# Patient Record
Sex: MALE | Race: WHITE | Employment: FULL TIME | URBAN - METROPOLITAN AREA
[De-identification: names, ages, dates, MRNs, and addresses within clinical notes are randomized per-mention and may not be internally consistent; named-entity substitution may affect disease eponyms.]

---

## 2017-06-23 ENCOUNTER — LAB ENCOUNTER (OUTPATIENT)
Dept: LAB | Age: 52
End: 2017-06-23
Attending: FAMILY MEDICINE
Payer: COMMERCIAL

## 2017-06-23 ENCOUNTER — OFFICE VISIT (OUTPATIENT)
Dept: FAMILY MEDICINE CLINIC | Facility: CLINIC | Age: 52
End: 2017-06-23

## 2017-06-23 VITALS
HEIGHT: 69 IN | SYSTOLIC BLOOD PRESSURE: 136 MMHG | TEMPERATURE: 98 F | DIASTOLIC BLOOD PRESSURE: 88 MMHG | WEIGHT: 233 LBS | HEART RATE: 94 BPM | BODY MASS INDEX: 34.51 KG/M2 | RESPIRATION RATE: 18 BRPM | OXYGEN SATURATION: 97 %

## 2017-06-23 DIAGNOSIS — Z13.29 SCREENING FOR THYROID DISORDER: ICD-10-CM

## 2017-06-23 DIAGNOSIS — Z13.220 SCREENING FOR LIPID DISORDERS: ICD-10-CM

## 2017-06-23 DIAGNOSIS — Z12.5 ENCOUNTER FOR SCREENING FOR MALIGNANT NEOPLASM OF PROSTATE: ICD-10-CM

## 2017-06-23 DIAGNOSIS — Z13.21 ENCOUNTER FOR VITAMIN DEFICIENCY SCREENING: ICD-10-CM

## 2017-06-23 DIAGNOSIS — Z13.1 ENCOUNTER FOR SCREENING FOR DIABETES MELLITUS: ICD-10-CM

## 2017-06-23 DIAGNOSIS — Z00.00 ROUTINE GENERAL MEDICAL EXAMINATION AT HEALTH CARE FACILITY: Primary | ICD-10-CM

## 2017-06-23 DIAGNOSIS — M1A.00X0 IDIOPATHIC CHRONIC GOUT WITHOUT TOPHUS, UNSPECIFIED SITE: ICD-10-CM

## 2017-06-23 DIAGNOSIS — Z13.0 SCREENING, ANEMIA, DEFICIENCY, IRON: ICD-10-CM

## 2017-06-23 DIAGNOSIS — Z00.00 LABORATORY EXAM ORDERED AS PART OF ROUTINE GENERAL MEDICAL EXAMINATION: ICD-10-CM

## 2017-06-23 DIAGNOSIS — E66.09 NON MORBID OBESITY DUE TO EXCESS CALORIES: ICD-10-CM

## 2017-06-23 DIAGNOSIS — I10 ESSENTIAL HYPERTENSION: ICD-10-CM

## 2017-06-23 DIAGNOSIS — Z80.0 FAMILY HISTORY OF COLON CANCER: ICD-10-CM

## 2017-06-23 PROCEDURE — 85025 COMPLETE CBC W/AUTO DIFF WBC: CPT

## 2017-06-23 PROCEDURE — 80053 COMPREHEN METABOLIC PANEL: CPT

## 2017-06-23 PROCEDURE — 82306 VITAMIN D 25 HYDROXY: CPT

## 2017-06-23 PROCEDURE — 36415 COLL VENOUS BLD VENIPUNCTURE: CPT

## 2017-06-23 PROCEDURE — 99396 PREV VISIT EST AGE 40-64: CPT | Performed by: FAMILY MEDICINE

## 2017-06-23 PROCEDURE — 80061 LIPID PANEL: CPT

## 2017-06-23 PROCEDURE — 84443 ASSAY THYROID STIM HORMONE: CPT

## 2017-06-23 RX ORDER — LOSARTAN POTASSIUM AND HYDROCHLOROTHIAZIDE 12.5; 1 MG/1; MG/1
1 TABLET ORAL DAILY
Qty: 90 TABLET | Refills: 3 | Status: SHIPPED | OUTPATIENT
Start: 2017-06-23 | End: 2020-04-01

## 2017-06-23 RX ORDER — ALLOPURINOL 100 MG/1
TABLET ORAL
Qty: 90 TABLET | Refills: 3 | Status: SHIPPED | OUTPATIENT
Start: 2017-06-23 | End: 2020-04-01

## 2017-06-23 NOTE — PROGRESS NOTES
Here for a physical.  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 Does not exercise. Positive family history of colon cancer. His never had a colonoscopy. Has a positive family history of premature cardiovascular disease.   Has gout but the last attack is bee

## 2019-07-28 ENCOUNTER — OFFICE VISIT (OUTPATIENT)
Dept: FAMILY MEDICINE CLINIC | Facility: CLINIC | Age: 54
End: 2019-07-28
Payer: COMMERCIAL

## 2019-07-28 VITALS
WEIGHT: 215.38 LBS | OXYGEN SATURATION: 97 % | HEIGHT: 67 IN | SYSTOLIC BLOOD PRESSURE: 116 MMHG | BODY MASS INDEX: 33.8 KG/M2 | TEMPERATURE: 100 F | RESPIRATION RATE: 20 BRPM | HEART RATE: 91 BPM | DIASTOLIC BLOOD PRESSURE: 74 MMHG

## 2019-07-28 DIAGNOSIS — J02.0 STREP PHARYNGITIS: Primary | ICD-10-CM

## 2019-07-28 DIAGNOSIS — J02.9 SORE THROAT: ICD-10-CM

## 2019-07-28 LAB
CONTROL LINE PRESENT WITH A CLEAR BACKGROUND (YES/NO): YES YES/NO
STREP GRP A CUL-SCR: NEGATIVE

## 2019-07-28 PROCEDURE — 99213 OFFICE O/P EST LOW 20 MIN: CPT | Performed by: NURSE PRACTITIONER

## 2019-07-28 PROCEDURE — 87880 STREP A ASSAY W/OPTIC: CPT | Performed by: NURSE PRACTITIONER

## 2019-07-28 RX ORDER — AMOXICILLIN 875 MG/1
875 TABLET, COATED ORAL 2 TIMES DAILY
Qty: 20 TABLET | Refills: 0 | Status: SHIPPED | OUTPATIENT
Start: 2019-07-28 | End: 2019-08-07

## 2019-07-28 NOTE — PROGRESS NOTES
CHIEF COMPLAINT:   Patient presents with:  Sore Throat: fever x1day      HPI:   Robert Montana is a 49year old male presents to clinic with symptoms of sore throat. Patient has had for 2 days. Symptoms have been worsenign since onset.   Patient reports fo NOSE: nostrils patent, no exudates, nasal mucosa pink and noninflamed  THROAT: oral mucosa pink, moist. Posterior pharynx erythematous and injected. Uvula is very red and swollen.   NECK: supple  LUNGS: clear to auscultation bilaterally, no wheezes or rhonc The patient is asked to follow up with their PCP prn. Patient Instructions   Use the antibiotic for 10 days  Ibuprofen 800mg three times a day      Pharyngitis: Strep (Presumed)    You have pharyngitis (sore throat).  The healthcare staff think your sore · Fever as directed by your healthcare provider  · New or worse ear pain, sinus pain, or headache  · Painful lumps in the back of neck  · Stiff neck  · Lymph nodes that get larger  · Can’t swallow liquids, a lot of drooling, or can’t open mouth wide due to

## 2019-07-28 NOTE — PATIENT INSTRUCTIONS
Use the antibiotic for 10 days  Ibuprofen 800mg three times a day      Pharyngitis: Strep (Presumed)    You have pharyngitis (sore throat).  The healthcare staff think your sore throat is caused by streptococcus (strep) bacteria. This is often called strep headache  · Painful lumps in the back of neck  · Stiff neck  · Lymph nodes that get larger  · Can’t swallow liquids, a lot of drooling, or can’t open mouth wide due to throat pain  · Signs of dehydration, such as very dark urine or no urine, sunken eyes, d

## 2019-10-17 ENCOUNTER — TELEPHONE (OUTPATIENT)
Dept: FAMILY MEDICINE CLINIC | Facility: CLINIC | Age: 54
End: 2019-10-17

## 2019-10-17 NOTE — TELEPHONE ENCOUNTER
Patient is having a gout flare up and the script he has is old and . Requesting if a new script can be sent to [facility]. Please advise.

## 2020-03-31 ENCOUNTER — VIRTUAL PHONE E/M (OUTPATIENT)
Dept: FAMILY MEDICINE CLINIC | Facility: CLINIC | Age: 55
End: 2020-03-31
Payer: COMMERCIAL

## 2020-03-31 DIAGNOSIS — I10 ESSENTIAL HYPERTENSION: ICD-10-CM

## 2020-03-31 DIAGNOSIS — M1A.0790 IDIOPATHIC CHRONIC GOUT OF FOOT WITHOUT TOPHUS, UNSPECIFIED LATERALITY: Primary | ICD-10-CM

## 2020-03-31 PROCEDURE — 99212 OFFICE O/P EST SF 10 MIN: CPT | Performed by: FAMILY MEDICINE

## 2020-03-31 NOTE — PROGRESS NOTES
Virtual/Telephone Check-In    [facility] verbally consents to a Virtual/Telephone Check-In service on 03/31/20. Patient understands and accepts financial responsibility for any deductible, co-insurance and/or co-pays associated with this service.

## 2020-03-31 NOTE — PROGRESS NOTES
Virtual/Telephone Check-In    [facility] verbally consents to a Virtual/Telephone Check-In service on 03/31/20. Patient understands and accepts financial responsibility for any deductible, co-insurance and/or co-pays associated with this service.

## 2020-04-01 RX ORDER — ALLOPURINOL 100 MG/1
TABLET ORAL
Qty: 90 TABLET | Refills: 3 | Status: SHIPPED | OUTPATIENT
Start: 2020-04-01 | End: 2021-11-05

## 2020-04-01 RX ORDER — LOSARTAN POTASSIUM 50 MG/1
50 TABLET ORAL DAILY
Qty: 90 TABLET | Refills: 2 | Status: SHIPPED | OUTPATIENT
Start: 2020-04-01 | End: 2021-03-12

## 2021-03-12 DIAGNOSIS — I10 ESSENTIAL HYPERTENSION: ICD-10-CM

## 2021-03-12 DIAGNOSIS — M1A.0790 IDIOPATHIC CHRONIC GOUT OF FOOT WITHOUT TOPHUS, UNSPECIFIED LATERALITY: ICD-10-CM

## 2021-03-12 RX ORDER — LOSARTAN POTASSIUM 50 MG/1
50 TABLET ORAL DAILY
Qty: 30 TABLET | Refills: 0 | Status: SHIPPED | OUTPATIENT
Start: 2021-03-12 | End: 2021-11-05

## 2021-11-05 ENCOUNTER — LAB ENCOUNTER (OUTPATIENT)
Dept: LAB | Age: 56
End: 2021-11-05
Attending: FAMILY MEDICINE
Payer: COMMERCIAL

## 2021-11-05 ENCOUNTER — OFFICE VISIT (OUTPATIENT)
Dept: FAMILY MEDICINE CLINIC | Facility: CLINIC | Age: 56
End: 2021-11-05

## 2021-11-05 VITALS
SYSTOLIC BLOOD PRESSURE: 128 MMHG | BODY MASS INDEX: 36.57 KG/M2 | DIASTOLIC BLOOD PRESSURE: 84 MMHG | OXYGEN SATURATION: 98 % | WEIGHT: 233 LBS | HEIGHT: 67 IN | HEART RATE: 86 BPM | RESPIRATION RATE: 16 BRPM

## 2021-11-05 DIAGNOSIS — M1A.0790 IDIOPATHIC CHRONIC GOUT OF FOOT WITHOUT TOPHUS, UNSPECIFIED LATERALITY: ICD-10-CM

## 2021-11-05 DIAGNOSIS — Z13.0 SCREENING FOR DEFICIENCY ANEMIA: ICD-10-CM

## 2021-11-05 DIAGNOSIS — I10 ESSENTIAL HYPERTENSION: ICD-10-CM

## 2021-11-05 DIAGNOSIS — Z12.5 PROSTATE CANCER SCREENING: ICD-10-CM

## 2021-11-05 DIAGNOSIS — Z13.220 LIPID SCREENING: ICD-10-CM

## 2021-11-05 DIAGNOSIS — Z00.00 WELLNESS EXAMINATION: Primary | ICD-10-CM

## 2021-11-05 DIAGNOSIS — Z00.00 WELLNESS EXAMINATION: ICD-10-CM

## 2021-11-05 DIAGNOSIS — Z12.11 SCREENING FOR COLON CANCER: ICD-10-CM

## 2021-11-05 PROCEDURE — 3008F BODY MASS INDEX DOCD: CPT | Performed by: FAMILY MEDICINE

## 2021-11-05 PROCEDURE — 80053 COMPREHEN METABOLIC PANEL: CPT

## 2021-11-05 PROCEDURE — 3079F DIAST BP 80-89 MM HG: CPT | Performed by: FAMILY MEDICINE

## 2021-11-05 PROCEDURE — 80061 LIPID PANEL: CPT

## 2021-11-05 PROCEDURE — 3074F SYST BP LT 130 MM HG: CPT | Performed by: FAMILY MEDICINE

## 2021-11-05 PROCEDURE — 99396 PREV VISIT EST AGE 40-64: CPT | Performed by: FAMILY MEDICINE

## 2021-11-05 PROCEDURE — 85025 COMPLETE CBC W/AUTO DIFF WBC: CPT

## 2021-11-05 PROCEDURE — 36415 COLL VENOUS BLD VENIPUNCTURE: CPT

## 2021-11-05 PROCEDURE — 84550 ASSAY OF BLOOD/URIC ACID: CPT

## 2021-11-05 RX ORDER — LOSARTAN POTASSIUM 50 MG/1
50 TABLET ORAL DAILY
Qty: 90 TABLET | Refills: 3 | Status: SHIPPED | OUTPATIENT
Start: 2021-11-05

## 2021-11-05 RX ORDER — ALLOPURINOL 100 MG/1
TABLET ORAL
Qty: 90 TABLET | Refills: 3 | Status: SHIPPED | OUTPATIENT
Start: 2021-11-05

## 2021-11-05 NOTE — PROGRESS NOTES
HPI:   Jai Thurman is a 55year old male who presents for an Annual Health Visit. No particular concerns or worries. Social:  , with a 5year old daughter.  Have family nearby, his side is all in [place], wife's here, [address] [place], [place] lb 6.4 oz (97.7 kg)  06/23/17 : 233 lb (105.7 kg)  06/20/16 : 232 lb (105.2 kg)  03/20/15 : 240 lb (108.9 kg)  09/26/14 : 238 lb (108 kg)    Body mass index is 36.49 kg/m².     General: alert, appears stated age and cooperative  Head: Normocephalic, without this visit. The patient indicates understanding of these issues and agrees to the plan.     Problem List:  Patient Active Problem List:     Gouty arthropathy, unspecified     Family history of colon cancer     Routine general medical examination at a [facility]

## 2023-08-02 ENCOUNTER — OFFICE VISIT (OUTPATIENT)
Dept: FAMILY MEDICINE CLINIC | Facility: CLINIC | Age: 58
End: 2023-08-02
Payer: COMMERCIAL

## 2023-08-02 ENCOUNTER — LAB ENCOUNTER (OUTPATIENT)
Dept: LAB | Age: 58
End: 2023-08-02
Attending: FAMILY MEDICINE
Payer: COMMERCIAL

## 2023-08-02 VITALS
WEIGHT: 215 LBS | RESPIRATION RATE: 18 BRPM | HEIGHT: 67 IN | BODY MASS INDEX: 33.74 KG/M2 | OXYGEN SATURATION: 98 % | DIASTOLIC BLOOD PRESSURE: 76 MMHG | TEMPERATURE: 98 F | SYSTOLIC BLOOD PRESSURE: 122 MMHG | HEART RATE: 68 BPM

## 2023-08-02 DIAGNOSIS — Z13.0 SCREENING FOR DEFICIENCY ANEMIA: ICD-10-CM

## 2023-08-02 DIAGNOSIS — Z13.220 LIPID SCREENING: ICD-10-CM

## 2023-08-02 DIAGNOSIS — Z12.5 PROSTATE CANCER SCREENING: ICD-10-CM

## 2023-08-02 DIAGNOSIS — Z12.11 SCREENING FOR COLON CANCER: ICD-10-CM

## 2023-08-02 DIAGNOSIS — I10 ESSENTIAL HYPERTENSION: ICD-10-CM

## 2023-08-02 DIAGNOSIS — Z00.00 WELLNESS EXAMINATION: ICD-10-CM

## 2023-08-02 DIAGNOSIS — Z00.00 WELLNESS EXAMINATION: Primary | ICD-10-CM

## 2023-08-02 DIAGNOSIS — M1A.0790 IDIOPATHIC CHRONIC GOUT OF FOOT WITHOUT TOPHUS, UNSPECIFIED LATERALITY: ICD-10-CM

## 2023-08-02 LAB
ALBUMIN SERPL-MCNC: 2.5 G/DL (ref 3.4–5)
ALBUMIN/GLOB SERPL: 0.5 {RATIO} (ref 1–2)
ALP LIVER SERPL-CCNC: 103 U/L
ALT SERPL-CCNC: 43 U/L
ANION GAP SERPL CALC-SCNC: 9 MMOL/L (ref 0–18)
AST SERPL-CCNC: 120 U/L (ref 15–37)
BASOPHILS # BLD AUTO: 0.06 X10(3) UL (ref 0–0.2)
BASOPHILS NFR BLD AUTO: 0.8 %
BILIRUB SERPL-MCNC: 5.3 MG/DL (ref 0.1–2)
BUN BLD-MCNC: 7 MG/DL (ref 7–18)
CALCIUM BLD-MCNC: 9 MG/DL (ref 8.5–10.1)
CHLORIDE SERPL-SCNC: 104 MMOL/L (ref 98–112)
CHOLEST SERPL-MCNC: 132 MG/DL (ref ?–200)
CO2 SERPL-SCNC: 20 MMOL/L (ref 21–32)
COMPLEXED PSA SERPL-MCNC: 0.25 NG/ML (ref ?–4)
CREAT BLD-MCNC: 1.11 MG/DL
EGFRCR SERPLBLD CKD-EPI 2021: 77 ML/MIN/1.73M2 (ref 60–?)
EOSINOPHIL # BLD AUTO: 0.13 X10(3) UL (ref 0–0.7)
EOSINOPHIL NFR BLD AUTO: 1.7 %
ERYTHROCYTE [DISTWIDTH] IN BLOOD BY AUTOMATED COUNT: 15 %
FASTING PATIENT LIPID ANSWER: YES
FASTING STATUS PATIENT QL REPORTED: YES
GLOBULIN PLAS-MCNC: 5.4 G/DL (ref 2.8–4.4)
GLUCOSE BLD-MCNC: 120 MG/DL (ref 70–99)
HCT VFR BLD AUTO: 29.6 %
HDLC SERPL-MCNC: 44 MG/DL (ref 40–59)
HGB BLD-MCNC: 10.3 G/DL
IMM GRANULOCYTES # BLD AUTO: 0.02 X10(3) UL (ref 0–1)
IMM GRANULOCYTES NFR BLD: 0.3 %
LDLC SERPL CALC-MCNC: 67 MG/DL (ref ?–100)
LYMPHOCYTES # BLD AUTO: 1.65 X10(3) UL (ref 1–4)
LYMPHOCYTES NFR BLD AUTO: 21.6 %
MCH RBC QN AUTO: 35 PG (ref 26–34)
MCHC RBC AUTO-ENTMCNC: 34.8 G/DL (ref 31–37)
MCV RBC AUTO: 100.7 FL
MONOCYTES # BLD AUTO: 0.68 X10(3) UL (ref 0.1–1)
MONOCYTES NFR BLD AUTO: 8.9 %
NEUTROPHILS # BLD AUTO: 5.09 X10 (3) UL (ref 1.5–7.7)
NEUTROPHILS # BLD AUTO: 5.09 X10(3) UL (ref 1.5–7.7)
NEUTROPHILS NFR BLD AUTO: 66.7 %
NONHDLC SERPL-MCNC: 88 MG/DL (ref ?–130)
OSMOLALITY SERPL CALC.SUM OF ELEC: 275 MOSM/KG (ref 275–295)
PLATELET # BLD AUTO: 101 10(3)UL (ref 150–450)
POTASSIUM SERPL-SCNC: 3.8 MMOL/L (ref 3.5–5.1)
PROT SERPL-MCNC: 7.9 G/DL (ref 6.4–8.2)
RBC # BLD AUTO: 2.94 X10(6)UL
SODIUM SERPL-SCNC: 133 MMOL/L (ref 136–145)
TRIGL SERPL-MCNC: 117 MG/DL (ref 30–149)
URATE SERPL-MCNC: 2.6 MG/DL
VLDLC SERPL CALC-MCNC: 18 MG/DL (ref 0–30)
WBC # BLD AUTO: 7.6 X10(3) UL (ref 4–11)

## 2023-08-02 PROCEDURE — 3008F BODY MASS INDEX DOCD: CPT | Performed by: FAMILY MEDICINE

## 2023-08-02 PROCEDURE — 84550 ASSAY OF BLOOD/URIC ACID: CPT

## 2023-08-02 PROCEDURE — 3078F DIAST BP <80 MM HG: CPT | Performed by: FAMILY MEDICINE

## 2023-08-02 PROCEDURE — 3074F SYST BP LT 130 MM HG: CPT | Performed by: FAMILY MEDICINE

## 2023-08-02 PROCEDURE — 80061 LIPID PANEL: CPT

## 2023-08-02 PROCEDURE — 36415 COLL VENOUS BLD VENIPUNCTURE: CPT

## 2023-08-02 PROCEDURE — 85025 COMPLETE CBC W/AUTO DIFF WBC: CPT

## 2023-08-02 PROCEDURE — 99396 PREV VISIT EST AGE 40-64: CPT | Performed by: FAMILY MEDICINE

## 2023-08-02 PROCEDURE — 80053 COMPREHEN METABOLIC PANEL: CPT

## 2023-08-02 RX ORDER — LOSARTAN POTASSIUM 50 MG/1
50 TABLET ORAL DAILY
Qty: 90 TABLET | Refills: 3 | Status: SHIPPED | OUTPATIENT
Start: 2023-08-02

## 2023-08-02 RX ORDER — ALLOPURINOL 100 MG/1
TABLET ORAL
Qty: 90 TABLET | Refills: 3 | Status: SHIPPED | OUTPATIENT
Start: 2023-08-02

## 2023-08-03 DIAGNOSIS — R74.8 ELEVATED LIVER ENZYMES: Primary | ICD-10-CM

## 2023-08-03 DIAGNOSIS — R17 PAINLESS JAUNDICE: ICD-10-CM

## 2023-08-04 ENCOUNTER — HOSPITAL ENCOUNTER (OUTPATIENT)
Dept: ULTRASOUND IMAGING | Facility: HOSPITAL | Age: 58
Discharge: HOME | End: 2023-08-04
Attending: FAMILY MEDICINE
Payer: COMMERCIAL

## 2023-08-04 DIAGNOSIS — R74.8 ELEVATED LIVER ENZYMES: ICD-10-CM

## 2023-08-04 DIAGNOSIS — R17 PAINLESS JAUNDICE: ICD-10-CM

## 2023-08-04 PROCEDURE — 76700 US EXAM ABDOM COMPLETE: CPT | Performed by: FAMILY MEDICINE

## 2023-08-07 DIAGNOSIS — R17 JAUNDICE: Primary | ICD-10-CM

## 2023-08-11 ENCOUNTER — LAB ENCOUNTER (OUTPATIENT)
Dept: LAB | Age: 58
End: 2023-08-11
Attending: FAMILY MEDICINE
Payer: COMMERCIAL

## 2023-08-11 DIAGNOSIS — R17 PAINLESS JAUNDICE: ICD-10-CM

## 2023-08-11 DIAGNOSIS — R17 JAUNDICE: ICD-10-CM

## 2023-08-11 DIAGNOSIS — R74.8 ELEVATED LIVER ENZYMES: ICD-10-CM

## 2023-08-11 LAB
HAV IGM SER QL: NONREACTIVE
HBV CORE IGM SER QL: NONREACTIVE
HBV SURFACE AG SERPL QL IA: NONREACTIVE
HCV AB SERPL QL IA: NONREACTIVE
INR BLD: 1.97 (ref 0.85–1.16)
PROTHROMBIN TIME: 22.3 SECONDS (ref 11.6–14.8)

## 2023-08-11 PROCEDURE — 83516 IMMUNOASSAY NONANTIBODY: CPT

## 2023-08-11 PROCEDURE — 80074 ACUTE HEPATITIS PANEL: CPT

## 2023-08-11 PROCEDURE — 36415 COLL VENOUS BLD VENIPUNCTURE: CPT

## 2023-08-11 PROCEDURE — 85610 PROTHROMBIN TIME: CPT

## 2023-08-12 LAB — M2 MITOCHONDRIAL AB: <20 UNITS

## 2023-08-16 ENCOUNTER — OFFICE VISIT (OUTPATIENT)
Dept: FAMILY MEDICINE CLINIC | Facility: CLINIC | Age: 58
End: 2023-08-16
Payer: COMMERCIAL

## 2023-08-16 VITALS
SYSTOLIC BLOOD PRESSURE: 112 MMHG | TEMPERATURE: 98 F | DIASTOLIC BLOOD PRESSURE: 50 MMHG | WEIGHT: 215 LBS | RESPIRATION RATE: 16 BRPM | OXYGEN SATURATION: 98 % | HEART RATE: 97 BPM | BODY MASS INDEX: 33.74 KG/M2 | HEIGHT: 67 IN

## 2023-08-16 DIAGNOSIS — K74.60 CIRRHOSIS OF LIVER WITH ASCITES, UNSPECIFIED HEPATIC CIRRHOSIS TYPE: Primary | ICD-10-CM

## 2023-08-16 DIAGNOSIS — R18.8 CIRRHOSIS OF LIVER WITH ASCITES, UNSPECIFIED HEPATIC CIRRHOSIS TYPE: Primary | ICD-10-CM

## 2023-08-16 PROCEDURE — 3074F SYST BP LT 130 MM HG: CPT | Performed by: FAMILY MEDICINE

## 2023-08-16 PROCEDURE — 99213 OFFICE O/P EST LOW 20 MIN: CPT | Performed by: FAMILY MEDICINE

## 2023-08-16 PROCEDURE — 3078F DIAST BP <80 MM HG: CPT | Performed by: FAMILY MEDICINE

## 2023-08-16 PROCEDURE — 3008F BODY MASS INDEX DOCD: CPT | Performed by: FAMILY MEDICINE

## 2023-08-22 ENCOUNTER — APPOINTMENT (OUTPATIENT)
Dept: ULTRASOUND IMAGING | Facility: HOSPITAL | Age: 58
End: 2023-08-22
Attending: NURSE PRACTITIONER
Payer: COMMERCIAL

## 2023-08-22 ENCOUNTER — APPOINTMENT (OUTPATIENT)
Dept: GENERAL RADIOLOGY | Facility: HOSPITAL | Age: 58
End: 2023-08-22
Attending: NURSE PRACTITIONER
Payer: COMMERCIAL

## 2023-08-22 ENCOUNTER — APPOINTMENT (OUTPATIENT)
Dept: CT IMAGING | Facility: HOSPITAL | Age: 58
End: 2023-08-22
Attending: EMERGENCY MEDICINE
Payer: COMMERCIAL

## 2023-08-22 ENCOUNTER — APPOINTMENT (OUTPATIENT)
Dept: ULTRASOUND IMAGING | Facility: HOSPITAL | Age: 58
End: 2023-08-22
Attending: EMERGENCY MEDICINE
Payer: COMMERCIAL

## 2023-08-22 ENCOUNTER — HOSPITAL ENCOUNTER (INPATIENT)
Facility: HOSPITAL | Age: 58
LOS: 3 days | Discharge: HOME | End: 2023-08-25
Attending: EMERGENCY MEDICINE | Admitting: HOSPITALIST
Payer: COMMERCIAL

## 2023-08-22 DIAGNOSIS — R17 ELEVATED BILIRUBIN: ICD-10-CM

## 2023-08-22 DIAGNOSIS — R73.9 HYPERGLYCEMIA: ICD-10-CM

## 2023-08-22 DIAGNOSIS — E87.6 HYPOKALEMIA: ICD-10-CM

## 2023-08-22 DIAGNOSIS — R17 SCLERAL ICTERUS: ICD-10-CM

## 2023-08-22 DIAGNOSIS — R79.89 ELEVATED LACTIC ACID LEVEL: ICD-10-CM

## 2023-08-22 DIAGNOSIS — E87.1 HYPONATREMIA: ICD-10-CM

## 2023-08-22 DIAGNOSIS — R18.8 OTHER ASCITES: ICD-10-CM

## 2023-08-22 DIAGNOSIS — R74.8 ELEVATED LIPASE: ICD-10-CM

## 2023-08-22 DIAGNOSIS — K65.2 SBP (SPONTANEOUS BACTERIAL PERITONITIS) (HCC): Primary | ICD-10-CM

## 2023-08-22 DIAGNOSIS — D69.6 THROMBOCYTOPENIA (HCC): ICD-10-CM

## 2023-08-22 DIAGNOSIS — A41.9 SEPSIS DUE TO UNDETERMINED ORGANISM (HCC): ICD-10-CM

## 2023-08-22 PROBLEM — K74.60 CIRRHOSIS OF LIVER (HCC): Status: ACTIVE | Noted: 2023-08-22

## 2023-08-22 LAB
A1AT SERPL-MCNC: 186 MG/DL (ref 90–200)
AFP-TM SERPL-MCNC: 9.3 NG/ML (ref ?–8)
ALBUMIN FLD-MCNC: 0.7 G/DL
ALBUMIN SERPL-MCNC: 2.6 G/DL (ref 3.4–5)
ALBUMIN/GLOB SERPL: 0.5 {RATIO} (ref 1–2)
ALP LIVER SERPL-CCNC: 128 U/L
ALT SERPL-CCNC: 51 U/L
ANION GAP SERPL CALC-SCNC: 6 MMOL/L (ref 0–18)
AST SERPL-CCNC: 92 U/L (ref 15–37)
BASOPHILS # BLD AUTO: 0.07 X10(3) UL (ref 0–0.2)
BASOPHILS NFR BLD AUTO: 0.8 %
BASOPHILS NFR PRT: 0 %
BILIRUB SERPL-MCNC: 6.5 MG/DL (ref 0.1–2)
BILIRUB UR QL CFM: POSITIVE
BUN BLD-MCNC: 6 MG/DL (ref 7–18)
CALCIUM BLD-MCNC: 9 MG/DL (ref 8.5–10.1)
CHLORIDE SERPL-SCNC: 105 MMOL/L (ref 98–112)
CLARITY UR REFRACT.AUTO: CLEAR
CO2 SERPL-SCNC: 23 MMOL/L (ref 21–32)
COLOR FLD: YELLOW
COLOR UR AUTO: YELLOW
CREAT BLD-MCNC: 1.06 MG/DL
DEPRECATED HBV CORE AB SER IA-ACNC: 1435.4 NG/ML
EGFRCR SERPLBLD CKD-EPI 2021: 82 ML/MIN/1.73M2 (ref 60–?)
EOSINOPHIL # BLD AUTO: 0.19 X10(3) UL (ref 0–0.7)
EOSINOPHIL NFR BLD AUTO: 2.1 %
EOSINOPHIL NFR PRT: 0 %
ERYTHROCYTE [DISTWIDTH] IN BLOOD BY AUTOMATED COUNT: 14.4 %
FOLATE SERPL-MCNC: 4.2 NG/ML (ref 8.7–?)
GLOBULIN PLAS-MCNC: 5.7 G/DL (ref 2.8–4.4)
GLUCOSE BLD-MCNC: 126 MG/DL (ref 70–99)
GLUCOSE UR STRIP.AUTO-MCNC: NORMAL MG/DL
HBV CORE AB SERPL QL IA: NONREACTIVE
HCT VFR BLD AUTO: 32.8 %
HGB BLD-MCNC: 11 G/DL
IMM GRANULOCYTES # BLD AUTO: 0.05 X10(3) UL (ref 0–1)
IMM GRANULOCYTES NFR BLD: 0.6 %
IMMUNOGLOBULIN PNL SER-MCNC: 2520 MG/DL (ref 791–1643)
INR BLD: 1.89 (ref 0.85–1.16)
IRON SATN MFR SERPL: 86 %
IRON SERPL-MCNC: 123 UG/DL
KETONES UR STRIP.AUTO-MCNC: NEGATIVE MG/DL
LACTATE SERPL-SCNC: 1.1 MMOL/L (ref 0.4–2)
LACTATE SERPL-SCNC: 2.1 MMOL/L (ref 0.4–2)
LEUKOCYTE ESTERASE UR QL STRIP.AUTO: NEGATIVE
LIPASE SERPL-CCNC: 107 U/L (ref 13–75)
LYMPHOCYTES # BLD AUTO: 1.82 X10(3) UL (ref 1–4)
LYMPHOCYTES NFR BLD AUTO: 20.4 %
LYMPHOCYTES NFR PRT: 42 %
MCH RBC QN AUTO: 34.3 PG (ref 26–34)
MCHC RBC AUTO-ENTMCNC: 33.5 G/DL (ref 31–37)
MCV RBC AUTO: 102.2 FL
MESOTHL CELL NFR PRT: 5 %
MONOCYTES # BLD AUTO: 0.58 X10(3) UL (ref 0.1–1)
MONOCYTES NFR BLD AUTO: 6.5 %
MONOS+MACROS NFR PRT: 32 %
NEUTROPHILS # BLD AUTO: 6.22 X10 (3) UL (ref 1.5–7.7)
NEUTROPHILS # BLD AUTO: 6.22 X10(3) UL (ref 1.5–7.7)
NEUTROPHILS NFR BLD AUTO: 69.6 %
NEUTROPHILS PERITONEAL FLUID: 21 %
NITRITE UR QL STRIP.AUTO: NEGATIVE
OSMOLALITY SERPL CALC.SUM OF ELEC: 277 MOSM/KG (ref 275–295)
PH UR STRIP.AUTO: 6.5 [PH] (ref 5–8)
PLATELET # BLD AUTO: 107 10(3)UL (ref 150–450)
POTASSIUM SERPL-SCNC: 3.1 MMOL/L (ref 3.5–5.1)
POTASSIUM SERPL-SCNC: 3.7 MMOL/L (ref 3.5–5.1)
PROT FLD-MCNC: 1.7 G/DL
PROT SERPL-MCNC: 8.3 G/DL (ref 6.4–8.2)
PROTHROMBIN TIME: 21.5 SECONDS (ref 11.6–14.8)
RBC # BLD AUTO: 3.21 X10(6)UL
RBC PERITONEAL FLUID: <3000 /MM3
RBC UR QL AUTO: NEGATIVE
SODIUM SERPL-SCNC: 134 MMOL/L (ref 136–145)
SP GR UR STRIP.AUTO: >1.03 (ref 1–1.03)
TIBC SERPL-MCNC: 143 UG/DL (ref 240–450)
TOTAL CELLS COUNTED FLD: 100
TRANSFERRIN SERPL-MCNC: 96 MG/DL (ref 200–360)
TRANSFERRIN SERPL-MCNC: 96 MG/DL (ref 200–360)
UROBILINOGEN UR STRIP.AUTO-MCNC: 3 MG/DL
VIT B12 SERPL-MCNC: 1308 PG/ML (ref 193–986)
WBC # BLD AUTO: 8.9 X10(3) UL (ref 4–11)
WBC # PRT: 173 /MM3

## 2023-08-22 PROCEDURE — 74177 CT ABD & PELVIS W/CONTRAST: CPT | Performed by: EMERGENCY MEDICINE

## 2023-08-22 PROCEDURE — 76705 ECHO EXAM OF ABDOMEN: CPT | Performed by: EMERGENCY MEDICINE

## 2023-08-22 PROCEDURE — 0W9G3ZZ DRAINAGE OF PERITONEAL CAVITY, PERCUTANEOUS APPROACH: ICD-10-PCS | Performed by: RADIOLOGY

## 2023-08-22 PROCEDURE — 49083 ABD PARACENTESIS W/IMAGING: CPT | Performed by: NURSE PRACTITIONER

## 2023-08-22 PROCEDURE — 99223 1ST HOSP IP/OBS HIGH 75: CPT | Performed by: HOSPITALIST

## 2023-08-22 PROCEDURE — 93975 VASCULAR STUDY: CPT | Performed by: NURSE PRACTITIONER

## 2023-08-22 PROCEDURE — 99223 1ST HOSP IP/OBS HIGH 75: CPT | Performed by: SURGERY

## 2023-08-22 PROCEDURE — 71046 X-RAY EXAM CHEST 2 VIEWS: CPT | Performed by: NURSE PRACTITIONER

## 2023-08-22 RX ORDER — BISACODYL 10 MG
10 SUPPOSITORY, RECTAL RECTAL
Status: DISCONTINUED | OUTPATIENT
Start: 2023-08-22 | End: 2023-08-25

## 2023-08-22 RX ORDER — MORPHINE SULFATE 2 MG/ML
1 INJECTION, SOLUTION INTRAMUSCULAR; INTRAVENOUS EVERY 2 HOUR PRN
Status: DISCONTINUED | OUTPATIENT
Start: 2023-08-22 | End: 2023-08-25

## 2023-08-22 RX ORDER — HYDROMORPHONE HYDROCHLORIDE 1 MG/ML
1 INJECTION, SOLUTION INTRAMUSCULAR; INTRAVENOUS; SUBCUTANEOUS ONCE
Status: COMPLETED | OUTPATIENT
Start: 2023-08-22 | End: 2023-08-22

## 2023-08-22 RX ORDER — ENEMA 19; 7 G/133ML; G/133ML
1 ENEMA RECTAL ONCE AS NEEDED
Status: DISCONTINUED | OUTPATIENT
Start: 2023-08-22 | End: 2023-08-25

## 2023-08-22 RX ORDER — LOSARTAN POTASSIUM 50 MG/1
50 TABLET ORAL DAILY
Status: DISCONTINUED | OUTPATIENT
Start: 2023-08-22 | End: 2023-08-25

## 2023-08-22 RX ORDER — ONDANSETRON 2 MG/ML
4 INJECTION INTRAMUSCULAR; INTRAVENOUS EVERY 6 HOURS PRN
Status: DISCONTINUED | OUTPATIENT
Start: 2023-08-22 | End: 2023-08-25

## 2023-08-22 RX ORDER — MORPHINE SULFATE 4 MG/ML
4 INJECTION, SOLUTION INTRAMUSCULAR; INTRAVENOUS EVERY 2 HOUR PRN
Status: DISCONTINUED | OUTPATIENT
Start: 2023-08-22 | End: 2023-08-25

## 2023-08-22 RX ORDER — POLYETHYLENE GLYCOL 3350 17 G/17G
17 POWDER, FOR SOLUTION ORAL DAILY PRN
Status: DISCONTINUED | OUTPATIENT
Start: 2023-08-22 | End: 2023-08-25

## 2023-08-22 RX ORDER — PIPERACILLIN SODIUM, TAZOBACTAM SODIUM 4; .5 G/20ML; G/20ML
INJECTION, POWDER, LYOPHILIZED, FOR SOLUTION INTRAVENOUS
Status: DISPENSED
Start: 2023-08-22 | End: 2023-08-22

## 2023-08-22 RX ORDER — SENNOSIDES 8.6 MG
17.2 TABLET ORAL NIGHTLY PRN
Status: DISCONTINUED | OUTPATIENT
Start: 2023-08-22 | End: 2023-08-25

## 2023-08-22 RX ORDER — MORPHINE SULFATE 2 MG/ML
2 INJECTION, SOLUTION INTRAMUSCULAR; INTRAVENOUS EVERY 2 HOUR PRN
Status: DISCONTINUED | OUTPATIENT
Start: 2023-08-22 | End: 2023-08-25

## 2023-08-22 RX ORDER — POTASSIUM CHLORIDE 14.9 MG/ML
20 INJECTION INTRAVENOUS ONCE
Status: COMPLETED | OUTPATIENT
Start: 2023-08-22 | End: 2023-08-22

## 2023-08-22 RX ORDER — MELATONIN
3 NIGHTLY PRN
Status: DISCONTINUED | OUTPATIENT
Start: 2023-08-22 | End: 2023-08-25

## 2023-08-22 RX ORDER — PROCHLORPERAZINE EDISYLATE 5 MG/ML
5 INJECTION INTRAMUSCULAR; INTRAVENOUS EVERY 8 HOURS PRN
Status: DISCONTINUED | OUTPATIENT
Start: 2023-08-22 | End: 2023-08-25

## 2023-08-22 RX ORDER — ONDANSETRON 2 MG/ML
4 INJECTION INTRAMUSCULAR; INTRAVENOUS ONCE
Status: COMPLETED | OUTPATIENT
Start: 2023-08-22 | End: 2023-08-22

## 2023-08-22 NOTE — PROGRESS NOTES
Patient back to room post Paracentesis. Report received from [facility], removed 2.6 L of fluid from paracentesis. Paracentesis site at RLQ, dressing intact.

## 2023-08-22 NOTE — ED QUICK NOTES
Orders for admission, patient is aware of plan and ready to go upstairs. Any questions, please call ED RN Mary at [address] [place] [address].      Patient Covid vaccination status: Fully vaccinated     COVID Test Ordered in ED: None    COVID Suspicion at Admission: N/A    Running Infusions:  0.9 @200mL/hr and KCL @50ml/hr    Mental Status/LOC at time of transport: AOx4    Other pertinent information:   CIWA score: N/A   NIH score:  N/A

## 2023-08-22 NOTE — PLAN OF CARE
Patient A&Ox4, VSS on room air. 2L O2 PRN. Patient reporting moderate-severe pain to R abdomen, IV pain medication given with adequate relief. Patient ambulating with steady gait, voiding freely, LBM 8/21. NPO. Plan for US ABD, Chest Xray and Paracentesis. POC discussed with patient.

## 2023-08-22 NOTE — PROGRESS NOTES
NURSING ADMISSION NOTE      Patient admitted via Cart from ED  Oriented to room. Safety precautions initiated. Bed in low position. Call light in reach.     Page sent to Gen surg MD  Page sent to DILIP MAYORGA

## 2023-08-23 PROBLEM — K80.50 CALCULUS OF BILE DUCT WITHOUT CHOLECYSTITIS AND WITHOUT OBSTRUCTION: Status: ACTIVE | Noted: 2023-08-23

## 2023-08-23 LAB
ACTIN SMOOTH MUSCLE AB: 20 UNITS
ALBUMIN SERPL-MCNC: 2.3 G/DL (ref 3.4–5)
ALBUMIN/GLOB SERPL: 0.5 {RATIO} (ref 1–2)
ALP LIVER SERPL-CCNC: 82 U/L
ALT SERPL-CCNC: 42 U/L
ANION GAP SERPL CALC-SCNC: 5 MMOL/L (ref 0–18)
AST SERPL-CCNC: 68 U/L (ref 15–37)
BASOPHILS # BLD AUTO: 0.06 X10(3) UL (ref 0–0.2)
BASOPHILS NFR BLD AUTO: 0.8 %
BILIRUB SERPL-MCNC: 5.8 MG/DL (ref 0.1–2)
BUN BLD-MCNC: 6 MG/DL (ref 7–18)
CALCIUM BLD-MCNC: 8.6 MG/DL (ref 8.5–10.1)
CHLORIDE SERPL-SCNC: 109 MMOL/L (ref 98–112)
CO2 SERPL-SCNC: 24 MMOL/L (ref 21–32)
CREAT BLD-MCNC: 0.96 MG/DL
EGFRCR SERPLBLD CKD-EPI 2021: 92 ML/MIN/1.73M2 (ref 60–?)
EOSINOPHIL # BLD AUTO: 0.21 X10(3) UL (ref 0–0.7)
EOSINOPHIL NFR BLD AUTO: 2.9 %
ERYTHROCYTE [DISTWIDTH] IN BLOOD BY AUTOMATED COUNT: 14.6 %
GLOBULIN PLAS-MCNC: 4.4 G/DL (ref 2.8–4.4)
GLUCOSE BLD-MCNC: 106 MG/DL (ref 70–99)
HCT VFR BLD AUTO: 29.6 %
HGB BLD-MCNC: 10.1 G/DL
IMM GRANULOCYTES # BLD AUTO: 0.05 X10(3) UL (ref 0–1)
IMM GRANULOCYTES NFR BLD: 0.7 %
INR BLD: 2.15 (ref 0.85–1.16)
LYMPHOCYTES # BLD AUTO: 1.7 X10(3) UL (ref 1–4)
LYMPHOCYTES NFR BLD AUTO: 23.1 %
M2 MITOCHONDRIAL AB: <20 UNITS
MCH RBC QN AUTO: 35.1 PG (ref 26–34)
MCHC RBC AUTO-ENTMCNC: 34.1 G/DL (ref 31–37)
MCV RBC AUTO: 102.8 FL
MONOCYTES # BLD AUTO: 0.64 X10(3) UL (ref 0.1–1)
MONOCYTES NFR BLD AUTO: 8.7 %
NEUTROPHILS # BLD AUTO: 4.69 X10 (3) UL (ref 1.5–7.7)
NEUTROPHILS # BLD AUTO: 4.69 X10(3) UL (ref 1.5–7.7)
NEUTROPHILS NFR BLD AUTO: 63.8 %
OSMOLALITY SERPL CALC.SUM OF ELEC: 284 MOSM/KG (ref 275–295)
PLATELET # BLD AUTO: 88 10(3)UL (ref 150–450)
PLATELET MORPHOLOGY: NORMAL
POTASSIUM SERPL-SCNC: 3.5 MMOL/L (ref 3.5–5.1)
PROT SERPL-MCNC: 6.7 G/DL (ref 6.4–8.2)
PROTHROMBIN TIME: 23.8 SECONDS (ref 11.6–14.8)
RBC # BLD AUTO: 2.88 X10(6)UL
SODIUM SERPL-SCNC: 138 MMOL/L (ref 136–145)
WBC # BLD AUTO: 7.4 X10(3) UL (ref 4–11)

## 2023-08-23 PROCEDURE — 99232 SBSQ HOSP IP/OBS MODERATE 35: CPT | Performed by: SURGERY

## 2023-08-23 PROCEDURE — 99233 SBSQ HOSP IP/OBS HIGH 50: CPT | Performed by: INTERNAL MEDICINE

## 2023-08-23 RX ORDER — ALLOPURINOL 100 MG/1
100 TABLET ORAL DAILY
Status: DISCONTINUED | OUTPATIENT
Start: 2023-08-23 | End: 2023-08-25

## 2023-08-23 RX ORDER — FUROSEMIDE 40 MG/1
40 TABLET ORAL DAILY
Status: DISCONTINUED | OUTPATIENT
Start: 2023-08-23 | End: 2023-08-25

## 2023-08-23 RX ORDER — HYDROCODONE BITARTRATE AND ACETAMINOPHEN 5; 325 MG/1; MG/1
1 TABLET ORAL EVERY 6 HOURS PRN
Status: DISCONTINUED | OUTPATIENT
Start: 2023-08-23 | End: 2023-08-25

## 2023-08-23 RX ORDER — CIPROFLOXACIN 500 MG/1
500 TABLET, FILM COATED ORAL
Status: DISCONTINUED | OUTPATIENT
Start: 2023-08-23 | End: 2023-08-25

## 2023-08-23 RX ORDER — SPIRONOLACTONE 25 MG/1
100 TABLET ORAL DAILY
Status: DISCONTINUED | OUTPATIENT
Start: 2023-08-23 | End: 2023-08-25

## 2023-08-23 NOTE — PLAN OF CARE
Patient A&Ox4, VSS on room air. O2 PRN via NC. Diet advanced, tolerating well, denies nausea. IV ABX continued per orders. Diuretics started this morning with good output. Ambulating independently with steady gait. MRI to be completed, check list done. Plan for NPO at midnight, EGD 8/24, consent signed and on chart. POC discussed with patient.

## 2023-08-24 ENCOUNTER — APPOINTMENT (OUTPATIENT)
Dept: MRI IMAGING | Facility: HOSPITAL | Age: 58
End: 2023-08-24
Attending: NURSE PRACTITIONER
Payer: COMMERCIAL

## 2023-08-24 ENCOUNTER — ANESTHESIA (OUTPATIENT)
Dept: ENDOSCOPY | Facility: HOSPITAL | Age: 58
End: 2023-08-24
Payer: COMMERCIAL

## 2023-08-24 ENCOUNTER — ANESTHESIA EVENT (OUTPATIENT)
Dept: ENDOSCOPY | Facility: HOSPITAL | Age: 58
End: 2023-08-24
Payer: COMMERCIAL

## 2023-08-24 PROBLEM — K20.90 ESOPHAGITIS: Status: ACTIVE | Noted: 2023-08-24

## 2023-08-24 LAB
ALBUMIN SERPL-MCNC: 2.1 G/DL (ref 3.4–5)
ALBUMIN/GLOB SERPL: 0.5 {RATIO} (ref 1–2)
ALP LIVER SERPL-CCNC: 92 U/L
ALT SERPL-CCNC: 37 U/L
ANION GAP SERPL CALC-SCNC: 6 MMOL/L (ref 0–18)
AST SERPL-CCNC: 62 U/L (ref 15–37)
BASOPHILS # BLD AUTO: 0.04 X10(3) UL (ref 0–0.2)
BASOPHILS NFR BLD AUTO: 0.6 %
BILIRUB SERPL-MCNC: 4.7 MG/DL (ref 0.1–2)
BUN BLD-MCNC: 5 MG/DL (ref 7–18)
CALCIUM BLD-MCNC: 7.9 MG/DL (ref 8.5–10.1)
CHLORIDE SERPL-SCNC: 105 MMOL/L (ref 98–112)
CO2 SERPL-SCNC: 25 MMOL/L (ref 21–32)
CREAT BLD-MCNC: 0.81 MG/DL
EGFRCR SERPLBLD CKD-EPI 2021: 103 ML/MIN/1.73M2 (ref 60–?)
EOSINOPHIL # BLD AUTO: 0.16 X10(3) UL (ref 0–0.7)
EOSINOPHIL NFR BLD AUTO: 2.2 %
ERYTHROCYTE [DISTWIDTH] IN BLOOD BY AUTOMATED COUNT: 14.3 %
GLOBULIN PLAS-MCNC: 4.4 G/DL (ref 2.8–4.4)
GLUCOSE BLD-MCNC: 95 MG/DL (ref 70–99)
HCT VFR BLD AUTO: 26.8 %
HGB BLD-MCNC: 9.4 G/DL
IMM GRANULOCYTES # BLD AUTO: 0.03 X10(3) UL (ref 0–1)
IMM GRANULOCYTES NFR BLD: 0.4 %
INR BLD: 2.31 (ref 0.85–1.16)
LYMPHOCYTES # BLD AUTO: 1.9 X10(3) UL (ref 1–4)
LYMPHOCYTES NFR BLD AUTO: 26.6 %
MAGNESIUM SERPL-MCNC: 1.5 MG/DL (ref 1.6–2.6)
MCH RBC QN AUTO: 34.9 PG (ref 26–34)
MCHC RBC AUTO-ENTMCNC: 35.1 G/DL (ref 31–37)
MCV RBC AUTO: 99.6 FL
MONOCYTES # BLD AUTO: 0.48 X10(3) UL (ref 0.1–1)
MONOCYTES NFR BLD AUTO: 6.7 %
NEUTROPHILS # BLD AUTO: 4.53 X10 (3) UL (ref 1.5–7.7)
NEUTROPHILS # BLD AUTO: 4.53 X10(3) UL (ref 1.5–7.7)
NEUTROPHILS NFR BLD AUTO: 63.5 %
OSMOLALITY SERPL CALC.SUM OF ELEC: 279 MOSM/KG (ref 275–295)
PLATELET # BLD AUTO: 83 10(3)UL (ref 150–450)
POTASSIUM SERPL-SCNC: 3.1 MMOL/L (ref 3.5–5.1)
PROT SERPL-MCNC: 6.5 G/DL (ref 6.4–8.2)
PROTHROMBIN TIME: 25.1 SECONDS (ref 11.6–14.8)
RBC # BLD AUTO: 2.69 X10(6)UL
SODIUM SERPL-SCNC: 136 MMOL/L (ref 136–145)
WBC # BLD AUTO: 7.1 X10(3) UL (ref 4–11)

## 2023-08-24 PROCEDURE — 0DB58ZX EXCISION OF ESOPHAGUS, VIA NATURAL OR ARTIFICIAL OPENING ENDOSCOPIC, DIAGNOSTIC: ICD-10-PCS | Performed by: INTERNAL MEDICINE

## 2023-08-24 PROCEDURE — 99232 SBSQ HOSP IP/OBS MODERATE 35: CPT | Performed by: INTERNAL MEDICINE

## 2023-08-24 PROCEDURE — 99231 SBSQ HOSP IP/OBS SF/LOW 25: CPT | Performed by: SURGERY

## 2023-08-24 PROCEDURE — 74183 MRI ABD W/O CNTR FLWD CNTR: CPT | Performed by: NURSE PRACTITIONER

## 2023-08-24 RX ORDER — SODIUM CHLORIDE, SODIUM LACTATE, POTASSIUM CHLORIDE, CALCIUM CHLORIDE 600; 310; 30; 20 MG/100ML; MG/100ML; MG/100ML; MG/100ML
INJECTION, SOLUTION INTRAVENOUS CONTINUOUS PRN
Status: DISCONTINUED | OUTPATIENT
Start: 2023-08-24 | End: 2023-08-24 | Stop reason: SURG

## 2023-08-24 RX ORDER — LIDOCAINE HYDROCHLORIDE 10 MG/ML
INJECTION, SOLUTION EPIDURAL; INFILTRATION; INTRACAUDAL; PERINEURAL AS NEEDED
Status: DISCONTINUED | OUTPATIENT
Start: 2023-08-24 | End: 2023-08-24 | Stop reason: SURG

## 2023-08-24 RX ORDER — MAGNESIUM OXIDE 400 MG/1
800 TABLET ORAL ONCE
Status: COMPLETED | OUTPATIENT
Start: 2023-08-24 | End: 2023-08-24

## 2023-08-24 RX ORDER — PANTOPRAZOLE SODIUM 40 MG/1
40 TABLET, DELAYED RELEASE ORAL
Status: DISCONTINUED | OUTPATIENT
Start: 2023-08-24 | End: 2023-08-25

## 2023-08-24 RX ORDER — POTASSIUM CHLORIDE 20 MEQ/1
40 TABLET, EXTENDED RELEASE ORAL ONCE
Status: COMPLETED | OUTPATIENT
Start: 2023-08-24 | End: 2023-08-24

## 2023-08-24 RX ORDER — SODIUM CHLORIDE, SODIUM LACTATE, POTASSIUM CHLORIDE, CALCIUM CHLORIDE 600; 310; 30; 20 MG/100ML; MG/100ML; MG/100ML; MG/100ML
INJECTION, SOLUTION INTRAVENOUS CONTINUOUS
Status: DISCONTINUED | OUTPATIENT
Start: 2023-08-24 | End: 2023-08-24

## 2023-08-24 RX ORDER — NALOXONE HYDROCHLORIDE 0.4 MG/ML
80 INJECTION, SOLUTION INTRAMUSCULAR; INTRAVENOUS; SUBCUTANEOUS AS NEEDED
Status: ACTIVE | OUTPATIENT
Start: 2023-08-24 | End: 2023-08-24

## 2023-08-24 RX ORDER — ONDANSETRON 2 MG/ML
4 INJECTION INTRAMUSCULAR; INTRAVENOUS AS NEEDED
Status: ACTIVE | OUTPATIENT
Start: 2023-08-24 | End: 2023-08-24

## 2023-08-24 RX ADMIN — SODIUM CHLORIDE, SODIUM LACTATE, POTASSIUM CHLORIDE, CALCIUM CHLORIDE: 600; 310; 30; 20 INJECTION, SOLUTION INTRAVENOUS at 10:24:00

## 2023-08-24 RX ADMIN — LIDOCAINE HYDROCHLORIDE 30 MG: 10 INJECTION, SOLUTION EPIDURAL; INFILTRATION; INTRACAUDAL; PERINEURAL at 10:27:00

## 2023-08-24 NOTE — OPERATIVE REPORT
EGD Operative Report  Patient Name: Jessy Quezada  YOB: 1965  MRN: [id number]  Procedure: Esophagogastroduodenoscopy (EGD)  with biopsies  Pre-operative Diagnosis & Indication: cirrhosis, EV screening  Post-operative Diagnosis: esophagitis, possible Crow's esophagus, portal hypertensive gastropathy  Attending Endoscopist: Yasmeen Miller MD  Informed Consent: The planned procedure(s), the explanation of the procedure, its expected benefits, the potential complications and risks and possible alternatives and their benefits and risks were discussed with the patient or the patient's surrogate. The discussion of risks, not limited to but including bleeding, infection, perforation, adverse effects from anesthesia, need for emergency surgery/prolonged hospitalization,  cardiac arrhythmias,  and aspiration were discussed with patient. Pt and/or surrogate understood the proposed procedure(s), its risks, benefits and alternatives and wish to proceed with procedure(s). All questions answered in full. After all questions were answered to their satisfaction, a signed, informed, and witnessed consent was obtained. Physical Exam: Heart: regular rate and rhythm. No rubs, murmurs, or gallops. Lungs: Clear to auscultation bilaterally. Abdomen: Soft, non-tender, non distended. No rebound tenderness, no guarding. A TIME OUT WAS COMPLETED prior to the procedure to confirm the patient, procedure(s) and complete endoscopy safety procedure. Sedation: Monitored Anesthesia Care; ASA class per anesthesiology team   Monitoring: Pulsoximetry, pulse, respirations, and blood pressure , vitals were monitored throughout the entire procedure under monitored anesthesia care. Procedure: The patient was then brought to the endoscopy suite where his/her pulse, pulse oximetry and blood pressure were monitored. The patient was placed in the left lateral decubitus position and deep sedation was administered.  Once adequate sedation was achieved, a bite block was placed and a lubricated tip of an Olympus video upper endoscope was inserted through the oropharynx and gently manipulated through the esophagus into the stomach and the second portion of the duodenum. Upon withdrawal of the endoscope, careful visualization of the mucosa was performed. The endoscope was then withdrawn into the gastric antrum and placed in a retroflexed position. The endoscope was then righted, and air was suctioned from the stomach. The endoscope was then withdrawn from the patient, with careful visual inspection of the mucosa. The patient left the procedure room in stable condition for recovery. Findings and endotherapy as listed below  Toleration: Patient tolerated procedure well   Complications: No immediate complications   Technical Difficulty:  The procedure was not technically difficult   Estimated Blood Loss: Minimal, less than 5mL of estimated blood loss. Findings and Therapeutics:  Esophagus: There were no strictures or stenosis, or ulcers or masses. GEJ junction traversed with endoscope without resistance. The squamocolumnar junction was irregular, appreciated at 33 cm from the incisors. The top of the gastric folds were appreciated at 39 cm from the incisors. The diaphragmatic impression was appreciated at 40 cm from the incisors. There was a few centimeters of esophagitis LA grade C esophagitis above the squamocolumnar junction. The appearance of the distal esophagus was concerning for possible Crow's esophagus (C6M6). Biopsies were taken with a cold forceps of the esophagitis, as well as at 36 cm from the incisors (biopsies were limited due to coagulopathy from liver disease). No varices were seen. Stomach: The gastric body was notable for moderate portal hypertensive gastropathy, and the antrum, fundus, cardia, and angularis were normal. No ulcers, erosions or masses visualized. Endoscope was placed in a retroflexion view in the stomach. There was  no evidence of a hiatal hernia or gastric varices.    Duodenum:   The entire examined duodenum was normal.  Recommendations:  Post EGD precautions, watch for bleeding, infection, perforation, adverse drug reactions   Follow-up biopsies  Protonix 40mg bid  Avoid non-aspirin NSAID  Repeat EGD in 12 weeks to assess healing of esophagitis and for formal 4 quadrant biopsies of possible Crow's    Donnie Plaza MD  8/24/2023  10:39 AM

## 2023-08-24 NOTE — H&P
History & Physical Examination    Patient Name: Armani Pena  MRN: [id number]  [facility]: [id number]  YOB: 1965    Diagnosis: cirrhosis    Present Illness: 64 y/o male history of cirrhosis presents for EV screening. losartan 50 MG Oral Tab, Take 1 tablet (50 mg total) by mouth daily. , Disp: 90 tablet, Rfl: 3, 8/21/2023  allopurinol 100 MG Oral Tab, TAKE 1 TABLET(100 MG) BY MOUTH DAILY, Disp: 90 tablet, Rfl: 3, 8/21/2023      furosemide (Lasix) tab 40 mg, 40 mg, Oral, Daily  spironolactone (Aldactone) tab 100 mg, 100 mg, Oral, Daily  allopurinol (Zyloprim) tab 100 mg, 100 mg, Oral, Daily  ciprofloxacin (Cipro) tab 500 mg, 500 mg, Oral, Daily @ 0700  HYDROcodone-acetaminophen (Norco) 5-325 MG per tab 1 tablet, 1 tablet, Oral, Q6H PRN  losartan (Cozaar) tab 50 mg, 50 mg, Oral, Daily  melatonin tab 3 mg, 3 mg, Oral, Nightly PRN  ondansetron (Zofran) 4 MG/2ML injection 4 mg, 4 mg, Intravenous, Q6H PRN  prochlorperazine (Compazine) 10 MG/2ML injection 5 mg, 5 mg, Intravenous, Q8H PRN  polyethylene glycol (PEG 3350) (Miralax) 17 g oral packet 17 g, 17 g, Oral, Daily PRN  sennosides (Senokot) tab 17.2 mg, 17.2 mg, Oral, Nightly PRN  bisacodyl (Dulcolax) 10 MG rectal suppository 10 mg, 10 mg, Rectal, Daily PRN  fleet enema (Fleet) 7-19 GM/118ML rectal enema 133 mL, 1 enema, Rectal, Once PRN  morphINE PF 2 MG/ML injection 1 mg, 1 mg, Intravenous, Q2H PRN   Or  morphINE PF 2 MG/ML injection 2 mg, 2 mg, Intravenous, Q2H PRN   Or  morphINE PF 4 MG/ML injection 4 mg, 4 mg, Intravenous, Q2H PRN        Allergies: No Known Allergies    Past Medical History:   Diagnosis Date    Essential hypertension     Personal history of alcoholism ([facility] [address]      Past Surgical History:   Procedure Laterality Date    TONSILLECTOMY       Family History   Problem Relation Age of Onset    Other (alcoholism) Father     Diabetes Mother     Other (CAD) Mother      Social History    Tobacco Use      Smoking status: Some Days        Types: Cigarettes, Cigars        Last attempt to quit: 2014        Years since quittin.5      Smokeless tobacco: Current    Alcohol use: Not Currently      SYSTEM Check if Review is Normal Check if Physical Exam is Normal If not normal, please explain:   HEENT [ x] +jaundice    NECK & BACK [ x] [ x]    HEART [ x] [x ]    LUNGS [ x] [ x]    ABDOMEN [ x] +distension    UROGENITAL [ n/a] [ n/a]    EXTREMITIES [ x] +swelling    OTHER        [ x ] I have discussed the risks and benefits and alternatives with the patient/family. They understand and agree to proceed with plan of care. [ x ] I have reviewed the History and Physical done within the last 30 days. Any changes noted above.     Sury Dasilva MD  2023  10:22 AM

## 2023-08-24 NOTE — ANESTHESIA POSTPROCEDURE EVALUATION
[facility] Patient Status:  Inpatient   Age/Gender 63year old male MRN [id number]   Location [address] [address] Attending Ambika Conti MD   [address] PCP Ashly Levin MD       Anesthesia Post-op Note    ESOPHAGOGASTRODUODENOSCOPY (EGD) W/ BIOPSIES    Procedure Summary       Date: 08/24/23 Room / Location: [facility] ENDOSCOPY 02 / [facility] ENDOSCOPY    Anesthesia Start: 1024 Anesthesia Stop: 6812    Procedure: ESOPHAGOGASTRODUODENOSCOPY (EGD) W/ BIOPSIES Diagnosis: (ESOPHAGITIS)    Surgeons: Derrell Hashimoto, MD Anesthesiologist: Soha Owens MD    Anesthesia Type: MAC ASA Status: 3            Anesthesia Type: MAC    Vitals Value Taken Time   /55 08/24/23 1040   Temp  08/24/23 1040   Pulse 93 08/24/23 1038   Resp 16 08/24/23 1040   SpO2 96 % 08/24/23 1039   Vitals shown include unvalidated device data.     Patient Location: Endoscopy    Anesthesia Type: MAC    Airway Patency: patent    Postop Pain Control: adequate    Mental Status: mildly sedated but able to meaningfully participate in the post-anesthesia evaluation    Nausea/Vomiting: none    Cardiopulmonary/Hydration status: stable euvolemic    Complications: no apparent anesthesia related complications    Postop vital signs: stable    Dental Exam: Unchanged from Postop

## 2023-08-24 NOTE — PLAN OF CARE
Patient A&Ox4 . Vital signs stable , tele with sinus rhythm . On room air during the day ,using o2 as needed when sleeping . Tolerating diet , denies nausea . BM 8/23 . Voiding well . Abdominal pain controlled with norco . NPO post midnight for EGD in am . MRI abdomen /liver pending . Dressing to paracentesis site with some old drainage . All safety precautions in place . Reminded to call for needs . Will continue to monitor .

## 2023-08-24 NOTE — PLAN OF CARE
MRI done this am.  NPO prior to EGD. INR 2.3, GI aware, Vit K given as per order. Pt up ad fei with steady gait. Denies pain this am.  Verbalized understanding of POC.

## 2023-08-25 VITALS
BODY MASS INDEX: 39.24 KG/M2 | RESPIRATION RATE: 20 BRPM | DIASTOLIC BLOOD PRESSURE: 57 MMHG | HEIGHT: 67 IN | OXYGEN SATURATION: 93 % | SYSTOLIC BLOOD PRESSURE: 126 MMHG | TEMPERATURE: 98 F | HEART RATE: 82 BPM | WEIGHT: 250 LBS

## 2023-08-25 LAB
MAGNESIUM SERPL-MCNC: 1.7 MG/DL (ref 1.6–2.6)
NON GYNE INTERPRETATION: NEGATIVE
POTASSIUM SERPL-SCNC: 3.6 MMOL/L (ref 3.5–5.1)

## 2023-08-25 PROCEDURE — 99239 HOSP IP/OBS DSCHRG MGMT >30: CPT | Performed by: INTERNAL MEDICINE

## 2023-08-25 RX ORDER — SPIRONOLACTONE 100 MG/1
100 TABLET, FILM COATED ORAL DAILY
Qty: 30 TABLET | Refills: 2 | Status: SHIPPED | OUTPATIENT
Start: 2023-08-25

## 2023-08-25 RX ORDER — PANTOPRAZOLE SODIUM 40 MG/1
40 TABLET, DELAYED RELEASE ORAL
Qty: 180 TABLET | Refills: 3 | Status: SHIPPED | OUTPATIENT
Start: 2023-08-25

## 2023-08-25 RX ORDER — FUROSEMIDE 40 MG/1
40 TABLET ORAL DAILY
Qty: 30 TABLET | Refills: 2 | Status: SHIPPED | OUTPATIENT
Start: 2023-08-25 | End: 2023-11-23

## 2023-08-25 NOTE — PLAN OF CARE
NURSING DISCHARGE NOTE    Discharged Home via Wheelchair. Accompanied by Support staff  Belongings Taken by patient/family. Verbal and written discharge instructions reviewed. Verbalized understanding.

## 2023-08-25 NOTE — PLAN OF CARE
Abdomen rounded. Denies nausea, bowel sounds x4 quad present. Denies discomfort, denies pain, states tolerating diet. Instructed on plan of care, call for all asst needed, discomfort felt. Verbalized understanding.

## 2023-08-25 NOTE — PLAN OF CARE
Patient A&Ox4 . Denies any abdominal pain or nausea . Tele with sinus rhythm . Patient had 11 beats of V-tach ,blood pressure stable asymptomatic . Notified Dr. Tim Powell . Repeat labs in am . Up ad fei in room . Voiding in bathroom . Had BM today .  Plan for possible home in am .

## 2023-08-25 NOTE — DISCHARGE INSTRUCTIONS
Follow up with physicians as instructed. Start taking lasix 40 milligrams and spironolactone 100 milligrams every day at home. Also start taking protonix twice a day 30 minutes before breakfast and dinner. Get labs in 1 week. F/u with Dr. Ivana Turner at [facility] in 2-4 weeks. Don't drink any more alcohol. You need a MRI of your liver and a repeat upper endoscopy in 3 months.

## 2023-08-28 ENCOUNTER — PATIENT OUTREACH (OUTPATIENT)
Dept: CASE MANAGEMENT | Age: 58
End: 2023-08-28

## 2023-08-28 DIAGNOSIS — Z02.9 ENCOUNTERS FOR UNSPECIFIED ADMINISTRATIVE PURPOSE: Primary | ICD-10-CM

## 2023-09-01 ENCOUNTER — HOSPITAL ENCOUNTER (INPATIENT)
Facility: HOSPITAL | Age: 58
LOS: 2 days | Discharge: HOME | End: 2023-09-03
Attending: EMERGENCY MEDICINE | Admitting: HOSPITALIST
Payer: COMMERCIAL

## 2023-09-01 ENCOUNTER — APPOINTMENT (OUTPATIENT)
Dept: CT IMAGING | Facility: HOSPITAL | Age: 58
End: 2023-09-01
Attending: EMERGENCY MEDICINE
Payer: COMMERCIAL

## 2023-09-01 ENCOUNTER — APPOINTMENT (OUTPATIENT)
Dept: GENERAL RADIOLOGY | Facility: HOSPITAL | Age: 58
End: 2023-09-01
Attending: EMERGENCY MEDICINE
Payer: COMMERCIAL

## 2023-09-01 DIAGNOSIS — E80.6 HYPERBILIRUBINEMIA: ICD-10-CM

## 2023-09-01 DIAGNOSIS — R06.02 SHORTNESS OF BREATH: ICD-10-CM

## 2023-09-01 DIAGNOSIS — D68.9 COAGULOPATHY (HCC): ICD-10-CM

## 2023-09-01 DIAGNOSIS — J90 PLEURAL EFFUSION: ICD-10-CM

## 2023-09-01 DIAGNOSIS — N17.9 AKI (ACUTE KIDNEY INJURY) (HCC): ICD-10-CM

## 2023-09-01 DIAGNOSIS — K74.60 CIRRHOSIS OF LIVER WITHOUT ASCITES, UNSPECIFIED HEPATIC CIRRHOSIS TYPE (HCC): ICD-10-CM

## 2023-09-01 DIAGNOSIS — K80.20 CALCULUS OF GALLBLADDER WITHOUT CHOLECYSTITIS WITHOUT OBSTRUCTION: ICD-10-CM

## 2023-09-01 DIAGNOSIS — U07.1 COVID-19: Primary | ICD-10-CM

## 2023-09-01 LAB
ALBUMIN SERPL-MCNC: 2.7 G/DL (ref 3.4–5)
ALBUMIN/GLOB SERPL: 0.5 {RATIO} (ref 1–2)
ALP LIVER SERPL-CCNC: 120 U/L
ALT SERPL-CCNC: 56 U/L
AMMONIA PLAS-MCNC: 36 UMOL/L (ref 11–32)
ANION GAP SERPL CALC-SCNC: 9 MMOL/L (ref 0–18)
APTT PPP: 41.7 SECONDS (ref 23.3–35.6)
AST SERPL-CCNC: 98 U/L (ref 15–37)
BASOPHILS # BLD AUTO: 0.05 X10(3) UL (ref 0–0.2)
BASOPHILS NFR BLD AUTO: 0.7 %
BILIRUB SERPL-MCNC: 7.4 MG/DL (ref 0.1–2)
BUN BLD-MCNC: 9 MG/DL (ref 7–18)
CALCIUM BLD-MCNC: 9 MG/DL (ref 8.5–10.1)
CHLORIDE SERPL-SCNC: 101 MMOL/L (ref 98–112)
CO2 SERPL-SCNC: 22 MMOL/L (ref 21–32)
CREAT BLD-MCNC: 1.65 MG/DL
D DIMER PPP FEU-MCNC: 7.31 UG/ML FEU (ref ?–0.57)
EGFRCR SERPLBLD CKD-EPI 2021: 48 ML/MIN/1.73M2 (ref 60–?)
EOSINOPHIL # BLD AUTO: 0.11 X10(3) UL (ref 0–0.7)
EOSINOPHIL NFR BLD AUTO: 1.6 %
ERYTHROCYTE [DISTWIDTH] IN BLOOD BY AUTOMATED COUNT: 14.5 %
ETHANOL SERPL-MCNC: <3 MG/DL (ref ?–3)
GLOBULIN PLAS-MCNC: 5.5 G/DL (ref 2.8–4.4)
GLUCOSE BLD-MCNC: 186 MG/DL (ref 70–99)
HCT VFR BLD AUTO: 31.4 %
HGB BLD-MCNC: 10.9 G/DL
IMM GRANULOCYTES # BLD AUTO: 0.03 X10(3) UL (ref 0–1)
IMM GRANULOCYTES NFR BLD: 0.4 %
INR BLD: 2.05 (ref 0.85–1.16)
LIPASE SERPL-CCNC: 112 U/L (ref 13–75)
LYMPHOCYTES # BLD AUTO: 0.7 X10(3) UL (ref 1–4)
LYMPHOCYTES NFR BLD AUTO: 10 %
MAGNESIUM SERPL-MCNC: 1.7 MG/DL (ref 1.6–2.6)
MCH RBC QN AUTO: 34.6 PG (ref 26–34)
MCHC RBC AUTO-ENTMCNC: 34.7 G/DL (ref 31–37)
MCV RBC AUTO: 99.7 FL
MONOCYTES # BLD AUTO: 0.55 X10(3) UL (ref 0.1–1)
MONOCYTES NFR BLD AUTO: 7.8 %
NEUTROPHILS # BLD AUTO: 5.57 X10 (3) UL (ref 1.5–7.7)
NEUTROPHILS # BLD AUTO: 5.57 X10(3) UL (ref 1.5–7.7)
NEUTROPHILS NFR BLD AUTO: 79.5 %
NT-PROBNP SERPL-MCNC: 49 PG/ML (ref ?–125)
OSMOLALITY SERPL CALC.SUM OF ELEC: 278 MOSM/KG (ref 275–295)
PLATELET # BLD AUTO: 105 10(3)UL (ref 150–450)
POTASSIUM SERPL-SCNC: 3.4 MMOL/L (ref 3.5–5.1)
PROT SERPL-MCNC: 8.2 G/DL (ref 6.4–8.2)
PROTHROMBIN TIME: 22.9 SECONDS (ref 11.6–14.8)
RBC # BLD AUTO: 3.15 X10(6)UL
SARS-COV-2 RNA RESP QL NAA+PROBE: DETECTED
SODIUM SERPL-SCNC: 132 MMOL/L (ref 136–145)
TROPONIN I HIGH SENSITIVITY: 7 NG/L
WBC # BLD AUTO: 7 X10(3) UL (ref 4–11)

## 2023-09-01 PROCEDURE — 71045 X-RAY EXAM CHEST 1 VIEW: CPT | Performed by: EMERGENCY MEDICINE

## 2023-09-01 PROCEDURE — XW033E5 INTRODUCTION OF REMDESIVIR ANTI-INFECTIVE INTO PERIPHERAL VEIN, PERCUTANEOUS APPROACH, NEW TECHNOLOGY GROUP 5: ICD-10-PCS | Performed by: EMERGENCY MEDICINE

## 2023-09-01 PROCEDURE — 71275 CT ANGIOGRAPHY CHEST: CPT | Performed by: EMERGENCY MEDICINE

## 2023-09-01 PROCEDURE — 99223 1ST HOSP IP/OBS HIGH 75: CPT | Performed by: HOSPITALIST

## 2023-09-01 RX ORDER — ONDANSETRON 2 MG/ML
8 INJECTION INTRAMUSCULAR; INTRAVENOUS EVERY 6 HOURS PRN
Status: DISCONTINUED | OUTPATIENT
Start: 2023-09-01 | End: 2023-09-03

## 2023-09-01 RX ORDER — KETOROLAC TROMETHAMINE 15 MG/ML
15 INJECTION, SOLUTION INTRAMUSCULAR; INTRAVENOUS EVERY 6 HOURS PRN
Status: DISCONTINUED | OUTPATIENT
Start: 2023-09-01 | End: 2023-09-03

## 2023-09-01 RX ORDER — LACTULOSE 10 G/15ML
10 SOLUTION ORAL 3 TIMES DAILY
Status: DISCONTINUED | OUTPATIENT
Start: 2023-09-01 | End: 2023-09-03

## 2023-09-01 RX ORDER — PANTOPRAZOLE SODIUM 40 MG/1
40 TABLET, DELAYED RELEASE ORAL
Status: DISCONTINUED | OUTPATIENT
Start: 2023-09-02 | End: 2023-09-03

## 2023-09-01 RX ORDER — POLYETHYLENE GLYCOL 3350 17 G/17G
17 POWDER, FOR SOLUTION ORAL DAILY PRN
Status: DISCONTINUED | OUTPATIENT
Start: 2023-09-01 | End: 2023-09-03

## 2023-09-01 RX ORDER — ACETAMINOPHEN 500 MG
500 TABLET ORAL EVERY 4 HOURS PRN
Status: DISCONTINUED | OUTPATIENT
Start: 2023-09-01 | End: 2023-09-03

## 2023-09-01 RX ORDER — BENZONATATE 200 MG/1
200 CAPSULE ORAL 3 TIMES DAILY PRN
Status: DISCONTINUED | OUTPATIENT
Start: 2023-09-01 | End: 2023-09-03

## 2023-09-01 RX ORDER — MAGNESIUM OXIDE 400 MG/1
400 TABLET ORAL ONCE
Status: COMPLETED | OUTPATIENT
Start: 2023-09-01 | End: 2023-09-01

## 2023-09-01 RX ORDER — ECHINACEA PURPUREA EXTRACT 125 MG
1 TABLET ORAL
Status: DISCONTINUED | OUTPATIENT
Start: 2023-09-01 | End: 2023-09-03

## 2023-09-01 RX ORDER — POTASSIUM CHLORIDE 20 MEQ/1
40 TABLET, EXTENDED RELEASE ORAL EVERY 4 HOURS
Status: COMPLETED | OUTPATIENT
Start: 2023-09-01 | End: 2023-09-02

## 2023-09-01 RX ORDER — MELATONIN
3 NIGHTLY PRN
Status: DISCONTINUED | OUTPATIENT
Start: 2023-09-01 | End: 2023-09-03

## 2023-09-01 RX ORDER — BISACODYL 10 MG
10 SUPPOSITORY, RECTAL RECTAL
Status: DISCONTINUED | OUTPATIENT
Start: 2023-09-01 | End: 2023-09-03

## 2023-09-01 RX ORDER — SODIUM CHLORIDE 9 MG/ML
INJECTION, SOLUTION INTRAVENOUS CONTINUOUS
Status: DISCONTINUED | OUTPATIENT
Start: 2023-09-01 | End: 2023-09-02

## 2023-09-01 RX ORDER — ALLOPURINOL 100 MG/1
100 TABLET ORAL DAILY
Status: DISCONTINUED | OUTPATIENT
Start: 2023-09-02 | End: 2023-09-03

## 2023-09-01 RX ORDER — SENNOSIDES 8.6 MG
17.2 TABLET ORAL NIGHTLY PRN
Status: DISCONTINUED | OUTPATIENT
Start: 2023-09-01 | End: 2023-09-03

## 2023-09-01 NOTE — ED INITIAL ASSESSMENT (HPI)
Pt to er with c/o sob just released for fluid in abd and needed fluid drained and pt has wt loss and jaundice

## 2023-09-02 PROBLEM — F10.10 ETOH ABUSE: Status: ACTIVE | Noted: 2023-09-02

## 2023-09-02 LAB
ALBUMIN SERPL-MCNC: 2.5 G/DL (ref 3.4–5)
ALBUMIN/GLOB SERPL: 0.5 {RATIO} (ref 1–2)
ALP LIVER SERPL-CCNC: 112 U/L
ALT SERPL-CCNC: 52 U/L
ANION GAP SERPL CALC-SCNC: 8 MMOL/L (ref 0–18)
AST SERPL-CCNC: 98 U/L (ref 15–37)
BILIRUB SERPL-MCNC: 6 MG/DL (ref 0.1–2)
BUN BLD-MCNC: 8 MG/DL (ref 7–18)
CALCIUM BLD-MCNC: 8.6 MG/DL (ref 8.5–10.1)
CHLORIDE SERPL-SCNC: 102 MMOL/L (ref 98–112)
CO2 SERPL-SCNC: 20 MMOL/L (ref 21–32)
CREAT BLD-MCNC: 1.14 MG/DL
CREAT UR-SCNC: 78 MG/DL
EGFRCR SERPLBLD CKD-EPI 2021: 75 ML/MIN/1.73M2 (ref 60–?)
ERYTHROCYTE [DISTWIDTH] IN BLOOD BY AUTOMATED COUNT: 14.6 %
GLOBULIN PLAS-MCNC: 5.3 G/DL (ref 2.8–4.4)
GLUCOSE BLD-MCNC: 88 MG/DL (ref 70–99)
HCT VFR BLD AUTO: 29.1 %
HGB BLD-MCNC: 10.1 G/DL
MAGNESIUM SERPL-MCNC: 1.7 MG/DL (ref 1.6–2.6)
MCH RBC QN AUTO: 34.5 PG (ref 26–34)
MCHC RBC AUTO-ENTMCNC: 34.7 G/DL (ref 31–37)
MCV RBC AUTO: 99.3 FL
OSMOLALITY SERPL CALC.SUM OF ELEC: 268 MOSM/KG (ref 275–295)
OSMOLALITY UR: 382 MOSM/KG (ref 300–1300)
PLATELET # BLD AUTO: 92 10(3)UL (ref 150–450)
POTASSIUM SERPL-SCNC: 3.8 MMOL/L (ref 3.5–5.1)
PROT SERPL-MCNC: 7.8 G/DL (ref 6.4–8.2)
RBC # BLD AUTO: 2.93 X10(6)UL
SODIUM SERPL-SCNC: 130 MMOL/L (ref 136–145)
SODIUM SERPL-SCNC: 22 MMOL/L
SODIUM SERPL-SCNC: 93 MMOL/L
URATE SERPL-MCNC: 4.7 MG/DL
WBC # BLD AUTO: 6.7 X10(3) UL (ref 4–11)

## 2023-09-02 PROCEDURE — 99223 1ST HOSP IP/OBS HIGH 75: CPT | Performed by: HOSPITALIST

## 2023-09-02 PROCEDURE — 99223 1ST HOSP IP/OBS HIGH 75: CPT | Performed by: INTERNAL MEDICINE

## 2023-09-02 RX ORDER — SODIUM CHLORIDE 9 MG/ML
INJECTION, SOLUTION INTRAVENOUS CONTINUOUS
Status: DISCONTINUED | OUTPATIENT
Start: 2023-09-02 | End: 2023-09-03

## 2023-09-02 RX ORDER — MAGNESIUM OXIDE 400 MG/1
400 TABLET ORAL ONCE
Status: COMPLETED | OUTPATIENT
Start: 2023-09-02 | End: 2023-09-02

## 2023-09-02 RX ORDER — POTASSIUM CHLORIDE 20 MEQ/1
40 TABLET, EXTENDED RELEASE ORAL ONCE
Status: COMPLETED | OUTPATIENT
Start: 2023-09-02 | End: 2023-09-02

## 2023-09-02 RX ORDER — SPIRONOLACTONE 25 MG/1
50 TABLET ORAL DAILY
Status: DISCONTINUED | OUTPATIENT
Start: 2023-09-02 | End: 2023-09-03

## 2023-09-02 RX ORDER — FUROSEMIDE 20 MG/1
20 TABLET ORAL DAILY
Status: DISCONTINUED | OUTPATIENT
Start: 2023-09-02 | End: 2023-09-03

## 2023-09-02 NOTE — PROGRESS NOTES
NURSING ADMISSION NOTE      Patient admitted via Cart  Oriented to room. Safety precautions initiated. Bed in low position. Call light in reach. Patient alert and oriented x 4, afebrile, no c/o pain. VSS, up at fei. COVID +, Contact and roplet precautions maintained, Remdesevir 200 mg IV administered, Potassium and Magnesium replaced per protocol. Updated patient on current POC, patient agreeable.

## 2023-09-02 NOTE — ED QUICK NOTES
Orders for admission, patient is aware of plan and ready to go upstairs. Any questions, please call ED RN Tyrese Corbett at extension [phone].      Patient Covid vaccination status: Fully vaccinated     COVID Test Ordered in ED: Rapid SARS-CoV-2 by PCR    COVID Suspicion at Admission: positive    Running Infusions:  None    Mental Status/LOC at time of transport: a/ox4    Other pertinent information:   CIWA score: N/A   NIH score:  N/A

## 2023-09-03 ENCOUNTER — APPOINTMENT (OUTPATIENT)
Dept: ULTRASOUND IMAGING | Facility: HOSPITAL | Age: 58
End: 2023-09-03
Attending: HOSPITALIST
Payer: COMMERCIAL

## 2023-09-03 VITALS
SYSTOLIC BLOOD PRESSURE: 129 MMHG | OXYGEN SATURATION: 100 % | RESPIRATION RATE: 18 BRPM | HEART RATE: 85 BPM | WEIGHT: 186 LBS | DIASTOLIC BLOOD PRESSURE: 52 MMHG | BODY MASS INDEX: 29 KG/M2 | TEMPERATURE: 98 F

## 2023-09-03 LAB
ALBUMIN SERPL-MCNC: 2.4 G/DL (ref 3.4–5)
ALBUMIN/GLOB SERPL: 0.5 {RATIO} (ref 1–2)
ALP LIVER SERPL-CCNC: 119 U/L
ALT SERPL-CCNC: 48 U/L
ANION GAP SERPL CALC-SCNC: 6 MMOL/L (ref 0–18)
AST SERPL-CCNC: 93 U/L (ref 15–37)
ATRIAL RATE: 101 BPM
BILIRUB SERPL-MCNC: 4.6 MG/DL (ref 0.1–2)
BUN BLD-MCNC: 7 MG/DL (ref 7–18)
CALCIUM BLD-MCNC: 8.4 MG/DL (ref 8.5–10.1)
CHLORIDE SERPL-SCNC: 103 MMOL/L (ref 98–112)
CO2 SERPL-SCNC: 23 MMOL/L (ref 21–32)
CREAT BLD-MCNC: 1.04 MG/DL
EGFRCR SERPLBLD CKD-EPI 2021: 84 ML/MIN/1.73M2 (ref 60–?)
GLOBULIN PLAS-MCNC: 5.1 G/DL (ref 2.8–4.4)
GLUCOSE BLD-MCNC: 105 MG/DL (ref 70–99)
MAGNESIUM SERPL-MCNC: 1.8 MG/DL (ref 1.6–2.6)
OSMOLALITY SERPL CALC.SUM OF ELEC: 272 MOSM/KG (ref 275–295)
P AXIS: 58 DEGREES
P-R INTERVAL: 142 MS
POTASSIUM SERPL-SCNC: 4.2 MMOL/L (ref 3.5–5.1)
POTASSIUM SERPL-SCNC: 4.2 MMOL/L (ref 3.5–5.1)
PROT SERPL-MCNC: 7.5 G/DL (ref 6.4–8.2)
Q-T INTERVAL: 436 MS
QRS DURATION: 76 MS
QTC CALCULATION (BEZET): 565 MS
R AXIS: 29 DEGREES
SODIUM SERPL-SCNC: 132 MMOL/L (ref 136–145)
T AXIS: 43 DEGREES
VENTRICULAR RATE: 101 BPM

## 2023-09-03 PROCEDURE — 93970 EXTREMITY STUDY: CPT | Performed by: HOSPITALIST

## 2023-09-03 PROCEDURE — 99239 HOSP IP/OBS DSCHRG MGMT >30: CPT | Performed by: HOSPITALIST

## 2023-09-03 PROCEDURE — 99232 SBSQ HOSP IP/OBS MODERATE 35: CPT | Performed by: INTERNAL MEDICINE

## 2023-09-03 RX ORDER — SPIRONOLACTONE 50 MG/1
50 TABLET, FILM COATED ORAL DAILY
Qty: 30 TABLET | Refills: 0 | Status: SHIPPED | OUTPATIENT
Start: 2023-09-04

## 2023-09-03 RX ORDER — FUROSEMIDE 20 MG/1
20 TABLET ORAL DAILY
Qty: 30 TABLET | Refills: 0 | Status: SHIPPED | OUTPATIENT
Start: 2023-09-04

## 2023-09-03 RX ORDER — LACTULOSE 10 G/15ML
10 SOLUTION ORAL DAILY
Qty: 30 EACH | Refills: 0 | Status: SHIPPED | OUTPATIENT
Start: 2023-09-04

## 2023-09-03 RX ORDER — LACTULOSE 10 G/15ML
10 SOLUTION ORAL DAILY
Status: DISCONTINUED | OUTPATIENT
Start: 2023-09-04 | End: 2023-09-03

## 2023-09-03 RX ORDER — PANTOPRAZOLE SODIUM 40 MG/1
40 TABLET, DELAYED RELEASE ORAL
Qty: 30 TABLET | Refills: 1 | Status: SHIPPED | OUTPATIENT
Start: 2023-09-03

## 2023-09-03 NOTE — PLAN OF CARE
Problem: Covid, cirrhosis   Data: Ax04. Telemetry- sinus rhythm. , on room air. Regular, unlabored breathing. Jaundice. Afebrile. Denied pain. Mild abdominal cramping at times. Continent. IVF 0.9 nacl @ 75 ml/hr. Regular diet. Ambulatory.    Intervention: Lactulose, IV Remdesivir, spirolactone   Education: Medications, call light   Response: Cooperative with care    Problem: Patient/Family Goals  Goal: Patient/Family Long Term Goal  Description: Patient's Long Term Goal: discharge home with adequate resources    Interventions:  - IVF, Remdesevir  - See additional Care Plan goals for specific interventions  Outcome: Progressing  Goal: Patient/Family Short Term Goal  Description: Patient's Short Term Goal: \"have more energy\"  9/2 noc: reduce cramping    Interventions:   - IVF, Remdesevir  - See additional Care Plan goals for specific interventions  Outcome: Progressing     Problem: RESPIRATORY - ADULT  Goal: Achieves optimal ventilation and oxygenation  Description: INTERVENTIONS:  - Assess for changes in respiratory status  - Assess for changes in mentation and behavior  - Position to facilitate oxygenation and minimize respiratory effort  - Oxygen supplementation based on oxygen saturation or ABGs  - Provide Smoking Cessation handout, if applicable  - Encourage broncho-pulmonary hygiene including cough, deep breathe, Incentive Spirometry  - Assess the need for suctioning and perform as needed  - Assess and instruct to report SOB or any respiratory difficulty  - Respiratory Therapy support as indicated  - Manage/alleviate anxiety  - Monitor for signs/symptoms of CO2 retention  Outcome: Progressing

## 2023-09-03 NOTE — PLAN OF CARE
NURSING DISCHARGE NOTE    Discharged Home via Wheelchair. Accompanied by Spouse and Support staff  Belongings Taken by patient/family. Patient is stable to discharge home. Medically cleared by all consults and hospitalist. Reviewed discharge instructions about medications and post-discharge follow up visits with patient. Patient verbalized understanding. Questions and concerns addressed. IV line dc'ed, pressure and dressing applied. Clean/dry/intact. Discharge navigator completed. Discharge AVS printed out and given to patient. Tele box removed, cleaned, and returned to drawer. All patient's belongings sent with patient.       Problem: Patient/Family Goals  Goal: Patient/Family Long Term Goal  Description: Patient's Long Term Goal: discharge home with adequate resources    Interventions:  - IVF, Remdesevir  - See additional Care Plan goals for specific interventions  Outcome: Completed  Goal: Patient/Family Short Term Goal  Description: Patient's Short Term Goal: \"have more energy\"  9/2 noc: reduce cramping    Interventions:   - IVF, Remdesevir  - See additional Care Plan goals for specific interventions  Outcome: Completed     Problem: RESPIRATORY - ADULT  Goal: Achieves optimal ventilation and oxygenation  Description: INTERVENTIONS:  - Assess for changes in respiratory status  - Assess for changes in mentation and behavior  - Position to facilitate oxygenation and minimize respiratory effort  - Oxygen supplementation based on oxygen saturation or ABGs  - Provide Smoking Cessation handout, if applicable  - Encourage broncho-pulmonary hygiene including cough, deep breathe, Incentive Spirometry  - Assess the need for suctioning and perform as needed  - Assess and instruct to report SOB or any respiratory difficulty  - Respiratory Therapy support as indicated  - Manage/alleviate anxiety  - Monitor for signs/symptoms of CO2 retention  Outcome: Completed

## 2023-09-05 ENCOUNTER — PATIENT OUTREACH (OUTPATIENT)
Dept: CASE MANAGEMENT | Age: 58
End: 2023-09-05

## 2023-09-05 ENCOUNTER — TELEPHONE (OUTPATIENT)
Dept: FAMILY MEDICINE CLINIC | Facility: CLINIC | Age: 58
End: 2023-09-05

## 2023-09-05 DIAGNOSIS — Z02.9 ENCOUNTERS FOR UNSPECIFIED ADMINISTRATIVE PURPOSE: Primary | ICD-10-CM

## 2023-09-05 DIAGNOSIS — U07.1 COVID-19: ICD-10-CM

## 2023-09-06 NOTE — PAYOR COMM NOTE
--------------  DISCHARGE REVIEW    Payor: Jung Pham  #:  [id number]  Authorization Number: [id number]    Admit date: 9/1/23  Admit time:   9:47 PM  Discharge Date: 9/3/2023  5:30 PM     Admitting Physician: Shell Carrington MD  Attending Physician:  No att. providers found  Primary Care Physician: Karolina Yoder MD

## 2023-09-11 NOTE — DISCHARGE SUMMARY
[facility] HOSPITALIST  DISCHARGE SUMMARY     Diogenes Raymond Patient Status:  Inpatient    1965 MRN [id number]   [facility]-A Attending No att. providers found   Hosp Day # 2 PCP Renee Nayak MD     Date of Admission: 2023  Date of Discharge:  9/3/2023     Discharge Disposition: Home or Self Care    Discharge Diagnosis:  1. COVID-19 infection  2. Acute kidney injury  3. Hyponatremia  4. Hypokalemia  5. Bilateral pleural effusions  6. Esophagitis  7. Cholelithiasis  8. Liver cirrhosis with ascites  9. Hypertension  10. Thrombocytopenia    History of Present Illness: Diogenes Raymond is a 63year old male with recent admission for abd pain and possible SBP, now comes back because of 20 lbs weight loss and dyspnea and fatigue. No cough or fevers. Says he has been urinating well with diuretics and LE edema much better. No longer on abx he says. Brief Synopsis: Patient was admitted to the pulmonary care unit. Patient was started on remdesivir due to his shortness of breath and high risk of being COVID-positive. Infectious disease on consult. Patient's electrolytes were replaced per protocol acute injury improved with gentle IV fluids. Patient's platelets remained stable. Patient's symptoms improving patient got 3 doses of remdesivir and was discharged home    Lace+ Score: 76  59-90 High Risk  29-58 Medium Risk  0-28   Low Risk       TCM Follow-Up Recommendation:  LACE > 58:  High Risk of readmission after discharge from the hospital.      Procedures during hospitalization:   None    Incidental or significant findings and recommendations (brief descriptions):  None    Lab/Test results pending at Discharge:   None    Consultants:  Infectious disease-Dr. Brandan Ross  Gastroenterology-Dr. Yuliya Stone  Nephrology-Dr. Keely Ferreira    Discharge Medication List:     Discharge Medications        Start taking these medications        Instructions Prescription details   rifAXIMin 550 MG Tabs  Commonly known as: Xifaxan   Take 1 tablet (550 mg total) by mouth 2 (two) times daily. Quantity: 60 tablet  Refills: 0            Change how you take these medications        Instructions Prescription details   furosemide 20 MG Tabs  Commonly known as: Lasix  What changed:   medication strength  how much to take   Take 1 tablet (20 mg total) by mouth daily. Quantity: 30 tablet  Refills: 0     lactulose 10 GM/15ML Soln  Commonly known as: CHRONULAC  What changed:   when to take this  additional instructions   Take 15 mL (10 g total) by mouth daily. Quantity: 30 each  Refills: 0     pantoprazole 40 MG Tbec  Commonly known as: Protonix  What changed: when to take this   Take 1 tablet (40 mg total) by mouth every morning before breakfast.   Quantity: 30 tablet  Refills: 1     spironolactone 50 MG Tabs  Commonly known as: Aldactone  What changed:   medication strength  how much to take   Take 1 tablet (50 mg total) by mouth daily. Quantity: 30 tablet  Refills: 0            Continue taking these medications        Instructions Prescription details   allopurinol 100 MG Tabs  Commonly known as: Zyloprim   TAKE 1 TABLET(100 MG) BY MOUTH DAILY   Quantity: 90 tablet  Refills: 3     losartan 50 MG Tabs  Commonly known as: Cozaar   Take 1 tablet (50 mg total) by mouth daily.    Quantity: 90 tablet  Refills: 3               Where to Get Your Medications        These medications were sent to [address] [place] [address], [address] [place] [address] [phone], [address], [facility]      Phone: [phone]   furosemide 20 MG Tabs  lactulose 10 GM/15ML Soln  pantoprazole 40 MG Tbec  rifAXIMin 550 MG Tabs  spironolactone 50 MG Tabs         [place]    Follow-up appointment:   Evelio Kaminski MD  [address] [place] [address] [address] [place] [address] [phone]    Follow up in 2 week(s)      Amarjit Church MD  [address] [address]  [phone]    Follow up in 2 week(s)  Post-hospitalization follow-up    Letha Varela MD   [facility]    Follow up in 1 week(s)  Post-hospitalization follow-up    Appointments for Next 30 Days 2023 - 10/11/2023        Date Arrival Time Visit Type Length Department Provider     2023  3:00 PM  [address] [facility] 30 min [address], Meryl Tam MD    Patient Instructions:         Location Instructions:     Masks are optional for all patients and visitors, unless otherwise indicated. 2023 11:00 AM  FOLLOW-UP OV [62200] 20 min [facility],  Winston Ramirez MD    Patient Instructions:     Please arrive 15 minutes prior to your scheduled appointment time. Vital signs:       Physical Exam:    General: No acute distress   Lungs: clear to auscultation  Cardiovascular: S1, S2  Abdomen: Soft      -----------------------------------------------------------------------------------------------  PATIENT DISCHARGE INSTRUCTIONS: See electronic chart    Sharri Sinclair DO    Total time spent on discharge plannin minutes     The [facility] makes medical notes like these available to patients in the interest of transparency. Please be advised this is a medical document. Medical documents are intended to carry relevant information, facts as evident, and the clinical opinion of the practitioner. The medical note is intended as peer to peer communication and may appear blunt or direct. It is written in medical language and may contain abbreviations or verbiage that are unfamiliar.

## 2023-09-13 ENCOUNTER — OFFICE VISIT (OUTPATIENT)
Dept: FAMILY MEDICINE CLINIC | Facility: CLINIC | Age: 58
End: 2023-09-13
Payer: COMMERCIAL

## 2023-09-13 VITALS
OXYGEN SATURATION: 94 % | HEIGHT: 67 IN | WEIGHT: 192 LBS | HEART RATE: 110 BPM | SYSTOLIC BLOOD PRESSURE: 118 MMHG | DIASTOLIC BLOOD PRESSURE: 76 MMHG | RESPIRATION RATE: 18 BRPM | BODY MASS INDEX: 30.13 KG/M2 | TEMPERATURE: 98 F

## 2023-09-13 DIAGNOSIS — U07.1 COVID-19: ICD-10-CM

## 2023-09-13 DIAGNOSIS — Z09 HOSPITAL DISCHARGE FOLLOW-UP: Primary | ICD-10-CM

## 2023-09-13 DIAGNOSIS — G47.00 INSOMNIA, UNSPECIFIED TYPE: ICD-10-CM

## 2023-09-13 DIAGNOSIS — N17.9 AKI (ACUTE KIDNEY INJURY) (HCC): ICD-10-CM

## 2023-09-13 DIAGNOSIS — K74.60 CIRRHOSIS OF LIVER WITH ASCITES, UNSPECIFIED HEPATIC CIRRHOSIS TYPE: ICD-10-CM

## 2023-09-13 DIAGNOSIS — R18.8 CIRRHOSIS OF LIVER WITH ASCITES, UNSPECIFIED HEPATIC CIRRHOSIS TYPE: ICD-10-CM

## 2023-09-13 DIAGNOSIS — N52.9 ERECTILE DYSFUNCTION, UNSPECIFIED ERECTILE DYSFUNCTION TYPE: ICD-10-CM

## 2023-09-13 PROCEDURE — 3008F BODY MASS INDEX DOCD: CPT | Performed by: FAMILY MEDICINE

## 2023-09-13 PROCEDURE — 99495 TRANSJ CARE MGMT MOD F2F 14D: CPT | Performed by: FAMILY MEDICINE

## 2023-09-13 PROCEDURE — 3078F DIAST BP <80 MM HG: CPT | Performed by: FAMILY MEDICINE

## 2023-09-13 PROCEDURE — 3074F SYST BP LT 130 MM HG: CPT | Performed by: FAMILY MEDICINE

## 2023-09-13 RX ORDER — SILDENAFIL 100 MG/1
TABLET, FILM COATED ORAL
Qty: 15 TABLET | Refills: 0 | Status: SHIPPED | OUTPATIENT
Start: 2023-09-13

## 2023-09-13 RX ORDER — TRAZODONE HYDROCHLORIDE 50 MG/1
TABLET ORAL NIGHTLY PRN
Qty: 30 TABLET | Refills: 0 | Status: SHIPPED | OUTPATIENT
Start: 2023-09-13

## 2023-09-20 ENCOUNTER — HOSPITAL ENCOUNTER (INPATIENT)
Facility: HOSPITAL | Age: 58
LOS: 2 days | Discharge: HOME | DRG: 682 | End: 2023-09-22
Attending: EMERGENCY MEDICINE | Admitting: INTERNAL MEDICINE
Payer: COMMERCIAL

## 2023-09-20 ENCOUNTER — HOSPITAL ENCOUNTER (INPATIENT)
Facility: HOSPITAL | Age: 58
LOS: 2 days | Discharge: HOME | End: 2023-09-22
Attending: EMERGENCY MEDICINE | Admitting: INTERNAL MEDICINE
Payer: COMMERCIAL

## 2023-09-20 DIAGNOSIS — N17.9 ACUTE KIDNEY INJURY (HCC): ICD-10-CM

## 2023-09-20 DIAGNOSIS — I95.9 HYPOTENSION, UNSPECIFIED HYPOTENSION TYPE: ICD-10-CM

## 2023-09-20 DIAGNOSIS — E87.6 HYPOKALEMIA: ICD-10-CM

## 2023-09-20 DIAGNOSIS — N17.9 AKI (ACUTE KIDNEY INJURY) (HCC): ICD-10-CM

## 2023-09-20 DIAGNOSIS — K74.60 CIRRHOSIS OF LIVER WITHOUT ASCITES, UNSPECIFIED HEPATIC CIRRHOSIS TYPE (HCC): ICD-10-CM

## 2023-09-20 DIAGNOSIS — K76.7 HEPATORENAL FAILURE (HCC): Primary | ICD-10-CM

## 2023-09-20 PROBLEM — D64.9 ANEMIA: Status: ACTIVE | Noted: 2023-09-20

## 2023-09-20 LAB
ALBUMIN SERPL-MCNC: 2.5 G/DL (ref 3.4–5)
ALBUMIN/GLOB SERPL: 0.5 {RATIO} (ref 1–2)
ALP LIVER SERPL-CCNC: 174 U/L
ALT SERPL-CCNC: 51 U/L
ANION GAP SERPL CALC-SCNC: 7 MMOL/L (ref 0–18)
AST SERPL-CCNC: 71 U/L (ref 15–37)
BASOPHILS # BLD AUTO: 0.05 X10(3) UL (ref 0–0.2)
BASOPHILS NFR BLD AUTO: 0.6 %
BILIRUB SERPL-MCNC: 3.6 MG/DL (ref 0.1–2)
BUN BLD-MCNC: 35 MG/DL (ref 7–18)
CALCIUM BLD-MCNC: 9.7 MG/DL (ref 8.5–10.1)
CHLORIDE SERPL-SCNC: 103 MMOL/L (ref 98–112)
CO2 SERPL-SCNC: 20 MMOL/L (ref 21–32)
CREAT BLD-MCNC: 2.28 MG/DL
EGFRCR SERPLBLD CKD-EPI 2021: 33 ML/MIN/1.73M2 (ref 60–?)
EOSINOPHIL # BLD AUTO: 0.13 X10(3) UL (ref 0–0.7)
EOSINOPHIL NFR BLD AUTO: 1.6 %
ERYTHROCYTE [DISTWIDTH] IN BLOOD BY AUTOMATED COUNT: 13.7 %
GLOBULIN PLAS-MCNC: 5.3 G/DL (ref 2.8–4.4)
GLUCOSE BLD-MCNC: 141 MG/DL (ref 70–99)
HCT VFR BLD AUTO: 27.4 %
HGB BLD-MCNC: 9.6 G/DL
IMM GRANULOCYTES # BLD AUTO: 0.04 X10(3) UL (ref 0–1)
IMM GRANULOCYTES NFR BLD: 0.5 %
LYMPHOCYTES # BLD AUTO: 1.53 X10(3) UL (ref 1–4)
LYMPHOCYTES NFR BLD AUTO: 19.1 %
MCH RBC QN AUTO: 33.8 PG (ref 26–34)
MCHC RBC AUTO-ENTMCNC: 35 G/DL (ref 31–37)
MCV RBC AUTO: 96.5 FL
MONOCYTES # BLD AUTO: 0.74 X10(3) UL (ref 0.1–1)
MONOCYTES NFR BLD AUTO: 9.2 %
NEUTROPHILS # BLD AUTO: 5.54 X10 (3) UL (ref 1.5–7.7)
NEUTROPHILS # BLD AUTO: 5.54 X10(3) UL (ref 1.5–7.7)
NEUTROPHILS NFR BLD AUTO: 69 %
OSMOLALITY SERPL CALC.SUM OF ELEC: 280 MOSM/KG (ref 275–295)
PLATELET # BLD AUTO: 72 10(3)UL (ref 150–450)
POTASSIUM SERPL-SCNC: 4.4 MMOL/L (ref 3.5–5.1)
PROT SERPL-MCNC: 7.8 G/DL (ref 6.4–8.2)
RBC # BLD AUTO: 2.84 X10(6)UL
SODIUM SERPL-SCNC: 130 MMOL/L (ref 136–145)
TROPONIN I HIGH SENSITIVITY: 7 NG/L
WBC # BLD AUTO: 8 X10(3) UL (ref 4–11)

## 2023-09-20 PROCEDURE — 99223 1ST HOSP IP/OBS HIGH 75: CPT | Performed by: INTERNAL MEDICINE

## 2023-09-20 RX ORDER — ALLOPURINOL 100 MG/1
100 TABLET ORAL DAILY
Status: DISCONTINUED | OUTPATIENT
Start: 2023-09-21 | End: 2023-09-22

## 2023-09-20 RX ORDER — ONDANSETRON 2 MG/ML
4 INJECTION INTRAMUSCULAR; INTRAVENOUS EVERY 4 HOURS PRN
Status: ACTIVE | OUTPATIENT
Start: 2023-09-20 | End: 2023-09-20

## 2023-09-20 RX ORDER — PROCHLORPERAZINE EDISYLATE 5 MG/ML
5 INJECTION INTRAMUSCULAR; INTRAVENOUS EVERY 8 HOURS PRN
Status: DISCONTINUED | OUTPATIENT
Start: 2023-09-20 | End: 2023-09-22

## 2023-09-20 RX ORDER — BENZONATATE 200 MG/1
200 CAPSULE ORAL 3 TIMES DAILY PRN
Status: DISCONTINUED | OUTPATIENT
Start: 2023-09-20 | End: 2023-09-22

## 2023-09-20 RX ORDER — POLYETHYLENE GLYCOL 3350 17 G/17G
17 POWDER, FOR SOLUTION ORAL DAILY PRN
Status: DISCONTINUED | OUTPATIENT
Start: 2023-09-20 | End: 2023-09-22

## 2023-09-20 RX ORDER — ACETAMINOPHEN 500 MG
500 TABLET ORAL EVERY 4 HOURS PRN
Status: DISCONTINUED | OUTPATIENT
Start: 2023-09-20 | End: 2023-09-22

## 2023-09-20 RX ORDER — ECHINACEA PURPUREA EXTRACT 125 MG
1 TABLET ORAL
Status: DISCONTINUED | OUTPATIENT
Start: 2023-09-20 | End: 2023-09-22

## 2023-09-20 RX ORDER — PANTOPRAZOLE SODIUM 40 MG/1
40 TABLET, DELAYED RELEASE ORAL
Status: DISCONTINUED | OUTPATIENT
Start: 2023-09-21 | End: 2023-09-22

## 2023-09-20 RX ORDER — BISACODYL 10 MG
10 SUPPOSITORY, RECTAL RECTAL
Status: DISCONTINUED | OUTPATIENT
Start: 2023-09-20 | End: 2023-09-22

## 2023-09-20 RX ORDER — MELATONIN
3 NIGHTLY PRN
Status: DISCONTINUED | OUTPATIENT
Start: 2023-09-20 | End: 2023-09-22

## 2023-09-20 RX ORDER — SENNOSIDES 8.6 MG
17.2 TABLET ORAL NIGHTLY PRN
Status: DISCONTINUED | OUTPATIENT
Start: 2023-09-20 | End: 2023-09-22

## 2023-09-20 RX ORDER — HEPARIN SODIUM 5000 [USP'U]/ML
5000 INJECTION, SOLUTION INTRAVENOUS; SUBCUTANEOUS EVERY 8 HOURS SCHEDULED
Status: DISCONTINUED | OUTPATIENT
Start: 2023-09-20 | End: 2023-09-22

## 2023-09-20 RX ORDER — LACTULOSE 10 G/15ML
10 SOLUTION ORAL DAILY
Status: DISCONTINUED | OUTPATIENT
Start: 2023-09-21 | End: 2023-09-22

## 2023-09-20 RX ORDER — ENEMA 19; 7 G/133ML; G/133ML
1 ENEMA RECTAL ONCE AS NEEDED
Status: DISCONTINUED | OUTPATIENT
Start: 2023-09-20 | End: 2023-09-22

## 2023-09-20 RX ORDER — TRAZODONE HYDROCHLORIDE 50 MG/1
TABLET ORAL NIGHTLY PRN
Status: DISCONTINUED | OUTPATIENT
Start: 2023-09-20 | End: 2023-09-20

## 2023-09-20 RX ORDER — ONDANSETRON 2 MG/ML
4 INJECTION INTRAMUSCULAR; INTRAVENOUS EVERY 6 HOURS PRN
Status: DISCONTINUED | OUTPATIENT
Start: 2023-09-20 | End: 2023-09-22

## 2023-09-20 RX ORDER — TRAZODONE HYDROCHLORIDE 50 MG/1
50 TABLET ORAL NIGHTLY PRN
Status: DISCONTINUED | OUTPATIENT
Start: 2023-09-20 | End: 2023-09-22

## 2023-09-20 RX ORDER — SODIUM CHLORIDE 9 MG/ML
INJECTION, SOLUTION INTRAVENOUS CONTINUOUS
Status: ACTIVE | OUTPATIENT
Start: 2023-09-20 | End: 2023-09-21

## 2023-09-20 NOTE — ED INITIAL ASSESSMENT (HPI)
Was seen at MD office and BP was low 79/48. Denies any sx. Recently hospitalized sept 3 for kidney issues.

## 2023-09-20 NOTE — ED QUICK NOTES
Orders for admission, patient is aware of plan and ready to go upstairs. Any questions, please call ED RN Erickson Hancock at extension [phone].      Patient Covid vaccination status: Fully vaccinated     COVID Test Ordered in ED: None    COVID Suspicion at Admission: N/A    Running Infusions:  None    Mental Status/LOC at time of transport: x4    Other pertinent information:   CIWA score: N/A   NIH score:  N/A

## 2023-09-21 ENCOUNTER — APPOINTMENT (OUTPATIENT)
Dept: GENERAL RADIOLOGY | Facility: HOSPITAL | Age: 58
DRG: 682 | End: 2023-09-21
Attending: NURSE PRACTITIONER
Payer: COMMERCIAL

## 2023-09-21 ENCOUNTER — APPOINTMENT (OUTPATIENT)
Dept: GENERAL RADIOLOGY | Facility: HOSPITAL | Age: 58
End: 2023-09-21
Attending: NURSE PRACTITIONER
Payer: COMMERCIAL

## 2023-09-21 ENCOUNTER — APPOINTMENT (OUTPATIENT)
Dept: ULTRASOUND IMAGING | Facility: HOSPITAL | Age: 58
DRG: 682 | End: 2023-09-21
Attending: NURSE PRACTITIONER
Payer: COMMERCIAL

## 2023-09-21 ENCOUNTER — APPOINTMENT (OUTPATIENT)
Dept: ULTRASOUND IMAGING | Facility: HOSPITAL | Age: 58
End: 2023-09-21
Attending: NURSE PRACTITIONER
Payer: COMMERCIAL

## 2023-09-21 LAB
ALBUMIN SERPL-MCNC: 2.5 G/DL (ref 3.4–5)
ALBUMIN/GLOB SERPL: 0.5 {RATIO} (ref 1–2)
ALP LIVER SERPL-CCNC: 124 U/L
ALT SERPL-CCNC: 47 U/L
ANION GAP SERPL CALC-SCNC: 6 MMOL/L (ref 0–18)
AST SERPL-CCNC: 64 U/L (ref 15–37)
BASOPHILS # BLD AUTO: 0.05 X10(3) UL (ref 0–0.2)
BASOPHILS NFR BLD AUTO: 0.8 %
BILIRUB SERPL-MCNC: 4.1 MG/DL (ref 0.1–2)
BILIRUB UR QL STRIP.AUTO: NEGATIVE
BUN BLD-MCNC: 21 MG/DL (ref 7–18)
CALCIUM BLD-MCNC: 9.5 MG/DL (ref 8.5–10.1)
CHLORIDE SERPL-SCNC: 107 MMOL/L (ref 98–112)
CLARITY UR REFRACT.AUTO: CLEAR
CO2 SERPL-SCNC: 21 MMOL/L (ref 21–32)
COLOR UR AUTO: YELLOW
CREAT BLD-MCNC: 1.45 MG/DL
EGFRCR SERPLBLD CKD-EPI 2021: 56 ML/MIN/1.73M2 (ref 60–?)
EOSINOPHIL # BLD AUTO: 0.19 X10(3) UL (ref 0–0.7)
EOSINOPHIL NFR BLD AUTO: 3.1 %
ERYTHROCYTE [DISTWIDTH] IN BLOOD BY AUTOMATED COUNT: 13.9 %
GLOBULIN PLAS-MCNC: 5.1 G/DL (ref 2.8–4.4)
GLUCOSE BLD-MCNC: 95 MG/DL (ref 70–99)
GLUCOSE UR STRIP.AUTO-MCNC: NORMAL MG/DL
HCT VFR BLD AUTO: 26.3 %
HGB BLD-MCNC: 8.9 G/DL
IMM GRANULOCYTES # BLD AUTO: 0.03 X10(3) UL (ref 0–1)
IMM GRANULOCYTES NFR BLD: 0.5 %
INR BLD: 1.87 (ref 0.85–1.16)
KETONES UR STRIP.AUTO-MCNC: NEGATIVE MG/DL
LEUKOCYTE ESTERASE UR QL STRIP.AUTO: NEGATIVE
LYMPHOCYTES # BLD AUTO: 1.57 X10(3) UL (ref 1–4)
LYMPHOCYTES NFR BLD AUTO: 25.7 %
MCH RBC QN AUTO: 33.6 PG (ref 26–34)
MCHC RBC AUTO-ENTMCNC: 33.8 G/DL (ref 31–37)
MCV RBC AUTO: 99.2 FL
MONOCYTES # BLD AUTO: 0.59 X10(3) UL (ref 0.1–1)
MONOCYTES NFR BLD AUTO: 9.6 %
NEUTROPHILS # BLD AUTO: 3.69 X10 (3) UL (ref 1.5–7.7)
NEUTROPHILS # BLD AUTO: 3.69 X10(3) UL (ref 1.5–7.7)
NEUTROPHILS NFR BLD AUTO: 60.3 %
NITRITE UR QL STRIP.AUTO: NEGATIVE
OSMOLALITY SERPL CALC.SUM OF ELEC: 281 MOSM/KG (ref 275–295)
PH UR STRIP.AUTO: 6 [PH] (ref 5–8)
PLATELET # BLD AUTO: 64 10(3)UL (ref 150–450)
POTASSIUM SERPL-SCNC: 4.3 MMOL/L (ref 3.5–5.1)
PROT SERPL-MCNC: 7.6 G/DL (ref 6.4–8.2)
PROT UR STRIP.AUTO-MCNC: NEGATIVE MG/DL
PROTHROMBIN TIME: 21.4 SECONDS (ref 11.6–14.8)
RBC # BLD AUTO: 2.65 X10(6)UL
RBC UR QL AUTO: NEGATIVE
SODIUM SERPL-SCNC: 134 MMOL/L (ref 136–145)
SP GR UR STRIP.AUTO: 1.02 (ref 1–1.03)
UROBILINOGEN UR STRIP.AUTO-MCNC: 4 MG/DL
WBC # BLD AUTO: 6.1 X10(3) UL (ref 4–11)

## 2023-09-21 PROCEDURE — 71046 X-RAY EXAM CHEST 2 VIEWS: CPT | Performed by: NURSE PRACTITIONER

## 2023-09-21 PROCEDURE — 76705 ECHO EXAM OF ABDOMEN: CPT | Performed by: NURSE PRACTITIONER

## 2023-09-21 PROCEDURE — 99232 SBSQ HOSP IP/OBS MODERATE 35: CPT | Performed by: HOSPITALIST

## 2023-09-21 NOTE — PROGRESS NOTES
NURSING ADMISSION NOTE      Patient admitted via Cart  Oriented to room. Safety precautions initiated. Bed in low position. Call light in reach. AO x4. RA. No tele. Heparin subcutaneous. Continent. Up standby to bathroom. No reports of pain. IVF. Regular diet. Pt updated on POC. No further needs at this moment. Med rec and navigator completed with pt and wife.

## 2023-09-22 VITALS
SYSTOLIC BLOOD PRESSURE: 128 MMHG | DIASTOLIC BLOOD PRESSURE: 49 MMHG | TEMPERATURE: 98 F | OXYGEN SATURATION: 99 % | BODY MASS INDEX: 30.13 KG/M2 | HEIGHT: 67 IN | HEART RATE: 93 BPM | RESPIRATION RATE: 19 BRPM | WEIGHT: 192 LBS

## 2023-09-22 LAB
ALBUMIN SERPL-MCNC: 2.3 G/DL (ref 3.4–5)
ALBUMIN/GLOB SERPL: 0.4 {RATIO} (ref 1–2)
ALP LIVER SERPL-CCNC: 136 U/L
ALT SERPL-CCNC: 50 U/L
ANION GAP SERPL CALC-SCNC: 1 MMOL/L (ref 0–18)
AST SERPL-CCNC: 72 U/L (ref 15–37)
BASOPHILS # BLD AUTO: 0.05 X10(3) UL (ref 0–0.2)
BASOPHILS NFR BLD AUTO: 0.9 %
BILIRUB SERPL-MCNC: 4.2 MG/DL (ref 0.1–2)
BUN BLD-MCNC: 14 MG/DL (ref 7–18)
CALCIUM BLD-MCNC: 8.9 MG/DL (ref 8.5–10.1)
CHLORIDE SERPL-SCNC: 103 MMOL/L (ref 98–112)
CO2 SERPL-SCNC: 22 MMOL/L (ref 21–32)
CREAT BLD-MCNC: 1.25 MG/DL
EGFRCR SERPLBLD CKD-EPI 2021: 67 ML/MIN/1.73M2 (ref 60–?)
EOSINOPHIL # BLD AUTO: 0.18 X10(3) UL (ref 0–0.7)
EOSINOPHIL NFR BLD AUTO: 3.4 %
ERYTHROCYTE [DISTWIDTH] IN BLOOD BY AUTOMATED COUNT: 14 %
GLOBULIN PLAS-MCNC: 5.2 G/DL (ref 2.8–4.4)
GLUCOSE BLD-MCNC: 100 MG/DL (ref 70–99)
HCT VFR BLD AUTO: 24 %
HGB BLD-MCNC: 8.3 G/DL
IMM GRANULOCYTES # BLD AUTO: 0.02 X10(3) UL (ref 0–1)
IMM GRANULOCYTES NFR BLD: 0.4 %
INR BLD: 1.84 (ref 0.85–1.16)
LYMPHOCYTES # BLD AUTO: 1.66 X10(3) UL (ref 1–4)
LYMPHOCYTES NFR BLD AUTO: 31.1 %
MCH RBC QN AUTO: 33.3 PG (ref 26–34)
MCHC RBC AUTO-ENTMCNC: 34.6 G/DL (ref 31–37)
MCV RBC AUTO: 96.4 FL
MONOCYTES # BLD AUTO: 0.42 X10(3) UL (ref 0.1–1)
MONOCYTES NFR BLD AUTO: 7.9 %
NEUTROPHILS # BLD AUTO: 3.01 X10 (3) UL (ref 1.5–7.7)
NEUTROPHILS # BLD AUTO: 3.01 X10(3) UL (ref 1.5–7.7)
NEUTROPHILS NFR BLD AUTO: 56.3 %
OSMOLALITY SERPL CALC.SUM OF ELEC: 263 MOSM/KG (ref 275–295)
PLATELET # BLD AUTO: 72 10(3)UL (ref 150–450)
POTASSIUM SERPL-SCNC: 4.4 MMOL/L (ref 3.5–5.1)
PROT SERPL-MCNC: 7.5 G/DL (ref 6.4–8.2)
PROTHROMBIN TIME: 21.1 SECONDS (ref 11.6–14.8)
RBC # BLD AUTO: 2.49 X10(6)UL
SODIUM SERPL-SCNC: 126 MMOL/L (ref 136–145)
WBC # BLD AUTO: 5.3 X10(3) UL (ref 4–11)

## 2023-09-22 NOTE — PLAN OF CARE
Patient is a/o x4. On room air. Ambulating in schafer. Denies SOB, ,n/v/d, or pain. No tele. Low sodium diet. Subcutaneous heparin. Continent. Voids. Up self. No further needs at this time. Safety precautions in place.    Problem: Patient/Family Goals  Goal: Patient/Family Long Term Goal  Description: Patient's Long Term Goal: Discharge     Interventions:  - Lactulose, Xifaxan  - See additional Care Plan goals for specific interventions  Outcome: Progressing

## 2023-09-22 NOTE — PROGRESS NOTES
AO x4. RA. No tele. Heparin subcutaneous. Continent. Up self to bathroom. No reports of pain. Low sodium diet. Trazodone PRN given for sleep as requested. Pt updated on POC. No further needs at this moment.

## 2023-09-22 NOTE — PROGRESS NOTES
NURSING DISCHARGE NOTE    Discharged Home via Ambulatory. Accompanied by Family member  Belongings Taken by patient/family. AVS explained. Answered all questions. PIV removed. VSS. No further needs at this time.

## 2023-09-23 DIAGNOSIS — G47.00 INSOMNIA, UNSPECIFIED TYPE: ICD-10-CM

## 2023-09-25 ENCOUNTER — TELEPHONE (OUTPATIENT)
Dept: FAMILY MEDICINE CLINIC | Facility: CLINIC | Age: 58
End: 2023-09-25

## 2023-09-25 ENCOUNTER — PATIENT OUTREACH (OUTPATIENT)
Dept: CASE MANAGEMENT | Age: 58
End: 2023-09-25

## 2023-09-25 DIAGNOSIS — Z02.9 ENCOUNTERS FOR ADMINISTRATIVE PURPOSE: Primary | ICD-10-CM

## 2023-09-25 RX ORDER — TRAZODONE HYDROCHLORIDE 50 MG/1
TABLET ORAL NIGHTLY PRN
Qty: 30 TABLET | Refills: 0 | Status: SHIPPED | OUTPATIENT
Start: 2023-09-25

## 2023-09-25 NOTE — PROGRESS NOTES
Goal Outcome Evaluation:           Progress: no change     Labetalol x 1 dose given; bp monitored; loose bm x2  episodes; urine output adequate; no complaint of pain.    Problem: Fall Injury Risk  Goal: Absence of Fall and Fall-Related Injury  Outcome: Ongoing, Progressing  Intervention: Promote Injury-Free Environment  Recent Flowsheet Documentation  Taken 3/20/2023 0400 by Glo Ni RN  Safety Promotion/Fall Prevention: safety round/check completed  Taken 3/20/2023 0200 by Glo Ni RN  Safety Promotion/Fall Prevention: safety round/check completed  Taken 3/20/2023 0000 by Glo Ni RN  Safety Promotion/Fall Prevention: safety round/check completed  Taken 3/19/2023 2200 by Glo Ni RN  Safety Promotion/Fall Prevention: safety round/check completed  Taken 3/19/2023 2000 by Glo Ni RN  Safety Promotion/Fall Prevention: safety round/check completed     Problem: Skin Injury Risk Increased  Goal: Skin Health and Integrity  Outcome: Ongoing, Progressing  Intervention: Optimize Skin Protection  Recent Flowsheet Documentation  Taken 3/20/2023 0400 by Glo Ni RN  Head of Bed (HOB) Positioning: HOB at 20-30 degrees  Taken 3/20/2023 0200 by Glo Ni RN  Head of Bed (HOB) Positioning: HOB at 20-30 degrees  Taken 3/20/2023 0000 by Glo Ni RN  Head of Bed (HOB) Positioning: HOB at 20-30 degrees  Taken 3/19/2023 2200 by Glo Ni RN  Head of Bed (HOB) Positioning: HOB elevated  Taken 3/19/2023 2026 by Glo Ni RN  Pressure Reduction Devices: alternating pressure pump (ADD)  Taken 3/19/2023 2000 by Glo Ni RN  Head of Bed (HOB) Positioning: HOB elevated     Problem: Adult Inpatient Plan of Care  Goal: Plan of Care Review  Outcome: Ongoing, Progressing  Flowsheets (Taken 3/20/2023 0603)  Progress: no change  Goal: Patient-Specific Goal (Individualized)  Outcome: Ongoing, Progressing  Goal: Absence of Hospital-Acquired Illness or Injury  Outcome: Ongoing,  MASHA s/w patient for HFU. He states that he is doing well and is having no issues. He confirms he will be getting his labs today and dropping off some paperwork at PCP office. He states that he will be following up with the specialists as this time and does not need an appt with PCP. MASHA sent TE to PCP office. Med review completed. He states that this was not a good time as he is currently at a meeting with his  and did not require a return call as everything was fine. MASHA closing encounter. Progressing  Intervention: Identify and Manage Fall Risk  Recent Flowsheet Documentation  Taken 3/20/2023 0400 by Glo Ni RN  Safety Promotion/Fall Prevention: safety round/check completed  Taken 3/20/2023 0200 by Glo Ni RN  Safety Promotion/Fall Prevention: safety round/check completed  Taken 3/20/2023 0000 by Glo Ni RN  Safety Promotion/Fall Prevention: safety round/check completed  Taken 3/19/2023 2200 by Glo Ni RN  Safety Promotion/Fall Prevention: safety round/check completed  Taken 3/19/2023 2000 by Glo Ni RN  Safety Promotion/Fall Prevention: safety round/check completed  Intervention: Prevent Skin Injury  Recent Flowsheet Documentation  Taken 3/20/2023 0400 by Glo Ni RN  Body Position:   turned   left  Taken 3/20/2023 0200 by Glo Ni RN  Body Position:   turned   right  Taken 3/20/2023 0000 by Glo Ni RN  Body Position: sitting up in bed  Taken 3/19/2023 2200 by Glo Ni RN  Body Position:   turned   left  Taken 3/19/2023 2000 by Glo Ni RN  Body Position:   turned   right  Intervention: Prevent and Manage VTE (Venous Thromboembolism) Risk  Recent Flowsheet Documentation  Taken 3/20/2023 0400 by Glo Ni RN  Activity Management: activity adjusted per tolerance  Taken 3/20/2023 0200 by Glo Ni RN  Activity Management: activity adjusted per tolerance  Taken 3/20/2023 0000 by Glo Ni RN  Activity Management: activity adjusted per tolerance  Taken 3/19/2023 2200 by Glo Ni RN  Activity Management: activity adjusted per tolerance  Taken 3/19/2023 2026 by Glo Ni RN  VTE Prevention/Management:   sequential compression devices on   left  Taken 3/19/2023 2000 by Glo Ni RN  Activity Management: activity adjusted per tolerance  Intervention: Prevent Infection  Recent Flowsheet Documentation  Taken 3/19/2023 2000 by Glo Ni RN  Infection Prevention:   single patient room provided   rest/sleep  promoted  Goal: Optimal Comfort and Wellbeing  Outcome: Ongoing, Progressing  Goal: Readiness for Transition of Care  Outcome: Ongoing, Progressing     Problem: Adjustment to Illness (Stroke, Ischemic/Transient Ischemic Attack)  Goal: Optimal Coping  Outcome: Ongoing, Progressing  Intervention: Support Psychosocial Response to Stroke  Recent Flowsheet Documentation  Taken 3/19/2023 2026 by Glo Ni RN  Supportive Measures: active listening utilized  Family/Support System Care: support provided     Problem: Bowel Elimination Impaired (Stroke, Ischemic/Transient Ischemic Attack)  Goal: Effective Bowel Elimination  Outcome: Ongoing, Progressing     Problem: Cerebral Tissue Perfusion (Stroke, Ischemic/Transient Ischemic Attack)  Goal: Optimal Cerebral Tissue Perfusion  Outcome: Ongoing, Progressing  Intervention: Protect and Optimize Cerebral Perfusion  Recent Flowsheet Documentation  Taken 3/19/2023 2026 by Glo Ni RN  Cerebral Perfusion Promotion: blood pressure monitored     Problem: Cognitive Impairment (Stroke, Ischemic/Transient Ischemic Attack)  Goal: Optimal Cognitive Function  Outcome: Ongoing, Progressing     Problem: Communication Impairment (Stroke, Ischemic/Transient Ischemic Attack)  Goal: Improved Communication Skills  Outcome: Ongoing, Progressing  Intervention: Optimize Communication Skills  Recent Flowsheet Documentation  Taken 3/19/2023 2026 by Glo Ni RN  Communication Enhancement Strategies:  utilized     Problem: Functional Ability Impaired (Stroke, Ischemic/Transient Ischemic Attack)  Goal: Optimal Functional Ability  Outcome: Ongoing, Progressing  Intervention: Optimize Functional Ability  Recent Flowsheet Documentation  Taken 3/20/2023 0400 by Glo Ni RN  Activity Management: activity adjusted per tolerance  Taken 3/20/2023 0200 by Glo Ni RN  Activity Management: activity adjusted per tolerance  Taken 3/20/2023 0000 by Glo Ni RN  Activity  Management: activity adjusted per tolerance  Taken 3/19/2023 2200 by Glo Ni RN  Activity Management: activity adjusted per tolerance  Taken 3/19/2023 2000 by Glo Ni RN  Activity Management: activity adjusted per tolerance     Problem: Respiratory Compromise (Stroke, Ischemic/Transient Ischemic Attack)  Goal: Effective Oxygenation and Ventilation  Outcome: Ongoing, Progressing  Intervention: Optimize Oxygenation and Ventilation  Recent Flowsheet Documentation  Taken 3/20/2023 0400 by Glo Ni RN  Head of Bed (HOB) Positioning: HOB at 20-30 degrees  Taken 3/20/2023 0200 by Glo Ni RN  Head of Bed (HOB) Positioning: HOB at 20-30 degrees  Taken 3/20/2023 0000 by Glo Ni RN  Head of Bed (HOB) Positioning: HOB at 20-30 degrees  Taken 3/19/2023 2200 by Glo Ni RN  Head of Bed (HOB) Positioning: HOB elevated  Taken 3/19/2023 2000 by Glo Ni RN  Head of Bed (HOB) Positioning: HOB elevated     Problem: Sensorimotor Impairment (Stroke, Ischemic/Transient Ischemic Attack)  Goal: Improved Sensorimotor Function  Outcome: Ongoing, Progressing  Intervention: Optimize Range of Motion, Motor Control and Function  Recent Flowsheet Documentation  Taken 3/20/2023 0400 by Glo Ni RN  Positioning/Transfer Devices:   pillows   in use  Taken 3/20/2023 0200 by Glo Ni RN  Positioning/Transfer Devices:   pillows   in use  Taken 3/20/2023 0000 by Glo Ni RN  Positioning/Transfer Devices:   pillows   in use  Taken 3/19/2023 2200 by Glo Ni RN  Positioning/Transfer Devices:   pillows   in use  Taken 3/19/2023 2000 by Glo Ni RN  Positioning/Transfer Devices:   pillows   in use  Intervention: Optimize Sensory and Perceptual Ability  Recent Flowsheet Documentation  Taken 3/19/2023 2026 by Glo Ni RN  Pressure Reduction Devices: alternating pressure pump (ADD)     Problem: Swallowing Impairment (Stroke, Ischemic/Transient Ischemic Attack)  Goal: Optimal  Eating and Swallowing without Aspiration  Outcome: Ongoing, Progressing     Problem: Urinary Elimination Impaired (Stroke, Ischemic/Transient Ischemic Attack)  Goal: Effective Urinary Elimination  Outcome: Ongoing, Progressing

## 2023-09-25 NOTE — TELEPHONE ENCOUNTER
.A refill request was received for:  Requested Prescriptions     Pending Prescriptions Disp Refills    TRAZODONE 50 MG Oral Tab [Pharmacy Med Name: TRAZODONE 50MG TABLETS] 30 tablet 0     Sig: TAKE 1/2 TO 1 TABLET(25 TO 50 MG) BY MOUTH EVERY NIGHT AS NEEDED FOR SLEEP       Last refill date:   9/13/2023    Last office visit: 9/13/2023    Follow up due:  Future Appointments   Date Time Provider Holli Stanley   11/20/2023  9:45 AM JAVI, PROCEDURE SGIEDW None

## 2023-09-25 NOTE — PROGRESS NOTES
Hospital follow up, first attempt. (Dc 9/22)    TCC request.    Patient declined follow up, prefers to follow up with specialist.  Transferred patient to central scheduling for further assistance. Confirmed with patient. Closing encounter.

## 2023-09-25 NOTE — TELEPHONE ENCOUNTER
JOSH, Spoke to pt for TCM today. He states that he is currently following up with the specialists and does not need a HFU at this time with PCP. HFU appt recommended by 9/29/23 and no later than 10/6/23  as pt is a high risk for readmission. He does state that he will be stopping by the office to drop by paperwork and will be getting labs done.

## 2023-09-25 NOTE — PAYOR COMM NOTE
--------------  DISCHARGE REVIEW    Payor: Jung Pham  #:  [id number]  Authorization Number: [id number]    Admit date: 9/20/23  Admit time:   7:43 PM  Discharge Date: 9/22/2023  3:44 PM     Admitting Physician: Eugenia Torres DO  Attending Physician:  No att. providers found  Primary Care Physician: Sabine Anna MD

## 2023-09-25 NOTE — PROGRESS NOTES
Returned call to patient and explained that [facility] does not schedule any outpatient testing. I did let him know I can however, provide him with the phone number and can transfer him directly to the Central Scheduling dept and they can assist with scheduling his MRI. Patient agreed to be transferred.     Closing encounter

## 2023-09-26 ENCOUNTER — LAB ENCOUNTER (OUTPATIENT)
Dept: LAB | Age: 58
End: 2023-09-26
Attending: INTERNAL MEDICINE
Payer: COMMERCIAL

## 2023-09-26 DIAGNOSIS — K70.31 ALCOHOLIC CIRRHOSIS OF LIVER WITH ASCITES (HCC): ICD-10-CM

## 2023-09-26 LAB
ALBUMIN SERPL-MCNC: 2.7 G/DL (ref 3.4–5)
ALBUMIN/GLOB SERPL: 0.5 {RATIO} (ref 1–2)
ALP LIVER SERPL-CCNC: 128 U/L
ALT SERPL-CCNC: 52 U/L
ANION GAP SERPL CALC-SCNC: 8 MMOL/L (ref 0–18)
AST SERPL-CCNC: 72 U/L (ref 15–37)
BASOPHILS # BLD AUTO: 0.04 X10(3) UL (ref 0–0.2)
BASOPHILS NFR BLD AUTO: 0.5 %
BILIRUB SERPL-MCNC: 4.7 MG/DL (ref 0.1–2)
BUN BLD-MCNC: 9 MG/DL (ref 7–18)
CALCIUM BLD-MCNC: 9.3 MG/DL (ref 8.5–10.1)
CHLORIDE SERPL-SCNC: 105 MMOL/L (ref 98–112)
CO2 SERPL-SCNC: 20 MMOL/L (ref 21–32)
CREAT BLD-MCNC: 1.15 MG/DL
EGFRCR SERPLBLD CKD-EPI 2021: 74 ML/MIN/1.73M2 (ref 60–?)
EOSINOPHIL # BLD AUTO: 0.19 X10(3) UL (ref 0–0.7)
EOSINOPHIL NFR BLD AUTO: 2.5 %
ERYTHROCYTE [DISTWIDTH] IN BLOOD BY AUTOMATED COUNT: 13.9 %
FASTING STATUS PATIENT QL REPORTED: YES
GLOBULIN PLAS-MCNC: 5.1 G/DL (ref 2.8–4.4)
GLUCOSE BLD-MCNC: 108 MG/DL (ref 70–99)
HCT VFR BLD AUTO: 24.9 %
HGB BLD-MCNC: 8.4 G/DL
IMM GRANULOCYTES # BLD AUTO: 0.04 X10(3) UL (ref 0–1)
IMM GRANULOCYTES NFR BLD: 0.5 %
INR BLD: 1.84 (ref 0.85–1.16)
LYMPHOCYTES # BLD AUTO: 1.5 X10(3) UL (ref 1–4)
LYMPHOCYTES NFR BLD AUTO: 19.9 %
MCH RBC QN AUTO: 33.1 PG (ref 26–34)
MCHC RBC AUTO-ENTMCNC: 33.7 G/DL (ref 31–37)
MCV RBC AUTO: 98 FL
MONOCYTES # BLD AUTO: 0.52 X10(3) UL (ref 0.1–1)
MONOCYTES NFR BLD AUTO: 6.9 %
NEUTROPHILS # BLD AUTO: 5.23 X10 (3) UL (ref 1.5–7.7)
NEUTROPHILS # BLD AUTO: 5.23 X10(3) UL (ref 1.5–7.7)
NEUTROPHILS NFR BLD AUTO: 69.7 %
OSMOLALITY SERPL CALC.SUM OF ELEC: 275 MOSM/KG (ref 275–295)
PLATELET # BLD AUTO: 92 10(3)UL (ref 150–450)
POTASSIUM SERPL-SCNC: 3.7 MMOL/L (ref 3.5–5.1)
PROT SERPL-MCNC: 7.8 G/DL (ref 6.4–8.2)
PROTHROMBIN TIME: 21.5 SECONDS (ref 11.6–14.8)
RBC # BLD AUTO: 2.54 X10(6)UL
SODIUM SERPL-SCNC: 133 MMOL/L (ref 136–145)
WBC # BLD AUTO: 7.5 X10(3) UL (ref 4–11)

## 2023-09-26 PROCEDURE — 85610 PROTHROMBIN TIME: CPT

## 2023-09-26 PROCEDURE — 36415 COLL VENOUS BLD VENIPUNCTURE: CPT

## 2023-09-26 PROCEDURE — 80053 COMPREHEN METABOLIC PANEL: CPT

## 2023-09-26 PROCEDURE — 85025 COMPLETE CBC W/AUTO DIFF WBC: CPT

## 2023-10-02 ENCOUNTER — APPOINTMENT (OUTPATIENT)
Dept: GENERAL RADIOLOGY | Facility: HOSPITAL | Age: 58
End: 2023-10-02
Attending: EMERGENCY MEDICINE
Payer: COMMERCIAL

## 2023-10-02 ENCOUNTER — HOSPITAL ENCOUNTER (INPATIENT)
Facility: HOSPITAL | Age: 58
LOS: 5 days | Discharge: HOME | End: 2023-10-07
Attending: EMERGENCY MEDICINE
Payer: COMMERCIAL

## 2023-10-02 ENCOUNTER — HOSPITAL ENCOUNTER (INPATIENT)
Facility: HOSPITAL | Age: 58
LOS: 5 days | Discharge: HOME | End: 2023-10-07
Attending: EMERGENCY MEDICINE | Admitting: INTERNAL MEDICINE
Payer: COMMERCIAL

## 2023-10-02 DIAGNOSIS — E87.20 LACTIC ACIDOSIS: ICD-10-CM

## 2023-10-02 DIAGNOSIS — J18.9 COMMUNITY ACQUIRED PNEUMONIA OF LEFT LOWER LOBE OF LUNG: Primary | ICD-10-CM

## 2023-10-02 PROBLEM — K72.90 LIVER FAILURE WITHOUT HEPATIC COMA (HCC): Status: ACTIVE | Noted: 2023-10-02

## 2023-10-02 LAB
ALBUMIN SERPL-MCNC: 2.3 G/DL (ref 3.4–5)
ALBUMIN/GLOB SERPL: 0.5 {RATIO} (ref 1–2)
ALP LIVER SERPL-CCNC: 117 U/L
ALT SERPL-CCNC: 43 U/L
AMMONIA PLAS-MCNC: <10 UMOL/L (ref 11–32)
ANION GAP SERPL CALC-SCNC: 4 MMOL/L (ref 0–18)
AST SERPL-CCNC: 53 U/L (ref 15–37)
ATRIAL RATE: 86 BPM
BASOPHILS # BLD AUTO: 0.03 X10(3) UL (ref 0–0.2)
BASOPHILS NFR BLD AUTO: 0.4 %
BILIRUB SERPL-MCNC: 5.8 MG/DL (ref 0.1–2)
BUN BLD-MCNC: 6 MG/DL (ref 7–18)
CALCIUM BLD-MCNC: 8.7 MG/DL (ref 8.5–10.1)
CHLORIDE SERPL-SCNC: 107 MMOL/L (ref 98–112)
CO2 SERPL-SCNC: 23 MMOL/L (ref 21–32)
CREAT BLD-MCNC: 0.99 MG/DL
EGFRCR SERPLBLD CKD-EPI 2021: 89 ML/MIN/1.73M2 (ref 60–?)
EOSINOPHIL # BLD AUTO: 0.1 X10(3) UL (ref 0–0.7)
EOSINOPHIL NFR BLD AUTO: 1.3 %
ERYTHROCYTE [DISTWIDTH] IN BLOOD BY AUTOMATED COUNT: 16.6 %
FLUAV + FLUBV RNA SPEC NAA+PROBE: NEGATIVE
FLUAV + FLUBV RNA SPEC NAA+PROBE: NEGATIVE
GLOBULIN PLAS-MCNC: 4.6 G/DL (ref 2.8–4.4)
GLUCOSE BLD-MCNC: 112 MG/DL (ref 70–99)
HCT VFR BLD AUTO: 24.5 %
HGB BLD-MCNC: 8.2 G/DL
IMM GRANULOCYTES # BLD AUTO: 0.07 X10(3) UL (ref 0–1)
IMM GRANULOCYTES NFR BLD: 0.9 %
L PNEUMO AG UR QL: NEGATIVE
LACTATE SERPL-SCNC: 2.6 MMOL/L (ref 0.4–2)
LACTATE SERPL-SCNC: 2.6 MMOL/L (ref 0.4–2)
LACTATE SERPL-SCNC: 3.7 MMOL/L (ref 0.4–2)
LYMPHOCYTES # BLD AUTO: 1.4 X10(3) UL (ref 1–4)
LYMPHOCYTES NFR BLD AUTO: 17.9 %
MAGNESIUM SERPL-MCNC: 2 MG/DL (ref 1.6–2.6)
MCH RBC QN AUTO: 33.2 PG (ref 26–34)
MCHC RBC AUTO-ENTMCNC: 33.5 G/DL (ref 31–37)
MCV RBC AUTO: 99.2 FL
MONOCYTES # BLD AUTO: 0.65 X10(3) UL (ref 0.1–1)
MONOCYTES NFR BLD AUTO: 8.3 %
NEUTROPHILS # BLD AUTO: 5.58 X10 (3) UL (ref 1.5–7.7)
NEUTROPHILS # BLD AUTO: 5.58 X10(3) UL (ref 1.5–7.7)
NEUTROPHILS NFR BLD AUTO: 71.2 %
OSMOLALITY SERPL CALC.SUM OF ELEC: 276 MOSM/KG (ref 275–295)
P AXIS: 39 DEGREES
P-R INTERVAL: 162 MS
PLATELET # BLD AUTO: 80 10(3)UL (ref 150–450)
POTASSIUM SERPL-SCNC: 3.8 MMOL/L (ref 3.5–5.1)
PROT SERPL-MCNC: 6.9 G/DL (ref 6.4–8.2)
Q-T INTERVAL: 422 MS
QRS DURATION: 78 MS
QTC CALCULATION (BEZET): 504 MS
R AXIS: 41 DEGREES
RBC # BLD AUTO: 2.47 X10(6)UL
RSV RNA SPEC NAA+PROBE: NEGATIVE
SARS-COV-2 RNA RESP QL NAA+PROBE: NOT DETECTED
SODIUM SERPL-SCNC: 134 MMOL/L (ref 136–145)
STREP PNEUMO ANTIGEN, URINE: NEGATIVE
T AXIS: 13 DEGREES
VENTRICULAR RATE: 86 BPM
WBC # BLD AUTO: 7.8 X10(3) UL (ref 4–11)

## 2023-10-02 PROCEDURE — 71045 X-RAY EXAM CHEST 1 VIEW: CPT | Performed by: EMERGENCY MEDICINE

## 2023-10-02 PROCEDURE — 99223 1ST HOSP IP/OBS HIGH 75: CPT | Performed by: STUDENT IN AN ORGANIZED HEALTH CARE EDUCATION/TRAINING PROGRAM

## 2023-10-02 RX ORDER — MELATONIN
3 NIGHTLY PRN
Status: DISCONTINUED | OUTPATIENT
Start: 2023-10-02 | End: 2023-10-07

## 2023-10-02 RX ORDER — ENOXAPARIN SODIUM 100 MG/ML
40 INJECTION SUBCUTANEOUS DAILY
Status: DISCONTINUED | OUTPATIENT
Start: 2023-10-02 | End: 2023-10-07

## 2023-10-02 RX ORDER — SODIUM CHLORIDE 9 MG/ML
125 INJECTION, SOLUTION INTRAVENOUS CONTINUOUS
Status: DISCONTINUED | OUTPATIENT
Start: 2023-10-02 | End: 2023-10-04

## 2023-10-02 RX ORDER — PANTOPRAZOLE SODIUM 40 MG/1
40 TABLET, DELAYED RELEASE ORAL
Status: DISCONTINUED | OUTPATIENT
Start: 2023-10-03 | End: 2023-10-07

## 2023-10-02 RX ORDER — ONDANSETRON 2 MG/ML
4 INJECTION INTRAMUSCULAR; INTRAVENOUS EVERY 6 HOURS PRN
Status: DISCONTINUED | OUTPATIENT
Start: 2023-10-02 | End: 2023-10-07

## 2023-10-02 RX ORDER — NICOTINE 21 MG/24HR
1 PATCH, TRANSDERMAL 24 HOURS TRANSDERMAL DAILY
Status: DISCONTINUED | OUTPATIENT
Start: 2023-10-02 | End: 2023-10-02

## 2023-10-02 RX ORDER — ALLOPURINOL 100 MG/1
100 TABLET ORAL DAILY
Status: DISCONTINUED | OUTPATIENT
Start: 2023-10-02 | End: 2023-10-07

## 2023-10-02 RX ORDER — LACTULOSE 10 G/15ML
10 SOLUTION ORAL DAILY
Status: DISCONTINUED | OUTPATIENT
Start: 2023-10-02 | End: 2023-10-07

## 2023-10-02 RX ORDER — ONDANSETRON 2 MG/ML
4 INJECTION INTRAMUSCULAR; INTRAVENOUS EVERY 4 HOURS PRN
Status: DISCONTINUED | OUTPATIENT
Start: 2023-10-02 | End: 2023-10-02 | Stop reason: ALTCHOICE

## 2023-10-02 RX ORDER — BENZONATATE 200 MG/1
200 CAPSULE ORAL 3 TIMES DAILY PRN
Status: DISCONTINUED | OUTPATIENT
Start: 2023-10-02 | End: 2023-10-07

## 2023-10-02 RX ORDER — ECHINACEA PURPUREA EXTRACT 125 MG
1 TABLET ORAL
Status: DISCONTINUED | OUTPATIENT
Start: 2023-10-02 | End: 2023-10-07

## 2023-10-02 RX ORDER — POLYETHYLENE GLYCOL 3350 17 G/17G
17 POWDER, FOR SOLUTION ORAL DAILY PRN
Status: DISCONTINUED | OUTPATIENT
Start: 2023-10-02 | End: 2023-10-07

## 2023-10-02 RX ORDER — PROCHLORPERAZINE EDISYLATE 5 MG/ML
5 INJECTION INTRAMUSCULAR; INTRAVENOUS EVERY 8 HOURS PRN
Status: DISCONTINUED | OUTPATIENT
Start: 2023-10-02 | End: 2023-10-07

## 2023-10-02 RX ORDER — SODIUM CHLORIDE 9 MG/ML
INJECTION, SOLUTION INTRAVENOUS CONTINUOUS
Status: CANCELLED | OUTPATIENT
Start: 2023-10-02

## 2023-10-02 RX ORDER — ACETAMINOPHEN 500 MG
500 TABLET ORAL EVERY 4 HOURS PRN
Status: DISCONTINUED | OUTPATIENT
Start: 2023-10-02 | End: 2023-10-07

## 2023-10-02 RX ORDER — GUAIFENESIN 600 MG/1
600 TABLET, EXTENDED RELEASE ORAL 2 TIMES DAILY PRN
Status: DISCONTINUED | OUTPATIENT
Start: 2023-10-02 | End: 2023-10-07

## 2023-10-02 RX ORDER — BISACODYL 10 MG
10 SUPPOSITORY, RECTAL RECTAL
Status: DISCONTINUED | OUTPATIENT
Start: 2023-10-02 | End: 2023-10-07

## 2023-10-02 RX ORDER — ENEMA 19; 7 G/133ML; G/133ML
1 ENEMA RECTAL ONCE AS NEEDED
Status: DISCONTINUED | OUTPATIENT
Start: 2023-10-02 | End: 2023-10-07

## 2023-10-02 RX ORDER — SENNOSIDES 8.6 MG
17.2 TABLET ORAL NIGHTLY PRN
Status: DISCONTINUED | OUTPATIENT
Start: 2023-10-02 | End: 2023-10-07

## 2023-10-02 NOTE — ED INITIAL ASSESSMENT (HPI)
Cough , sore throat  and dizziness since morning . No fever .  Patient is known case of liver failure   awaiting  for transplant

## 2023-10-02 NOTE — ED QUICK NOTES
Orders for admission, patient is aware of plan and ready to go upstairs. Any questions, please call ED RN Alysia Marcum at extension [phone].      Patient Covid vaccination status: Fully vaccinated     COVID Test Ordered in ED: SARS-CoV-2/Flu A and B/RSV by PCR (GeneXpert)    COVID Suspicion at Admission: N/A    Running Infusions:    sodium chloride 125 mL/hr (10/02/23 1540)   Zosyn 3.375g/100ml @  200ml/hr    Mental Status/LOC at time of transport: AOX4    Other pertinent information: receiving Liver transplant next month; no current scheduled dialysis; COVID last month; Need repeat lactic, first was delayed a bit from draw due to difficulty getting a line; IV placed by US    CIWA score: N/A   NIH score:  N/A

## 2023-10-02 NOTE — ED QUICK NOTES
[facility] tried for a line; two separate attempts w/US; second attempt was successful w/extended line

## 2023-10-02 NOTE — PLAN OF CARE
Received pt alert and oriented X4. VSS. Navigators completed. Med rec completed. Pt nicotine patch DC per patient. IVF infusing. UA sent. Need sputum culture. Pt on tele and  per orders. Fall precautions in place, call light in reach    NURSING ADMISSION NOTE      Patient admitted via [address] to room. Safety precautions initiated. Bed in low position. Call light in reach.

## 2023-10-03 LAB
ALBUMIN SERPL-MCNC: 2 G/DL (ref 3.4–5)
ALBUMIN/GLOB SERPL: 0.5 {RATIO} (ref 1–2)
ALP LIVER SERPL-CCNC: 86 U/L
ALT SERPL-CCNC: 34 U/L
ANION GAP SERPL CALC-SCNC: 3 MMOL/L (ref 0–18)
AST SERPL-CCNC: 41 U/L (ref 15–37)
BASOPHILS # BLD AUTO: 0.02 X10(3) UL (ref 0–0.2)
BASOPHILS NFR BLD AUTO: 0.2 %
BILIRUB SERPL-MCNC: 4.9 MG/DL (ref 0.1–2)
BUN BLD-MCNC: 7 MG/DL (ref 7–18)
CALCIUM BLD-MCNC: 8.1 MG/DL (ref 8.5–10.1)
CHLORIDE SERPL-SCNC: 111 MMOL/L (ref 98–112)
CO2 SERPL-SCNC: 23 MMOL/L (ref 21–32)
CREAT BLD-MCNC: 0.88 MG/DL
EGFRCR SERPLBLD CKD-EPI 2021: 100 ML/MIN/1.73M2 (ref 60–?)
EOSINOPHIL # BLD AUTO: 0.16 X10(3) UL (ref 0–0.7)
EOSINOPHIL NFR BLD AUTO: 1.9 %
ERYTHROCYTE [DISTWIDTH] IN BLOOD BY AUTOMATED COUNT: 17.2 %
GLOBULIN PLAS-MCNC: 3.7 G/DL (ref 2.8–4.4)
GLUCOSE BLD-MCNC: 111 MG/DL (ref 70–99)
GLUCOSE BLD-MCNC: 141 MG/DL (ref 70–99)
HCT VFR BLD AUTO: 21 %
HGB BLD-MCNC: 7.2 G/DL
IMM GRANULOCYTES # BLD AUTO: 0.08 X10(3) UL (ref 0–1)
IMM GRANULOCYTES NFR BLD: 0.9 %
LYMPHOCYTES # BLD AUTO: 1.54 X10(3) UL (ref 1–4)
LYMPHOCYTES NFR BLD AUTO: 18.2 %
MCH RBC QN AUTO: 34.1 PG (ref 26–34)
MCHC RBC AUTO-ENTMCNC: 34.3 G/DL (ref 31–37)
MCV RBC AUTO: 99.5 FL
MONOCYTES # BLD AUTO: 0.77 X10(3) UL (ref 0.1–1)
MONOCYTES NFR BLD AUTO: 9.1 %
NEUTROPHILS # BLD AUTO: 5.91 X10 (3) UL (ref 1.5–7.7)
NEUTROPHILS # BLD AUTO: 5.91 X10(3) UL (ref 1.5–7.7)
NEUTROPHILS NFR BLD AUTO: 69.7 %
OSMOLALITY SERPL CALC.SUM OF ELEC: 283 MOSM/KG (ref 275–295)
PLATELET # BLD AUTO: 73 10(3)UL (ref 150–450)
POTASSIUM SERPL-SCNC: 3.4 MMOL/L (ref 3.5–5.1)
PROT SERPL-MCNC: 5.7 G/DL (ref 6.4–8.2)
RBC # BLD AUTO: 2.11 X10(6)UL
SODIUM SERPL-SCNC: 137 MMOL/L (ref 136–145)
WBC # BLD AUTO: 8.5 X10(3) UL (ref 4–11)

## 2023-10-03 PROCEDURE — 99232 SBSQ HOSP IP/OBS MODERATE 35: CPT | Performed by: HOSPITALIST

## 2023-10-03 RX ORDER — POTASSIUM CHLORIDE 20 MEQ/1
40 TABLET, EXTENDED RELEASE ORAL ONCE
Status: COMPLETED | OUTPATIENT
Start: 2023-10-03 | End: 2023-10-03

## 2023-10-03 RX ORDER — TRAZODONE HYDROCHLORIDE 100 MG/1
100 TABLET ORAL NIGHTLY PRN
Status: DISCONTINUED | OUTPATIENT
Start: 2023-10-03 | End: 2023-10-07

## 2023-10-03 NOTE — PLAN OF CARE
Pt received A&Ox4. VSS. Tele. Tmax 100.6 - tylenol given prn. RA. Pt c/o dizziness. Medications given per MAR, receiving Zosyn. IVF. Call light within reach. Fall precautions in place.

## 2023-10-03 NOTE — PLAN OF CARE
Pt alert and oriented X4. VSS. No Co of pain. IVF infusing. IVABX given per MAR. All meds given per [facility]. Pt completed POA with spiritual care. Fall precautions in place, call light in reach.      Problem: RISK FOR INFECTION - ADULT  Goal: Absence of fever/infection during anticipated neutropenic period  Description: INTERVENTIONS  - Monitor WBC  - Administer growth factors as ordered  - Implement neutropenic guidelines  Outcome: Progressing

## 2023-10-04 PROBLEM — E88.09 HYPOALBUMINEMIA: Status: ACTIVE | Noted: 2023-10-04

## 2023-10-04 PROBLEM — R60.1 ANASARCA: Status: ACTIVE | Noted: 2023-10-04

## 2023-10-04 LAB — POTASSIUM SERPL-SCNC: 3.7 MMOL/L (ref 3.5–5.1)

## 2023-10-04 PROCEDURE — 99232 SBSQ HOSP IP/OBS MODERATE 35: CPT | Performed by: INTERNAL MEDICINE

## 2023-10-04 RX ORDER — SPIRONOLACTONE 25 MG/1
12.5 TABLET ORAL DAILY
Status: DISCONTINUED | OUTPATIENT
Start: 2023-10-04 | End: 2023-10-07

## 2023-10-04 NOTE — PLAN OF CARE
Patient is A/O x4. VSS. Afebrile. Room air. No complaints of pain. No complaints of SOB. Non productive cough. Ambulatory. Voids. Regular diet; tolerated well. Patient expressed feeling \"puffy\"; generalized non-pitting edema noted in hands and feet. MD notified. IVF stopped per order. All medications given per [facility]. IV abx infused per order. PIV saline locked. Safety precautions in place. Call light within reach.     Problem: RISK FOR INFECTION - ADULT  Goal: Absence of fever/infection during anticipated neutropenic period  Description: INTERVENTIONS  - Monitor WBC  - Administer growth factors as ordered  - Implement neutropenic guidelines  Outcome: Progressing

## 2023-10-04 NOTE — PLAN OF CARE
Pt a/o x4. VSS on RA. No c/o pain. Nonproductive cough present. Prns given. Meds per MAR. IVF per order. Fall risk protocol followed, call light within reach.      Problem: RISK FOR INFECTION - ADULT  Goal: Absence of fever/infection during anticipated neutropenic period  Description: INTERVENTIONS  - Monitor WBC  - Administer growth factors as ordered  - Implement neutropenic guidelines  Outcome: Progressing

## 2023-10-05 LAB
ALBUMIN SERPL-MCNC: 1.9 G/DL (ref 3.4–5)
ALBUMIN/GLOB SERPL: 0.5 {RATIO} (ref 1–2)
ALP LIVER SERPL-CCNC: 89 U/L
ALT SERPL-CCNC: 33 U/L
ANION GAP SERPL CALC-SCNC: 6 MMOL/L (ref 0–18)
AST SERPL-CCNC: 37 U/L (ref 15–37)
BASOPHILS # BLD AUTO: 0.04 X10(3) UL (ref 0–0.2)
BASOPHILS NFR BLD AUTO: 0.7 %
BILIRUB SERPL-MCNC: 4 MG/DL (ref 0.1–2)
BUN BLD-MCNC: 8 MG/DL (ref 7–18)
CALCIUM BLD-MCNC: 8.1 MG/DL (ref 8.5–10.1)
CHLORIDE SERPL-SCNC: 110 MMOL/L (ref 98–112)
CO2 SERPL-SCNC: 21 MMOL/L (ref 21–32)
CREAT BLD-MCNC: 0.88 MG/DL
EGFRCR SERPLBLD CKD-EPI 2021: 100 ML/MIN/1.73M2 (ref 60–?)
EOSINOPHIL # BLD AUTO: 0.19 X10(3) UL (ref 0–0.7)
EOSINOPHIL NFR BLD AUTO: 3.1 %
ERYTHROCYTE [DISTWIDTH] IN BLOOD BY AUTOMATED COUNT: 17.4 %
GLOBULIN PLAS-MCNC: 4.1 G/DL (ref 2.8–4.4)
GLUCOSE BLD-MCNC: 97 MG/DL (ref 70–99)
HCT VFR BLD AUTO: 22.1 %
HGB BLD-MCNC: 7.5 G/DL
IMM GRANULOCYTES # BLD AUTO: 0.05 X10(3) UL (ref 0–1)
IMM GRANULOCYTES NFR BLD: 0.8 %
INR BLD: 2.4 (ref 0.85–1.16)
LYMPHOCYTES # BLD AUTO: 1.4 X10(3) UL (ref 1–4)
LYMPHOCYTES NFR BLD AUTO: 22.9 %
MCH RBC QN AUTO: 34.2 PG (ref 26–34)
MCHC RBC AUTO-ENTMCNC: 33.9 G/DL (ref 31–37)
MCV RBC AUTO: 100.9 FL
MONOCYTES # BLD AUTO: 0.61 X10(3) UL (ref 0.1–1)
MONOCYTES NFR BLD AUTO: 10 %
NEUTROPHILS # BLD AUTO: 3.82 X10 (3) UL (ref 1.5–7.7)
NEUTROPHILS # BLD AUTO: 3.82 X10(3) UL (ref 1.5–7.7)
NEUTROPHILS NFR BLD AUTO: 62.5 %
OSMOLALITY SERPL CALC.SUM OF ELEC: 282 MOSM/KG (ref 275–295)
PLATELET # BLD AUTO: 79 10(3)UL (ref 150–450)
POTASSIUM SERPL-SCNC: 3.7 MMOL/L (ref 3.5–5.1)
PROT SERPL-MCNC: 6 G/DL (ref 6.4–8.2)
PROTHROMBIN TIME: 26.4 SECONDS (ref 11.6–14.8)
RBC # BLD AUTO: 2.19 X10(6)UL
SODIUM SERPL-SCNC: 137 MMOL/L (ref 136–145)
WBC # BLD AUTO: 6.1 X10(3) UL (ref 4–11)

## 2023-10-05 PROCEDURE — 99232 SBSQ HOSP IP/OBS MODERATE 35: CPT | Performed by: INTERNAL MEDICINE

## 2023-10-05 RX ORDER — ALBUMIN (HUMAN) 12.5 G/50ML
25 SOLUTION INTRAVENOUS ONCE
Status: COMPLETED | OUTPATIENT
Start: 2023-10-05 | End: 2023-10-05

## 2023-10-05 RX ORDER — FUROSEMIDE 10 MG/ML
20 INJECTION INTRAMUSCULAR; INTRAVENOUS ONCE
Status: COMPLETED | OUTPATIENT
Start: 2023-10-05 | End: 2023-10-05

## 2023-10-05 NOTE — PLAN OF CARE
Patient alert and oriented x4. Afebrile. Room air. No SOB. Non -productive cough noted. Maintained on IV antibiotics for Pneumonia, tolerated well. No complaints of pain. No nausea and vomiting. Ambulatory. Fall precautions in place. Call light in reach. Needs attended. Will continue Plan of care.      Problem: RESPIRATORY - ADULT  Goal: Achieves optimal ventilation and oxygenation  Description: INTERVENTIONS:  - Assess for changes in respiratory status  - Assess for changes in mentation and behavior  - Position to facilitate oxygenation and minimize respiratory effort  - Oxygen supplementation based on oxygen saturation or ABGs  - Provide Smoking Cessation handout, if applicable  - Encourage broncho-pulmonary hygiene including cough, deep breathe, Incentive Spirometry  - Assess the need for suctioning and perform as needed  - Assess and instruct to report SOB or any respiratory difficulty  - Respiratory Therapy support as indicated  - Manage/alleviate anxiety  - Monitor for signs/symptoms of CO2 retention  Outcome: Progressing

## 2023-10-05 NOTE — PLAN OF CARE
Problem: RISK FOR INFECTION - ADULT  Goal: Absence of fever/infection during anticipated neutropenic period  Description: INTERVENTIONS  - Monitor WBC  - Administer growth factors as ordered  - Implement neutropenic guidelines  Outcome: Progressing     Problem: RESPIRATORY - ADULT  Goal: Achieves optimal ventilation and oxygenation  Description: INTERVENTIONS:  - Assess for changes in respiratory status  - Assess for changes in mentation and behavior  - Position to facilitate oxygenation and minimize respiratory effort  - Oxygen supplementation based on oxygen saturation or ABGs  - Provide Smoking Cessation handout, if applicable  - Encourage broncho-pulmonary hygiene including cough, deep breathe, Incentive Spirometry  - Assess the need for suctioning and perform as needed  - Assess and instruct to report SOB or any respiratory difficulty  - Respiratory Therapy support as indicated  - Manage/alleviate anxiety  - Monitor for signs/symptoms of CO2 retention  Outcome: Progressing     Attempted to establish IV access in R arm d/t L arm swelling. Unable to obtain access to R arm, discussed with Dr. Yessica Evans, ok to continue using IV in L arm. L arm IV CDI and flushes without complication. Albumin and lasix given, awaiting US of LUE prior to DC. Patient ambulates on unit ad fei w/o complications. Patient voids, tolerates diet, denies pain. Patient updated progressing and in agreement with POC. Safety precautions in place.

## 2023-10-06 ENCOUNTER — APPOINTMENT (OUTPATIENT)
Dept: ULTRASOUND IMAGING | Facility: HOSPITAL | Age: 58
End: 2023-10-06
Attending: INTERNAL MEDICINE
Payer: COMMERCIAL

## 2023-10-06 PROCEDURE — 93971 EXTREMITY STUDY: CPT | Performed by: INTERNAL MEDICINE

## 2023-10-06 PROCEDURE — 99232 SBSQ HOSP IP/OBS MODERATE 35: CPT | Performed by: INTERNAL MEDICINE

## 2023-10-06 RX ORDER — ALBUMIN (HUMAN) 12.5 G/50ML
25 SOLUTION INTRAVENOUS EVERY 8 HOURS
Qty: 300 ML | Refills: 0 | Status: COMPLETED | OUTPATIENT
Start: 2023-10-06 | End: 2023-10-07

## 2023-10-06 RX ORDER — FUROSEMIDE 10 MG/ML
20 INJECTION INTRAMUSCULAR; INTRAVENOUS 3 TIMES DAILY
Status: COMPLETED | OUTPATIENT
Start: 2023-10-06 | End: 2023-10-07

## 2023-10-06 NOTE — PROGRESS NOTES
Alert and orientated x4. Stating his left arm swelling was getting tighter and tighter. Antibiotics being infused through left IV. IV restarted to right wrist and left IV removed. Pt is scheduled for US of left arm today. Afebrile. Vital signs stable.

## 2023-10-06 NOTE — PLAN OF CARE
Pt Aox4. VSS. RA.   No N/V/D. Reported discomfort of LUE due to swelling. Small blisters noted. Cleaned and Neosporin applied. General edema noted. US LUE completed. NNO. Lasix 20mg an Albumin 25g administered once per order. Good appetite  Will continue plan of care.        Problem: RISK FOR INFECTION - ADULT  Goal: Absence of fever/infection during anticipated neutropenic period  Description: INTERVENTIONS  - Monitor WBC  - Administer growth factors as ordered  - Implement neutropenic guidelines  10/6/2023 1132 by Shelbi Watson RN  Outcome: Adequate for Discharge  10/6/2023 1132 by Shelbi Watson RN  Outcome: Progressing     Problem: RESPIRATORY - ADULT  Goal: Achieves optimal ventilation and oxygenation  Description: INTERVENTIONS:  - Assess for changes in respiratory status  - Assess for changes in mentation and behavior  - Position to facilitate oxygenation and minimize respiratory effort  - Oxygen supplementation based on oxygen saturation or ABGs  - Provide Smoking Cessation handout, if applicable  - Encourage broncho-pulmonary hygiene including cough, deep breathe, Incentive Spirometry  - Assess the need for suctioning and perform as needed  - Assess and instruct to report SOB or any respiratory difficulty  - Respiratory Therapy support as indicated  - Manage/alleviate anxiety  - Monitor for signs/symptoms of CO2 retention  10/6/2023 1132 by Shelbi Watson RN  Outcome: Adequate for Discharge  10/6/2023 1132 by Shelbi Watson RN  Outcome: Progressing

## 2023-10-07 VITALS
TEMPERATURE: 98 F | HEART RATE: 87 BPM | HEIGHT: 67 IN | WEIGHT: 205 LBS | DIASTOLIC BLOOD PRESSURE: 55 MMHG | RESPIRATION RATE: 17 BRPM | BODY MASS INDEX: 32.18 KG/M2 | SYSTOLIC BLOOD PRESSURE: 114 MMHG | OXYGEN SATURATION: 97 %

## 2023-10-07 LAB
ALBUMIN SERPL-MCNC: 2.9 G/DL (ref 3.4–5)
ALBUMIN/GLOB SERPL: 0.7 {RATIO} (ref 1–2)
ALP LIVER SERPL-CCNC: 97 U/L
ALT SERPL-CCNC: 36 U/L
ANION GAP SERPL CALC-SCNC: 5 MMOL/L (ref 0–18)
ANION GAP SERPL CALC-SCNC: 6 MMOL/L (ref 0–18)
AST SERPL-CCNC: 47 U/L (ref 15–37)
BASOPHILS # BLD AUTO: 0.03 X10(3) UL (ref 0–0.2)
BASOPHILS NFR BLD AUTO: 0.6 %
BILIRUB SERPL-MCNC: 5.2 MG/DL (ref 0.1–2)
BUN BLD-MCNC: 5 MG/DL (ref 7–18)
BUN BLD-MCNC: 5 MG/DL (ref 7–18)
CALCIUM BLD-MCNC: 8.4 MG/DL (ref 8.5–10.1)
CALCIUM BLD-MCNC: 8.5 MG/DL (ref 8.5–10.1)
CHLORIDE SERPL-SCNC: 105 MMOL/L (ref 98–112)
CHLORIDE SERPL-SCNC: 105 MMOL/L (ref 98–112)
CO2 SERPL-SCNC: 25 MMOL/L (ref 21–32)
CO2 SERPL-SCNC: 26 MMOL/L (ref 21–32)
CREAT BLD-MCNC: 0.96 MG/DL
CREAT BLD-MCNC: 0.98 MG/DL
EGFRCR SERPLBLD CKD-EPI 2021: 90 ML/MIN/1.73M2 (ref 60–?)
EGFRCR SERPLBLD CKD-EPI 2021: 92 ML/MIN/1.73M2 (ref 60–?)
EOSINOPHIL # BLD AUTO: 0.15 X10(3) UL (ref 0–0.7)
EOSINOPHIL NFR BLD AUTO: 3.1 %
ERYTHROCYTE [DISTWIDTH] IN BLOOD BY AUTOMATED COUNT: 17.6 %
GLOBULIN PLAS-MCNC: 4 G/DL (ref 2.8–4.4)
GLUCOSE BLD-MCNC: 116 MG/DL (ref 70–99)
GLUCOSE BLD-MCNC: 122 MG/DL (ref 70–99)
HCT VFR BLD AUTO: 22.8 %
HGB BLD-MCNC: 7.7 G/DL
IMM GRANULOCYTES # BLD AUTO: 0.02 X10(3) UL (ref 0–1)
IMM GRANULOCYTES NFR BLD: 0.4 %
LYMPHOCYTES # BLD AUTO: 1.17 X10(3) UL (ref 1–4)
LYMPHOCYTES NFR BLD AUTO: 24.1 %
MCH RBC QN AUTO: 33.8 PG (ref 26–34)
MCHC RBC AUTO-ENTMCNC: 33.8 G/DL (ref 31–37)
MCV RBC AUTO: 100 FL
MONOCYTES # BLD AUTO: 0.46 X10(3) UL (ref 0.1–1)
MONOCYTES NFR BLD AUTO: 9.5 %
NEUTROPHILS # BLD AUTO: 3.03 X10 (3) UL (ref 1.5–7.7)
NEUTROPHILS # BLD AUTO: 3.03 X10(3) UL (ref 1.5–7.7)
NEUTROPHILS NFR BLD AUTO: 62.3 %
OSMOLALITY SERPL CALC.SUM OF ELEC: 280 MOSM/KG (ref 275–295)
OSMOLALITY SERPL CALC.SUM OF ELEC: 281 MOSM/KG (ref 275–295)
PLATELET # BLD AUTO: 80 10(3)UL (ref 150–450)
POTASSIUM SERPL-SCNC: 2.9 MMOL/L (ref 3.5–5.1)
POTASSIUM SERPL-SCNC: 3.2 MMOL/L (ref 3.5–5.1)
POTASSIUM SERPL-SCNC: 3.2 MMOL/L (ref 3.5–5.1)
PROCALCITONIN SERPL-MCNC: <0.05 NG/ML (ref ?–0.16)
PROT SERPL-MCNC: 6.9 G/DL (ref 6.4–8.2)
RBC # BLD AUTO: 2.28 X10(6)UL
SODIUM SERPL-SCNC: 136 MMOL/L (ref 136–145)
SODIUM SERPL-SCNC: 136 MMOL/L (ref 136–145)
WBC # BLD AUTO: 4.9 X10(3) UL (ref 4–11)

## 2023-10-07 PROCEDURE — 99239 HOSP IP/OBS DSCHRG MGMT >30: CPT | Performed by: INTERNAL MEDICINE

## 2023-10-07 RX ORDER — SPIRONOLACTONE 25 MG/1
12.5 TABLET ORAL DAILY
Qty: 15 TABLET | Refills: 0 | Status: SHIPPED | OUTPATIENT
Start: 2023-10-08 | End: 2023-11-07

## 2023-10-07 RX ORDER — POTASSIUM CHLORIDE 20 MEQ/1
40 TABLET, EXTENDED RELEASE ORAL EVERY 4 HOURS
Status: COMPLETED | OUTPATIENT
Start: 2023-10-07 | End: 2023-10-07

## 2023-10-07 RX ORDER — POTASSIUM CHLORIDE 20 MEQ/1
40 TABLET, EXTENDED RELEASE ORAL ONCE
Status: COMPLETED | OUTPATIENT
Start: 2023-10-07 | End: 2023-10-07

## 2023-10-07 RX ORDER — FUROSEMIDE 10 MG/ML
INJECTION INTRAMUSCULAR; INTRAVENOUS
Status: DISCONTINUED
Start: 2023-10-07 | End: 2023-10-07

## 2023-10-07 RX ORDER — POTASSIUM CHLORIDE 20 MEQ/1
20 TABLET, EXTENDED RELEASE ORAL 2 TIMES DAILY
Qty: 4 TABLET | Refills: 0 | Status: SHIPPED | OUTPATIENT
Start: 2023-10-07 | End: 2023-10-09

## 2023-10-07 RX ORDER — ALBUMIN (HUMAN) 12.5 G/50ML
25 SOLUTION INTRAVENOUS ONCE
Status: COMPLETED | OUTPATIENT
Start: 2023-10-07 | End: 2023-10-07

## 2023-10-07 RX ORDER — FUROSEMIDE 10 MG/ML
20 INJECTION INTRAMUSCULAR; INTRAVENOUS ONCE
Status: COMPLETED | OUTPATIENT
Start: 2023-10-07 | End: 2023-10-07

## 2023-10-07 NOTE — PLAN OF CARE
NURSING DISCHARGE NOTE    Discharged Home via Wheelchair. Accompanied by Family member  Belongings Taken by patient/family. Discharge teaching provided and verbalized understanding.     Problem: RISK FOR INFECTION - ADULT  Goal: Absence of fever/infection during anticipated neutropenic period  Description: INTERVENTIONS  - Monitor WBC  - Administer growth factors as ordered  - Implement neutropenic guidelines  Outcome: Progressing     Problem: SKIN/TISSUE INTEGRITY - ADULT  Goal: Skin integrity remains intact  Description: INTERVENTIONS  - Assess and document risk factors for pressure ulcer development  - Assess and document skin integrity  - Monitor for areas of redness and/or skin breakdown  - Initiate interventions, skin care algorithm/standards of care as needed  Outcome: Progressing  Goal: Incision(s), wounds(s) or drain site(s) healing without S/S of infection  Description: INTERVENTIONS:  - Assess and document risk factors for pressure ulcer development  - Assess and document skin integrity  - Assess and document dressing/incision, wound bed, drain sites and surrounding tissue  - Implement wound care per orders  - Initiate isolation precautions as appropriate  - Initiate Pressure Ulcer prevention bundle as indicated  Outcome: Progressing

## 2023-10-07 NOTE — PROGRESS NOTES
Resumed care at 2300. Pt A&Ox4 on room air, no complaints of pain, tolerating medications well per mar. Lasix and albumin given due to his lower extremity edema from his liver failure. Call light within reach, frequent checks made, needs met.

## 2023-10-09 ENCOUNTER — PATIENT OUTREACH (OUTPATIENT)
Dept: CASE MANAGEMENT | Age: 58
End: 2023-10-09

## 2023-10-09 ENCOUNTER — TELEPHONE (OUTPATIENT)
Dept: FAMILY MEDICINE CLINIC | Facility: CLINIC | Age: 58
End: 2023-10-09

## 2023-10-09 DIAGNOSIS — Z02.9 ENCOUNTERS FOR UNSPECIFIED ADMINISTRATIVE PURPOSE: Primary | ICD-10-CM

## 2023-10-09 NOTE — TELEPHONE ENCOUNTER
Pt has an open wound from hospital. He states he just needs someone to look at it and confirm it is not infected and it looks good.  Appt made tomorrow with YP

## 2023-10-09 NOTE — TELEPHONE ENCOUNTER
Pt said he needs a wound check - according to pt from \"botched IV\" by tomorrow or he will need to get admitted back to the hospital.

## 2023-10-10 ENCOUNTER — LAB ENCOUNTER (OUTPATIENT)
Dept: LAB | Age: 58
End: 2023-10-10
Attending: HOSPITALIST
Payer: COMMERCIAL

## 2023-10-10 ENCOUNTER — OFFICE VISIT (OUTPATIENT)
Dept: FAMILY MEDICINE CLINIC | Facility: CLINIC | Age: 58
End: 2023-10-10
Payer: COMMERCIAL

## 2023-10-10 VITALS
OXYGEN SATURATION: 98 % | HEIGHT: 67 IN | RESPIRATION RATE: 16 BRPM | SYSTOLIC BLOOD PRESSURE: 126 MMHG | BODY MASS INDEX: 31.55 KG/M2 | WEIGHT: 201 LBS | HEART RATE: 98 BPM | DIASTOLIC BLOOD PRESSURE: 50 MMHG

## 2023-10-10 DIAGNOSIS — T80.1XXD INTRAVENOUS INFILTRATION, SUBSEQUENT ENCOUNTER: ICD-10-CM

## 2023-10-10 DIAGNOSIS — D64.9 ANEMIA, UNSPECIFIED TYPE: ICD-10-CM

## 2023-10-10 DIAGNOSIS — J18.9 PNEUMONIA OF LEFT LUNG DUE TO INFECTIOUS ORGANISM, UNSPECIFIED PART OF LUNG: Primary | ICD-10-CM

## 2023-10-10 DIAGNOSIS — K76.7 HEPATORENAL FAILURE (HCC): ICD-10-CM

## 2023-10-10 DIAGNOSIS — K74.60 CIRRHOSIS OF LIVER WITHOUT ASCITES, UNSPECIFIED HEPATIC CIRRHOSIS TYPE (HCC): ICD-10-CM

## 2023-10-10 DIAGNOSIS — N17.9 ACUTE KIDNEY INJURY (HCC): ICD-10-CM

## 2023-10-10 DIAGNOSIS — E87.6 HYPOKALEMIA: ICD-10-CM

## 2023-10-10 DIAGNOSIS — K70.31 ALCOHOLIC CIRRHOSIS OF LIVER WITH ASCITES (HCC): ICD-10-CM

## 2023-10-10 LAB
ALBUMIN SERPL-MCNC: 3.5 G/DL (ref 3.4–5)
ALBUMIN/GLOB SERPL: 0.8 {RATIO} (ref 1–2)
ALP LIVER SERPL-CCNC: 116 U/L
ALT SERPL-CCNC: 37 U/L
ANION GAP SERPL CALC-SCNC: 5 MMOL/L (ref 0–18)
AST SERPL-CCNC: 54 U/L (ref 15–37)
BILIRUB SERPL-MCNC: 6.5 MG/DL (ref 0.1–2)
BUN BLD-MCNC: 8 MG/DL (ref 7–18)
CALCIUM BLD-MCNC: 9.5 MG/DL (ref 8.5–10.1)
CHLORIDE SERPL-SCNC: 108 MMOL/L (ref 98–112)
CO2 SERPL-SCNC: 22 MMOL/L (ref 21–32)
CREAT BLD-MCNC: 1.11 MG/DL
DEPRECATED HBV CORE AB SER IA-ACNC: 1134 NG/ML
EGFRCR SERPLBLD CKD-EPI 2021: 77 ML/MIN/1.73M2 (ref 60–?)
GLOBULIN PLAS-MCNC: 4.3 G/DL (ref 2.8–4.4)
GLUCOSE BLD-MCNC: 116 MG/DL (ref 70–99)
OSMOLALITY SERPL CALC.SUM OF ELEC: 279 MOSM/KG (ref 275–295)
POTASSIUM SERPL-SCNC: 3.8 MMOL/L (ref 3.5–5.1)
PROT SERPL-MCNC: 7.8 G/DL (ref 6.4–8.2)
SODIUM SERPL-SCNC: 135 MMOL/L (ref 136–145)

## 2023-10-10 PROCEDURE — 99495 TRANSJ CARE MGMT MOD F2F 14D: CPT | Performed by: FAMILY MEDICINE

## 2023-10-10 PROCEDURE — 82728 ASSAY OF FERRITIN: CPT

## 2023-10-10 PROCEDURE — 80053 COMPREHEN METABOLIC PANEL: CPT

## 2023-10-10 PROCEDURE — 36415 COLL VENOUS BLD VENIPUNCTURE: CPT

## 2023-10-10 NOTE — PATIENT INSTRUCTIONS
Check with GI about getting vaccines for Shingles, pneumonia, tdap,     Follow up in 3-4 weeks. Earlier if worse.

## 2023-10-11 ENCOUNTER — HOSPITAL ENCOUNTER (OUTPATIENT)
Dept: GENERAL RADIOLOGY | Age: 58
Discharge: HOME | End: 2023-10-11
Attending: FAMILY MEDICINE
Payer: COMMERCIAL

## 2023-10-11 DIAGNOSIS — J18.9 PNEUMONIA OF LEFT LUNG DUE TO INFECTIOUS ORGANISM, UNSPECIFIED PART OF LUNG: ICD-10-CM

## 2023-10-11 PROCEDURE — 71046 X-RAY EXAM CHEST 2 VIEWS: CPT | Performed by: FAMILY MEDICINE

## 2023-10-19 ENCOUNTER — APPOINTMENT (OUTPATIENT)
Dept: CT IMAGING | Facility: HOSPITAL | Age: 58
DRG: 433 | End: 2023-10-19
Attending: HOSPITALIST

## 2023-10-19 ENCOUNTER — HOSPITAL ENCOUNTER (INPATIENT)
Facility: HOSPITAL | Age: 58
LOS: 3 days | Discharge: HOME | DRG: 433 | End: 2023-10-22
Attending: EMERGENCY MEDICINE | Admitting: INTERNAL MEDICINE

## 2023-10-19 ENCOUNTER — APPOINTMENT (OUTPATIENT)
Dept: GENERAL RADIOLOGY | Facility: HOSPITAL | Age: 58
DRG: 433 | End: 2023-10-19
Attending: EMERGENCY MEDICINE

## 2023-10-19 ENCOUNTER — APPOINTMENT (OUTPATIENT)
Dept: CV DIAGNOSTICS | Facility: HOSPITAL | Age: 58
DRG: 433 | End: 2023-10-19
Attending: HOSPITALIST

## 2023-10-19 DIAGNOSIS — G47.00 INSOMNIA, UNSPECIFIED TYPE: ICD-10-CM

## 2023-10-19 DIAGNOSIS — J18.9 COMMUNITY ACQUIRED PNEUMONIA, UNSPECIFIED LATERALITY: Primary | ICD-10-CM

## 2023-10-19 DIAGNOSIS — K86.2 PANCREATIC CYST: ICD-10-CM

## 2023-10-19 PROBLEM — J96.01 ACUTE RESPIRATORY FAILURE WITH HYPOXIA (HCC): Status: ACTIVE | Noted: 2023-10-19

## 2023-10-19 LAB
ADENOVIRUS PCR:: NOT DETECTED
ALBUMIN SERPL-MCNC: 2.8 G/DL (ref 3.4–5)
ALBUMIN/GLOB SERPL: 0.6 {RATIO} (ref 1–2)
ALP LIVER SERPL-CCNC: 125 U/L
ALT SERPL-CCNC: 33 U/L
ANION GAP SERPL CALC-SCNC: 4 MMOL/L (ref 0–18)
ANION GAP SERPL CALC-SCNC: 7 MMOL/L (ref 0–18)
APTT PPP: 48.4 SECONDS (ref 23.3–35.6)
AST SERPL-CCNC: 55 U/L (ref 15–37)
ATRIAL RATE: 95 BPM
B PARAPERT DNA SPEC QL NAA+PROBE: NOT DETECTED
B PERT DNA SPEC QL NAA+PROBE: NOT DETECTED
BASOPHILS # BLD AUTO: 0.05 X10(3) UL (ref 0–0.2)
BASOPHILS NFR BLD AUTO: 0.9 %
BILIRUB SERPL-MCNC: 6.1 MG/DL (ref 0.1–2)
BUN BLD-MCNC: 5 MG/DL (ref 7–18)
BUN BLD-MCNC: 6 MG/DL (ref 7–18)
C PNEUM DNA SPEC QL NAA+PROBE: NOT DETECTED
CALCIUM BLD-MCNC: 8.5 MG/DL (ref 8.5–10.1)
CALCIUM BLD-MCNC: 8.6 MG/DL (ref 8.5–10.1)
CHLORIDE SERPL-SCNC: 111 MMOL/L (ref 98–112)
CHLORIDE SERPL-SCNC: 112 MMOL/L (ref 98–112)
CO2 SERPL-SCNC: 22 MMOL/L (ref 21–32)
CO2 SERPL-SCNC: 24 MMOL/L (ref 21–32)
CORONAVIRUS 229E PCR:: NOT DETECTED
CORONAVIRUS HKU1 PCR:: NOT DETECTED
CORONAVIRUS NL63 PCR:: NOT DETECTED
CORONAVIRUS OC43 PCR:: NOT DETECTED
CREAT BLD-MCNC: 0.97 MG/DL
CREAT BLD-MCNC: 1.06 MG/DL
EGFRCR SERPLBLD CKD-EPI 2021: 82 ML/MIN/1.73M2 (ref 60–?)
EGFRCR SERPLBLD CKD-EPI 2021: 91 ML/MIN/1.73M2 (ref 60–?)
EOSINOPHIL # BLD AUTO: 0.2 X10(3) UL (ref 0–0.7)
EOSINOPHIL NFR BLD AUTO: 3.5 %
ERYTHROCYTE [DISTWIDTH] IN BLOOD BY AUTOMATED COUNT: 17 %
FLUAV + FLUBV RNA SPEC NAA+PROBE: NEGATIVE
FLUAV + FLUBV RNA SPEC NAA+PROBE: NEGATIVE
FLUAV RNA SPEC QL NAA+PROBE: NOT DETECTED
FLUBV RNA SPEC QL NAA+PROBE: NOT DETECTED
GLOBULIN PLAS-MCNC: 4.4 G/DL (ref 2.8–4.4)
GLUCOSE BLD-MCNC: 124 MG/DL (ref 70–99)
GLUCOSE BLD-MCNC: 139 MG/DL (ref 70–99)
HCT VFR BLD AUTO: 24.1 %
HGB BLD-MCNC: 7.9 G/DL
IMM GRANULOCYTES # BLD AUTO: 0.02 X10(3) UL (ref 0–1)
IMM GRANULOCYTES NFR BLD: 0.4 %
INR BLD: 2.54 (ref 0.85–1.16)
L PNEUMO AG UR QL: NEGATIVE
LACTATE SERPL-SCNC: 1.7 MMOL/L (ref 0.4–2)
LACTATE SERPL-SCNC: 2.3 MMOL/L (ref 0.4–2)
LYMPHOCYTES # BLD AUTO: 1.48 X10(3) UL (ref 1–4)
LYMPHOCYTES NFR BLD AUTO: 26.2 %
MCH RBC QN AUTO: 33.5 PG (ref 26–34)
MCHC RBC AUTO-ENTMCNC: 32.8 G/DL (ref 31–37)
MCV RBC AUTO: 102.1 FL
METAPNEUMOVIRUS PCR:: NOT DETECTED
MONOCYTES # BLD AUTO: 0.45 X10(3) UL (ref 0.1–1)
MONOCYTES NFR BLD AUTO: 8 %
MYCOPLASMA PNEUMONIA PCR:: NOT DETECTED
NEUTROPHILS # BLD AUTO: 3.44 X10 (3) UL (ref 1.5–7.7)
NEUTROPHILS # BLD AUTO: 3.44 X10(3) UL (ref 1.5–7.7)
NEUTROPHILS NFR BLD AUTO: 61 %
NT-PROBNP SERPL-MCNC: 181 PG/ML (ref ?–125)
OSMOLALITY SERPL CALC.SUM OF ELEC: 288 MOSM/KG (ref 275–295)
OSMOLALITY SERPL CALC.SUM OF ELEC: 291 MOSM/KG (ref 275–295)
P AXIS: 44 DEGREES
P-R INTERVAL: 148 MS
PARAINFLUENZA 1 PCR:: NOT DETECTED
PARAINFLUENZA 2 PCR:: NOT DETECTED
PARAINFLUENZA 3 PCR:: NOT DETECTED
PARAINFLUENZA 4 PCR:: NOT DETECTED
PLATELET # BLD AUTO: 80 10(3)UL (ref 150–450)
POTASSIUM SERPL-SCNC: 3.6 MMOL/L (ref 3.5–5.1)
POTASSIUM SERPL-SCNC: 3.8 MMOL/L (ref 3.5–5.1)
PROCALCITONIN SERPL-MCNC: 0.07 NG/ML (ref ?–0.16)
PROT SERPL-MCNC: 7.2 G/DL (ref 6.4–8.2)
PROTHROMBIN TIME: 27.7 SECONDS (ref 11.6–14.8)
Q-T INTERVAL: 416 MS
QRS DURATION: 78 MS
QTC CALCULATION (BEZET): 522 MS
R AXIS: 36 DEGREES
RBC # BLD AUTO: 2.36 X10(6)UL
RHINOVIRUS/ENTERO PCR:: NOT DETECTED
RSV RNA SPEC NAA+PROBE: NEGATIVE
RSV RNA SPEC QL NAA+PROBE: NOT DETECTED
SARS-COV-2 RNA NPH QL NAA+NON-PROBE: NOT DETECTED
SARS-COV-2 RNA RESP QL NAA+PROBE: NOT DETECTED
SODIUM SERPL-SCNC: 139 MMOL/L (ref 136–145)
SODIUM SERPL-SCNC: 141 MMOL/L (ref 136–145)
STREP PNEUMO ANTIGEN, URINE: NEGATIVE
T AXIS: 33 DEGREES
TROPONIN I HIGH SENSITIVITY: 15 NG/L
VENTRICULAR RATE: 95 BPM
WBC # BLD AUTO: 5.6 X10(3) UL (ref 4–11)

## 2023-10-19 PROCEDURE — 71045 X-RAY EXAM CHEST 1 VIEW: CPT | Performed by: EMERGENCY MEDICINE

## 2023-10-19 PROCEDURE — 99223 1ST HOSP IP/OBS HIGH 75: CPT | Performed by: STUDENT IN AN ORGANIZED HEALTH CARE EDUCATION/TRAINING PROGRAM

## 2023-10-19 PROCEDURE — 93306 TTE W/DOPPLER COMPLETE: CPT | Performed by: HOSPITALIST

## 2023-10-19 PROCEDURE — 71260 CT THORAX DX C+: CPT | Performed by: HOSPITALIST

## 2023-10-19 RX ORDER — PROCHLORPERAZINE EDISYLATE 5 MG/ML
5 INJECTION INTRAMUSCULAR; INTRAVENOUS EVERY 8 HOURS PRN
Status: DISCONTINUED | OUTPATIENT
Start: 2023-10-19 | End: 2023-10-22

## 2023-10-19 RX ORDER — POLYETHYLENE GLYCOL 3350 17 G/17G
17 POWDER, FOR SOLUTION ORAL DAILY PRN
Status: DISCONTINUED | OUTPATIENT
Start: 2023-10-19 | End: 2023-10-22

## 2023-10-19 RX ORDER — MELATONIN
3 NIGHTLY PRN
Status: DISCONTINUED | OUTPATIENT
Start: 2023-10-19 | End: 2023-10-22

## 2023-10-19 RX ORDER — ECHINACEA PURPUREA EXTRACT 125 MG
1 TABLET ORAL
Status: DISCONTINUED | OUTPATIENT
Start: 2023-10-19 | End: 2023-10-22

## 2023-10-19 RX ORDER — ALLOPURINOL 100 MG/1
100 TABLET ORAL DAILY
Status: DISCONTINUED | OUTPATIENT
Start: 2023-10-19 | End: 2023-10-22

## 2023-10-19 RX ORDER — GUAIFENESIN 600 MG/1
600 TABLET, EXTENDED RELEASE ORAL 2 TIMES DAILY PRN
Status: DISCONTINUED | OUTPATIENT
Start: 2023-10-19 | End: 2023-10-22

## 2023-10-19 RX ORDER — ENEMA 19; 7 G/133ML; G/133ML
1 ENEMA RECTAL ONCE AS NEEDED
Status: DISCONTINUED | OUTPATIENT
Start: 2023-10-19 | End: 2023-10-22

## 2023-10-19 RX ORDER — TRAZODONE HYDROCHLORIDE 50 MG/1
50 TABLET ORAL ONCE
Status: COMPLETED | OUTPATIENT
Start: 2023-10-19 | End: 2023-10-19

## 2023-10-19 RX ORDER — SODIUM CHLORIDE 9 MG/ML
INJECTION, SOLUTION INTRAVENOUS CONTINUOUS
Status: DISCONTINUED | OUTPATIENT
Start: 2023-10-19 | End: 2023-10-19

## 2023-10-19 RX ORDER — ONDANSETRON 2 MG/ML
4 INJECTION INTRAMUSCULAR; INTRAVENOUS EVERY 6 HOURS PRN
Status: DISCONTINUED | OUTPATIENT
Start: 2023-10-19 | End: 2023-10-22

## 2023-10-19 RX ORDER — LACTULOSE 10 G/15ML
10 SOLUTION ORAL DAILY
Status: DISCONTINUED | OUTPATIENT
Start: 2023-10-19 | End: 2023-10-22

## 2023-10-19 RX ORDER — BISACODYL 10 MG
10 SUPPOSITORY, RECTAL RECTAL
Status: DISCONTINUED | OUTPATIENT
Start: 2023-10-19 | End: 2023-10-22

## 2023-10-19 RX ORDER — SENNOSIDES 8.6 MG
17.2 TABLET ORAL NIGHTLY PRN
Status: DISCONTINUED | OUTPATIENT
Start: 2023-10-19 | End: 2023-10-22

## 2023-10-19 RX ORDER — ACETAMINOPHEN 500 MG
500 TABLET ORAL EVERY 4 HOURS PRN
Status: DISCONTINUED | OUTPATIENT
Start: 2023-10-19 | End: 2023-10-22

## 2023-10-19 RX ORDER — PANTOPRAZOLE SODIUM 40 MG/1
40 TABLET, DELAYED RELEASE ORAL
Status: DISCONTINUED | OUTPATIENT
Start: 2023-10-19 | End: 2023-10-22

## 2023-10-19 RX ORDER — AZITHROMYCIN 250 MG/1
500 TABLET, FILM COATED ORAL
Qty: 4 TABLET | Refills: 0 | Status: DISCONTINUED | OUTPATIENT
Start: 2023-10-20 | End: 2023-10-19

## 2023-10-19 RX ORDER — SPIRONOLACTONE 25 MG/1
12.5 TABLET ORAL DAILY
Status: DISCONTINUED | OUTPATIENT
Start: 2023-10-19 | End: 2023-10-19

## 2023-10-19 RX ORDER — ENOXAPARIN SODIUM 100 MG/ML
40 INJECTION SUBCUTANEOUS DAILY
Status: DISCONTINUED | OUTPATIENT
Start: 2023-10-19 | End: 2023-10-22

## 2023-10-19 RX ORDER — NICOTINE 21 MG/24HR
1 PATCH, TRANSDERMAL 24 HOURS TRANSDERMAL DAILY
Status: DISCONTINUED | OUTPATIENT
Start: 2023-10-19 | End: 2023-10-22

## 2023-10-19 RX ORDER — BENZONATATE 200 MG/1
200 CAPSULE ORAL 3 TIMES DAILY PRN
Status: DISCONTINUED | OUTPATIENT
Start: 2023-10-19 | End: 2023-10-22

## 2023-10-19 RX ORDER — FUROSEMIDE 10 MG/ML
40 INJECTION INTRAMUSCULAR; INTRAVENOUS DAILY
Status: DISCONTINUED | OUTPATIENT
Start: 2023-10-19 | End: 2023-10-22

## 2023-10-19 NOTE — ED QUICK NOTES
Orders for admission, patient is aware of plan and ready to go upstairs. Any questions, please call ED RN Melvin Bernstein at extension [phone].      Patient Covid vaccination status: Fully vaccinated     COVID Test Ordered in ED: SARS-CoV-2/Flu A and B/RSV by PCR (GeneXpert)    COVID Suspicion at Admission: N/A    Running Infusions:  Azithromycin 250mL/hr    Mental Status/LOC at time of transport: A&Ox4    Other pertinent information:   CIWA score: N/A   NIH score:  N/A

## 2023-10-19 NOTE — PROGRESS NOTES
NURSING ADMISSION NOTE      Patient admitted via Cart  Oriented to room. Safety precautions initiated. Bed in low position. Call light in reach. Patient is alert and oriented x 4, afebrile, states he has constant headache but refuse pain medications. Patient on 2 L per NC, IV abx, IV fluids. Regular diet, no nausea/vomiting, no diarrhea. Jaundice. Ambulates independently.

## 2023-10-19 NOTE — PROGRESS NOTES
Noted HR in the 150s non sustained, went back downt o 100s. Pt was awake and alert, talking to Dr. Camilla Schuster at bedside. Pt denies any symptoms. MD with orders to do EKG.

## 2023-10-20 LAB
ALBUMIN SERPL-MCNC: 2.7 G/DL (ref 3.4–5)
ALBUMIN/GLOB SERPL: 0.6 {RATIO} (ref 1–2)
ALP LIVER SERPL-CCNC: 111 U/L
ALT SERPL-CCNC: 34 U/L
ANION GAP SERPL CALC-SCNC: 7 MMOL/L (ref 0–18)
AST SERPL-CCNC: 55 U/L (ref 15–37)
ATRIAL RATE: 92 BPM
BASOPHILS # BLD AUTO: 0.05 X10(3) UL (ref 0–0.2)
BASOPHILS NFR BLD AUTO: 1 %
BILIRUB SERPL-MCNC: 6.7 MG/DL (ref 0.1–2)
BUN BLD-MCNC: 5 MG/DL (ref 7–18)
CALCIUM BLD-MCNC: 8.8 MG/DL (ref 8.5–10.1)
CHLORIDE SERPL-SCNC: 108 MMOL/L (ref 98–112)
CO2 SERPL-SCNC: 24 MMOL/L (ref 21–32)
CREAT BLD-MCNC: 0.93 MG/DL
EGFRCR SERPLBLD CKD-EPI 2021: 96 ML/MIN/1.73M2 (ref 60–?)
EOSINOPHIL # BLD AUTO: 0.17 X10(3) UL (ref 0–0.7)
EOSINOPHIL NFR BLD AUTO: 3.4 %
ERYTHROCYTE [DISTWIDTH] IN BLOOD BY AUTOMATED COUNT: 16.7 %
GLOBULIN PLAS-MCNC: 4.4 G/DL (ref 2.8–4.4)
GLUCOSE BLD-MCNC: 100 MG/DL (ref 70–99)
HCT VFR BLD AUTO: 24.5 %
HGB BLD-MCNC: 8.1 G/DL
IMM GRANULOCYTES # BLD AUTO: 0.02 X10(3) UL (ref 0–1)
IMM GRANULOCYTES NFR BLD: 0.4 %
LYMPHOCYTES # BLD AUTO: 1.32 X10(3) UL (ref 1–4)
LYMPHOCYTES NFR BLD AUTO: 26.6 %
MCH RBC QN AUTO: 33.9 PG (ref 26–34)
MCHC RBC AUTO-ENTMCNC: 33.1 G/DL (ref 31–37)
MCV RBC AUTO: 102.5 FL
MONOCYTES # BLD AUTO: 0.5 X10(3) UL (ref 0.1–1)
MONOCYTES NFR BLD AUTO: 10.1 %
NEUTROPHILS # BLD AUTO: 2.9 X10 (3) UL (ref 1.5–7.7)
NEUTROPHILS # BLD AUTO: 2.9 X10(3) UL (ref 1.5–7.7)
NEUTROPHILS NFR BLD AUTO: 58.5 %
OSMOLALITY SERPL CALC.SUM OF ELEC: 285 MOSM/KG (ref 275–295)
P AXIS: 65 DEGREES
P-R INTERVAL: 154 MS
PLATELET # BLD AUTO: 79 10(3)UL (ref 150–450)
POTASSIUM SERPL-SCNC: 3.1 MMOL/L (ref 3.5–5.1)
PROT SERPL-MCNC: 7.1 G/DL (ref 6.4–8.2)
Q-T INTERVAL: 418 MS
QRS DURATION: 84 MS
QTC CALCULATION (BEZET): 516 MS
R AXIS: 56 DEGREES
RBC # BLD AUTO: 2.39 X10(6)UL
SODIUM SERPL-SCNC: 139 MMOL/L (ref 136–145)
T AXIS: 41 DEGREES
VENTRICULAR RATE: 92 BPM
WBC # BLD AUTO: 5 X10(3) UL (ref 4–11)

## 2023-10-20 PROCEDURE — 99232 SBSQ HOSP IP/OBS MODERATE 35: CPT | Performed by: HOSPITALIST

## 2023-10-20 RX ORDER — SPIRONOLACTONE 25 MG/1
100 TABLET ORAL DAILY
Status: DISCONTINUED | OUTPATIENT
Start: 2023-10-20 | End: 2023-10-22

## 2023-10-20 RX ORDER — POTASSIUM CHLORIDE 20 MEQ/1
40 TABLET, EXTENDED RELEASE ORAL ONCE
Status: COMPLETED | OUTPATIENT
Start: 2023-10-20 | End: 2023-10-20

## 2023-10-20 NOTE — PLAN OF CARE
Patient AAOx4. Room air. Tele NSR. Lovenox. Voids. Lactulose. Reports BM today. Up ad fei, ambulating in hallway. Daily weight. Regular diet. IV lasix, PO aldactone added by GI. MRI ordered, screening completed. Patient rounded on routinely. Patient updated on plan of care.       Problem: Patient/Family Goals  Goal: Patient/Family Long Term Goal  Description: Patient's Long Term Goal: discharge home with adequate resources    Interventions:  - IVF, IV abx  - See additional Care Plan goals for specific interventions  Outcome: Progressing  Goal: Patient/Family Short Term Goal  Description: Patient's Short Term Goal: breathe better  10/19 NOC: sleep    Interventions:   - IVF, IV abx, O2 per NC  - See additional Care Plan goals for specific interventions  Outcome: Progressing

## 2023-10-20 NOTE — PLAN OF CARE
Pt is A&Ox4. VSS, afebrile. Maintaining O2 sats WDL on RA. Tele, NSR/ST. Lovenox. BLE edema. IV lasix. EP. Last BM 10/19. General diet. Voids. Up ad fei. Denies pain. PIV, SL. No further needs at this time, continue POC. Safety precautions in place.  GI consulted this AM.     Problem: Patient/Family Goals  Goal: Patient/Family Long Term Goal  Description: Patient's Long Term Goal: discharge home with adequate resources    Interventions:  - IVF, IV abx  - See additional Care Plan goals for specific interventions  Outcome: Progressing  Goal: Patient/Family Short Term Goal  Description: Patient's Short Term Goal: breathe better  10/19 NOC: sleep    Interventions:   - IVF, IV abx, O2 per NC  - See additional Care Plan goals for specific interventions  Outcome: Progressing

## 2023-10-21 PROBLEM — J91.8 PLEURAL EFFUSION ASSOCIATED WITH HEPATIC DISORDER: Status: ACTIVE | Noted: 2023-09-01

## 2023-10-21 PROBLEM — K76.9 PLEURAL EFFUSION ASSOCIATED WITH HEPATIC DISORDER: Status: ACTIVE | Noted: 2023-09-01

## 2023-10-21 LAB
ANION GAP SERPL CALC-SCNC: 5 MMOL/L (ref 0–18)
BUN BLD-MCNC: 6 MG/DL (ref 7–18)
CALCIUM BLD-MCNC: 8.7 MG/DL (ref 8.5–10.1)
CHLORIDE SERPL-SCNC: 107 MMOL/L (ref 98–112)
CO2 SERPL-SCNC: 26 MMOL/L (ref 21–32)
CREAT BLD-MCNC: 0.9 MG/DL
EGFRCR SERPLBLD CKD-EPI 2021: 100 ML/MIN/1.73M2 (ref 60–?)
GLUCOSE BLD-MCNC: 129 MG/DL (ref 70–99)
OSMOLALITY SERPL CALC.SUM OF ELEC: 285 MOSM/KG (ref 275–295)
POTASSIUM SERPL-SCNC: 3.1 MMOL/L (ref 3.5–5.1)
POTASSIUM SERPL-SCNC: 3.1 MMOL/L (ref 3.5–5.1)
SODIUM SERPL-SCNC: 138 MMOL/L (ref 136–145)

## 2023-10-21 PROCEDURE — 99232 SBSQ HOSP IP/OBS MODERATE 35: CPT | Performed by: HOSPITALIST

## 2023-10-21 RX ORDER — TRAZODONE HYDROCHLORIDE 50 MG/1
100 TABLET ORAL NIGHTLY PRN
Status: DISCONTINUED | OUTPATIENT
Start: 2023-10-21 | End: 2023-10-22

## 2023-10-21 RX ORDER — POTASSIUM CHLORIDE 20 MEQ/1
40 TABLET, EXTENDED RELEASE ORAL ONCE
Status: COMPLETED | OUTPATIENT
Start: 2023-10-21 | End: 2023-10-21

## 2023-10-21 RX ORDER — TRAZODONE HYDROCHLORIDE 50 MG/1
100 TABLET ORAL NIGHTLY
Status: SHIPPED | COMMUNITY
Start: 2023-10-21 | End: 2023-10-22

## 2023-10-21 NOTE — PLAN OF CARE
A&Ox4. VSS. Afebrile. SPO2>90% on RA. BLE edema +2 on left leg and foot, +3 on the right leg and foot. MRI of abd results pending. Repeat CXR to be done. Pt denies n/v, SOB, or pain. Voids- up ad fei. Patient updated on POC. All questions answered at this time.         Problem: Patient/Family Goals  Goal: Patient/Family Long Term Goal  Description: Patient's Long Term Goal: discharge home with adequate resources    Interventions:  - IVF, IV abx  - See additional Care Plan goals for specific interventions  Outcome: Progressing     Problem: Patient/Family Goals  Goal: Patient/Family Short Term Goal  Description: Patient's Short Term Goal: breathe better  10/19 NOC: sleep    Interventions:   - IVF, IV abx, O2 per NC  - See additional Care Plan goals for specific interventions  Outcome: Progressing

## 2023-10-21 NOTE — PLAN OF CARE
Pt a/ox4. Up as tolerated in room. Swelling to BLE 2+, improved since admission per pt report. Denies pain when assessed. MRI abdomen pending, screening form completed. Plan to home when ready.

## 2023-10-22 ENCOUNTER — APPOINTMENT (OUTPATIENT)
Dept: GENERAL RADIOLOGY | Facility: HOSPITAL | Age: 58
DRG: 433 | End: 2023-10-22
Attending: HOSPITALIST

## 2023-10-22 ENCOUNTER — APPOINTMENT (OUTPATIENT)
Dept: MRI IMAGING | Facility: HOSPITAL | Age: 58
DRG: 433 | End: 2023-10-22
Attending: INTERNAL MEDICINE

## 2023-10-22 VITALS
OXYGEN SATURATION: 98 % | WEIGHT: 199.5 LBS | HEART RATE: 88 BPM | HEIGHT: 67 IN | DIASTOLIC BLOOD PRESSURE: 56 MMHG | TEMPERATURE: 98 F | RESPIRATION RATE: 16 BRPM | SYSTOLIC BLOOD PRESSURE: 126 MMHG | BODY MASS INDEX: 31.31 KG/M2

## 2023-10-22 DIAGNOSIS — G47.00 INSOMNIA, UNSPECIFIED TYPE: ICD-10-CM

## 2023-10-22 DIAGNOSIS — N52.9 ERECTILE DYSFUNCTION, UNSPECIFIED ERECTILE DYSFUNCTION TYPE: ICD-10-CM

## 2023-10-22 DIAGNOSIS — M1A.0790 IDIOPATHIC CHRONIC GOUT OF FOOT WITHOUT TOPHUS, UNSPECIFIED LATERALITY: ICD-10-CM

## 2023-10-22 LAB
ALBUMIN SERPL-MCNC: 2.9 G/DL (ref 3.4–5)
ALBUMIN/GLOB SERPL: 0.6 {RATIO} (ref 1–2)
ALP LIVER SERPL-CCNC: 120 U/L
ALT SERPL-CCNC: 42 U/L
ANION GAP SERPL CALC-SCNC: 9 MMOL/L (ref 0–18)
AST SERPL-CCNC: 67 U/L (ref 15–37)
BILIRUB SERPL-MCNC: 8.2 MG/DL (ref 0.1–2)
BUN BLD-MCNC: 5 MG/DL (ref 7–18)
CALCIUM BLD-MCNC: 8.9 MG/DL (ref 8.5–10.1)
CHLORIDE SERPL-SCNC: 104 MMOL/L (ref 98–112)
CO2 SERPL-SCNC: 22 MMOL/L (ref 21–32)
CREAT BLD-MCNC: 1.22 MG/DL
EGFRCR SERPLBLD CKD-EPI 2021: 69 ML/MIN/1.73M2 (ref 60–?)
FOLATE SERPL-MCNC: 5.7 NG/ML (ref 8.7–?)
GLOBULIN PLAS-MCNC: 5.1 G/DL (ref 2.8–4.4)
GLUCOSE BLD-MCNC: 132 MG/DL (ref 70–99)
OSMOLALITY SERPL CALC.SUM OF ELEC: 279 MOSM/KG (ref 275–295)
POTASSIUM SERPL-SCNC: 3.4 MMOL/L (ref 3.5–5.1)
POTASSIUM SERPL-SCNC: 3.4 MMOL/L (ref 3.5–5.1)
PROT SERPL-MCNC: 8 G/DL (ref 6.4–8.2)
SODIUM SERPL-SCNC: 135 MMOL/L (ref 136–145)
VIT B12 SERPL-MCNC: 1723 PG/ML (ref 193–986)

## 2023-10-22 PROCEDURE — 99239 HOSP IP/OBS DSCHRG MGMT >30: CPT | Performed by: HOSPITALIST

## 2023-10-22 PROCEDURE — 74183 MRI ABD W/O CNTR FLWD CNTR: CPT | Performed by: INTERNAL MEDICINE

## 2023-10-22 PROCEDURE — 71047 X-RAY EXAM CHEST 3 VIEWS: CPT | Performed by: HOSPITALIST

## 2023-10-22 PROCEDURE — 71045 X-RAY EXAM CHEST 1 VIEW: CPT | Performed by: HOSPITALIST

## 2023-10-22 RX ORDER — FUROSEMIDE 40 MG/1
40 TABLET ORAL DAILY
Qty: 30 TABLET | Refills: 0 | Status: SHIPPED | OUTPATIENT
Start: 2023-10-22

## 2023-10-22 RX ORDER — POTASSIUM CHLORIDE 20 MEQ/1
40 TABLET, EXTENDED RELEASE ORAL ONCE
Status: COMPLETED | OUTPATIENT
Start: 2023-10-22 | End: 2023-10-22

## 2023-10-22 RX ORDER — SPIRONOLACTONE 100 MG/1
100 TABLET, FILM COATED ORAL DAILY
Qty: 30 TABLET | Refills: 0 | Status: SHIPPED | OUTPATIENT
Start: 2023-10-23

## 2023-10-22 RX ORDER — FOLIC ACID 1 MG/1
1 TABLET ORAL DAILY
Status: DISCONTINUED | OUTPATIENT
Start: 2023-10-22 | End: 2023-10-22

## 2023-10-22 RX ORDER — FOLIC ACID 1 MG/1
1 TABLET ORAL DAILY
Qty: 30 TABLET | Refills: 0 | Status: SHIPPED | OUTPATIENT
Start: 2023-10-23

## 2023-10-22 NOTE — PLAN OF CARE
NURSING DISCHARGE NOTE    Discharged Home via Ambulatory. Accompanied by Spouse  Belongings Taken by patient/family. PIV removed  Discharge navigator complete  Cleared for DC -- close follow up with GI  All questions & concerns addressed at this time.      Problem: Patient/Family Goals  Goal: Patient/Family Long Term Goal  Description: Patient's Long Term Goal: discharge home with adequate resources    Interventions:  - IVF, IV abx  - See additional Care Plan goals for specific interventions  Outcome: Progressing  Goal: Patient/Family Short Term Goal  Description: Patient's Short Term Goal: breathe better  10/19 NOC: sleep    Interventions:   - IVF, IV abx, O2 per NC  - See additional Care Plan goals for specific interventions  Outcome: Progressing

## 2023-10-22 NOTE — PLAN OF CARE
AOX4, VSS, afebrile. Tele NSR, edema to lower extremities. RA . IND in room. Requested PO trazadone for sleep. See order hx. Ambulates frequently. Pt to have MRI this am. Updated pt POC for NOC. Will follow.    Problem: Patient/Family Goals  Goal: Patient/Family Long Term Goal  Description: Patient's Long Term Goal: discharge home with adequate resources    Interventions:  - IVF, IV abx  - See additional Care Plan goals for specific interventions  Outcome: Progressing  Goal: Patient/Family Short Term Goal  Description: Patient's Short Term Goal: breathe better  10/19 NOC: sleep    Interventions:   - IVF, IV abx, O2 per NC  - See additional Care Plan goals for specific interventions  Outcome: Progressing

## 2023-10-23 ENCOUNTER — PATIENT OUTREACH (OUTPATIENT)
Dept: CASE MANAGEMENT | Age: 58
End: 2023-10-23

## 2023-10-23 DIAGNOSIS — K74.60 CIRRHOSIS OF LIVER WITHOUT ASCITES, UNSPECIFIED HEPATIC CIRRHOSIS TYPE (HCC): ICD-10-CM

## 2023-10-23 DIAGNOSIS — Z02.9 ENCOUNTERS FOR UNSPECIFIED ADMINISTRATIVE PURPOSE: Primary | ICD-10-CM

## 2023-10-23 RX ORDER — SILDENAFIL 100 MG/1
TABLET, FILM COATED ORAL
Qty: 15 TABLET | Refills: 0 | Status: SHIPPED | OUTPATIENT
Start: 2023-10-23

## 2023-10-23 RX ORDER — TRAZODONE HYDROCHLORIDE 50 MG/1
100 TABLET ORAL NIGHTLY
Qty: 30 TABLET | Refills: 1 | Status: SHIPPED | OUTPATIENT
Start: 2023-10-23

## 2023-10-23 RX ORDER — ALLOPURINOL 100 MG/1
TABLET ORAL
Qty: 90 TABLET | Refills: 3 | Status: SHIPPED | OUTPATIENT
Start: 2023-10-23

## 2023-10-23 NOTE — PROGRESS NOTES
Attempted to contact pt for condition update however no answer. Call rang twice and then disconnected. Unable to leave a VM at this time. NCM to try again at a later time.

## 2023-10-23 NOTE — TELEPHONE ENCOUNTER
.A refill request was received for:  Requested Prescriptions     Pending Prescriptions Disp Refills    allopurinol 100 MG Oral Tab 90 tablet 3     Sig: TAKE 1 TABLET(100 MG) BY MOUTH DAILY    Sildenafil Citrate 100 MG Oral Tab 15 tablet 0     Sig: Take 0.5-1 tablets ( mg total) by mouth daily as needed for Erectile Dysfunction. traZODone 50 MG Oral Tab  0     Sig: Take 2 tablets (100 mg total) by mouth nightly.        Last refill date:   allopurinol 8/2/2023  Sildenafil 9/13/2023  Trazodone 10/21/2023    Last office visit: 10/10/2023    Follow up due:  Future Appointments   Date Time Provider Holli Stanley   10/30/2023  8:30 AM Brody Trinidad MD [facility] Surg/Onc   11/20/2023  9:45 AM REYNA CALDWELL SGIEDW None   11/21/2023 10:00 AM YUDITH MR RM1 (1.5T) [facility]UDITH MRI [address]

## 2023-10-23 NOTE — PAYOR COMM NOTE
--------------  DISCHARGE REVIEW    Payor: [employer] #:  [id number]  Authorization Number: [id number]    Admit date: 10/19/23  Admit time:   4:26 AM  Discharge Date: 10/22/2023  5:11 PM     Admitting Physician: Mariana Alexander DO  Attending Physician:  No att. providers found  Primary Care Physician: Maritza Rider MD          Discharge Summary Notes        Discharge Summary signed by Moriah Amezquita DO at 10/23/2023 12:55 PM       Author: Moriah Amezquita DO Specialty: HOSPITALIST Author Type: Physician    Filed: 10/23/2023 12:55 PM Date of Service: 10/23/2023  8:55 AM Status: Signed    : Moriah Amezquita DO (Physician)           Velvet Medrano HOSPITALIST  DISCHARGE SUMMARY     Nba Montalvo Patient Status:  Inpatient    1965 MRN [id number]   [facility] 5NW-A Attending No att. providers found   Hosp Day # 3 PCP Elaine Grey MD     Date of Admission: 10/19/2023  Date of Discharge:  10/22/2023     Discharge Disposition: Home or Self Care    Discharge Diagnosis:  Decompensated alcoholic cirrhosis  B/L pleural effusions R>L  Pancreatic lesion  Hypokalemia  Macrocytic anemia  Thrombocytopenia  Folate deficiency  Gout  GERD    History of Present Illness: Nba Montalvo is a 63year old male with past medical history of alcoholic cirrhosis, liver failure (awaiting liver transplant), hypertension who presents ED for shortness of breath. Patient was recently admitted and treated for pneumonia. Patient feels that he never fully recovered and continued to have cough and worsening shortness of breath. He denies any fevers, chills, nausea, vomiting, abdominal pain, chest pain, headaches, dizziness.      Brief Synopsis:     #Edema, B/L pleural effusions R>L secondary to cirrhosis  -Continue to diurese - monitor renal function  -Echo normal  -Aldactone started 10/20  -Pneumonia ruled out - antibiotics stopped   -Interval CXR improved  -Plan for PO lasix/aldactone at dc #Alcoholic cirrhosis  -Aldactone/rifaximin/lactulose  -MRI liver reviewed    -Being followed at tertiary center for transplant   -GI following     #Pancreatic lesion  -Noted on MRI  -GI following, w/u to be completed OP     #Hypokalemia  -Replace PRN     #Macrocytic anemia/thrombocytopenia  -Stable  -B12 WNL  -Folate deficient - start replacement      #Gout  -Allopurinol     #GERD  -PPI    Lace+ Score: 80  59-90 High Risk  29-58 Medium Risk  0-28   Low Risk       TCM Follow-Up Recommendation:  LACE > 58: High Risk of readmission after discharge from the hospital.      Procedures during hospitalization:   None    Incidental or significant findings and recommendations (brief descriptions):  None    Lab/Test results pending at Discharge:   None    Consultants:  GI    Discharge Medication List:     Discharge Medications        START taking these medications        Instructions Prescription details   folic acid 1 MG Tabs  Commonly known as: Folvite      Take 1 tablet (1 mg total) by mouth daily. Quantity: 30 tablet  Refills: 0     furosemide 40 MG Tabs  Commonly known as: Lasix      Take 1 tablet (40 mg total) by mouth daily. Quantity: 30 tablet  Refills: 0     spironolactone 100 MG Tabs  Commonly known as: Aldactone      Take 1 tablet (100 mg total) by mouth daily. Quantity: 30 tablet  Refills: 0            CONTINUE taking these medications        Instructions Prescription details   allopurinol 100 MG Tabs  Commonly known as: Zyloprim      TAKE 1 TABLET(100 MG) BY MOUTH DAILY   Quantity: 90 tablet  Refills: 3     lactulose 10 GM/15ML Soln  Commonly known as: CHRONULAC      Take 15 mL (10 g total) by mouth daily.    Quantity: 30 each  Refills: 3     pantoprazole 40 MG Tbec  Commonly known as: Protonix      Take 1 tablet (40 mg total) by mouth every morning before breakfast.   Quantity: 30 tablet  Refills: 3     rifAXIMin 550 MG Tabs  Commonly known as: Xifaxan      Take 1 tablet (550 mg total) by mouth 2 (two) times daily. Quantity: 60 tablet  Refills: 3     Sildenafil Citrate 100 MG Tabs  Commonly known as: VIAGRA      Take 0.5-1 tablets ( mg total) by mouth daily as needed for Erectile Dysfunction. Quantity: 15 tablet  Refills: 0     traZODone 50 MG Tabs  Commonly known as: Desyrel      Take 2 tablets (100 mg total) by mouth nightly. Refills: 0            STOP taking these medications      potassium chloride 20 MEQ Tbcr  Commonly known as: K-Dur                  Where to Get Your Medications        Please  your prescriptions at the location directed by your doctor or nurse    Bring a paper prescription for each of these medications  folic acid 1 MG Tabs  furosemide 40 MG Tabs  spironolactone 100 MG Tabs         [place]    Follow-up appointment:   Paullette Krabbe, [phone]    Schedule an appointment as soon as possible for a visit in 1 week(s)  Hospitalization follow up    Appointments for Next 30 Days 10/23/2023 - 11/22/2023        Date Arrival Time Visit Type Length Department Provider     10/30/2023  8:30 AM  NEW CONSULT [1674] 30 min [facility], [address] [place], [place] [address]Sharmaine MD    Patient Instructions:         Location Instructions: Your appointment is with [facility]. The address is Yessenia Daodore[address]bruno [address] [place] [address], [facility], [address] [place] [address] Rd. Masks are optional for all patients and visitors, unless otherwise indicated. 11/20/2023  9:45 AM  EGD [99508] 30 min [facility] Jose Reyes, PROCEDURE    Patient Instructions:     Please arrive 60 minutes prior to your scheduled procedure time. 11/21/2023 10:00 AM  [facility] MRI ABDOMEN [employer] [[id number]] 75 min [facility] MRI - [address] [facility] MR RM1 (1.5T)    Patient Instructions:     Please arrive 30 minutes prior to your scheduled appointment time.   You will need time to change your clothes, fill out screening forms, use the restroom, and may need an IV if your exam requires contrast.  If you arrive too late, your appointment may need to be rescheduled. Please have nothing to eat or drink 4 hours prior to your exam.    IF YOU REQUIRE ORAL SEDATION FOR YOUR MRI: Your physician is responsible for giving you a prescription for oral medication which you would fill at your local pharmacy. If you will be taking oral sedation, you must bring a  who will drive you home (the  does not necessarily have to stay throughout the procedure). Location Instructions: The appointment will be at the [facility] at the [facility] and [facility] located at [address] [place] [address].&nbsp; This facility is [place] [address] [facility] [facility]"" and 1 mile [place] [address] of [address]. The telephone number for this location is [phone]. Due to safety reasons you will be required to change into a gown. You will be asked to remove all metallic items, such as watches, jewelry, hairpins, eyeglasses, hearing aids, and continuous glucose monitoring devices. Your purse, wallet or other personal items will remain in a secure locker or with your family during your exam.  Please bring your insurance card and photo ID. You will also need to bring your doctor's order unless your physician's office submitted the order electronically or faxed the order. Without the order, your test may be delayed or postponed. Children: Children under the age of 15 must have another adult caregiver with them.&nbsp; Please do not bring your child/children without a caregiver. &nbsp; Because of the highly sensitive equipment and privacy of all our patients, children will not be permitted in the exam rooms, unless otherwise noted and in accordance with departmental policy.   PATIENT RESPONSIBILITY ESTIMATE  - (Estimate) We will provide you with your estimated remaining deductible and coinsurance balance for your services at the time of check in.  - (Payment) Please be aware that you may be asked for payment at the time of service.  - (Questions) If you would like more information about your Patient Responsibility Estimate in advance of your appointment, you can speak to a  ([phone], option 5). Masks are optional for all patients and visitors, unless otherwise indicated. -----------------------------------------------------------------------------------------------  PATIENT DISCHARGE INSTRUCTIONS: See electronic chart    Ann Daugherty DO    Total time spent on discharge plannin minutes     The [facility] makes medical notes like these available to patients in the interest of transparency. Please be advised this is a medical document. Medical documents are intended to carry relevant information, facts as evident, and the clinical opinion of the practitioner. The medical note is intended as peer to peer communication and may appear blunt or direct. It is written in medical language and may contain abbreviations or verbiage that are unfamiliar.        Electronically signed by Latonya Del Castillo DO on 10/23/2023 12:55 PM         REVIEWER COMMENTS

## 2023-10-23 NOTE — DISCHARGE SUMMARY
[facility] HOSPITALIST  DISCHARGE SUMMARY     Rosalee Bryan Patient Status:  Inpatient    1965 MRN [id number]   [facility] 5NW-A Attending No att. providers found   Hosp Day # 3 PCP Flakita Donovan MD     Date of Admission: 10/19/2023  Date of Discharge:  10/22/2023     Discharge Disposition: Home or Self Care    Discharge Diagnosis:  Decompensated alcoholic cirrhosis  B/L pleural effusions R>L  Pancreatic lesion  Hypokalemia  Macrocytic anemia  Thrombocytopenia  Folate deficiency  Gout  GERD    History of Present Illness: Rosalee Bryan is a 63year old male with past medical history of alcoholic cirrhosis, liver failure (awaiting liver transplant), hypertension who presents ED for shortness of breath. Patient was recently admitted and treated for pneumonia. Patient feels that he never fully recovered and continued to have cough and worsening shortness of breath. He denies any fevers, chills, nausea, vomiting, abdominal pain, chest pain, headaches, dizziness. Brief Synopsis:     #Edema, B/L pleural effusions R>L secondary to cirrhosis  -Continue to diurese - monitor renal function  -Echo normal  -Aldactone started 10/20  -Pneumonia ruled out - antibiotics stopped   -Interval CXR improved  -Plan for PO lasix/aldactone at dc      #Alcoholic cirrhosis  -Aldactone/rifaximin/lactulose  -MRI liver reviewed    -Being followed at tertiary center for transplant   -GI following     #Pancreatic lesion  -Noted on MRI  -GI following, w/u to be completed OP     #Hypokalemia  -Replace PRN     #Macrocytic anemia/thrombocytopenia  -Stable  -B12 WNL  -Folate deficient - start replacement      #Gout  -Allopurinol     #GERD  -PPI    Lace+ Score: 80  59-90 High Risk  29-58 Medium Risk  0-28   Low Risk       TCM Follow-Up Recommendation:  LACE > 58:  High Risk of readmission after discharge from the hospital.      Procedures during hospitalization:   None    Incidental or significant findings and recommendations (brief descriptions):  None    Lab/Test results pending at Discharge:   None    Consultants:  GI    Discharge Medication List:     Discharge Medications        START taking these medications        Instructions Prescription details   folic acid 1 MG Tabs  Commonly known as: Folvite      Take 1 tablet (1 mg total) by mouth daily. Quantity: 30 tablet  Refills: 0     furosemide 40 MG Tabs  Commonly known as: Lasix      Take 1 tablet (40 mg total) by mouth daily. Quantity: 30 tablet  Refills: 0     spironolactone 100 MG Tabs  Commonly known as: Aldactone      Take 1 tablet (100 mg total) by mouth daily. Quantity: 30 tablet  Refills: 0            CONTINUE taking these medications        Instructions Prescription details   allopurinol 100 MG Tabs  Commonly known as: Zyloprim      TAKE 1 TABLET(100 MG) BY MOUTH DAILY   Quantity: 90 tablet  Refills: 3     lactulose 10 GM/15ML Soln  Commonly known as: CHRONULAC      Take 15 mL (10 g total) by mouth daily. Quantity: 30 each  Refills: 3     pantoprazole 40 MG Tbec  Commonly known as: Protonix      Take 1 tablet (40 mg total) by mouth every morning before breakfast.   Quantity: 30 tablet  Refills: 3     rifAXIMin 550 MG Tabs  Commonly known as: Xifaxan      Take 1 tablet (550 mg total) by mouth 2 (two) times daily. Quantity: 60 tablet  Refills: 3     Sildenafil Citrate 100 MG Tabs  Commonly known as: VIAGRA      Take 0.5-1 tablets ( mg total) by mouth daily as needed for Erectile Dysfunction. Quantity: 15 tablet  Refills: 0     traZODone 50 MG Tabs  Commonly known as: Desyrel      Take 2 tablets (100 mg total) by mouth nightly.    Refills: 0            STOP taking these medications      potassium chloride 20 MEQ Tbcr  Commonly known as: K-Dur                  Where to Get Your Medications        Please  your prescriptions at the location directed by your doctor or nurse    Bring a paper prescription for each of these medications  folic acid 1 MG Tabs  furosemide 40 MG Tabs  spironolactone 100 MG Tabs         [place]    Follow-up appointment:   Renu Carrillo, [address] [facility] [address]  [phone]    Schedule an appointment as soon as possible for a visit in 1 week(s)  Hospitalization follow up    Appointments for Next 30 Days 10/23/2023 - 11/22/2023        Date Arrival Time Visit Type Length Department Provider     10/30/2023  8:30 AM  NEW CONSULT [2399] 30 min [facility], [address] [facility] Isidro Sullivan MD    Patient Instructions:         Location Instructions: Your appointment is with [facility]. The address is [address] 234 beds at the [facility], [place], [address] Masks are optional for all patients and visitors, unless otherwise indicated. 11/20/2023  9:45 AM  EGD [62180] 30 min [facility], PROCEDURE    Patient Instructions:     Please arrive 60 minutes prior to your scheduled procedure time. 11/21/2023 10:00 AM  [facility] MRI ABDOMEN [employer] [2648] 75 min [facility] MRI - [address] BK MR RM1 (1.5T)    Patient Instructions:     Please arrive 30 minutes prior to your scheduled appointment time. You will need time to change your clothes, fill out screening forms, use the restroom, and may need an IV if your exam requires contrast.  If you arrive too late, your appointment may need to be rescheduled. Please have nothing to eat or drink 4 hours prior to your exam.    IF YOU REQUIRE ORAL SEDATION FOR YOUR MRI: Your physician is responsible for giving you a prescription for oral medication which you would fill at your local pharmacy. If you will be taking oral sedation, you must bring a  who will drive you home (the  does not necessarily have to stay throughout the procedure). Location Instructions:      The appointment will be at the [facility] at the [address] [facility] and [address] [facility] [address] [facility] [address] [facility] [address] [place] [address].&nbsp; This facility is 2.5 miles [address] [facility] and 1 mile [place] [address] [address] The telephone number for this location is [phone]. Due to safety reasons you will be required to change into a gown. You will be asked to remove all metallic items, such as watches, jewelry, hairpins, eyeglasses, hearing aids, and continuous glucose monitoring devices. Your purse, wallet or other personal items will remain in a secure locker or with your family during your exam.  Please bring your insurance card and photo ID. You will also need to bring your doctor's order unless your physician's office submitted the order electronically or faxed the order. Without the order, your test may be delayed or postponed. Children: Children under the age of 15 must have another adult caregiver with them.&nbsp; Please do not bring your child/children without a caregiver. &nbsp; Because of the highly sensitive equipment and privacy of all our patients, children will not be permitted in the exam rooms, unless otherwise noted and in accordance with departmental policy. PATIENT RESPONSIBILITY ESTIMATE  - (Estimate) We will provide you with your estimated remaining deductible and coinsurance balance for your services at the time of check in.  - (Payment) Please be aware that you may be asked for payment at the time of service.  - (Questions) If you would like more information about your Patient Responsibility Estimate in advance of your appointment, you can speak to a  ([phone], option 5). Masks are optional for all patients and visitors, unless otherwise indicated.                       -----------------------------------------------------------------------------------------------  PATIENT DISCHARGE INSTRUCTIONS: See electronic chart    Josefina Barnes,     Total time spent on discharge plannin minutes     The [facility] makes medical notes like these available to patients in the interest of transparency. Please be advised this is a medical document. Medical documents are intended to carry relevant information, facts as evident, and the clinical opinion of the practitioner. The medical note is intended as peer to peer communication and may appear blunt or direct. It is written in medical language and may contain abbreviations or verbiage that are unfamiliar.

## 2023-10-25 ENCOUNTER — TELEPHONE (OUTPATIENT)
Dept: FAMILY MEDICINE CLINIC | Facility: CLINIC | Age: 58
End: 2023-10-25

## 2023-10-25 ENCOUNTER — LAB ENCOUNTER (OUTPATIENT)
Dept: LAB | Age: 58
End: 2023-10-25
Attending: FAMILY MEDICINE

## 2023-10-25 DIAGNOSIS — E87.6 HYPOKALEMIA: ICD-10-CM

## 2023-10-25 DIAGNOSIS — D64.9 ANEMIA, UNSPECIFIED TYPE: ICD-10-CM

## 2023-10-25 LAB
ALBUMIN SERPL-MCNC: 3.4 G/DL (ref 3.4–5)
ALBUMIN/GLOB SERPL: 0.7 {RATIO} (ref 1–2)
ALP LIVER SERPL-CCNC: 128 U/L
ALT SERPL-CCNC: 43 U/L
ANION GAP SERPL CALC-SCNC: 9 MMOL/L (ref 0–18)
AST SERPL-CCNC: 74 U/L (ref 15–37)
BASOPHILS # BLD AUTO: 0.05 X10(3) UL (ref 0–0.2)
BASOPHILS NFR BLD AUTO: 0.8 %
BILIRUB SERPL-MCNC: 9.8 MG/DL (ref 0.1–2)
BUN BLD-MCNC: 7 MG/DL (ref 7–18)
CALCIUM BLD-MCNC: 10.3 MG/DL (ref 8.5–10.1)
CHLORIDE SERPL-SCNC: 104 MMOL/L (ref 98–112)
CO2 SERPL-SCNC: 23 MMOL/L (ref 21–32)
CREAT BLD-MCNC: 1.25 MG/DL
EGFRCR SERPLBLD CKD-EPI 2021: 67 ML/MIN/1.73M2 (ref 60–?)
EOSINOPHIL # BLD AUTO: 0.24 X10(3) UL (ref 0–0.7)
EOSINOPHIL NFR BLD AUTO: 3.7 %
ERYTHROCYTE [DISTWIDTH] IN BLOOD BY AUTOMATED COUNT: 15.6 %
FASTING STATUS PATIENT QL REPORTED: YES
FOLATE SERPL-MCNC: 30.8 NG/ML (ref 8.7–?)
GLOBULIN PLAS-MCNC: 5 G/DL (ref 2.8–4.4)
GLUCOSE BLD-MCNC: 136 MG/DL (ref 70–99)
HCT VFR BLD AUTO: 31.2 %
HGB BLD-MCNC: 10 G/DL
IMM GRANULOCYTES # BLD AUTO: 0.02 X10(3) UL (ref 0–1)
IMM GRANULOCYTES NFR BLD: 0.3 %
LYMPHOCYTES # BLD AUTO: 1.43 X10(3) UL (ref 1–4)
LYMPHOCYTES NFR BLD AUTO: 22.1 %
MCH RBC QN AUTO: 33.8 PG (ref 26–34)
MCHC RBC AUTO-ENTMCNC: 32.1 G/DL (ref 31–37)
MCV RBC AUTO: 105.4 FL
MONOCYTES # BLD AUTO: 0.5 X10(3) UL (ref 0.1–1)
MONOCYTES NFR BLD AUTO: 7.7 %
NEUTROPHILS # BLD AUTO: 4.24 X10 (3) UL (ref 1.5–7.7)
NEUTROPHILS # BLD AUTO: 4.24 X10(3) UL (ref 1.5–7.7)
NEUTROPHILS NFR BLD AUTO: 65.4 %
OSMOLALITY SERPL CALC.SUM OF ELEC: 282 MOSM/KG (ref 275–295)
PLATELET # BLD AUTO: 76 10(3)UL (ref 150–450)
POTASSIUM SERPL-SCNC: 3.5 MMOL/L (ref 3.5–5.1)
PROT SERPL-MCNC: 8.4 G/DL (ref 6.4–8.2)
RBC # BLD AUTO: 2.96 X10(6)UL
SODIUM SERPL-SCNC: 136 MMOL/L (ref 136–145)
VIT B12 SERPL-MCNC: 1755 PG/ML (ref 193–986)
WBC # BLD AUTO: 6.5 X10(3) UL (ref 4–11)

## 2023-10-25 PROCEDURE — 80053 COMPREHEN METABOLIC PANEL: CPT

## 2023-10-25 PROCEDURE — 82607 VITAMIN B-12: CPT

## 2023-10-25 PROCEDURE — 85025 COMPLETE CBC W/AUTO DIFF WBC: CPT

## 2023-10-25 PROCEDURE — 36415 COLL VENOUS BLD VENIPUNCTURE: CPT

## 2023-10-25 PROCEDURE — 82746 ASSAY OF FOLIC ACID SERUM: CPT

## 2023-10-30 ENCOUNTER — LAB ENCOUNTER (OUTPATIENT)
Dept: LAB | Age: 58
End: 2023-10-30
Attending: INTERNAL MEDICINE
Payer: COMMERCIAL

## 2023-10-30 DIAGNOSIS — K70.31 ALCOHOLIC CIRRHOSIS OF LIVER WITH ASCITES (HCC): ICD-10-CM

## 2023-10-30 LAB
AFP-TM SERPL-MCNC: 9.1 NG/ML (ref ?–8)
ALBUMIN SERPL-MCNC: 3.4 G/DL (ref 3.4–5)
ALBUMIN/GLOB SERPL: 0.6 {RATIO} (ref 1–2)
ALP LIVER SERPL-CCNC: 128 U/L
ALT SERPL-CCNC: 53 U/L
ANION GAP SERPL CALC-SCNC: 8 MMOL/L (ref 0–18)
AST SERPL-CCNC: 78 U/L (ref 15–37)
BASOPHILS # BLD AUTO: 0.06 X10(3) UL (ref 0–0.2)
BASOPHILS NFR BLD AUTO: 0.8 %
BILIRUB SERPL-MCNC: 11.6 MG/DL (ref 0.1–2)
BUN BLD-MCNC: 10 MG/DL (ref 7–18)
CALCIUM BLD-MCNC: 10 MG/DL (ref 8.5–10.1)
CHLORIDE SERPL-SCNC: 95 MMOL/L (ref 98–112)
CO2 SERPL-SCNC: 26 MMOL/L (ref 21–32)
CREAT BLD-MCNC: 1.27 MG/DL
EGFRCR SERPLBLD CKD-EPI 2021: 66 ML/MIN/1.73M2 (ref 60–?)
EOSINOPHIL # BLD AUTO: 0.17 X10(3) UL (ref 0–0.7)
EOSINOPHIL NFR BLD AUTO: 2.4 %
ERYTHROCYTE [DISTWIDTH] IN BLOOD BY AUTOMATED COUNT: 13.7 %
FASTING STATUS PATIENT QL REPORTED: YES
GLOBULIN PLAS-MCNC: 5.7 G/DL (ref 2.8–4.4)
GLUCOSE BLD-MCNC: 124 MG/DL (ref 70–99)
HCT VFR BLD AUTO: 33.2 %
HGB BLD-MCNC: 10.9 G/DL
IMM GRANULOCYTES # BLD AUTO: 0.03 X10(3) UL (ref 0–1)
IMM GRANULOCYTES NFR BLD: 0.4 %
INR BLD: 2.23 (ref 0.8–1.2)
LYMPHOCYTES # BLD AUTO: 1.15 X10(3) UL (ref 1–4)
LYMPHOCYTES NFR BLD AUTO: 16 %
MCH RBC QN AUTO: 34 PG (ref 26–34)
MCHC RBC AUTO-ENTMCNC: 32.8 G/DL (ref 31–37)
MCV RBC AUTO: 103.4 FL
MONOCYTES # BLD AUTO: 0.71 X10(3) UL (ref 0.1–1)
MONOCYTES NFR BLD AUTO: 9.9 %
NEUTROPHILS # BLD AUTO: 5.05 X10 (3) UL (ref 1.5–7.7)
NEUTROPHILS # BLD AUTO: 5.05 X10(3) UL (ref 1.5–7.7)
NEUTROPHILS NFR BLD AUTO: 70.5 %
OSMOLALITY SERPL CALC.SUM OF ELEC: 268 MOSM/KG (ref 275–295)
PLATELET # BLD AUTO: 75 10(3)UL (ref 150–450)
POTASSIUM SERPL-SCNC: 4.5 MMOL/L (ref 3.5–5.1)
PROT SERPL-MCNC: 9.1 G/DL (ref 6.4–8.2)
PROTHROMBIN TIME: 24.9 SECONDS (ref 11.6–14.8)
RBC # BLD AUTO: 3.21 X10(6)UL
SODIUM SERPL-SCNC: 129 MMOL/L (ref 136–145)
WBC # BLD AUTO: 7.2 X10(3) UL (ref 4–11)

## 2023-10-30 PROCEDURE — 80053 COMPREHEN METABOLIC PANEL: CPT

## 2023-10-30 PROCEDURE — 85025 COMPLETE CBC W/AUTO DIFF WBC: CPT

## 2023-10-30 PROCEDURE — 36415 COLL VENOUS BLD VENIPUNCTURE: CPT

## 2023-10-30 PROCEDURE — 82105 ALPHA-FETOPROTEIN SERUM: CPT

## 2023-10-30 PROCEDURE — 85610 PROTHROMBIN TIME: CPT

## 2023-10-30 PROCEDURE — 80321 ALCOHOLS BIOMARKERS 1OR 2: CPT

## 2023-10-31 ENCOUNTER — PATIENT OUTREACH (OUTPATIENT)
Dept: INTERNAL MEDICINE CLINIC | Facility: CLINIC | Age: 58
End: 2023-10-31

## 2023-10-31 NOTE — PROGRESS NOTES
Returned call to patient & he is requesting assistance with cancelling his scheduled MRI. I then notified central scheduling and had them to remove the scheduled MRI from the pt's account. Closing encounter.

## 2023-11-04 LAB
PHOSPHATIDYETHANOL: NEGATIVE
PHOSPHATIDYLETHANOL (PETH): NEGATIVE NG/ML

## 2023-11-08 DIAGNOSIS — G47.00 INSOMNIA, UNSPECIFIED TYPE: ICD-10-CM

## 2023-11-08 RX ORDER — TRAZODONE HYDROCHLORIDE 50 MG/1
100 TABLET ORAL NIGHTLY
Qty: 90 TABLET | Refills: 0 | Status: SHIPPED | OUTPATIENT
Start: 2023-11-08

## 2023-11-20 ENCOUNTER — ANESTHESIA (OUTPATIENT)
Dept: ENDOSCOPY | Facility: HOSPITAL | Age: 58
End: 2023-11-20
Payer: COMMERCIAL

## 2023-11-20 ENCOUNTER — HOSPITAL ENCOUNTER (OUTPATIENT)
Facility: HOSPITAL | Age: 58
Setting detail: HOSPITAL OUTPATIENT SURGERY
Discharge: HOME | End: 2023-11-20
Attending: INTERNAL MEDICINE | Admitting: INTERNAL MEDICINE
Payer: COMMERCIAL

## 2023-11-20 ENCOUNTER — ANESTHESIA EVENT (OUTPATIENT)
Dept: ENDOSCOPY | Facility: HOSPITAL | Age: 58
End: 2023-11-20
Payer: COMMERCIAL

## 2023-11-20 VITALS
HEIGHT: 67 IN | WEIGHT: 186 LBS | OXYGEN SATURATION: 100 % | RESPIRATION RATE: 16 BRPM | HEART RATE: 86 BPM | BODY MASS INDEX: 29.19 KG/M2 | SYSTOLIC BLOOD PRESSURE: 107 MMHG | TEMPERATURE: 98 F | DIASTOLIC BLOOD PRESSURE: 60 MMHG

## 2023-11-20 DIAGNOSIS — K21.9 GASTROESOPHAGEAL REFLUX DISEASE, UNSPECIFIED WHETHER ESOPHAGITIS PRESENT: ICD-10-CM

## 2023-11-20 PROCEDURE — 0DB38ZX EXCISION OF LOWER ESOPHAGUS, VIA NATURAL OR ARTIFICIAL OPENING ENDOSCOPIC, DIAGNOSTIC: ICD-10-PCS | Performed by: INTERNAL MEDICINE

## 2023-11-20 PROCEDURE — 88321 CONSLTJ&REPRT SLD PREP ELSWR: CPT | Performed by: INTERNAL MEDICINE

## 2023-11-20 PROCEDURE — 0DBL8ZX EXCISION OF TRANSVERSE COLON, VIA NATURAL OR ARTIFICIAL OPENING ENDOSCOPIC, DIAGNOSTIC: ICD-10-PCS | Performed by: INTERNAL MEDICINE

## 2023-11-20 PROCEDURE — 0DB58ZX EXCISION OF ESOPHAGUS, VIA NATURAL OR ARTIFICIAL OPENING ENDOSCOPIC, DIAGNOSTIC: ICD-10-PCS | Performed by: INTERNAL MEDICINE

## 2023-11-20 PROCEDURE — 0DB78ZX EXCISION OF STOMACH, PYLORUS, VIA NATURAL OR ARTIFICIAL OPENING ENDOSCOPIC, DIAGNOSTIC: ICD-10-PCS | Performed by: INTERNAL MEDICINE

## 2023-11-20 PROCEDURE — 88305 TISSUE EXAM BY PATHOLOGIST: CPT | Performed by: INTERNAL MEDICINE

## 2023-11-20 DEVICE — REPLAY HEMOSTASIS CLIP, 11MM SPAN
Type: IMPLANTABLE DEVICE | Site: COLON
Brand: REPLAY

## 2023-11-20 RX ORDER — NALOXONE HYDROCHLORIDE 0.4 MG/ML
0.08 INJECTION, SOLUTION INTRAMUSCULAR; INTRAVENOUS; SUBCUTANEOUS ONCE AS NEEDED
Status: DISCONTINUED | OUTPATIENT
Start: 2023-11-20 | End: 2023-11-20

## 2023-11-20 RX ORDER — PHENYLEPHRINE HCL 10 MG/ML
VIAL (ML) INJECTION AS NEEDED
Status: DISCONTINUED | OUTPATIENT
Start: 2023-11-20 | End: 2023-11-20 | Stop reason: SURG

## 2023-11-20 RX ORDER — SODIUM CHLORIDE, SODIUM LACTATE, POTASSIUM CHLORIDE, CALCIUM CHLORIDE 600; 310; 30; 20 MG/100ML; MG/100ML; MG/100ML; MG/100ML
INJECTION, SOLUTION INTRAVENOUS CONTINUOUS
Status: DISCONTINUED | OUTPATIENT
Start: 2023-11-20 | End: 2023-11-20

## 2023-11-20 RX ORDER — LIDOCAINE HYDROCHLORIDE 10 MG/ML
INJECTION, SOLUTION EPIDURAL; INFILTRATION; INTRACAUDAL; PERINEURAL AS NEEDED
Status: DISCONTINUED | OUTPATIENT
Start: 2023-11-20 | End: 2023-11-20 | Stop reason: SURG

## 2023-11-20 RX ADMIN — SODIUM CHLORIDE, SODIUM LACTATE, POTASSIUM CHLORIDE, CALCIUM CHLORIDE: 600; 310; 30; 20 INJECTION, SOLUTION INTRAVENOUS at 11:29:00

## 2023-11-20 RX ADMIN — PHENYLEPHRINE HCL 100 MCG: 10 MG/ML VIAL (ML) INJECTION at 11:31:00

## 2023-11-20 RX ADMIN — SODIUM CHLORIDE, SODIUM LACTATE, POTASSIUM CHLORIDE, CALCIUM CHLORIDE: 600; 310; 30; 20 INJECTION, SOLUTION INTRAVENOUS at 11:05:00

## 2023-11-20 RX ADMIN — PHENYLEPHRINE HCL 100 MCG: 10 MG/ML VIAL (ML) INJECTION at 11:34:00

## 2023-11-20 RX ADMIN — PHENYLEPHRINE HCL 100 MCG: 10 MG/ML VIAL (ML) INJECTION at 11:27:00

## 2023-11-20 RX ADMIN — PHENYLEPHRINE HCL 100 MCG: 10 MG/ML VIAL (ML) INJECTION at 11:24:00

## 2023-11-20 RX ADMIN — PHENYLEPHRINE HCL 100 MCG: 10 MG/ML VIAL (ML) INJECTION at 11:21:00

## 2023-11-20 RX ADMIN — LIDOCAINE HYDROCHLORIDE 5 ML: 10 INJECTION, SOLUTION EPIDURAL; INFILTRATION; INTRACAUDAL; PERINEURAL at 11:08:00

## 2023-11-20 NOTE — ANESTHESIA POSTPROCEDURE EVALUATION
[facility] Patient Status:  Hospital Outpatient Surgery   Age/Gender 63year old male MRN [id number]   Location [address] [address] Attending Alen Felipe MD   Hosp Day # 0 PCP Fermin Rinaldi MD       Anesthesia Post-op Note    ESOPHAGOGASTRODUODENOSCOPY (EGD) with biopsies, COLONOSCOPY with cold snare polypectomy and clip placement x 2    Procedure Summary       Date: 11/20/23 Room / Location: [facility] ENDOSCOPY 02 / 1404 [facility] ENDOSCOPY    Anesthesia Start: 6207 Anesthesia Stop: 2545    Procedures:       ESOPHAGOGASTRODUODENOSCOPY (EGD) with biopsies, COLONOSCOPY with cold snare polypectomy and clip placement x 2      . Diagnosis:       Gastroesophageal reflux disease, unspecified whether esophagitis present      (EGD: Crow's Esophagus; Colonoscopy: Incomplete prep, colon polyps)    Surgeons: Alen Felipe MD Anesthesiologist: Salina Olson MD    Anesthesia Type: MAC ASA Status: 3            Anesthesia Type: MAC    Vitals Value Taken Time   /60 11/20/23 1205   Temp  11/20/23 1212   Pulse 86 11/20/23 1205   Resp 16 11/20/23 1146   SpO2 100 % 11/20/23 1205       Patient Location: Endoscopy    Anesthesia Type: MAC    Airway Patency: patent    Postop Pain Control: adequate    Mental Status: preanesthetic baseline    Nausea/Vomiting: none    Cardiopulmonary/Hydration status: stable euvolemic    Complications: no apparent anesthesia related complications    Postop vital signs: stable    Dental Exam: Unchanged from Preop    Patient to be discharged home when criteria met.

## 2023-11-20 NOTE — OPERATIVE REPORT
EGD Operative Report  Patient Name: Armand Park  YOB: 1965  MRN: [id number]  Procedure: Esophagogastroduodenoscopy (EGD)  with biopsies  Pre-operative Diagnosis & Indication: Crow's Esophagus  Post-operative Diagnosis:   C5M5 Crow's esophagus, s/p biopsies  Portal hypertensive gastropathy  Gastric polyps, s/p biopsy  Attending Endoscopist: Monica Mendez MD  Informed Consent: The planned procedure(s), the explanation of the procedure, its expected benefits, the potential complications and risks and possible alternatives and their benefits and risks were discussed with the patient or the patient's surrogate. The discussion of risks, not limited to but including bleeding, infection, perforation, adverse effects from anesthesia, need for emergency surgery/prolonged hospitalization,  cardiac arrhythmias,  and aspiration were discussed with patient. Pt and/or surrogate understood the proposed procedure(s), its risks, benefits and alternatives and wish to proceed with procedure(s). All questions answered in full. After all questions were answered to their satisfaction, a signed, informed, and witnessed consent was obtained. Physical Exam: Heart: regular rate and rhythm. No rubs, murmurs, or gallops. Lungs: Clear to auscultation bilaterally. Abdomen: Soft, non-tender, non distended. No rebound tenderness, no guarding. A TIME OUT WAS COMPLETED prior to the procedure to confirm the patient, procedure(s) and complete endoscopy safety procedure. Sedation: Monitored Anesthesia Care; ASA class per anesthesiology team   Monitoring: Pulsoximetry, pulse, respirations, and blood pressure , vitals were monitored throughout the entire procedure under monitored anesthesia care. Procedure: The patient was then brought to the endoscopy suite where his/her pulse, pulse oximetry and blood pressure were monitored. The patient was placed in the left lateral decubitus position and deep sedation was administered. Once adequate sedation was achieved, a bite block was placed and a lubricated tip of an Olympus video upper endoscope was inserted through the oropharynx and gently manipulated through the esophagus into the stomach and the second portion of the duodenum. Upon withdrawal of the endoscope, careful visualization of the mucosa was performed. The endoscope was then withdrawn into the gastric antrum and placed in a retroflexed position. The endoscope was then righted, and air was suctioned from the stomach. The endoscope was then withdrawn from the patient, with careful visual inspection of the mucosa. The patient left the procedure room in stable condition for recovery. Findings and endotherapy as listed below  Toleration: Patient tolerated procedure well   Complications: No immediate complications   Technical Difficulty:  The procedure was not technically difficult   Estimated Blood Loss: Minimal, less than 5mL of estimated blood loss. Findings and Therapeutics:  Esophagus: There were no strictures or stenosis. GE junction traversed with endoscope without resistance. The diaphragmatic impression was present at 38 cm from the incisors. The top of the gastric folds was present at 37 cm from the incisors. The  The squamocolumnar junction was irregular, appreciated at 32 cm from the incisors. The known Crow's esophagus was examined under both white light and NBI, and no nodules were seen;  4 quadrant biopsies were obtained with cold forceps at 37 cm, 35 cm, and 33 cm from the incisors. No esophageal varices were seen. Stomach: The gastric body, antrum, fundus, cardia, and angularis were notable for a snakeskin appearance consistent with mild portal hypertensive gastropathy. Multiple small (<1cm) sessile polyps were seen in the gastric body; one of these was biopsied with a cold forceps for histology. No ulcers, erosions or masses visualized. Endoscope was placed in a retroflexion view in the stomach.  There was no evidence of a hiatal hernia or gastric varices. Duodenum:   The entire examined duodenum was normal.    Recommendations:  Post EGD precautions, watch for bleeding, infection, perforation, adverse drug reactions   Follow-up biopsies  Continue pantoprazole 40 mg once daily  Proceed with colonoscopy.     Anabella Odell MD  11/20/2023  11:45 AM

## 2023-11-20 NOTE — H&P
History & Physical Examination    Patient Name: Armand Park  MRN: [id number]  [facility]: [id number]  YOB: 1965    Diagnosis: Crow's esophagus, family history of CRC (mother)    Present Illness: 64 y/o M history of Crow's esophagus presents for EGD for Crow's surveillance and CRC screening, first time, increased risk. Medications Prior to Admission   Medication Sig Dispense Refill Last Dose    traZODone 50 MG Oral Tab Take 2 tablets (100 mg total) by mouth nightly. 90 tablet 0 Past Week    lactulose 10 GM/15ML Oral Solution Take 15 mL (10 g total) by mouth daily. 30 each 0 Past Week    pantoprazole 40 MG Oral Tab EC Take 1 tablet (40 mg total) by mouth every morning before breakfast. 30 tablet 0 11/20/2023    rifAXIMin 550 MG Oral Tab Take 1 tablet (550 mg total) by mouth 2 (two) times daily. 60 tablet 0 11/20/2023    allopurinol 100 MG Oral Tab TAKE 1 TABLET(100 MG) BY MOUTH DAILY 90 tablet 3 11/20/2023    Sildenafil Citrate 100 MG Oral Tab Take 0.5-1 tablets ( mg total) by mouth daily as needed for Erectile Dysfunction. 15 tablet 0 Past Month    folic acid 1 MG Oral Tab Take 1 tablet (1 mg total) by mouth daily. 30 tablet 0 11/20/2023    furosemide 40 MG Oral Tab Take 1 tablet (40 mg total) by mouth daily. 30 tablet 0 11/20/2023    spironolactone 100 MG Oral Tab Take 1 tablet (100 mg total) by mouth daily. 30 tablet 0 11/20/2023    PEG 3350-KCl-Na Bicarb-NaCl 420 g Oral Recon Soln Take as directed by physician.  4000 mL 0      Current Facility-Administered Medications   Medication Dose Route Frequency    lactated ringers infusion   Intravenous Continuous       Allergies: No Known Allergies    Past Medical History:   Diagnosis Date    Abdominal pain [id number]    Ascites     Disorder of liver     Cirrhosis    Esophageal reflux     Essential hypertension     High blood pressure     Hypoalbuminemia 10/04/2023    Nausea [id number]    Personal history of alcoholism ([address]     Weight loss [id number] Past Surgical History:   Procedure Laterality Date    TONSILLECTOMY       Family History   Problem Relation Age of Onset    Other (alcoholism) Father     Diabetes Mother         Type I    Other (CAD) Mother      Social History     Tobacco Use    Smoking status: Former     Packs/day: 0.00     Years: 30.00     Additional pack years: 0.00     Total pack years: 0.00     Types: Cigarettes     Quit date: 2014     Years since quittin.7    Smokeless tobacco: Current    Tobacco comments:     Still not smoking cigarettes since . Do smoke a couple of cigars a week. Substance Use Topics    Alcohol use: Not Currently     Alcohol/week: 4.0 standard drinks of alcohol     Types: 4 Standard drinks or equivalent per week       SYSTEM Check if Review is Normal Check if Physical Exam is Normal If not normal, please explain:   HEENT [ x] [x ]    NECK & BACK [ x] [ x]    HEART [ x] [x ]    LUNGS [ x] [ x]    ABDOMEN [ x] [x ]    UROGENITAL [ n/a] [ n/a]    EXTREMITIES [ x] [x ]    OTHER        [ x ] I have discussed the risks and benefits and alternatives with the patient/family. They understand and agree to proceed with plan of care. [ x ] I have reviewed the History and Physical done within the last 30 days. Any changes noted above.     Kiara Merritt MD  2023  10:19 AM 160.02

## 2023-11-20 NOTE — OPERATIVE REPORT
Colonoscopy Operative Report  Patient Name: Emilie Oneil  YOB: 1965  MRN: [id number]  Procedure: Colonoscopy with cold snare polypectomy and clip placement  Pre-operative Diagnosis & Indication: CRC screening, increased risk (mother with CRC per patient)  Post-operative Diagnosis: Three 2-4mm hepatic flexure colon polyps, resected with a cold snare and cold forceps, 2 hemostatic clips placed after one cold snare polypectomy for successful hemostasis  Attending Endoscopist: Lorelei Sharif MD  Informed Consent: The planned procedure(s), the explanation of the procedure, its expected benefits, the potential complications and risks and possible alternatives and their benefits and risks were discussed with the patient or the patient's surrogate. The discussion of risks, not limited to but including bleeding, infection, perforation, adverse effects from anesthesia, need for emergency surgery, medication effects, cardiac arrhythmias, missed polyps, and aspiration and death, were discussed with patient. Pt and/or surrogate understood the proposed procedure(s), its risks, benefits and alternatives and wish to proceed with procedure(s). All questions answered in full. After all questions were answered to their satisfaction, a signed, informed, and witnessed consent was obtained. Physical Exam: Heart: regular rate and rhythm. No rubs, murmurs, or gallops. Lungs: Clear to auscultation bilaterally. Abdomen: Soft, non-tender, non distended. Positive bowel sounds. No rebound tenderness, no guarding. A TIME OUT WAS COMPLETED prior to the procedure to confirm the patient, procedure and complete endoscopy safety procedure. Sedation: Monitored Anesthesia Care; ASA class per anesthesiology team   Monitoring: Pulsoximetry, pulse, respirations, and blood pressure, vitals were monitored throughout the entire procedure under monitored anesthesia care.    Preparation Quality:  [place] Bowel Prep Score: 3  [Right 1/ Transverse 1/ Left 1 ]   Procedure: After achieving adequate sedation, and placing the patient in the left lateral decubitus position, a digital rectal examination was performed. The lubricated tip of the pediatric colonoscope was then introduced into the rectum and advanced to the cecum. The ileocecal valve were clearly and distinctly visualized, thus verifying the cecum. The endoscope was then carefully withdrawn from the patient with careful visualization of the colonic mucosa to the best of my ability, with bowel preparation quality as noted above. Air was suctioned to the best of my ability, during withdrawal of the endoscope. When the endoscope reached the rectum, it was placed in a retroflexed position, and the rectal bulb was thus visualized. The endoscope was righted, and air was suctioned from the colon to the best of my ability, as it was during withdrawn from the colon. The endoscope was then removed from the patient. The patient tolerated the procedure without apparent procedural complications. The patient left the procedure room in stable condition for recovery. Toleration: Patient tolerated procedure well   Complications: No immediate complications   Technical Difficulty:  The procedure was not technically difficult   Estimated Blood Loss: Minimal, less than 5mL of estimated blood loss. Findings and Therapeutics:  Colon: The bowel prep was not adequate for polyp detection, with opaque solid and semi-solid stool seen throughout the colon that could not be lavaged and suctioned. There were three, 2-4mm sessile hepatic flexure polyps seen, resected with a cold snare and cold forceps; there was persistent oozing after one cold snare polypectomy, and 2 clips were placed for hemostasis. No signs of inflammation, ulceration or erosions. There were no diverticula noted throughout the entire visualized colon. Rectum: There were small sized, internal hemorrhoids seen on retroflexion. Recommendations:    Post Colonoscopy/polypectomy precautions, watch for bleeding, infection, perforation, adverse drug reactions. High fiber diet  Repeat colonoscopy as soon as able, with extended prep, and miralax bid 2 weeks prior. Follow up pathology.     Dallas Caba MD  11/20/2023  11:50 AM

## 2023-11-20 NOTE — DISCHARGE INSTRUCTIONS
Home Care Instructions for Colonoscopy and/or Gastroscopy with Sedation    Diet:  - Resume your regular diet . - Start with light meals to minimize bloating.  - Do not drink alcohol today. Medication:  - If you have questions about resuming your normal medications, please contact your Primary Care Physician. Activities:  - Take it easy today. Do not return to work today. - Do not drive today. - Do not operate any machinery today (including kitchen equipment). Colonoscopy:  - You may notice some rectal \"spotting\" (a little blood on the toilet tissue) for a day or two after the exam. This is normal.  - If you experience any rectal bleeding (not spotting), persistent tenderness or sharp severe abdominal pains, oral temperature over 100 degrees Fahrenheit, light-headedness or dizziness, or any other problems, contact your doctor. Gastroscopy:  - You may have a sore throat for 2-3 days following the exam. This is normal. Gargling with warm salt water (1/2 tsp salt to 1 glass warm water) or using throat lozenges will help. - If you experience any sharp pain in your neck, abdomen or chest, vomiting of blood, oral temperature over 100 degrees Fahrenheit, light-headedness or dizziness, or any other problems, contact your doctor. **If unable to reach your doctor, please go to the [facility] Emergency Room**    - Your referring physician will receive a full report of your examination.  - If you do not hear from your doctor's office within two weeks of your biopsy, please call them for your results. Continue pantoprazole 40 mg once daily  Repeat colonoscopy as soon as able, with extended prep, and miralax bid 2 weeks prior.

## 2023-11-27 ENCOUNTER — HOSPITAL ENCOUNTER (OUTPATIENT)
Facility: HOSPITAL | Age: 58
Setting detail: HOSPITAL OUTPATIENT SURGERY
Discharge: HOME | End: 2023-11-27
Attending: STUDENT IN AN ORGANIZED HEALTH CARE EDUCATION/TRAINING PROGRAM | Admitting: STUDENT IN AN ORGANIZED HEALTH CARE EDUCATION/TRAINING PROGRAM
Payer: COMMERCIAL

## 2023-11-27 ENCOUNTER — ANESTHESIA (OUTPATIENT)
Dept: ENDOSCOPY | Facility: HOSPITAL | Age: 58
End: 2023-11-27
Payer: COMMERCIAL

## 2023-11-27 ENCOUNTER — LAB ENCOUNTER (OUTPATIENT)
Dept: LAB | Age: 58
End: 2023-11-27
Attending: INTERNAL MEDICINE
Payer: COMMERCIAL

## 2023-11-27 ENCOUNTER — ANESTHESIA EVENT (OUTPATIENT)
Dept: ENDOSCOPY | Facility: HOSPITAL | Age: 58
End: 2023-11-27
Payer: COMMERCIAL

## 2023-11-27 VITALS
WEIGHT: 185 LBS | HEIGHT: 67 IN | SYSTOLIC BLOOD PRESSURE: 118 MMHG | HEART RATE: 82 BPM | RESPIRATION RATE: 16 BRPM | TEMPERATURE: 98 F | BODY MASS INDEX: 29.03 KG/M2 | OXYGEN SATURATION: 100 % | DIASTOLIC BLOOD PRESSURE: 49 MMHG

## 2023-11-27 DIAGNOSIS — K70.31 ALCOHOLIC CIRRHOSIS OF LIVER WITH ASCITES (HCC): ICD-10-CM

## 2023-11-27 LAB
ALBUMIN SERPL-MCNC: 2.6 G/DL (ref 3.4–5)
ALBUMIN/GLOB SERPL: 0.6 {RATIO} (ref 1–2)
ALP LIVER SERPL-CCNC: 156 U/L
ALT SERPL-CCNC: 47 U/L
ANION GAP SERPL CALC-SCNC: 9 MMOL/L (ref 0–18)
AST SERPL-CCNC: 78 U/L (ref 15–37)
BASOPHILS # BLD AUTO: 0.08 X10(3) UL (ref 0–0.2)
BASOPHILS NFR BLD AUTO: 0.8 %
BILIRUB SERPL-MCNC: 5 MG/DL (ref 0.1–2)
BUN BLD-MCNC: 13 MG/DL (ref 9–23)
CALCIUM BLD-MCNC: 8.8 MG/DL (ref 8.5–10.1)
CHLORIDE SERPL-SCNC: 102 MMOL/L (ref 98–112)
CO2 SERPL-SCNC: 22 MMOL/L (ref 21–32)
CREAT BLD-MCNC: 1.25 MG/DL
EGFRCR SERPLBLD CKD-EPI 2021: 67 ML/MIN/1.73M2 (ref 60–?)
EOSINOPHIL # BLD AUTO: 0.15 X10(3) UL (ref 0–0.7)
EOSINOPHIL NFR BLD AUTO: 1.6 %
ERYTHROCYTE [DISTWIDTH] IN BLOOD BY AUTOMATED COUNT: 15.7 %
FASTING STATUS PATIENT QL REPORTED: YES
GLOBULIN PLAS-MCNC: 4.3 G/DL (ref 2.8–4.4)
GLUCOSE BLD-MCNC: 151 MG/DL (ref 70–99)
HCT VFR BLD AUTO: 27 %
HGB BLD-MCNC: 9.1 G/DL
IMM GRANULOCYTES # BLD AUTO: 0.07 X10(3) UL (ref 0–1)
IMM GRANULOCYTES NFR BLD: 0.7 %
INR BLD: 2.26 (ref 0.8–1.2)
LYMPHOCYTES # BLD AUTO: 1.79 X10(3) UL (ref 1–4)
LYMPHOCYTES NFR BLD AUTO: 18.9 %
MCH RBC QN AUTO: 33.3 PG (ref 26–34)
MCHC RBC AUTO-ENTMCNC: 33.7 G/DL (ref 31–37)
MCV RBC AUTO: 98.9 FL
MONOCYTES # BLD AUTO: 0.53 X10(3) UL (ref 0.1–1)
MONOCYTES NFR BLD AUTO: 5.6 %
NEUTROPHILS # BLD AUTO: 6.84 X10 (3) UL (ref 1.5–7.7)
NEUTROPHILS # BLD AUTO: 6.84 X10(3) UL (ref 1.5–7.7)
NEUTROPHILS NFR BLD AUTO: 72.4 %
OSMOLALITY SERPL CALC.SUM OF ELEC: 279 MOSM/KG (ref 275–295)
PLATELET # BLD AUTO: 75 10(3)UL (ref 150–450)
POTASSIUM SERPL-SCNC: 4.1 MMOL/L (ref 3.5–5.1)
PROT SERPL-MCNC: 6.9 G/DL (ref 6.4–8.2)
PROTHROMBIN TIME: 25.2 SECONDS (ref 11.6–14.8)
RBC # BLD AUTO: 2.73 X10(6)UL
SODIUM SERPL-SCNC: 133 MMOL/L (ref 136–145)
WBC # BLD AUTO: 9.5 X10(3) UL (ref 4–11)

## 2023-11-27 PROCEDURE — 85610 PROTHROMBIN TIME: CPT

## 2023-11-27 PROCEDURE — 36415 COLL VENOUS BLD VENIPUNCTURE: CPT

## 2023-11-27 PROCEDURE — 85025 COMPLETE CBC W/AUTO DIFF WBC: CPT

## 2023-11-27 PROCEDURE — 0DBN8ZX EXCISION OF SIGMOID COLON, VIA NATURAL OR ARTIFICIAL OPENING ENDOSCOPIC, DIAGNOSTIC: ICD-10-PCS | Performed by: STUDENT IN AN ORGANIZED HEALTH CARE EDUCATION/TRAINING PROGRAM

## 2023-11-27 PROCEDURE — 88305 TISSUE EXAM BY PATHOLOGIST: CPT | Performed by: STUDENT IN AN ORGANIZED HEALTH CARE EDUCATION/TRAINING PROGRAM

## 2023-11-27 PROCEDURE — 80053 COMPREHEN METABOLIC PANEL: CPT

## 2023-11-27 PROCEDURE — 0DBL8ZX EXCISION OF TRANSVERSE COLON, VIA NATURAL OR ARTIFICIAL OPENING ENDOSCOPIC, DIAGNOSTIC: ICD-10-PCS | Performed by: STUDENT IN AN ORGANIZED HEALTH CARE EDUCATION/TRAINING PROGRAM

## 2023-11-27 DEVICE — REPLAY HEMOSTASIS CLIP, 11MM SPAN
Type: IMPLANTABLE DEVICE | Status: FUNCTIONAL
Brand: REPLAY

## 2023-11-27 RX ORDER — SODIUM CHLORIDE, SODIUM LACTATE, POTASSIUM CHLORIDE, CALCIUM CHLORIDE 600; 310; 30; 20 MG/100ML; MG/100ML; MG/100ML; MG/100ML
INJECTION, SOLUTION INTRAVENOUS CONTINUOUS
Status: DISCONTINUED | OUTPATIENT
Start: 2023-11-27 | End: 2023-11-27

## 2023-11-27 RX ORDER — HYDROMORPHONE HYDROCHLORIDE 1 MG/ML
0.4 INJECTION, SOLUTION INTRAMUSCULAR; INTRAVENOUS; SUBCUTANEOUS EVERY 5 MIN PRN
Status: DISCONTINUED | OUTPATIENT
Start: 2023-11-27 | End: 2023-11-27

## 2023-11-27 RX ORDER — NALOXONE HYDROCHLORIDE 0.4 MG/ML
0.08 INJECTION, SOLUTION INTRAMUSCULAR; INTRAVENOUS; SUBCUTANEOUS AS NEEDED
Status: DISCONTINUED | OUTPATIENT
Start: 2023-11-27 | End: 2023-11-27

## 2023-11-27 RX ORDER — LIDOCAINE HYDROCHLORIDE 10 MG/ML
INJECTION, SOLUTION EPIDURAL; INFILTRATION; INTRACAUDAL; PERINEURAL AS NEEDED
Status: DISCONTINUED | OUTPATIENT
Start: 2023-11-27 | End: 2023-11-27 | Stop reason: SURG

## 2023-11-27 RX ORDER — HYDROMORPHONE HYDROCHLORIDE 1 MG/ML
0.2 INJECTION, SOLUTION INTRAMUSCULAR; INTRAVENOUS; SUBCUTANEOUS EVERY 5 MIN PRN
Status: DISCONTINUED | OUTPATIENT
Start: 2023-11-27 | End: 2023-11-27

## 2023-11-27 RX ORDER — HYDROMORPHONE HYDROCHLORIDE 1 MG/ML
0.6 INJECTION, SOLUTION INTRAMUSCULAR; INTRAVENOUS; SUBCUTANEOUS EVERY 5 MIN PRN
Status: DISCONTINUED | OUTPATIENT
Start: 2023-11-27 | End: 2023-11-27

## 2023-11-27 RX ADMIN — LIDOCAINE HYDROCHLORIDE 25 MG: 10 INJECTION, SOLUTION EPIDURAL; INFILTRATION; INTRACAUDAL; PERINEURAL at 09:51:00

## 2023-11-27 RX ADMIN — SODIUM CHLORIDE, SODIUM LACTATE, POTASSIUM CHLORIDE, CALCIUM CHLORIDE: 600; 310; 30; 20 INJECTION, SOLUTION INTRAVENOUS at 10:25:00

## 2023-11-27 NOTE — H&P
History & Physical Examination    Patient Name: Jaime Luna  MRN: [id number]  CSN: [id number]  YOB: 1965    Diagnosis: Need for colon cancer screening, poor prep noted on colonoscopy November 20, 2023    Present Illness: 60y/o male history of cirrhosis and recent colonoscopy with poor prep presents for colonoscopy. Medications Prior to Admission   Medication Sig Dispense Refill Last Dose    folic acid 1 MG Oral Tab Take 1 tablet (1 mg total) by mouth daily. 30 tablet 11 11/27/2023    traZODone 50 MG Oral Tab Take 2 tablets (100 mg total) by mouth nightly. 90 tablet 0 11/25/2023    PEG 3350-KCl-Na Bicarb-NaCl 420 g Oral Recon Soln Take as directed by physician. 4000 mL 0 11/26/2023    lactulose 10 GM/15ML Oral Solution Take 15 mL (10 g total) by mouth daily. 30 each 0 11/26/2023    pantoprazole 40 MG Oral Tab EC Take 1 tablet (40 mg total) by mouth every morning before breakfast. 30 tablet 0 11/27/2023    rifAXIMin 550 MG Oral Tab Take 1 tablet (550 mg total) by mouth 2 (two) times daily. 60 tablet 0 11/27/2023    allopurinol 100 MG Oral Tab TAKE 1 TABLET(100 MG) BY MOUTH DAILY 90 tablet 3 11/27/2023    Sildenafil Citrate 100 MG Oral Tab Take 0.5-1 tablets ( mg total) by mouth daily as needed for Erectile Dysfunction. 15 tablet 0 11/27/2023    furosemide 40 MG Oral Tab Take 1 tablet (40 mg total) by mouth daily. 30 tablet 0 11/27/2023    spironolactone 100 MG Oral Tab Take 1 tablet (100 mg total) by mouth daily. 30 tablet 0 11/27/2023    PEG 3350-KCl-Na Bicarb-NaCl 420 g Oral Recon Soln Take as directed by physician.  4000 mL 0      Current Facility-Administered Medications   Medication Dose Route Frequency    lactated ringers infusion   Intravenous Continuous    lactated ringers infusion   Intravenous Continuous    lactated ringers IV bolus 500 mL  500 mL Intravenous Once PRN    atropine 0.1 MG/ML injection 0.5 mg  0.5 mg Intravenous PRN    naloxone (Narcan) 0.4 MG/ML injection 0.08 mg  0.08 mg Intravenous PRN    fentaNYL (Sublimaze) 50 mcg/mL injection 25 mcg  25 mcg Intravenous Q5 Min PRN    Or    fentaNYL (Sublimaze) 50 mcg/mL injection 50 mcg  50 mcg Intravenous Q5 Min PRN    HYDROmorphone (Dilaudid) 1 MG/ML injection 0.2 mg  0.2 mg Intravenous Q5 Min PRN    Or    HYDROmorphone (Dilaudid) 1 MG/ML injection 0.4 mg  0.4 mg Intravenous Q5 Min PRN    Or    HYDROmorphone (Dilaudid) 1 MG/ML injection 0.6 mg  0.6 mg Intravenous Q5 Min PRN       Allergies: No Known Allergies    Past Medical History:   Diagnosis Date    Abdominal pain [id number]    Ascites     COVID 2023    Disorder of liver     Cirrhosis    Esophageal reflux     Essential hypertension     High blood pressure     Hypoalbuminemia 10/04/2023    Nausea [id number]    Personal history of alcoholism ([address]     Weight loss [id number]     Past Surgical History:   Procedure Laterality Date    COLONOSCOPY N/A 2023    Procedure: .;  Surgeon: Rossana Sanches MD;  Location: [facility] ENDOSCOPY    TONSILLECTOMY       Family History   Problem Relation Age of Onset    Other (alcoholism) Father     Diabetes Mother         Type I    Other (CAD) Mother      Social History     Tobacco Use    Smoking status: Former     Packs/day: 0.00     Years: 30.00     Additional pack years: 0.00     Total pack years: 0.00     Types: Cigarettes     Quit date: 2014     Years since quittin.8    Smokeless tobacco: Current    Tobacco comments:     Still not smoking cigarettes since . Do smoke a couple of cigars a week.    Substance Use Topics    Alcohol use: Not Currently     Alcohol/week: 4.0 standard drinks of alcohol     Types: 4 Standard drinks or equivalent per week       SYSTEM Check if Review is Normal Check if Physical Exam is Normal If not normal, please explain:   HEENT [ x] [x ]    NECK & BACK [ x] [ x]    HEART [ x] [x ]    LUNGS [ x] [ x]    ABDOMEN [ x] [x ]    UROGENITAL [ n/a] [ n/a]    EXTREMITIES [ x] [x ]    OTHER        [ x ] I have discussed the risks and benefits and alternatives with the patient/family. They understand and agree to proceed with plan of care. [ x ] I have reviewed the History and Physical done within the last 30 days. Any changes noted above.     Bhargavi Barrow DO  11/27/2023  10:32 AM

## 2023-11-27 NOTE — DISCHARGE INSTRUCTIONS

## 2023-11-27 NOTE — OPERATIVE REPORT
Colonoscopy Operative Report  Patient Name: Jessy Quezada  YOB: 1965  MRN: [id number]  Procedure: Colonoscopy with polypectomy and placement of Endo Clip  Date of service: November 27, 2023  Pre-operative Diagnosis & Indication: Need for colon cancer screening, poor prep noted on colonoscopy November 20, 2023  Post-operative Diagnosis:   3 mm transverse colon polyp removed using cold snare  2 sigmoid colon polyps ranging in size from 3 mm to 8 mm. Smallest removed using cold snare and largest removed using cold snare with placement of 1 Endo Clip due to bleeding with good hemostasis  2 previous endoclips noted at the hepatic flexure  Sigmoid diverticulosis  Inadequate prep with solid stool and unable to visualize cecum  Internal hemorrhoids  Attending Endoscopist: Fly Mcnair D.O. Informed Consent: The planned procedure(s), the explanation of the procedure, its expected benefits, the potential complications and risks and possible alternatives and their benefits and risks were discussed with the patient or the patient's surrogate. The discussion of risks, not limited to but including bleeding, infection, perforation, adverse effects from anesthesia, need for emergency surgery, medication effects, cardiac arrhythmias, missed polyps, and aspiration and death, were discussed with patient. Pt and/or surrogate understood the proposed procedure(s), its risks, benefits and alternatives and wish to proceed with procedure(s). All questions answered in full. After all questions were answered to their satisfaction, a signed, informed, and witnessed consent was obtained. A TIME OUT WAS COMPLETED prior to the procedure to confirm the patient, procedure and complete endoscopy safety procedure.    Sedation: Monitored Anesthesia Care; ASA class per anesthesiology team   Monitoring: Pulsoximetry, pulse, respirations, and blood pressure, vitals were monitored throughout the entire procedure under monitored anesthesia care. Preparation Quality:  [place]incoln Bowel Prep Score: 3  [Right 0/ Transverse 2/ Left 1]   Procedure: After achieving adequate sedation, and placing the patient in the left lateral decubitus position, a digital rectal examination was performed. The lubricated tip of the adult colonoscope was then introduced into the rectum and advanced to the cecum. The ileocecal valve were clearly and distinctly visualized, thus verifying the cecum. Solid stool was noted throughout the entirety of the cecum so appendiceal orifice and cecum was unable to be visualized. The terminal ileum was intubated. The endoscope was then carefully withdrawn from the patient with careful visualization of the colonic mucosa to the best of my ability, with bowel preparation quality as noted above. Air was suctioned to the best of my ability, during withdrawal of the endoscope. When the endoscope reached the rectum, it was placed in a retroflexed position, and the rectal bulb was thus visualized. The endoscope was righted, and air was suctioned from the colon to the best of my ability, as it was during withdrawn from the colon. The endoscope was then removed from the patient. The patient tolerated the procedure without apparent procedural complications. The patient left the procedure room in stable condition for recovery. Toleration: Patient tolerated procedure well  Complications: No immediate complications  Technical Difficulty: The procedure was not technically difficult   Estimated Blood Loss: Minimal, less than 5mL of estimated blood loss. Findings and Therapeutics:  Terminal Ileum: Solid debris was noted in the ileum. Colon: There was a 3 mm polyp in the transverse colon which was removed using cold snare. There were 2 polyps in the sigmoid colon ranging in size from 3 mm to 8 mm. Both were removed using cold snare.   1 Endo Clip was placed at the site after removal of 8 mm polyp due to oozing of blood with good hemostasis. 2 prior endoclips were noted in the hepatic flexure. The cecum was unable to be adequately visualized due to solid stool throughout the cecum. Solid and liquid stool was also noted throughout the remainder of the colon which limited visualization. No signs of inflammation, ulceration or erosions. There were diverticula noted throughout the sigmoid colon. Rectum: There were small sized, internal hemorrhoids seen on retroflexion. Recommendations:   Post Colonoscopy/polypectomy precautions, watch for bleeding, infection, perforation, adverse drug reactions. High fiber diet  Repeat colonoscopy as soon as able with 2-day prep and MiraLAX twice daily for 2 weeks prior. Stressed importance of following prep instructions including clear liquid diet the day prior to procedure.   Follow-up pathology results    Lee Ann Adan DO  11/27/2023  10:23 AM

## 2023-12-04 DIAGNOSIS — G47.00 INSOMNIA, UNSPECIFIED TYPE: ICD-10-CM

## 2023-12-04 RX ORDER — TRAZODONE HYDROCHLORIDE 50 MG/1
100 TABLET ORAL NIGHTLY
Qty: 90 TABLET | Refills: 0 | Status: SHIPPED | OUTPATIENT
Start: 2023-12-04

## 2023-12-04 NOTE — TELEPHONE ENCOUNTER
.A refill request was received for:  Requested Prescriptions     Pending Prescriptions Disp Refills    traZODone 50 MG Oral Tab 90 tablet 0     Sig: Take 2 tablets (100 mg total) by mouth nightly.        Last refill date:   11/8/2023    Last office visit: 10/10/2023    Follow up due:  Future Appointments   Date Time Provider Holli Stanley   12/18/2023 10:00 AM Heather Oswald MD [facility] Surg/Onc

## 2023-12-05 ENCOUNTER — TELEPHONE (OUTPATIENT)
Dept: FAMILY MEDICINE CLINIC | Facility: CLINIC | Age: 58
End: 2023-12-05

## 2023-12-05 NOTE — TELEPHONE ENCOUNTER
Hep Lisav vaccine, pt has appt scheduled needs order entered.  Having liver transplant and needs this vaccine

## 2023-12-05 NOTE — TELEPHONE ENCOUNTER
Is this the same as hep b vaccine?   Hepatitis B Vac Recomb Adj (HEPLISAV-B) 20 MCG/0.5ML Intramuscular Solution Prefilled Syringe     Is this something pt needs?   He had 3 twinrix in 2014 and hep B also in 2012

## 2023-12-06 NOTE — TELEPHONE ENCOUNTER
I suspect it is due to the fact he was negative for immunity on antibody testing and they want to repeat, it is a hepatitis B vaccine.    They may have a specific one recommended for a non responder, which is why he was given the specific name.    If we have it, I would just recommend doing it, if we don't, we may need to have him contact hepatology or ask him, if they requested this specific vaccine before just ordering any hepatitis b vaccine.    Also it is the fastest (2 doses a month apart)    If we don't have it and he is confident he needs that can we look into ordering it to a pharmacy?    Lg Sosa MD

## 2023-12-07 ENCOUNTER — LAB ENCOUNTER (OUTPATIENT)
Dept: LAB | Age: 58
End: 2023-12-07
Attending: INTERNAL MEDICINE
Payer: COMMERCIAL

## 2023-12-07 DIAGNOSIS — K70.31 ASCITES DUE TO ALCOHOLIC CIRRHOSIS (HCC): ICD-10-CM

## 2023-12-07 LAB
ALBUMIN SERPL-MCNC: 2.9 G/DL (ref 3.4–5)
ALBUMIN/GLOB SERPL: 0.6 {RATIO} (ref 1–2)
ALP LIVER SERPL-CCNC: 152 U/L
ALT SERPL-CCNC: 46 U/L
ANION GAP SERPL CALC-SCNC: 8 MMOL/L (ref 0–18)
AST SERPL-CCNC: 66 U/L (ref 15–37)
BASOPHILS # BLD AUTO: 0.06 X10(3) UL (ref 0–0.2)
BASOPHILS NFR BLD AUTO: 0.8 %
BILIRUB SERPL-MCNC: 7 MG/DL (ref 0.1–2)
BUN BLD-MCNC: 11 MG/DL (ref 9–23)
CALCIUM BLD-MCNC: 9.4 MG/DL (ref 8.5–10.1)
CHLORIDE SERPL-SCNC: 103 MMOL/L (ref 98–112)
CO2 SERPL-SCNC: 24 MMOL/L (ref 21–32)
CREAT BLD-MCNC: 1.2 MG/DL
EGFRCR SERPLBLD CKD-EPI 2021: 71 ML/MIN/1.73M2 (ref 60–?)
EOSINOPHIL # BLD AUTO: 0.14 X10(3) UL (ref 0–0.7)
EOSINOPHIL NFR BLD AUTO: 1.8 %
ERYTHROCYTE [DISTWIDTH] IN BLOOD BY AUTOMATED COUNT: 16.4 %
GLOBULIN PLAS-MCNC: 4.8 G/DL (ref 2.8–4.4)
GLUCOSE BLD-MCNC: 135 MG/DL (ref 70–99)
HCT VFR BLD AUTO: 29.6 %
HGB BLD-MCNC: 9.8 G/DL
IMM GRANULOCYTES # BLD AUTO: 0.05 X10(3) UL (ref 0–1)
IMM GRANULOCYTES NFR BLD: 0.6 %
INR BLD: 1.98 (ref 0.8–1.2)
LYMPHOCYTES # BLD AUTO: 1.54 X10(3) UL (ref 1–4)
LYMPHOCYTES NFR BLD AUTO: 19.8 %
MCH RBC QN AUTO: 33.3 PG (ref 26–34)
MCHC RBC AUTO-ENTMCNC: 33.1 G/DL (ref 31–37)
MCV RBC AUTO: 100.7 FL
MONOCYTES # BLD AUTO: 0.51 X10(3) UL (ref 0.1–1)
MONOCYTES NFR BLD AUTO: 6.6 %
NEUTROPHILS # BLD AUTO: 5.46 X10 (3) UL (ref 1.5–7.7)
NEUTROPHILS # BLD AUTO: 5.46 X10(3) UL (ref 1.5–7.7)
NEUTROPHILS NFR BLD AUTO: 70.4 %
OSMOLALITY SERPL CALC.SUM OF ELEC: 281 MOSM/KG (ref 275–295)
PLATELET # BLD AUTO: 80 10(3)UL (ref 150–450)
POTASSIUM SERPL-SCNC: 4.1 MMOL/L (ref 3.5–5.1)
PROT SERPL-MCNC: 7.7 G/DL (ref 6.4–8.2)
PROTHROMBIN TIME: 22.7 SECONDS (ref 11.6–14.8)
RBC # BLD AUTO: 2.94 X10(6)UL
SODIUM SERPL-SCNC: 135 MMOL/L (ref 136–145)
WBC # BLD AUTO: 7.8 X10(3) UL (ref 4–11)

## 2023-12-07 PROCEDURE — 36415 COLL VENOUS BLD VENIPUNCTURE: CPT

## 2023-12-07 PROCEDURE — 85025 COMPLETE CBC W/AUTO DIFF WBC: CPT

## 2023-12-07 PROCEDURE — 80053 COMPREHEN METABOLIC PANEL: CPT

## 2023-12-07 PROCEDURE — 85610 PROTHROMBIN TIME: CPT

## 2023-12-08 ENCOUNTER — NURSE ONLY (OUTPATIENT)
Dept: FAMILY MEDICINE CLINIC | Facility: CLINIC | Age: 58
End: 2023-12-08
Payer: COMMERCIAL

## 2023-12-08 NOTE — PROGRESS NOTES
Patient came in for Hepatitis B Vac Recomb Adj (HEPLISAV-B) 20 MCG/0.5ML   I let him know we do not have that here, he would have to go to pharmacy to get that done.  He decided to go to [facility]

## 2023-12-12 ENCOUNTER — LAB ENCOUNTER (OUTPATIENT)
Dept: LAB | Age: 58
End: 2023-12-12
Attending: INTERNAL MEDICINE
Payer: COMMERCIAL

## 2023-12-12 DIAGNOSIS — Z01.818 PREOPERATIVE EXAMINATION, UNSPECIFIED: Primary | ICD-10-CM

## 2023-12-12 DIAGNOSIS — Z01.818 PRE-TRANSPLANT EVALUATION FOR LIVER TRANSPLANT: ICD-10-CM

## 2023-12-12 DIAGNOSIS — K70.31 ALCOHOLIC CIRRHOSIS OF LIVER WITH ASCITES (HCC): ICD-10-CM

## 2023-12-12 LAB
ALBUMIN SERPL-MCNC: 2.9 G/DL (ref 3.4–5)
ALBUMIN/GLOB SERPL: 0.7 {RATIO} (ref 1–2)
ALP LIVER SERPL-CCNC: 154 U/L
ALT SERPL-CCNC: 46 U/L
ANION GAP SERPL CALC-SCNC: 4 MMOL/L (ref 0–18)
AST SERPL-CCNC: 68 U/L (ref 15–37)
BASOPHILS # BLD AUTO: 0.07 X10(3) UL (ref 0–0.2)
BASOPHILS NFR BLD AUTO: 0.8 %
BILIRUB SERPL-MCNC: 6.9 MG/DL (ref 0.1–2)
BUN BLD-MCNC: 12 MG/DL (ref 9–23)
CALCIUM BLD-MCNC: 9.1 MG/DL (ref 8.5–10.1)
CHLORIDE SERPL-SCNC: 103 MMOL/L (ref 98–112)
CO2 SERPL-SCNC: 26 MMOL/L (ref 21–32)
CREAT BLD-MCNC: 1.31 MG/DL
EGFRCR SERPLBLD CKD-EPI 2021: 63 ML/MIN/1.73M2 (ref 60–?)
EOSINOPHIL # BLD AUTO: 0.15 X10(3) UL (ref 0–0.7)
EOSINOPHIL NFR BLD AUTO: 1.8 %
ERYTHROCYTE [DISTWIDTH] IN BLOOD BY AUTOMATED COUNT: 16.3 %
FASTING STATUS PATIENT QL REPORTED: NO
GLOBULIN PLAS-MCNC: 4.2 G/DL (ref 2.8–4.4)
GLUCOSE BLD-MCNC: 123 MG/DL (ref 70–99)
HCT VFR BLD AUTO: 27 %
HGB BLD-MCNC: 9.3 G/DL
IMM GRANULOCYTES # BLD AUTO: 0.08 X10(3) UL (ref 0–1)
IMM GRANULOCYTES NFR BLD: 0.9 %
INR BLD: 2.14 (ref 0.8–1.2)
LYMPHOCYTES # BLD AUTO: 1.2 X10(3) UL (ref 1–4)
LYMPHOCYTES NFR BLD AUTO: 14.2 %
MCH RBC QN AUTO: 33.9 PG (ref 26–34)
MCHC RBC AUTO-ENTMCNC: 34.4 G/DL (ref 31–37)
MCV RBC AUTO: 98.5 FL
MONOCYTES # BLD AUTO: 0.82 X10(3) UL (ref 0.1–1)
MONOCYTES NFR BLD AUTO: 9.7 %
NEUTROPHILS # BLD AUTO: 6.16 X10 (3) UL (ref 1.5–7.7)
NEUTROPHILS # BLD AUTO: 6.16 X10(3) UL (ref 1.5–7.7)
NEUTROPHILS NFR BLD AUTO: 72.6 %
OSMOLALITY SERPL CALC.SUM OF ELEC: 277 MOSM/KG (ref 275–295)
PLATELET # BLD AUTO: 80 10(3)UL (ref 150–450)
POTASSIUM SERPL-SCNC: 4.2 MMOL/L (ref 3.5–5.1)
PROT SERPL-MCNC: 7.1 G/DL (ref 6.4–8.2)
PROTHROMBIN TIME: 24.1 SECONDS (ref 11.6–14.8)
RBC # BLD AUTO: 2.74 X10(6)UL
SODIUM SERPL-SCNC: 133 MMOL/L (ref 136–145)
WBC # BLD AUTO: 8.5 X10(3) UL (ref 4–11)

## 2023-12-12 PROCEDURE — 80053 COMPREHEN METABOLIC PANEL: CPT

## 2023-12-12 PROCEDURE — 85025 COMPLETE CBC W/AUTO DIFF WBC: CPT

## 2023-12-12 PROCEDURE — 85610 PROTHROMBIN TIME: CPT

## 2023-12-12 PROCEDURE — 36415 COLL VENOUS BLD VENIPUNCTURE: CPT

## 2023-12-18 ENCOUNTER — LAB ENCOUNTER (OUTPATIENT)
Dept: LAB | Age: 58
End: 2023-12-18
Attending: INTERNAL MEDICINE
Payer: COMMERCIAL

## 2023-12-18 DIAGNOSIS — K70.31 ALCOHOLIC CIRRHOSIS OF LIVER WITH ASCITES (HCC): ICD-10-CM

## 2023-12-18 LAB
ALBUMIN SERPL-MCNC: 2.8 G/DL (ref 3.4–5)
ALBUMIN/GLOB SERPL: 0.6 {RATIO} (ref 1–2)
ALP LIVER SERPL-CCNC: 145 U/L
ALT SERPL-CCNC: 48 U/L
ANION GAP SERPL CALC-SCNC: 7 MMOL/L (ref 0–18)
AST SERPL-CCNC: 70 U/L (ref 15–37)
BASOPHILS # BLD AUTO: 0.06 X10(3) UL (ref 0–0.2)
BASOPHILS NFR BLD AUTO: 0.8 %
BILIRUB SERPL-MCNC: 7.4 MG/DL (ref 0.1–2)
BUN BLD-MCNC: 14 MG/DL (ref 9–23)
CALCIUM BLD-MCNC: 9.5 MG/DL (ref 8.5–10.1)
CHLORIDE SERPL-SCNC: 105 MMOL/L (ref 98–112)
CO2 SERPL-SCNC: 24 MMOL/L (ref 21–32)
CREAT BLD-MCNC: 1.24 MG/DL
EGFRCR SERPLBLD CKD-EPI 2021: 68 ML/MIN/1.73M2 (ref 60–?)
EOSINOPHIL # BLD AUTO: 0.11 X10(3) UL (ref 0–0.7)
EOSINOPHIL NFR BLD AUTO: 1.5 %
ERYTHROCYTE [DISTWIDTH] IN BLOOD BY AUTOMATED COUNT: 16.4 %
FASTING STATUS PATIENT QL REPORTED: NO
GLOBULIN PLAS-MCNC: 4.5 G/DL (ref 2.8–4.4)
GLUCOSE BLD-MCNC: 106 MG/DL (ref 70–99)
HCT VFR BLD AUTO: 28.9 %
HGB BLD-MCNC: 9.5 G/DL
IMM GRANULOCYTES # BLD AUTO: 0.06 X10(3) UL (ref 0–1)
IMM GRANULOCYTES NFR BLD: 0.8 %
INR BLD: 2.24 (ref 0.8–1.2)
LYMPHOCYTES # BLD AUTO: 1.32 X10(3) UL (ref 1–4)
LYMPHOCYTES NFR BLD AUTO: 18 %
MCH RBC QN AUTO: 33.9 PG (ref 26–34)
MCHC RBC AUTO-ENTMCNC: 32.9 G/DL (ref 31–37)
MCV RBC AUTO: 103.2 FL
MONOCYTES # BLD AUTO: 0.68 X10(3) UL (ref 0.1–1)
MONOCYTES NFR BLD AUTO: 9.3 %
NEUTROPHILS # BLD AUTO: 5.12 X10 (3) UL (ref 1.5–7.7)
NEUTROPHILS # BLD AUTO: 5.12 X10(3) UL (ref 1.5–7.7)
NEUTROPHILS NFR BLD AUTO: 69.6 %
OSMOLALITY SERPL CALC.SUM OF ELEC: 283 MOSM/KG (ref 275–295)
PLATELET # BLD AUTO: 72 10(3)UL (ref 150–450)
POTASSIUM SERPL-SCNC: 4.2 MMOL/L (ref 3.5–5.1)
PROT SERPL-MCNC: 7.3 G/DL (ref 6.4–8.2)
PROTHROMBIN TIME: 25.1 SECONDS (ref 11.6–14.8)
RBC # BLD AUTO: 2.8 X10(6)UL
SODIUM SERPL-SCNC: 136 MMOL/L (ref 136–145)
WBC # BLD AUTO: 7.4 X10(3) UL (ref 4–11)

## 2023-12-18 PROCEDURE — 80053 COMPREHEN METABOLIC PANEL: CPT

## 2023-12-18 PROCEDURE — 80321 ALCOHOLS BIOMARKERS 1OR 2: CPT

## 2023-12-18 PROCEDURE — 85025 COMPLETE CBC W/AUTO DIFF WBC: CPT

## 2023-12-18 PROCEDURE — 36415 COLL VENOUS BLD VENIPUNCTURE: CPT

## 2023-12-18 PROCEDURE — 85610 PROTHROMBIN TIME: CPT

## 2023-12-19 ENCOUNTER — HOSPITAL ENCOUNTER (OUTPATIENT)
Dept: GENERAL RADIOLOGY | Age: 58
Discharge: HOME | End: 2023-12-19
Attending: INTERNAL MEDICINE
Payer: COMMERCIAL

## 2023-12-19 ENCOUNTER — LAB ENCOUNTER (OUTPATIENT)
Dept: LAB | Age: 58
End: 2023-12-19
Attending: INTERNAL MEDICINE
Payer: COMMERCIAL

## 2023-12-19 DIAGNOSIS — K70.31 ALCOHOLIC CIRRHOSIS OF LIVER WITH ASCITES (HCC): ICD-10-CM

## 2023-12-19 LAB
BILIRUB UR QL STRIP.AUTO: NEGATIVE
CLARITY UR REFRACT.AUTO: CLEAR
GLUCOSE UR STRIP.AUTO-MCNC: NORMAL MG/DL
KETONES UR STRIP.AUTO-MCNC: NEGATIVE MG/DL
LEUKOCYTE ESTERASE UR QL STRIP.AUTO: NEGATIVE
NITRITE UR QL STRIP.AUTO: NEGATIVE
PH UR STRIP.AUTO: 7 [PH] (ref 5–8)
PROT UR STRIP.AUTO-MCNC: NEGATIVE MG/DL
RBC UR QL AUTO: NEGATIVE
SP GR UR STRIP.AUTO: 1.01 (ref 1–1.03)
UROBILINOGEN UR STRIP.AUTO-MCNC: NORMAL MG/DL

## 2023-12-19 PROCEDURE — 71046 X-RAY EXAM CHEST 2 VIEWS: CPT | Performed by: INTERNAL MEDICINE

## 2023-12-19 PROCEDURE — 81003 URINALYSIS AUTO W/O SCOPE: CPT

## 2023-12-19 PROCEDURE — 36415 COLL VENOUS BLD VENIPUNCTURE: CPT

## 2023-12-19 PROCEDURE — 87040 BLOOD CULTURE FOR BACTERIA: CPT

## 2023-12-21 LAB
PHOSPHATIDYETHANOL: NEGATIVE
PHOSPHATIDYLETHANOL (PETH): NEGATIVE NG/ML

## 2023-12-28 ENCOUNTER — HOSPITAL ENCOUNTER (EMERGENCY)
Facility: HOSPITAL | Age: 58
Discharge: HOME | End: 2023-12-29

## 2023-12-28 ENCOUNTER — HOSPITAL ENCOUNTER (EMERGENCY)
Facility: HOSPITAL | Age: 58
Discharge: OTHER INSTITUTION | End: 2023-12-28
Attending: STUDENT IN AN ORGANIZED HEALTH CARE EDUCATION/TRAINING PROGRAM
Payer: COMMERCIAL

## 2023-12-28 ENCOUNTER — APPOINTMENT (OUTPATIENT)
Dept: CT IMAGING | Facility: HOSPITAL | Age: 58
End: 2023-12-28
Attending: STUDENT IN AN ORGANIZED HEALTH CARE EDUCATION/TRAINING PROGRAM
Payer: COMMERCIAL

## 2023-12-28 VITALS
OXYGEN SATURATION: 95 % | HEART RATE: 64 BPM | DIASTOLIC BLOOD PRESSURE: 76 MMHG | TEMPERATURE: 98 F | BODY MASS INDEX: 28.64 KG/M2 | HEIGHT: 68 IN | WEIGHT: 189 LBS | RESPIRATION RATE: 22 BRPM | SYSTOLIC BLOOD PRESSURE: 142 MMHG

## 2023-12-28 DIAGNOSIS — Z94.4: ICD-10-CM

## 2023-12-28 DIAGNOSIS — Z98.890 POST-OPERATIVE STATE: ICD-10-CM

## 2023-12-28 DIAGNOSIS — N17.9 ACUTE KIDNEY INJURY (HCC): Primary | ICD-10-CM

## 2023-12-28 LAB
ALBUMIN SERPL-MCNC: 2.9 G/DL (ref 3.4–5)
ALBUMIN/GLOB SERPL: 0.9 {RATIO} (ref 1–2)
ALP LIVER SERPL-CCNC: 229 U/L
ALT SERPL-CCNC: 108 U/L
ANION GAP SERPL CALC-SCNC: 6 MMOL/L (ref 0–18)
AST SERPL-CCNC: 20 U/L (ref 15–37)
BASOPHILS # BLD AUTO: 0.03 X10(3) UL (ref 0–0.2)
BASOPHILS NFR BLD AUTO: 0.3 %
BILIRUB SERPL-MCNC: 2.2 MG/DL (ref 0.1–2)
BILIRUB UR QL STRIP.AUTO: NEGATIVE
BUN BLD-MCNC: 41 MG/DL (ref 9–23)
CALCIUM BLD-MCNC: 9.2 MG/DL (ref 8.5–10.1)
CHLORIDE SERPL-SCNC: 97 MMOL/L (ref 98–112)
CLARITY UR REFRACT.AUTO: CLEAR
CO2 SERPL-SCNC: 26 MMOL/L (ref 21–32)
COLOR UR AUTO: YELLOW
CREAT BLD-MCNC: 3.14 MG/DL
EGFRCR SERPLBLD CKD-EPI 2021: 22 ML/MIN/1.73M2 (ref 60–?)
EOSINOPHIL # BLD AUTO: 0.09 X10(3) UL (ref 0–0.7)
EOSINOPHIL NFR BLD AUTO: 0.8 %
ERYTHROCYTE [DISTWIDTH] IN BLOOD BY AUTOMATED COUNT: 16.1 %
FLUAV + FLUBV RNA SPEC NAA+PROBE: NEGATIVE
FLUAV + FLUBV RNA SPEC NAA+PROBE: NEGATIVE
GLOBULIN PLAS-MCNC: 3.3 G/DL (ref 2.8–4.4)
GLUCOSE BLD-MCNC: 107 MG/DL (ref 70–99)
GLUCOSE UR STRIP.AUTO-MCNC: NORMAL MG/DL
HCT VFR BLD AUTO: 33.3 %
HGB BLD-MCNC: 11.7 G/DL
IMM GRANULOCYTES # BLD AUTO: 0.12 X10(3) UL (ref 0–1)
IMM GRANULOCYTES NFR BLD: 1.1 %
KETONES UR STRIP.AUTO-MCNC: NEGATIVE MG/DL
LEUKOCYTE ESTERASE UR QL STRIP.AUTO: NEGATIVE
LIPASE SERPL-CCNC: 17 U/L (ref 13–75)
LYMPHOCYTES # BLD AUTO: 1.02 X10(3) UL (ref 1–4)
LYMPHOCYTES NFR BLD AUTO: 9.3 %
MCH RBC QN AUTO: 31.3 PG (ref 26–34)
MCHC RBC AUTO-ENTMCNC: 35.1 G/DL (ref 31–37)
MCV RBC AUTO: 89 FL
MONOCYTES # BLD AUTO: 0.96 X10(3) UL (ref 0.1–1)
MONOCYTES NFR BLD AUTO: 8.8 %
NEUTROPHILS # BLD AUTO: 8.72 X10 (3) UL (ref 1.5–7.7)
NEUTROPHILS # BLD AUTO: 8.72 X10(3) UL (ref 1.5–7.7)
NEUTROPHILS NFR BLD AUTO: 79.7 %
NITRITE UR QL STRIP.AUTO: NEGATIVE
OSMOLALITY SERPL CALC.SUM OF ELEC: 279 MOSM/KG (ref 275–295)
PH UR STRIP.AUTO: 5.5 [PH] (ref 5–8)
PLATELET # BLD AUTO: 188 10(3)UL (ref 150–450)
POTASSIUM SERPL-SCNC: 5.6 MMOL/L (ref 3.5–5.1)
PROT SERPL-MCNC: 6.2 G/DL (ref 6.4–8.2)
PROT UR STRIP.AUTO-MCNC: 20 MG/DL
RBC # BLD AUTO: 3.74 X10(6)UL
RBC UR QL AUTO: NEGATIVE
RSV RNA SPEC NAA+PROBE: NEGATIVE
SARS-COV-2 RNA RESP QL NAA+PROBE: NOT DETECTED
SODIUM SERPL-SCNC: 129 MMOL/L (ref 136–145)
SP GR UR STRIP.AUTO: 1.02 (ref 1–1.03)
UROBILINOGEN UR STRIP.AUTO-MCNC: NORMAL MG/DL
WBC # BLD AUTO: 10.9 X10(3) UL (ref 4–11)

## 2023-12-28 PROCEDURE — 96361 HYDRATE IV INFUSION ADD-ON: CPT

## 2023-12-28 PROCEDURE — 74176 CT ABD & PELVIS W/O CONTRAST: CPT | Performed by: STUDENT IN AN ORGANIZED HEALTH CARE EDUCATION/TRAINING PROGRAM

## 2023-12-28 PROCEDURE — 93010 ELECTROCARDIOGRAM REPORT: CPT

## 2023-12-28 PROCEDURE — 0241U SARS-COV-2/FLU A AND B/RSV BY PCR (GENEXPERT): CPT | Performed by: STUDENT IN AN ORGANIZED HEALTH CARE EDUCATION/TRAINING PROGRAM

## 2023-12-28 PROCEDURE — 85025 COMPLETE CBC W/AUTO DIFF WBC: CPT | Performed by: STUDENT IN AN ORGANIZED HEALTH CARE EDUCATION/TRAINING PROGRAM

## 2023-12-28 PROCEDURE — 80053 COMPREHEN METABOLIC PANEL: CPT | Performed by: STUDENT IN AN ORGANIZED HEALTH CARE EDUCATION/TRAINING PROGRAM

## 2023-12-28 PROCEDURE — 93005 ELECTROCARDIOGRAM TRACING: CPT

## 2023-12-28 PROCEDURE — 81001 URINALYSIS AUTO W/SCOPE: CPT | Performed by: STUDENT IN AN ORGANIZED HEALTH CARE EDUCATION/TRAINING PROGRAM

## 2023-12-28 PROCEDURE — 96375 TX/PRO/DX INJ NEW DRUG ADDON: CPT

## 2023-12-28 PROCEDURE — 99285 EMERGENCY DEPT VISIT HI MDM: CPT

## 2023-12-28 PROCEDURE — 83690 ASSAY OF LIPASE: CPT | Performed by: STUDENT IN AN ORGANIZED HEALTH CARE EDUCATION/TRAINING PROGRAM

## 2023-12-28 PROCEDURE — 96374 THER/PROPH/DIAG INJ IV PUSH: CPT

## 2023-12-28 RX ORDER — SODIUM CHLORIDE 9 MG/ML
INJECTION, SOLUTION INTRAVENOUS CONTINUOUS
Status: DISCONTINUED | OUTPATIENT
Start: 2023-12-28 | End: 2023-12-28

## 2023-12-28 RX ORDER — HYDROMORPHONE HYDROCHLORIDE 1 MG/ML
0.5 INJECTION, SOLUTION INTRAMUSCULAR; INTRAVENOUS; SUBCUTANEOUS ONCE
Status: COMPLETED | OUTPATIENT
Start: 2023-12-28 | End: 2023-12-28

## 2023-12-28 RX ORDER — ASPIRIN 81 MG/1
81 TABLET ORAL DAILY
COMMUNITY

## 2023-12-28 RX ORDER — TACROLIMUS 4 MG/1
4 TABLET, EXTENDED RELEASE ORAL DAILY
COMMUNITY

## 2023-12-28 RX ORDER — ONDANSETRON 2 MG/ML
4 INJECTION INTRAMUSCULAR; INTRAVENOUS ONCE
Status: COMPLETED | OUTPATIENT
Start: 2023-12-28 | End: 2023-12-28

## 2023-12-28 RX ORDER — SULFAMETHOXAZOLE AND TRIMETHOPRIM 400; 80 MG/1; MG/1
1 TABLET ORAL EVERY EVENING
COMMUNITY

## 2023-12-28 RX ORDER — VALGANCICLOVIR 450 MG/1
450 TABLET, FILM COATED ORAL DAILY
COMMUNITY

## 2023-12-28 RX ORDER — TACROLIMUS 1 MG/1
3 CAPSULE ORAL DAILY
COMMUNITY

## 2023-12-28 NOTE — ED INITIAL ASSESSMENT (HPI)
Patient with recent liver transplant at [facility] on 12/19/23. Patient states liquid in his drain turned pink instead of red starting at midnight with increased pain, unable to move his neck and can't sleep.

## 2023-12-29 LAB
ATRIAL RATE: 61 BPM
P AXIS: 40 DEGREES
P-R INTERVAL: 156 MS
Q-T INTERVAL: 434 MS
QRS DURATION: 70 MS
QTC CALCULATION (BEZET): 436 MS
R AXIS: 32 DEGREES
T AXIS: 24 DEGREES
VENTRICULAR RATE: 61 BPM

## 2024-01-11 ENCOUNTER — HOSPITAL ENCOUNTER (OUTPATIENT)
Dept: MRI IMAGING | Age: 59
Discharge: HOME | End: 2024-01-11
Attending: INTERNAL MEDICINE
Payer: COMMERCIAL

## 2024-01-11 DIAGNOSIS — M54.42 ACUTE MIDLINE LOW BACK PAIN WITH BILATERAL SCIATICA: ICD-10-CM

## 2024-01-11 DIAGNOSIS — M54.41 ACUTE MIDLINE LOW BACK PAIN WITH BILATERAL SCIATICA: ICD-10-CM

## 2024-01-11 PROCEDURE — 72158 MRI LUMBAR SPINE W/O & W/DYE: CPT | Performed by: INTERNAL MEDICINE

## 2024-01-11 PROCEDURE — A9575 INJ GADOTERATE MEGLUMI 0.1ML: HCPCS | Performed by: INTERNAL MEDICINE

## 2024-01-11 RX ORDER — GADOTERATE MEGLUMINE 376.9 MG/ML
20 INJECTION INTRAVENOUS
Status: COMPLETED | OUTPATIENT
Start: 2024-01-11 | End: 2024-01-11

## 2024-01-11 RX ADMIN — GADOTERATE MEGLUMINE 17 ML: 376.9 INJECTION INTRAVENOUS at 10:47:00

## 2024-01-18 DIAGNOSIS — G47.00 INSOMNIA, UNSPECIFIED TYPE: ICD-10-CM

## 2024-01-18 RX ORDER — TRAZODONE HYDROCHLORIDE 50 MG/1
100 TABLET ORAL NIGHTLY
Qty: 90 TABLET | Refills: 0 | Status: SHIPPED | OUTPATIENT
Start: 2024-01-18

## 2024-01-18 NOTE — TELEPHONE ENCOUNTER
A refill request was received for:  Requested Prescriptions     Pending Prescriptions Disp Refills    TRAZODONE 50 MG Oral Tab [Pharmacy Med Name: TRAZODONE 50MG TABLETS] 90 tablet 0     Sig: TAKE 2 TABLETS(100 MG) BY MOUTH EVERY NIGHT       Last refill date:12/4/2023       Last office visit:9/13/2023    Follow up due:  No future appointments.

## 2024-01-30 ENCOUNTER — LAB ENCOUNTER (OUTPATIENT)
Dept: LAB | Age: 59
End: 2024-01-30
Attending: INTERNAL MEDICINE
Payer: COMMERCIAL

## 2024-01-30 DIAGNOSIS — Z51.81 ENCOUNTER FOR THERAPEUTIC DRUG MONITORING: ICD-10-CM

## 2024-01-30 DIAGNOSIS — K70.31 ALCOHOLIC CIRRHOSIS OF LIVER WITH ASCITES (HCC): ICD-10-CM

## 2024-01-30 DIAGNOSIS — D84.9 IMMUNOSUPPRESSION (HCC): ICD-10-CM

## 2024-01-30 DIAGNOSIS — Z94.4 LIVER REPLACED BY TRANSPLANT (HCC): Primary | ICD-10-CM

## 2024-01-30 LAB
ALBUMIN SERPL-MCNC: 3.4 G/DL (ref 3.4–5)
ALBUMIN/GLOB SERPL: 0.9 {RATIO} (ref 1–2)
ALP LIVER SERPL-CCNC: 147 U/L
ANION GAP SERPL CALC-SCNC: 7 MMOL/L (ref 0–18)
AST SERPL-CCNC: 14 U/L (ref 15–37)
BASOPHILS # BLD AUTO: 0.07 X10(3) UL (ref 0–0.2)
BASOPHILS NFR BLD AUTO: 1.5 %
BILIRUB SERPL-MCNC: 0.7 MG/DL (ref 0.1–2)
BUN BLD-MCNC: 12 MG/DL (ref 9–23)
CALCIUM BLD-MCNC: 10.2 MG/DL (ref 8.5–10.1)
CHLORIDE SERPL-SCNC: 106 MMOL/L (ref 98–112)
CO2 SERPL-SCNC: 28 MMOL/L (ref 21–32)
CREAT BLD-MCNC: 1.3 MG/DL
EGFRCR SERPLBLD CKD-EPI 2021: 64 ML/MIN/1.73M2 (ref 60–?)
EOSINOPHIL # BLD AUTO: 0.24 X10(3) UL (ref 0–0.7)
EOSINOPHIL NFR BLD AUTO: 5.2 %
ERYTHROCYTE [DISTWIDTH] IN BLOOD BY AUTOMATED COUNT: 15 %
FASTING STATUS PATIENT QL REPORTED: YES
GLOBULIN PLAS-MCNC: 3.6 G/DL (ref 2.8–4.4)
GLUCOSE BLD-MCNC: 113 MG/DL (ref 70–99)
HCT VFR BLD AUTO: 32.7 %
HGB BLD-MCNC: 10.4 G/DL
IMM GRANULOCYTES # BLD AUTO: 0.02 X10(3) UL (ref 0–1)
IMM GRANULOCYTES NFR BLD: 0.4 %
INR BLD: 1.05 (ref 0.8–1.2)
LYMPHOCYTES # BLD AUTO: 1.2 X10(3) UL (ref 1–4)
LYMPHOCYTES NFR BLD AUTO: 25.8 %
MAGNESIUM SERPL-MCNC: 2 MG/DL (ref 1.6–2.6)
MCH RBC QN AUTO: 29.5 PG (ref 26–34)
MCHC RBC AUTO-ENTMCNC: 31.8 G/DL (ref 31–37)
MCV RBC AUTO: 92.9 FL
MONOCYTES # BLD AUTO: 0.21 X10(3) UL (ref 0.1–1)
MONOCYTES NFR BLD AUTO: 4.5 %
NEUTROPHILS # BLD AUTO: 2.91 X10 (3) UL (ref 1.5–7.7)
NEUTROPHILS # BLD AUTO: 2.91 X10(3) UL (ref 1.5–7.7)
NEUTROPHILS NFR BLD AUTO: 62.6 %
OSMOLALITY SERPL CALC.SUM OF ELEC: 293 MOSM/KG (ref 275–295)
PLATELET # BLD AUTO: 266 10(3)UL (ref 150–450)
POTASSIUM SERPL-SCNC: 4 MMOL/L (ref 3.5–5.1)
PROT SERPL-MCNC: 7 G/DL (ref 6.4–8.2)
PROTHROMBIN TIME: 13.7 SECONDS (ref 11.6–14.8)
RBC # BLD AUTO: 3.52 X10(6)UL
SODIUM SERPL-SCNC: 141 MMOL/L (ref 136–145)
WBC # BLD AUTO: 4.7 X10(3) UL (ref 4–11)

## 2024-01-30 PROCEDURE — 85610 PROTHROMBIN TIME: CPT

## 2024-01-30 PROCEDURE — 80053 COMPREHEN METABOLIC PANEL: CPT

## 2024-01-30 PROCEDURE — 85025 COMPLETE CBC W/AUTO DIFF WBC: CPT

## 2024-01-30 PROCEDURE — 80197 ASSAY OF TACROLIMUS: CPT

## 2024-01-30 PROCEDURE — 83735 ASSAY OF MAGNESIUM: CPT

## 2024-01-30 PROCEDURE — 36415 COLL VENOUS BLD VENIPUNCTURE: CPT

## 2024-02-01 LAB — TACROLIMUS LVL: 7.3 NG/ML

## 2024-02-05 ENCOUNTER — OFFICE VISIT (OUTPATIENT)
Dept: SURGERY | Facility: CLINIC | Age: 59
End: 2024-02-05

## 2024-02-05 VITALS
RESPIRATION RATE: 16 BRPM | HEART RATE: 81 BPM | DIASTOLIC BLOOD PRESSURE: 79 MMHG | BODY MASS INDEX: 25 KG/M2 | TEMPERATURE: 98 F | WEIGHT: 166.19 LBS | SYSTOLIC BLOOD PRESSURE: 134 MMHG | OXYGEN SATURATION: 100 %

## 2024-02-05 DIAGNOSIS — R26.89 BALANCE PROBLEM: Primary | ICD-10-CM

## 2024-02-05 DIAGNOSIS — Z94.4 STATUS POST LIVER TRANSPLANT (HCC): ICD-10-CM

## 2024-02-05 DIAGNOSIS — M1A.0790 IDIOPATHIC CHRONIC GOUT OF FOOT WITHOUT TOPHUS, UNSPECIFIED LATERALITY: ICD-10-CM

## 2024-02-05 DIAGNOSIS — N52.9 ERECTILE DYSFUNCTION, UNSPECIFIED ERECTILE DYSFUNCTION TYPE: ICD-10-CM

## 2024-02-05 RX ORDER — SILDENAFIL 100 MG/1
TABLET, FILM COATED ORAL
Qty: 15 TABLET | Refills: 0 | Status: SHIPPED | OUTPATIENT
Start: 2024-02-05

## 2024-02-05 RX ORDER — ALLOPURINOL 100 MG/1
TABLET ORAL
Qty: 90 TABLET | Refills: 3 | Status: SHIPPED | OUTPATIENT
Start: 2024-02-05

## 2024-02-06 ENCOUNTER — LAB ENCOUNTER (OUTPATIENT)
Dept: LAB | Age: 59
End: 2024-02-06
Attending: INTERNAL MEDICINE
Payer: COMMERCIAL

## 2024-02-06 DIAGNOSIS — D84.9 IMMUNOSUPPRESSION (HCC): ICD-10-CM

## 2024-02-06 DIAGNOSIS — Z94.4 STATUS POST LIVER TRANSPLANT (HCC): ICD-10-CM

## 2024-02-06 DIAGNOSIS — Z94.4 LIVER REPLACED BY TRANSPLANT (HCC): ICD-10-CM

## 2024-02-06 DIAGNOSIS — Z51.81 ENCOUNTER FOR THERAPEUTIC DRUG MONITORING: ICD-10-CM

## 2024-02-06 LAB
ALBUMIN SERPL-MCNC: 3.4 G/DL (ref 3.4–5)
ALBUMIN/GLOB SERPL: 1 {RATIO} (ref 1–2)
ALP LIVER SERPL-CCNC: 108 U/L
ALT SERPL-CCNC: 10 U/L
ANION GAP SERPL CALC-SCNC: 4 MMOL/L (ref 0–18)
AST SERPL-CCNC: 11 U/L (ref 15–37)
BASOPHILS # BLD AUTO: 0.04 X10(3) UL (ref 0–0.2)
BASOPHILS NFR BLD AUTO: 0.8 %
BILIRUB SERPL-MCNC: 0.6 MG/DL (ref 0.1–2)
BUN BLD-MCNC: 10 MG/DL (ref 9–23)
CALCIUM BLD-MCNC: 9.7 MG/DL (ref 8.5–10.1)
CHLORIDE SERPL-SCNC: 110 MMOL/L (ref 98–112)
CO2 SERPL-SCNC: 26 MMOL/L (ref 21–32)
CREAT BLD-MCNC: 0.98 MG/DL
EGFRCR SERPLBLD CKD-EPI 2021: 90 ML/MIN/1.73M2 (ref 60–?)
EOSINOPHIL # BLD AUTO: 0.22 X10(3) UL (ref 0–0.7)
EOSINOPHIL NFR BLD AUTO: 4.4 %
ERYTHROCYTE [DISTWIDTH] IN BLOOD BY AUTOMATED COUNT: 15 %
FASTING STATUS PATIENT QL REPORTED: YES
GLOBULIN PLAS-MCNC: 3.4 G/DL (ref 2.8–4.4)
GLUCOSE BLD-MCNC: 101 MG/DL (ref 70–99)
HAV AB SER QL IA: REACTIVE
HAV IGM SER QL: NONREACTIVE
HCT VFR BLD AUTO: 31.6 %
HGB BLD-MCNC: 10.1 G/DL
IMM GRANULOCYTES # BLD AUTO: 0.01 X10(3) UL (ref 0–1)
IMM GRANULOCYTES NFR BLD: 0.2 %
LYMPHOCYTES # BLD AUTO: 1.18 X10(3) UL (ref 1–4)
LYMPHOCYTES NFR BLD AUTO: 23.7 %
MAGNESIUM SERPL-MCNC: 1.6 MG/DL (ref 1.6–2.6)
MCH RBC QN AUTO: 29.7 PG (ref 26–34)
MCHC RBC AUTO-ENTMCNC: 32 G/DL (ref 31–37)
MCV RBC AUTO: 92.9 FL
MONOCYTES # BLD AUTO: 0.26 X10(3) UL (ref 0.1–1)
MONOCYTES NFR BLD AUTO: 5.2 %
NEUTROPHILS # BLD AUTO: 3.26 X10 (3) UL (ref 1.5–7.7)
NEUTROPHILS # BLD AUTO: 3.26 X10(3) UL (ref 1.5–7.7)
NEUTROPHILS NFR BLD AUTO: 65.7 %
OSMOLALITY SERPL CALC.SUM OF ELEC: 289 MOSM/KG (ref 275–295)
PLATELET # BLD AUTO: 196 10(3)UL (ref 150–450)
POTASSIUM SERPL-SCNC: 3.7 MMOL/L (ref 3.5–5.1)
PROT SERPL-MCNC: 6.8 G/DL (ref 6.4–8.2)
RBC # BLD AUTO: 3.4 X10(6)UL
SODIUM SERPL-SCNC: 140 MMOL/L (ref 136–145)
WBC # BLD AUTO: 5 X10(3) UL (ref 4–11)

## 2024-02-06 PROCEDURE — 80197 ASSAY OF TACROLIMUS: CPT

## 2024-02-06 PROCEDURE — 86709 HEPATITIS A IGM ANTIBODY: CPT

## 2024-02-06 PROCEDURE — 36415 COLL VENOUS BLD VENIPUNCTURE: CPT

## 2024-02-06 PROCEDURE — 83735 ASSAY OF MAGNESIUM: CPT

## 2024-02-06 PROCEDURE — 86708 HEPATITIS A ANTIBODY: CPT

## 2024-02-06 PROCEDURE — 80053 COMPREHEN METABOLIC PANEL: CPT

## 2024-02-06 PROCEDURE — 85025 COMPLETE CBC W/AUTO DIFF WBC: CPT

## 2024-02-06 NOTE — TELEPHONE ENCOUNTER
.A refill request was received for:  Requested Prescriptions     Pending Prescriptions Disp Refills    allopurinol 100 MG Oral Tab 90 tablet 3     Sig: TAKE 1 TABLET(100 MG) BY MOUTH DAILY    Sildenafil Citrate 100 MG Oral Tab 15 tablet 0     Sig: Take 0.5-1 tablets ( mg total) by mouth daily as needed for Erectile Dysfunction.       Last refill date:   10/23/2023    Last office visit: 10/10/2023    Follow up due:  No future appointments.

## 2024-02-08 LAB — TACROLIMUS LVL: 4 NG/ML

## 2024-02-09 ENCOUNTER — OFFICE VISIT (OUTPATIENT)
Dept: FAMILY MEDICINE CLINIC | Facility: CLINIC | Age: 59
End: 2024-02-09
Payer: COMMERCIAL

## 2024-02-09 VITALS
SYSTOLIC BLOOD PRESSURE: 102 MMHG | HEART RATE: 81 BPM | OXYGEN SATURATION: 100 % | WEIGHT: 168 LBS | HEIGHT: 68 IN | RESPIRATION RATE: 16 BRPM | BODY MASS INDEX: 25.46 KG/M2 | DIASTOLIC BLOOD PRESSURE: 62 MMHG

## 2024-02-09 DIAGNOSIS — Z94.4 S/P LIVER TRANSPLANT (HCC): ICD-10-CM

## 2024-02-09 DIAGNOSIS — Z09 HOSPITAL DISCHARGE FOLLOW-UP: Primary | ICD-10-CM

## 2024-02-09 PROBLEM — R74.8 ELEVATED LIPASE: Status: RESOLVED | Noted: 2023-08-22 | Resolved: 2024-02-09

## 2024-02-09 PROBLEM — D64.9 ANEMIA: Status: RESOLVED | Noted: 2023-09-20 | Resolved: 2024-02-09

## 2024-02-09 PROBLEM — E87.6 HYPOKALEMIA: Status: RESOLVED | Noted: 2023-08-22 | Resolved: 2024-02-09

## 2024-02-09 PROBLEM — R17 ELEVATED BILIRUBIN: Status: RESOLVED | Noted: 2023-08-22 | Resolved: 2024-02-09

## 2024-02-09 PROBLEM — F10.10 ETOH ABUSE: Status: RESOLVED | Noted: 2023-09-02 | Resolved: 2024-02-09

## 2024-02-09 PROBLEM — E87.20 LACTIC ACIDOSIS: Status: RESOLVED | Noted: 2023-10-02 | Resolved: 2024-02-09

## 2024-02-09 PROBLEM — K72.90 LIVER FAILURE WITHOUT HEPATIC COMA (HCC): Status: RESOLVED | Noted: 2023-10-02 | Resolved: 2024-02-09

## 2024-02-09 PROBLEM — J18.9 COMMUNITY ACQUIRED PNEUMONIA OF LEFT LOWER LOBE OF LUNG: Status: RESOLVED | Noted: 2023-10-02 | Resolved: 2024-02-09

## 2024-02-09 PROBLEM — K20.90 ESOPHAGITIS: Status: RESOLVED | Noted: 2023-08-24 | Resolved: 2024-02-09

## 2024-02-09 PROBLEM — N17.9 ACUTE RENAL FAILURE (ARF): Status: RESOLVED | Noted: 2023-09-20 | Resolved: 2024-02-09

## 2024-02-09 PROBLEM — R06.02 SHORTNESS OF BREATH: Status: RESOLVED | Noted: 2023-09-01 | Resolved: 2024-02-09

## 2024-02-09 PROBLEM — N17.9 ACUTE KIDNEY INJURY (HCC): Status: RESOLVED | Noted: 2023-09-20 | Resolved: 2024-02-09

## 2024-02-09 PROBLEM — I95.9 HYPOTENSION, UNSPECIFIED HYPOTENSION TYPE: Status: RESOLVED | Noted: 2023-09-20 | Resolved: 2024-02-09

## 2024-02-09 PROBLEM — K76.7 HEPATORENAL FAILURE (HCC): Status: RESOLVED | Noted: 2023-09-20 | Resolved: 2024-02-09

## 2024-02-09 PROBLEM — U07.1 COVID-19: Status: RESOLVED | Noted: 2023-09-01 | Resolved: 2024-02-09

## 2024-02-09 PROBLEM — E80.6 HYPERBILIRUBINEMIA: Status: RESOLVED | Noted: 2023-09-01 | Resolved: 2024-02-09

## 2024-02-09 PROBLEM — N17.9 AKI (ACUTE KIDNEY INJURY) (HCC): Status: RESOLVED | Noted: 2023-09-01 | Resolved: 2024-02-09

## 2024-02-09 PROBLEM — J91.8 PLEURAL EFFUSION ASSOCIATED WITH HEPATIC DISORDER: Status: RESOLVED | Noted: 2023-09-01 | Resolved: 2024-02-09

## 2024-02-09 PROBLEM — E87.1 HYPONATREMIA: Status: RESOLVED | Noted: 2023-08-22 | Resolved: 2024-02-09

## 2024-02-09 PROBLEM — K74.60 CIRRHOSIS OF LIVER WITHOUT ASCITES, UNSPECIFIED HEPATIC CIRRHOSIS TYPE (HCC): Status: RESOLVED | Noted: 2023-09-01 | Resolved: 2024-02-09

## 2024-02-09 PROBLEM — R60.1 ANASARCA: Status: RESOLVED | Noted: 2023-10-04 | Resolved: 2024-02-09

## 2024-02-09 PROBLEM — K80.20 CALCULUS OF GALLBLADDER WITHOUT CHOLECYSTITIS WITHOUT OBSTRUCTION: Status: RESOLVED | Noted: 2023-09-01 | Resolved: 2024-02-09

## 2024-02-09 PROBLEM — A41.9 SEPSIS DUE TO UNDETERMINED ORGANISM (HCC): Status: RESOLVED | Noted: 2023-08-22 | Resolved: 2024-02-09

## 2024-02-09 PROBLEM — R17 SCLERAL ICTERUS: Status: RESOLVED | Noted: 2023-08-22 | Resolved: 2024-02-09

## 2024-02-09 PROBLEM — J96.01 ACUTE RESPIRATORY FAILURE WITH HYPOXIA (HCC): Status: RESOLVED | Noted: 2023-10-19 | Resolved: 2024-02-09

## 2024-02-09 PROBLEM — K80.50 CALCULUS OF BILE DUCT WITHOUT CHOLECYSTITIS AND WITHOUT OBSTRUCTION: Status: RESOLVED | Noted: 2023-08-23 | Resolved: 2024-02-09

## 2024-02-09 PROBLEM — K76.9 PLEURAL EFFUSION ASSOCIATED WITH HEPATIC DISORDER: Status: RESOLVED | Noted: 2023-09-01 | Resolved: 2024-02-09

## 2024-02-09 PROBLEM — J18.9 COMMUNITY ACQUIRED PNEUMONIA, UNSPECIFIED LATERALITY: Status: RESOLVED | Noted: 2023-10-19 | Resolved: 2024-02-09

## 2024-02-09 PROBLEM — N17.9 AKI (ACUTE KIDNEY INJURY): Status: RESOLVED | Noted: 2023-09-01 | Resolved: 2024-02-09

## 2024-02-09 PROBLEM — K65.2 SBP (SPONTANEOUS BACTERIAL PERITONITIS) (HCC): Status: RESOLVED | Noted: 2023-08-22 | Resolved: 2024-02-09

## 2024-02-09 PROBLEM — N17.9 ACUTE KIDNEY INJURY: Status: RESOLVED | Noted: 2023-09-20 | Resolved: 2024-02-09

## 2024-02-09 PROBLEM — R79.89 ELEVATED LACTIC ACID LEVEL: Status: RESOLVED | Noted: 2023-08-22 | Resolved: 2024-02-09

## 2024-02-09 PROBLEM — R73.9 HYPERGLYCEMIA: Status: RESOLVED | Noted: 2023-08-22 | Resolved: 2024-02-09

## 2024-02-09 PROBLEM — N17.9 ACUTE RENAL FAILURE (ARF) (HCC): Status: RESOLVED | Noted: 2023-09-20 | Resolved: 2024-02-09

## 2024-02-09 PROCEDURE — 99213 OFFICE O/P EST LOW 20 MIN: CPT | Performed by: FAMILY MEDICINE

## 2024-02-09 RX ORDER — MYCOPHENOLIC ACID 360 MG/1
TABLET, DELAYED RELEASE ORAL
COMMUNITY
Start: 2024-02-06 | End: 2024-02-09

## 2024-02-09 RX ORDER — CYCLOBENZAPRINE HCL 5 MG
5 TABLET ORAL
COMMUNITY
Start: 2024-01-23 | End: 2024-02-22

## 2024-02-09 RX ORDER — ERGOCALCIFEROL 1.25 MG/1
50000 CAPSULE ORAL WEEKLY
COMMUNITY

## 2024-02-09 RX ORDER — SODIUM ZIRCONIUM CYCLOSILICATE 10 G/10G
POWDER, FOR SUSPENSION ORAL
COMMUNITY

## 2024-02-09 RX ORDER — MELATONIN
400
COMMUNITY
Start: 2024-01-23

## 2024-02-09 RX ORDER — TRAMADOL HYDROCHLORIDE 50 MG/1
50 TABLET ORAL
COMMUNITY
Start: 2024-01-23 | End: 2024-02-22

## 2024-02-09 RX ORDER — TACROLIMUS 0.75 MG/1
TABLET, EXTENDED RELEASE ORAL
COMMUNITY
Start: 2024-01-23

## 2024-02-09 RX ORDER — IVERMECTIN 3 MG/1
TABLET ORAL
COMMUNITY
Start: 2023-12-13

## 2024-02-09 RX ORDER — URSODIOL 300 MG/1
300 CAPSULE ORAL 3 TIMES DAILY
COMMUNITY

## 2024-02-09 RX ORDER — TACROLIMUS 1 MG/1
TABLET, EXTENDED RELEASE ORAL 3 TIMES DAILY
COMMUNITY
Start: 2024-02-06

## 2024-02-09 NOTE — PROGRESS NOTES
[facility] Progress Note    SUBJECTIVE: Richard Palacios 58 year old male is here today for   Chief Complaint   Patient presents with    Follow - Up       Had liver transplant in December, and had a biliary stricture and had a stent.    Had some back issues, but probably related to the activity reduction.    No particular concerns or owrries.     Had follow up with GI recently. Recommend to get back into primary    Had a case of strongyloides, so presumed old infection that presented itself.    Was in the hospital 12/19 to 12/26 after transplant. Was home for 2 days for holidays, nd was in 1/9/24.    Having some issues with shakes and balance, and memory, planning to see neurology.    Planning on following up on pancreas to evaluate further, found some issues with pancreas,    Sugars are ok, and not having issues digesting food.    Planning on vaccines he can get and onces he can't until 6 months.out.    Having some issues with gettign envarsus.    Plans to follow with Dr. Blakely.    [facility]  Past Medical History:   Diagnosis Date    Abdominal pain [id number]    Ascites     COVID 09/2023    Disorder of liver     Cirrhosis    Esophageal reflux     Essential hypertension     ETOH abuse     High blood pressure     Hypoalbuminemia 10/04/2023    Nausea [id number]    Personal history of alcoholism (HCC)     Weight loss [id number]        PSH  Past Surgical History:   Procedure Laterality Date    COLONOSCOPY N/A 11/20/2023    Procedure: .;  Surgeon: Gus Ocampo MD;  Location:  ENDOSCOPY    COLONOSCOPY N/A 11/27/2023    Procedure: COLONOSCOPY with cold snare polypectomy and clip placement x1;  Surgeon: Robin Raya DO;  Location:  ENDOSCOPY    TONSILLECTOMY          Social Hx:  See HPI    ROS  See HPI    OBJECTIVE:  /62   Pulse 81   Resp 16   Ht 5' 8\" (1.727 m)   Wt 168 lb (76.2 kg)   SpO2 100%   BMI 25.54 kg/m²           Labs:          Meds:   Current Outpatient Medications   Medication Sig  Dispense Refill    cyclobenzaprine 5 MG Oral Tab Take 1 tablet (5 mg total) by mouth.      ergocalciferol 1.25 MG (86414 UT) Oral Cap Take 1 capsule (50,000 Units total) by mouth once a week.      Ivermectin 3 MG Oral Tab       Magnesium Oxide -Mg Supplement 400 (240 Mg) MG Oral Tab Take 1 tablet (400 mg total) by mouth.      LOKELMA 10 g Oral Powd Pack TAKE 1 TABLET BY MOUTH ONE TIME EACH DAY AT THE SAME TIME      traMADol 50 MG Oral Tab Take 1 tablet (50 mg total) by mouth.      ursodiol 300 MG Oral Cap Take 1 capsule (300 mg total) by mouth 3 (three) times daily.      allopurinol 100 MG Oral Tab TAKE 1 TABLET(100 MG) BY MOUTH DAILY 90 tablet 3    Sildenafil Citrate 100 MG Oral Tab Take 0.5-1 tablets ( mg total) by mouth daily as needed for Erectile Dysfunction. 15 tablet 0    traZODone 50 MG Oral Tab Take 2 tablets (100 mg total) by mouth nightly. 90 tablet 0    tacrolimus 1 MG Oral Cap Take 3 capsules (3 mg total) by mouth daily.      Mycophenolate Sodium (MYCOPHENOLIC ACID OR) Take 720 mg by mouth in the morning and 720 mg before bedtime.      sulfamethoxazole-trimethoprim 400-80 MG Oral Tab Take 1 tablet by mouth every evening. 2 tabs mon and thurs      valGANciclovir 450 MG Oral Tab Take 1 tablet (450 mg total) by mouth daily.      aspirin 81 MG Oral Tab EC Take 1 tablet (81 mg total) by mouth daily.      pantoprazole 40 MG Oral Tab EC Take 1 tablet (40 mg total) by mouth 2 (two) times daily before meals. 60 tablet 11    ENVARSUS XR 0.75 MG Oral Tablet 24 Hr  (Patient not taking: Reported on 2/9/2024)      ENVARSUS XR 1 MG Oral Tablet 24 Hr in the morning, at noon, and at bedtime. (Patient not taking: Reported on 2/9/2024)      furosemide 20 MG Oral Tab Take 1 tablet (20 mg total) by mouth daily. (Patient not taking: Reported on 2/9/2024) 90 tablet 3         Assessment/Plan  Richard was seen today for follow - up.    Diagnoses and all orders for this visit:    Hospital discharge follow-up    S/P liver  transplant (HCC)         Doing quite well at this time, had some complications during process, but now resolved, has ongoing gait issues and tremor, and memory so getting neurological work up.         Total Time spent with patient and coordinating care:  20 minutes.    Follow up: as needed for well care, can be August if doing well until then      Lg Sosa MD

## 2024-02-20 ENCOUNTER — LAB ENCOUNTER (OUTPATIENT)
Dept: LAB | Age: 59
End: 2024-02-20
Attending: INTERNAL MEDICINE
Payer: COMMERCIAL

## 2024-02-20 DIAGNOSIS — Z51.81 ENCOUNTER FOR THERAPEUTIC DRUG MONITORING: ICD-10-CM

## 2024-02-20 DIAGNOSIS — D84.9 IMMUNOSUPPRESSION (HCC): ICD-10-CM

## 2024-02-20 DIAGNOSIS — Z94.4 LIVER REPLACED BY TRANSPLANT (HCC): ICD-10-CM

## 2024-02-20 LAB
ALBUMIN SERPL-MCNC: 3.5 G/DL (ref 3.4–5)
ALBUMIN/GLOB SERPL: 1 {RATIO} (ref 1–2)
ALP LIVER SERPL-CCNC: 85 U/L
ALT SERPL-CCNC: 16 U/L
ANION GAP SERPL CALC-SCNC: 7 MMOL/L (ref 0–18)
AST SERPL-CCNC: 11 U/L (ref 15–37)
BASOPHILS # BLD AUTO: 0.07 X10(3) UL (ref 0–0.2)
BASOPHILS NFR BLD AUTO: 1.3 %
BILIRUB SERPL-MCNC: 0.5 MG/DL (ref 0.1–2)
BUN BLD-MCNC: 17 MG/DL (ref 9–23)
CALCIUM BLD-MCNC: 10.1 MG/DL (ref 8.5–10.1)
CHLORIDE SERPL-SCNC: 107 MMOL/L (ref 98–112)
CO2 SERPL-SCNC: 25 MMOL/L (ref 21–32)
CREAT BLD-MCNC: 1.12 MG/DL
EGFRCR SERPLBLD CKD-EPI 2021: 77 ML/MIN/1.73M2 (ref 60–?)
EOSINOPHIL # BLD AUTO: 0.2 X10(3) UL (ref 0–0.7)
EOSINOPHIL NFR BLD AUTO: 3.7 %
ERYTHROCYTE [DISTWIDTH] IN BLOOD BY AUTOMATED COUNT: 14 %
FASTING STATUS PATIENT QL REPORTED: YES
GLOBULIN PLAS-MCNC: 3.6 G/DL (ref 2.8–4.4)
GLUCOSE BLD-MCNC: 105 MG/DL (ref 70–99)
HCT VFR BLD AUTO: 35.4 %
HGB BLD-MCNC: 11.6 G/DL
IMM GRANULOCYTES # BLD AUTO: 0.03 X10(3) UL (ref 0–1)
IMM GRANULOCYTES NFR BLD: 0.6 %
LYMPHOCYTES # BLD AUTO: 1.65 X10(3) UL (ref 1–4)
LYMPHOCYTES NFR BLD AUTO: 30.4 %
MAGNESIUM SERPL-MCNC: 1.7 MG/DL (ref 1.6–2.6)
MCH RBC QN AUTO: 29.4 PG (ref 26–34)
MCHC RBC AUTO-ENTMCNC: 32.8 G/DL (ref 31–37)
MCV RBC AUTO: 89.8 FL
MONOCYTES # BLD AUTO: 0.26 X10(3) UL (ref 0.1–1)
MONOCYTES NFR BLD AUTO: 4.8 %
NEUTROPHILS # BLD AUTO: 3.21 X10 (3) UL (ref 1.5–7.7)
NEUTROPHILS # BLD AUTO: 3.21 X10(3) UL (ref 1.5–7.7)
NEUTROPHILS NFR BLD AUTO: 59.2 %
OSMOLALITY SERPL CALC.SUM OF ELEC: 290 MOSM/KG (ref 275–295)
PLATELET # BLD AUTO: 241 10(3)UL (ref 150–450)
POTASSIUM SERPL-SCNC: 4.1 MMOL/L (ref 3.5–5.1)
PROT SERPL-MCNC: 7.1 G/DL (ref 6.4–8.2)
RBC # BLD AUTO: 3.94 X10(6)UL
SODIUM SERPL-SCNC: 139 MMOL/L (ref 136–145)
WBC # BLD AUTO: 5.4 X10(3) UL (ref 4–11)

## 2024-02-20 PROCEDURE — 80053 COMPREHEN METABOLIC PANEL: CPT

## 2024-02-20 PROCEDURE — 85025 COMPLETE CBC W/AUTO DIFF WBC: CPT

## 2024-02-20 PROCEDURE — 80197 ASSAY OF TACROLIMUS: CPT

## 2024-02-20 PROCEDURE — 36415 COLL VENOUS BLD VENIPUNCTURE: CPT

## 2024-02-20 PROCEDURE — 83735 ASSAY OF MAGNESIUM: CPT

## 2024-02-23 LAB — TACROLIMUS LVL: 7.3 NG/ML

## 2024-02-23 RX ORDER — URSODIOL 300 MG/1
300 CAPSULE ORAL 3 TIMES DAILY
Qty: 270 CAPSULE | Refills: 1 | Status: SHIPPED | OUTPATIENT
Start: 2024-02-23

## 2024-02-23 RX ORDER — CYCLOBENZAPRINE HCL 5 MG
5 TABLET ORAL 3 TIMES DAILY PRN
Qty: 45 TABLET | Refills: 1 | Status: SHIPPED | OUTPATIENT
Start: 2024-02-23 | End: 2024-03-24

## 2024-02-23 RX ORDER — MELATONIN
400 DAILY
Qty: 30 TABLET | Refills: 1 | Status: SHIPPED | OUTPATIENT
Start: 2024-02-23

## 2024-02-23 NOTE — TELEPHONE ENCOUNTER
A refill request was received for:  Requested Prescriptions     Pending Prescriptions Disp Refills    cyclobenzaprine 5 MG Oral Tab  0     Sig: Take 1 tablet (5 mg total) by mouth.    Magnesium Oxide -Mg Supplement 400 (240 Mg) MG Oral Tab 30 tablet 0     Sig: Take 1 tablet (400 mg total) by mouth.    ursodiol 300 MG Oral Cap  0     Sig: Take 1 capsule (300 mg total) by mouth 3 (three) times daily.       Last refill date: EXTERNAL    Last office visit:2/9/2024     Follow up due:  Future Appointments   Date Time Provider Department Center   2/28/2024  9:40 AM Gus Ocampo MD [facility]   3/1/2024  9:40 AM Nir Blakely MD [facility]   3/18/2024 10:30 AM Crystal Bryan MD [facility]

## 2024-03-01 DIAGNOSIS — N52.9 ERECTILE DYSFUNCTION, UNSPECIFIED ERECTILE DYSFUNCTION TYPE: ICD-10-CM

## 2024-03-04 RX ORDER — CYCLOBENZAPRINE HCL 5 MG
5 TABLET ORAL 3 TIMES DAILY PRN
Qty: 45 TABLET | Refills: 1 | Status: SHIPPED | OUTPATIENT
Start: 2024-03-04 | End: 2024-04-03

## 2024-03-04 RX ORDER — SILDENAFIL 100 MG/1
TABLET, FILM COATED ORAL
Qty: 15 TABLET | Refills: 0 | Status: SHIPPED | OUTPATIENT
Start: 2024-03-04

## 2024-03-04 RX ORDER — SODIUM ZIRCONIUM CYCLOSILICATE 10 G/10G
10 POWDER, FOR SUSPENSION ORAL DAILY
Qty: 90 PACKET | Refills: 1 | Status: SHIPPED | OUTPATIENT
Start: 2024-03-04

## 2024-03-04 NOTE — TELEPHONE ENCOUNTER
.A refill request was received for:  Requested Prescriptions     Pending Prescriptions Disp Refills    Sildenafil Citrate 100 MG Oral Tab 15 tablet 0     Sig: Take 0.5-1 tablets ( mg total) by mouth daily as needed for Erectile Dysfunction.    LOKELMA 10 g Oral Powd Pack  0    cyclobenzaprine 5 MG Oral Tab 45 tablet 1     Sig: Take 1 tablet (5 mg total) by mouth 3 (three) times daily as needed for Muscle spasms.       Last refill date:   cyclobenzaprine 2/23/2024  Viagra 2/9/2024  Lokema externally reported    Last office visit: 2/9/2024    Follow up due:  Future Appointments   Date Time Provider Department Center   3/18/2024 10:30 AM Crystal Bryan MD [facility]   3/19/2024 11:00 AM Nir Blakely MD [facility] [place]   4/5/2024 10:30 AM YUDITH MR RM1 (1.5T) [facility]   5/3/2024 10:20 AM Nir Blakely MD [facility] [place]

## 2024-03-05 ENCOUNTER — LAB ENCOUNTER (OUTPATIENT)
Dept: LAB | Age: 59
End: 2024-03-05
Attending: Other
Payer: COMMERCIAL

## 2024-03-05 DIAGNOSIS — K70.31 ALCOHOLIC CIRRHOSIS OF LIVER WITH ASCITES (HCC): ICD-10-CM

## 2024-03-05 DIAGNOSIS — R25.1 TREMOR OBSERVED ON EXAMINATION: ICD-10-CM

## 2024-03-05 DIAGNOSIS — R26.0 ATAXIC GAIT: ICD-10-CM

## 2024-03-05 DIAGNOSIS — D84.9 IMMUNOSUPPRESSION (HCC): ICD-10-CM

## 2024-03-05 DIAGNOSIS — Z51.81 ENCOUNTER FOR THERAPEUTIC DRUG MONITORING: ICD-10-CM

## 2024-03-05 DIAGNOSIS — R20.8 DECREASED VIBRATORY SENSE: ICD-10-CM

## 2024-03-05 DIAGNOSIS — R41.89 COGNITIVE CHANGES: ICD-10-CM

## 2024-03-05 DIAGNOSIS — Z94.4 S/P LIVER TRANSPLANT (HCC): ICD-10-CM

## 2024-03-05 DIAGNOSIS — Z94.4 LIVER REPLACED BY TRANSPLANT (HCC): ICD-10-CM

## 2024-03-05 LAB
ALBUMIN SERPL-MCNC: 3.7 G/DL (ref 3.4–5)
ALBUMIN/GLOB SERPL: 1.1 {RATIO} (ref 1–2)
ALP LIVER SERPL-CCNC: 98 U/L
ALT SERPL-CCNC: 17 U/L
AMMONIA PLAS-MCNC: 15 UMOL/L (ref 11–32)
ANION GAP SERPL CALC-SCNC: 8 MMOL/L (ref 0–18)
AST SERPL-CCNC: 22 U/L (ref 15–37)
BASOPHILS # BLD AUTO: 0.05 X10(3) UL (ref 0–0.2)
BASOPHILS NFR BLD AUTO: 0.9 %
BILIRUB SERPL-MCNC: 0.5 MG/DL (ref 0.1–2)
BUN BLD-MCNC: 17 MG/DL (ref 9–23)
CALCIUM BLD-MCNC: 9.5 MG/DL (ref 8.5–10.1)
CHLORIDE SERPL-SCNC: 107 MMOL/L (ref 98–112)
CO2 SERPL-SCNC: 22 MMOL/L (ref 21–32)
CREAT BLD-MCNC: 1.16 MG/DL
EGFRCR SERPLBLD CKD-EPI 2021: 73 ML/MIN/1.73M2 (ref 60–?)
EOSINOPHIL # BLD AUTO: 0.13 X10(3) UL (ref 0–0.7)
EOSINOPHIL NFR BLD AUTO: 2.4 %
ERYTHROCYTE [DISTWIDTH] IN BLOOD BY AUTOMATED COUNT: 13.1 %
FASTING STATUS PATIENT QL REPORTED: YES
GLOBULIN PLAS-MCNC: 3.5 G/DL (ref 2.8–4.4)
GLUCOSE BLD-MCNC: 122 MG/DL (ref 70–99)
HCT VFR BLD AUTO: 35.7 %
HGB BLD-MCNC: 12.4 G/DL
IMM GRANULOCYTES # BLD AUTO: 0.02 X10(3) UL (ref 0–1)
IMM GRANULOCYTES NFR BLD: 0.4 %
LYMPHOCYTES # BLD AUTO: 1.29 X10(3) UL (ref 1–4)
LYMPHOCYTES NFR BLD AUTO: 23.4 %
MAGNESIUM SERPL-MCNC: 1.3 MG/DL (ref 1.6–2.6)
MCH RBC QN AUTO: 30.2 PG (ref 26–34)
MCHC RBC AUTO-ENTMCNC: 34.7 G/DL (ref 31–37)
MCV RBC AUTO: 86.9 FL
MONOCYTES # BLD AUTO: 0.24 X10(3) UL (ref 0.1–1)
MONOCYTES NFR BLD AUTO: 4.4 %
NEUTROPHILS # BLD AUTO: 3.78 X10 (3) UL (ref 1.5–7.7)
NEUTROPHILS # BLD AUTO: 3.78 X10(3) UL (ref 1.5–7.7)
NEUTROPHILS NFR BLD AUTO: 68.5 %
OSMOLALITY SERPL CALC.SUM OF ELEC: 287 MOSM/KG (ref 275–295)
PLATELET # BLD AUTO: 212 10(3)UL (ref 150–450)
POTASSIUM SERPL-SCNC: 4.6 MMOL/L (ref 3.5–5.1)
PROT SERPL-MCNC: 7.2 G/DL (ref 6.4–8.2)
RBC # BLD AUTO: 4.11 X10(6)UL
SODIUM SERPL-SCNC: 137 MMOL/L (ref 136–145)
VIT B12 SERPL-MCNC: 420 PG/ML (ref 193–986)
WBC # BLD AUTO: 5.5 X10(3) UL (ref 4–11)

## 2024-03-05 PROCEDURE — 82140 ASSAY OF AMMONIA: CPT

## 2024-03-05 PROCEDURE — 80197 ASSAY OF TACROLIMUS: CPT

## 2024-03-05 PROCEDURE — 85025 COMPLETE CBC W/AUTO DIFF WBC: CPT

## 2024-03-05 PROCEDURE — 80053 COMPREHEN METABOLIC PANEL: CPT

## 2024-03-05 PROCEDURE — 82607 VITAMIN B-12: CPT

## 2024-03-05 PROCEDURE — 36415 COLL VENOUS BLD VENIPUNCTURE: CPT

## 2024-03-05 PROCEDURE — 83735 ASSAY OF MAGNESIUM: CPT

## 2024-03-08 LAB — TACROLIMUS LVL: 9.7 NG/ML

## 2024-03-12 ENCOUNTER — LAB ENCOUNTER (OUTPATIENT)
Dept: LAB | Age: 59
End: 2024-03-12
Attending: INTERNAL MEDICINE
Payer: COMMERCIAL

## 2024-03-12 DIAGNOSIS — Z51.81 ENCOUNTER FOR THERAPEUTIC DRUG MONITORING: ICD-10-CM

## 2024-03-12 DIAGNOSIS — Z94.4 LIVER REPLACED BY TRANSPLANT (HCC): ICD-10-CM

## 2024-03-12 DIAGNOSIS — D84.9 IMMUNOSUPPRESSION (HCC): ICD-10-CM

## 2024-03-12 LAB
ALBUMIN SERPL-MCNC: 3.9 G/DL (ref 3.4–5)
ALBUMIN/GLOB SERPL: 1 {RATIO} (ref 1–2)
ALP LIVER SERPL-CCNC: 101 U/L
ALT SERPL-CCNC: 18 U/L
ANION GAP SERPL CALC-SCNC: 8 MMOL/L (ref 0–18)
AST SERPL-CCNC: 11 U/L (ref 15–37)
BASOPHILS # BLD AUTO: 0.05 X10(3) UL (ref 0–0.2)
BASOPHILS NFR BLD AUTO: 0.8 %
BILIRUB SERPL-MCNC: 0.5 MG/DL (ref 0.1–2)
BUN BLD-MCNC: 15 MG/DL (ref 9–23)
CALCIUM BLD-MCNC: 10.2 MG/DL (ref 8.5–10.1)
CHLORIDE SERPL-SCNC: 105 MMOL/L (ref 98–112)
CO2 SERPL-SCNC: 26 MMOL/L (ref 21–32)
CREAT BLD-MCNC: 1.13 MG/DL
EGFRCR SERPLBLD CKD-EPI 2021: 76 ML/MIN/1.73M2 (ref 60–?)
EOSINOPHIL # BLD AUTO: 0.14 X10(3) UL (ref 0–0.7)
EOSINOPHIL NFR BLD AUTO: 2.3 %
ERYTHROCYTE [DISTWIDTH] IN BLOOD BY AUTOMATED COUNT: 12.9 %
FASTING STATUS PATIENT QL REPORTED: YES
GLOBULIN PLAS-MCNC: 3.8 G/DL (ref 2.8–4.4)
GLUCOSE BLD-MCNC: 123 MG/DL (ref 70–99)
HCT VFR BLD AUTO: 40.2 %
HGB BLD-MCNC: 13.2 G/DL
IMM GRANULOCYTES # BLD AUTO: 0.02 X10(3) UL (ref 0–1)
IMM GRANULOCYTES NFR BLD: 0.3 %
LYMPHOCYTES # BLD AUTO: 1.37 X10(3) UL (ref 1–4)
LYMPHOCYTES NFR BLD AUTO: 22.9 %
MAGNESIUM SERPL-MCNC: 2.1 MG/DL (ref 1.6–2.6)
MCH RBC QN AUTO: 29.1 PG (ref 26–34)
MCHC RBC AUTO-ENTMCNC: 32.8 G/DL (ref 31–37)
MCV RBC AUTO: 88.7 FL
MONOCYTES # BLD AUTO: 0.47 X10(3) UL (ref 0.1–1)
MONOCYTES NFR BLD AUTO: 7.8 %
NEUTROPHILS # BLD AUTO: 3.94 X10 (3) UL (ref 1.5–7.7)
NEUTROPHILS # BLD AUTO: 3.94 X10(3) UL (ref 1.5–7.7)
NEUTROPHILS NFR BLD AUTO: 65.9 %
OSMOLALITY SERPL CALC.SUM OF ELEC: 290 MOSM/KG (ref 275–295)
PLATELET # BLD AUTO: 219 10(3)UL (ref 150–450)
POTASSIUM SERPL-SCNC: 4.5 MMOL/L (ref 3.5–5.1)
PROT SERPL-MCNC: 7.7 G/DL (ref 6.4–8.2)
RBC # BLD AUTO: 4.53 X10(6)UL
SODIUM SERPL-SCNC: 139 MMOL/L (ref 136–145)
WBC # BLD AUTO: 6 X10(3) UL (ref 4–11)

## 2024-03-12 PROCEDURE — 80197 ASSAY OF TACROLIMUS: CPT

## 2024-03-12 PROCEDURE — 85025 COMPLETE CBC W/AUTO DIFF WBC: CPT

## 2024-03-12 PROCEDURE — 36415 COLL VENOUS BLD VENIPUNCTURE: CPT

## 2024-03-12 PROCEDURE — 83735 ASSAY OF MAGNESIUM: CPT

## 2024-03-12 PROCEDURE — 80053 COMPREHEN METABOLIC PANEL: CPT

## 2024-03-15 LAB — TACROLIMUS LVL: 6.6 NG/ML

## 2024-03-18 ENCOUNTER — OFFICE VISIT (OUTPATIENT)
Dept: SURGERY | Facility: CLINIC | Age: 59
End: 2024-03-18

## 2024-03-18 VITALS
SYSTOLIC BLOOD PRESSURE: 146 MMHG | HEIGHT: 68 IN | DIASTOLIC BLOOD PRESSURE: 91 MMHG | BODY MASS INDEX: 25.19 KG/M2 | OXYGEN SATURATION: 100 % | WEIGHT: 166.19 LBS | TEMPERATURE: 98 F | RESPIRATION RATE: 18 BRPM | HEART RATE: 101 BPM

## 2024-03-19 ENCOUNTER — PROCEDURE VISIT (OUTPATIENT)
Dept: NEUROLOGY | Facility: CLINIC | Age: 59
End: 2024-03-19
Payer: COMMERCIAL

## 2024-03-19 DIAGNOSIS — R25.1 TREMOR OBSERVED ON EXAMINATION: ICD-10-CM

## 2024-03-19 DIAGNOSIS — R26.0 ATAXIC GAIT: Primary | ICD-10-CM

## 2024-03-19 DIAGNOSIS — R20.8 DECREASED VIBRATORY SENSE: ICD-10-CM

## 2024-03-19 PROCEDURE — 95912 NRV CNDJ TEST 11-12 STUDIES: CPT | Performed by: OTHER

## 2024-03-19 PROCEDURE — 95886 MUSC TEST DONE W/N TEST COMP: CPT | Performed by: OTHER

## 2024-03-19 NOTE — PROCEDURES
[facility]  [address], [place] [address]   [phone]        Full Name: AALIYAH EVANGELISTA Gender: Male  Patient ID: [id number] YOB: 1965      Visit Date: 3/18/2024 4:25 PM  Age: 58 Years  Examining Physician: MARQUES TA MD  Height: 5 feet 8 inch  Weight: 166 lbs  History:     Focused HPI:   This is a 58 year old male who presents for evaluation of tremor, balance issues and memory changes.      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  He was reportedly doing well post-operatively and on Envarsus (tacrolimus ER) 7 mg daily and CellCept 720 mg bid for immunosuppression but re-hospitalized at [facility] after transfer from [facility] 12/28-1/5/2024 for NATHALY and hyperkalemia with supra-therapeutic tacrolimus level. He then had low back pain and found to have likely hemangioma in spine.  He was seen by hematology due to concern for possible myeloma but thought to have benign hemangioma; lower back improved with conservative management. He also had complications of procedure with biliary stent and stricture noted. He was on ursodiol.         Exam shows impaired gait as well as tremor but no signs of Parkinson's disease. Tremor is at rest and with movement and high frequency, low amplitude and most likely metabolic.  Exam shows decreased sensation as well and could be due to underlying neuropathy; NCS/EMG requested to evaluate for possible neuropathy type/severity     Focused Exam:  Motor: 5/5 throughout  DTR: 3+ brisk patellars, 1+ ankle jerks, 2+ otherwise, no clonus, toes downgoing symmetrically   Sensory: Pin is reduced LE at R side up to just distal to calf slightly more proximal compared to left side   Vibration is reduced - ? 4 sec great toe left and 7 on R  Proprioception is normal    Sensory NCS      Nerve / Sites Rec. Site Onset Lat Peak Lat NP Amp PP Amp Segments Distance  Velocity Comment     ms ms µV µV  cm m/s    R Median - Dig II (Antidromic)      Wrist Index 3.85 4.69 24.6 49.6 Wrist - Index 14 36       Ref.  ?3.30 ?4.00 ?7.0 ?8.0 Ref.      R Ulnar - Dig V (Antidromic)      Wrist Dig V 2.55 3.44 42.1 45.6 Wrist - Dig V 12 47       Ref.  ?3.10 ?4.00 ?5.0 ?4.0 Ref.      R Radial - Superficial (Antidromic)      Forearm Wrist 1.93 2.60 28.5 24.3 Forearm - Wrist 10 52       Ref.  ?2.20 ?2.80 ?7.0 ?11.0 Ref.      R Sural - (Antidromic)      Calf Ankle 3.49 4.38 7.6 9.9 Calf - Ankle 14 40       Ref.  ?3.60 ?4.50 ?4.0 ?4.0 Ref.         2 Ankle 3.59 4.43 5.7 10.7       L Sural - (Antidromic)      Calf Ankle 3.65 4.64 6.6 8.4 Calf - Ankle 14 38       Ref.  ?3.60 ?4.50 ?4.0 ?4.0 Ref.          Motor NCS      Nerve / Sites Muscle Latency Amplitude Segments Dist. Lat Diff Velocity Comments     ms mV  cm ms m/s    R Median - APB      Wrist APB 4.54 6.0 Wrist - APB 7         Ref.  ?4.70 ?4.2 Ref.          Elbow APB 9.42 5.0 Elbow - Wrist 23 4.88 47.2       Ref.    Ref.   ?47.0    R Ulnar - ADM      Wrist ADM 2.73 11.0 Wrist - ADM 7         Ref.  ?3.70 ?7.9 Ref.          B.Elbow ADM 7.31 10.9 B.Elbow - Wrist 22.5 4.58 49.1       Ref.    Ref.   ?52.0    R Peroneal - EDB      Ankle EDB 5.92 4.4 Ankle - EDB 7         Ref.  ?6.50 ?1.1 Ref.          B. Fib Head EDB 14.92 3.8 B. Fib Head - Ankle 32.5 9.00 36.1       Ref.    Ref.   ?36.0    L Peroneal - EDB      Ankle EDB 9.65 1.0 Ankle - EDB 8         Ref.  ?6.50 ?1.1 Ref.          B. Fib Head EDB 18.33 1.2 B. Fib Head - Ankle 30.5 8.69 35.1       Ref.    Ref.   ?36.0    R Tibial - AH      Ankle AH 4.65 9.3 Ankle - AH 8         Ref.  ?6.10 ?5.3 Ref.          Knee AH 16.83 5.4 Knee - Ankle 40 12.19 32.8       Ref.    Ref.   ?34.0    L Tibial - AH      Ankle AH 6.08 9.9 Ankle - AH 8         Ref.  ?6.10 ?5.3 Ref.          Knee AH 17.85 6.1 Knee - Ankle 40 11.77 34.0       Ref.    Ref.   ?34.0        F  Wave      Nerve M Latency F Latency    ms ms   R  Peroneal - EDB 6.6 57.5   Ref.  ?63.6   R Tibial - AH 5.4 56.8   Ref.  ?61.1   L Tibial - AH 7.9 58.9   Ref.  ?61.1   L Peroneal - EDB NR NR   Ref.  ?63.6   R Median - APB 4.9 31.2   Ref.  ?31.5   R Ulnar - ADM 3.3 30.1   Ref.  ?30.9       EMG Summary Table     Spontaneous MUAP Recruitment   Muscle IA Fib PSW Fasc H.F. Amp Dur. PPP Pattern   R. Deltoid N None None None None N N N N   R. Biceps brachii N None None None None N N N N   R. Triceps brachii N None None None None N N N N   R. Extensor digitorum communis N None None None None N N N N   R. First dorsal interosseous N None None None None N N N N   R. Vastus medialis N None None None None N N N N   L. Vastus medialis N None None None None N N N N   R. Peroneus longus N None None None None N N N N   L. Peroneus longus N None None None None N N N N   R. Tibialis anterior N None None None None N N N N   L. Tibialis anterior N None None None None N N N N   R. Gastrocnemius N None None None None N N N N   L. Gastrocnemius N None None None None N N N N   R. Extensor hallucis longus N None None None None N N N N   L. Extensor hallucis longus N None None None None N N N N       Summary    Nerve conduction studies;   Right sural sensory response was normal.  Left sural sensory response was borderline due to mildly prolonged peak latency, with normal amplitude, and borderline conduction velocity; rapid cooling should be considered.  Right median sensory response was abnormal due to prolonged peak latency, with normal amplitude, and slowed conduction velocity.  Right ulnar sensory response was borderline due to borderline conduction velocity, with normal amplitude, normal peak latency; rapid cooling should be considered.  Right radial sensory response was normal.  Right common peroneal motor response was normal; F-wave response was normal.  Left common peroneal motor response was abnormal due to markedly prolonged distal motor latency, with reduced amplitude, and mildly  slowed conduction velocity; F-wave response was absent.  Right tibial motor response was borderline due to borderline conduction velocity, with normal amplitude and normal distal motor latency; F-wave response was normal.  Left tibial motor response was normal; F-wave response was normal.  Right median motor response was normal; F-wave response was normal.  Right ulnar motor response was normal; F-wave response was normal.    EMG (needle exam):  Concentric needle EMG study was normal in all 15 tested muscles: R. Deltoid, R. Biceps brachii, R. Triceps brachii, R. Extensor digitorum communis, R. First dorsal interosseous, R. Vastus medialis, L. Vastus medialis, R. Peroneus longus, L. Peroneus longus, R. Tibialis anterior, L. Tibialis anterior, R. Gastrocnemius, L. Gastrocnemius, R. Extensor hallucis longus, L. Extensor hallucis longus.            Conclusion:   This is a mildly abnormal study.  There is electrophysiologic evidence to suggest asymmetry of responses in the left lower extremity relative to the right lower extremity with demyelinating changes greater than axonal changes in the left tibial motor response.  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       Clinical comment:  This does not appear typical for a length dependent polyneuropathy or a primary demyelinating neuropathy; there is no suggestion for a myopathy; while there are some demyelinating sensory and motor features in the right upper extremity and left lower extremity, this does not meet criteria for chronic inflammatory demyelinating polyneuropathy.  There are no acute denervation changes noted.  The significance of these findings is questionable and may be technically related or suggest an atypical neuropathy or possibly a chronic lumbosacral radiculopathy superimposed upon a right carpal tunnel syndrome.  Clinical correlation is advised    Nir Blakely  MD, Neurology  [facility]  Pager [phone]  3/19/2024

## 2024-03-20 ENCOUNTER — LAB ENCOUNTER (OUTPATIENT)
Dept: LAB | Age: 59
End: 2024-03-20
Attending: INTERNAL MEDICINE
Payer: COMMERCIAL

## 2024-03-20 DIAGNOSIS — D84.9 IMMUNOSUPPRESSION (HCC): ICD-10-CM

## 2024-03-20 DIAGNOSIS — Z51.81 ENCOUNTER FOR THERAPEUTIC DRUG MONITORING: ICD-10-CM

## 2024-03-20 DIAGNOSIS — Z94.4 LIVER REPLACED BY TRANSPLANT (HCC): ICD-10-CM

## 2024-03-20 LAB
ALBUMIN SERPL-MCNC: 3.8 G/DL (ref 3.4–5)
ALBUMIN/GLOB SERPL: 1 {RATIO} (ref 1–2)
ALP LIVER SERPL-CCNC: 129 U/L
ALT SERPL-CCNC: 26 U/L
ANION GAP SERPL CALC-SCNC: 3 MMOL/L (ref 0–18)
AST SERPL-CCNC: 19 U/L (ref 15–37)
BASOPHILS # BLD AUTO: 0.07 X10(3) UL (ref 0–0.2)
BASOPHILS NFR BLD AUTO: 1.3 %
BILIRUB SERPL-MCNC: 0.4 MG/DL (ref 0.1–2)
BUN BLD-MCNC: 21 MG/DL (ref 9–23)
CALCIUM BLD-MCNC: 9.9 MG/DL (ref 8.5–10.1)
CHLORIDE SERPL-SCNC: 106 MMOL/L (ref 98–112)
CO2 SERPL-SCNC: 27 MMOL/L (ref 21–32)
CREAT BLD-MCNC: 1.04 MG/DL
EGFRCR SERPLBLD CKD-EPI 2021: 84 ML/MIN/1.73M2 (ref 60–?)
EOSINOPHIL # BLD AUTO: 0.14 X10(3) UL (ref 0–0.7)
EOSINOPHIL NFR BLD AUTO: 2.5 %
ERYTHROCYTE [DISTWIDTH] IN BLOOD BY AUTOMATED COUNT: 13 %
FASTING STATUS PATIENT QL REPORTED: YES
GLOBULIN PLAS-MCNC: 3.8 G/DL (ref 2.8–4.4)
GLUCOSE BLD-MCNC: 118 MG/DL (ref 70–99)
HCT VFR BLD AUTO: 40 %
HGB BLD-MCNC: 13.2 G/DL
IMM GRANULOCYTES # BLD AUTO: 0.03 X10(3) UL (ref 0–1)
IMM GRANULOCYTES NFR BLD: 0.5 %
LYMPHOCYTES # BLD AUTO: 1.53 X10(3) UL (ref 1–4)
LYMPHOCYTES NFR BLD AUTO: 27.5 %
MAGNESIUM SERPL-MCNC: 1.8 MG/DL (ref 1.6–2.6)
MCH RBC QN AUTO: 29 PG (ref 26–34)
MCHC RBC AUTO-ENTMCNC: 33 G/DL (ref 31–37)
MCV RBC AUTO: 87.9 FL
MONOCYTES # BLD AUTO: 0.48 X10(3) UL (ref 0.1–1)
MONOCYTES NFR BLD AUTO: 8.6 %
NEUTROPHILS # BLD AUTO: 3.32 X10 (3) UL (ref 1.5–7.7)
NEUTROPHILS # BLD AUTO: 3.32 X10(3) UL (ref 1.5–7.7)
NEUTROPHILS NFR BLD AUTO: 59.6 %
OSMOLALITY SERPL CALC.SUM OF ELEC: 286 MOSM/KG (ref 275–295)
PLATELET # BLD AUTO: 227 10(3)UL (ref 150–450)
POTASSIUM SERPL-SCNC: 4.8 MMOL/L (ref 3.5–5.1)
PROT SERPL-MCNC: 7.6 G/DL (ref 6.4–8.2)
RBC # BLD AUTO: 4.55 X10(6)UL
SODIUM SERPL-SCNC: 136 MMOL/L (ref 136–145)
WBC # BLD AUTO: 5.6 X10(3) UL (ref 4–11)

## 2024-03-20 PROCEDURE — 36415 COLL VENOUS BLD VENIPUNCTURE: CPT

## 2024-03-20 PROCEDURE — 80197 ASSAY OF TACROLIMUS: CPT

## 2024-03-20 PROCEDURE — 80053 COMPREHEN METABOLIC PANEL: CPT

## 2024-03-20 PROCEDURE — 85025 COMPLETE CBC W/AUTO DIFF WBC: CPT

## 2024-03-20 PROCEDURE — 83735 ASSAY OF MAGNESIUM: CPT

## 2024-03-22 ENCOUNTER — HOSPITAL ENCOUNTER (OUTPATIENT)
Dept: ULTRASOUND IMAGING | Facility: HOSPITAL | Age: 59
Discharge: HOME | End: 2024-03-22
Attending: INTERNAL MEDICINE
Payer: COMMERCIAL

## 2024-03-22 DIAGNOSIS — Z94.4 LIVER TRANSPLANT RECIPIENT (HCC): ICD-10-CM

## 2024-03-22 DIAGNOSIS — R74.8 ELEVATED ALKALINE PHOSPHATASE LEVEL: ICD-10-CM

## 2024-03-22 PROCEDURE — 93975 VASCULAR STUDY: CPT | Performed by: INTERNAL MEDICINE

## 2024-03-22 PROCEDURE — 76700 US EXAM ABDOM COMPLETE: CPT | Performed by: INTERNAL MEDICINE

## 2024-03-23 LAB — TACROLIMUS LVL: 4.9 NG/ML

## 2024-03-26 ENCOUNTER — LAB ENCOUNTER (OUTPATIENT)
Dept: LAB | Age: 59
End: 2024-03-26
Attending: INTERNAL MEDICINE
Payer: COMMERCIAL

## 2024-03-26 DIAGNOSIS — D84.9 IMMUNOSUPPRESSION (HCC): ICD-10-CM

## 2024-03-26 DIAGNOSIS — Z94.4 LIVER REPLACED BY TRANSPLANT (HCC): ICD-10-CM

## 2024-03-26 DIAGNOSIS — Z51.81 ENCOUNTER FOR THERAPEUTIC DRUG MONITORING: ICD-10-CM

## 2024-03-26 LAB
ALBUMIN SERPL-MCNC: 3.9 G/DL (ref 3.4–5)
ALBUMIN/GLOB SERPL: 1.2 {RATIO} (ref 1–2)
ALP LIVER SERPL-CCNC: 109 U/L
ALT SERPL-CCNC: 23 U/L
ANION GAP SERPL CALC-SCNC: 3 MMOL/L (ref 0–18)
AST SERPL-CCNC: 12 U/L (ref 15–37)
BASOPHILS # BLD AUTO: 0.04 X10(3) UL (ref 0–0.2)
BASOPHILS NFR BLD AUTO: 0.7 %
BILIRUB SERPL-MCNC: 0.4 MG/DL (ref 0.1–2)
BUN BLD-MCNC: 18 MG/DL (ref 9–23)
CALCIUM BLD-MCNC: 10.2 MG/DL (ref 8.5–10.1)
CHLORIDE SERPL-SCNC: 106 MMOL/L (ref 98–112)
CO2 SERPL-SCNC: 27 MMOL/L (ref 21–32)
CREAT BLD-MCNC: 1.33 MG/DL
EGFRCR SERPLBLD CKD-EPI 2021: 62 ML/MIN/1.73M2 (ref 60–?)
EOSINOPHIL # BLD AUTO: 0.12 X10(3) UL (ref 0–0.7)
EOSINOPHIL NFR BLD AUTO: 2.1 %
ERYTHROCYTE [DISTWIDTH] IN BLOOD BY AUTOMATED COUNT: 12.7 %
FASTING STATUS PATIENT QL REPORTED: YES
GLOBULIN PLAS-MCNC: 3.3 G/DL (ref 2.8–4.4)
GLUCOSE BLD-MCNC: 115 MG/DL (ref 70–99)
HCT VFR BLD AUTO: 39 %
HGB BLD-MCNC: 13.6 G/DL
IMM GRANULOCYTES # BLD AUTO: 0.05 X10(3) UL (ref 0–1)
IMM GRANULOCYTES NFR BLD: 0.9 %
LYMPHOCYTES # BLD AUTO: 1.23 X10(3) UL (ref 1–4)
LYMPHOCYTES NFR BLD AUTO: 21.1 %
MAGNESIUM SERPL-MCNC: 1.8 MG/DL (ref 1.6–2.6)
MCH RBC QN AUTO: 29.9 PG (ref 26–34)
MCHC RBC AUTO-ENTMCNC: 34.9 G/DL (ref 31–37)
MCV RBC AUTO: 85.7 FL
MONOCYTES # BLD AUTO: 0.37 X10(3) UL (ref 0.1–1)
MONOCYTES NFR BLD AUTO: 6.4 %
NEUTROPHILS # BLD AUTO: 4.01 X10 (3) UL (ref 1.5–7.7)
NEUTROPHILS # BLD AUTO: 4.01 X10(3) UL (ref 1.5–7.7)
NEUTROPHILS NFR BLD AUTO: 68.8 %
OSMOLALITY SERPL CALC.SUM OF ELEC: 285 MOSM/KG (ref 275–295)
PLATELET # BLD AUTO: 253 10(3)UL (ref 150–450)
POTASSIUM SERPL-SCNC: 4.7 MMOL/L (ref 3.5–5.1)
PROT SERPL-MCNC: 7.2 G/DL (ref 6.4–8.2)
RBC # BLD AUTO: 4.55 X10(6)UL
SODIUM SERPL-SCNC: 136 MMOL/L (ref 136–145)
WBC # BLD AUTO: 5.8 X10(3) UL (ref 4–11)

## 2024-03-26 PROCEDURE — 36415 COLL VENOUS BLD VENIPUNCTURE: CPT

## 2024-03-26 PROCEDURE — 80197 ASSAY OF TACROLIMUS: CPT

## 2024-03-26 PROCEDURE — 85025 COMPLETE CBC W/AUTO DIFF WBC: CPT

## 2024-03-26 PROCEDURE — 80053 COMPREHEN METABOLIC PANEL: CPT

## 2024-03-26 PROCEDURE — 83735 ASSAY OF MAGNESIUM: CPT

## 2024-03-28 LAB — TACROLIMUS LVL: 5 NG/ML

## 2024-04-05 ENCOUNTER — HOSPITAL ENCOUNTER (OUTPATIENT)
Dept: MRI IMAGING | Age: 59
Discharge: HOME | End: 2024-04-05
Attending: Other
Payer: COMMERCIAL

## 2024-04-05 ENCOUNTER — LAB ENCOUNTER (OUTPATIENT)
Dept: LAB | Age: 59
End: 2024-04-05
Attending: INTERNAL MEDICINE
Payer: COMMERCIAL

## 2024-04-05 DIAGNOSIS — Z94.4 LIVER REPLACED BY TRANSPLANT (HCC): ICD-10-CM

## 2024-04-05 DIAGNOSIS — Z51.81 ENCOUNTER FOR THERAPEUTIC DRUG MONITORING: ICD-10-CM

## 2024-04-05 DIAGNOSIS — R20.8 DECREASED VIBRATORY SENSE: ICD-10-CM

## 2024-04-05 DIAGNOSIS — R25.1 TREMOR OBSERVED ON EXAMINATION: ICD-10-CM

## 2024-04-05 DIAGNOSIS — D84.9 IMMUNOSUPPRESSION (HCC): ICD-10-CM

## 2024-04-05 DIAGNOSIS — R41.89 COGNITIVE CHANGES: ICD-10-CM

## 2024-04-05 DIAGNOSIS — Z94.4 S/P LIVER TRANSPLANT (HCC): ICD-10-CM

## 2024-04-05 DIAGNOSIS — K70.31 ALCOHOLIC CIRRHOSIS OF LIVER WITH ASCITES (HCC): ICD-10-CM

## 2024-04-05 DIAGNOSIS — R26.0 ATAXIC GAIT: ICD-10-CM

## 2024-04-05 LAB
ALBUMIN SERPL-MCNC: 3.7 G/DL (ref 3.4–5)
ALBUMIN/GLOB SERPL: 1.2 {RATIO} (ref 1–2)
ALP LIVER SERPL-CCNC: 91 U/L
ALT SERPL-CCNC: 23 U/L
ANION GAP SERPL CALC-SCNC: 5 MMOL/L (ref 0–18)
AST SERPL-CCNC: 16 U/L (ref 15–37)
BASOPHILS # BLD AUTO: 0.03 X10(3) UL (ref 0–0.2)
BASOPHILS NFR BLD AUTO: 0.6 %
BILIRUB SERPL-MCNC: 0.5 MG/DL (ref 0.1–2)
BUN BLD-MCNC: 16 MG/DL (ref 9–23)
CALCIUM BLD-MCNC: 9.8 MG/DL (ref 8.5–10.1)
CHLORIDE SERPL-SCNC: 109 MMOL/L (ref 98–112)
CO2 SERPL-SCNC: 27 MMOL/L (ref 21–32)
CREAT BLD-MCNC: 1.01 MG/DL
EGFRCR SERPLBLD CKD-EPI 2021: 87 ML/MIN/1.73M2 (ref 60–?)
EOSINOPHIL # BLD AUTO: 0.09 X10(3) UL (ref 0–0.7)
EOSINOPHIL NFR BLD AUTO: 1.8 %
ERYTHROCYTE [DISTWIDTH] IN BLOOD BY AUTOMATED COUNT: 12.9 %
FASTING STATUS PATIENT QL REPORTED: NO
GLOBULIN PLAS-MCNC: 3.2 G/DL (ref 2.8–4.4)
GLUCOSE BLD-MCNC: 96 MG/DL (ref 70–99)
HCT VFR BLD AUTO: 40.8 %
HGB BLD-MCNC: 13.5 G/DL
IMM GRANULOCYTES # BLD AUTO: 0.02 X10(3) UL (ref 0–1)
IMM GRANULOCYTES NFR BLD: 0.4 %
LYMPHOCYTES # BLD AUTO: 1.21 X10(3) UL (ref 1–4)
LYMPHOCYTES NFR BLD AUTO: 24.6 %
MAGNESIUM SERPL-MCNC: 1.7 MG/DL (ref 1.6–2.6)
MCH RBC QN AUTO: 29 PG (ref 26–34)
MCHC RBC AUTO-ENTMCNC: 33.1 G/DL (ref 31–37)
MCV RBC AUTO: 87.6 FL
MONOCYTES # BLD AUTO: 0.31 X10(3) UL (ref 0.1–1)
MONOCYTES NFR BLD AUTO: 6.3 %
NEUTROPHILS # BLD AUTO: 3.26 X10 (3) UL (ref 1.5–7.7)
NEUTROPHILS # BLD AUTO: 3.26 X10(3) UL (ref 1.5–7.7)
NEUTROPHILS NFR BLD AUTO: 66.3 %
OSMOLALITY SERPL CALC.SUM OF ELEC: 293 MOSM/KG (ref 275–295)
PLATELET # BLD AUTO: 200 10(3)UL (ref 150–450)
POTASSIUM SERPL-SCNC: 4.3 MMOL/L (ref 3.5–5.1)
PROT SERPL-MCNC: 6.9 G/DL (ref 6.4–8.2)
RBC # BLD AUTO: 4.66 X10(6)UL
SODIUM SERPL-SCNC: 141 MMOL/L (ref 136–145)
WBC # BLD AUTO: 4.9 X10(3) UL (ref 4–11)

## 2024-04-05 PROCEDURE — 80197 ASSAY OF TACROLIMUS: CPT

## 2024-04-05 PROCEDURE — A9575 INJ GADOTERATE MEGLUMI 0.1ML: HCPCS | Performed by: OTHER

## 2024-04-05 PROCEDURE — 83735 ASSAY OF MAGNESIUM: CPT

## 2024-04-05 PROCEDURE — 36415 COLL VENOUS BLD VENIPUNCTURE: CPT

## 2024-04-05 PROCEDURE — 80053 COMPREHEN METABOLIC PANEL: CPT

## 2024-04-05 PROCEDURE — 70553 MRI BRAIN STEM W/O & W/DYE: CPT | Performed by: OTHER

## 2024-04-05 PROCEDURE — 85025 COMPLETE CBC W/AUTO DIFF WBC: CPT

## 2024-04-05 RX ORDER — GADOTERATE MEGLUMINE 376.9 MG/ML
15 INJECTION INTRAVENOUS
Status: COMPLETED | OUTPATIENT
Start: 2024-04-05 | End: 2024-04-05

## 2024-04-05 RX ADMIN — GADOTERATE MEGLUMINE 15 ML: 376.9 INJECTION INTRAVENOUS at 11:40:00

## 2024-04-08 LAB — TACROLIMUS LVL: 6.5 NG/ML

## 2024-04-12 DIAGNOSIS — G47.00 INSOMNIA, UNSPECIFIED TYPE: ICD-10-CM

## 2024-04-12 DIAGNOSIS — N52.9 ERECTILE DYSFUNCTION, UNSPECIFIED ERECTILE DYSFUNCTION TYPE: ICD-10-CM

## 2024-04-13 RX ORDER — TRAZODONE HYDROCHLORIDE 50 MG/1
100 TABLET ORAL NIGHTLY
Qty: 30 TABLET | Refills: 0 | Status: SHIPPED | OUTPATIENT
Start: 2024-04-13 | End: 2024-04-15

## 2024-04-13 RX ORDER — SILDENAFIL 100 MG/1
TABLET, FILM COATED ORAL DAILY PRN
Qty: 15 TABLET | Refills: 0 | Status: SHIPPED | OUTPATIENT
Start: 2024-04-13 | End: 2024-04-15

## 2024-04-13 NOTE — TELEPHONE ENCOUNTER
.A refill request was received for:  Requested Prescriptions     Pending Prescriptions Disp Refills    TRAZODONE 50 MG Oral Tab [Pharmacy Med Name: TRAZODONE 50MG TABLETS] 30 tablet 0     Sig: TAKE 2 TABLETS BY MOUTH NIGHTLY    SILDENAFIL CITRATE 100 MG Oral Tab [Pharmacy Med Name: SILDENAFIL 100MG TABLETS] 15 tablet 0     Sig: TAKE 1/2 TO 1 TABLET(50  MG) BY MOUTH DAILY AS NEEDED FOR ERECTILE DYSFUNCTION       Last refill date:   3/4/2024  trazodone  1/1/8/2024    Last office visit: 2/92/2024    Follow up due:  Future Appointments   Date Time Provider Department Center   5/3/2024 10:20 AM Nir Blakely MD [facility] [place]   7/15/2024  1:45 PM Crystal Bryan MD [facility]

## 2024-04-15 RX ORDER — SILDENAFIL 100 MG/1
TABLET, FILM COATED ORAL DAILY PRN
Qty: 45 TABLET | Refills: 0 | Status: SHIPPED | OUTPATIENT
Start: 2024-04-15

## 2024-04-15 RX ORDER — TRAZODONE HYDROCHLORIDE 100 MG/1
100 TABLET ORAL NIGHTLY
Qty: 90 TABLET | Refills: 1 | Status: SHIPPED | OUTPATIENT
Start: 2024-04-15 | End: 2025-04-10

## 2024-04-24 ENCOUNTER — TELEPHONE (OUTPATIENT)
Dept: FAMILY MEDICINE CLINIC | Facility: CLINIC | Age: 59
End: 2024-04-24

## 2024-04-24 DIAGNOSIS — Z94.4 S/P LIVER TRANSPLANT (HCC): Primary | ICD-10-CM

## 2024-04-24 NOTE — TELEPHONE ENCOUNTER
Patient called and stated he needs a referral to Cardiology as his GI doctor would like to perform a Colonoscopy on patient while sedated to check on patients pancreas.    He recently had a liver transplant.    Please advise.

## 2024-05-03 ENCOUNTER — OFFICE VISIT (OUTPATIENT)
Dept: NEUROLOGY | Facility: CLINIC | Age: 59
End: 2024-05-03
Payer: COMMERCIAL

## 2024-05-03 VITALS
WEIGHT: 166 LBS | HEART RATE: 98 BPM | HEIGHT: 68 IN | RESPIRATION RATE: 16 BRPM | SYSTOLIC BLOOD PRESSURE: 137 MMHG | BODY MASS INDEX: 25.16 KG/M2 | DIASTOLIC BLOOD PRESSURE: 79 MMHG

## 2024-05-03 DIAGNOSIS — M54.50 ACUTE MIDLINE LOW BACK PAIN WITHOUT SCIATICA: Primary | ICD-10-CM

## 2024-05-03 DIAGNOSIS — R25.1 TREMOR OBSERVED ON EXAMINATION: ICD-10-CM

## 2024-05-03 DIAGNOSIS — Z94.4 S/P LIVER TRANSPLANT (HCC): ICD-10-CM

## 2024-05-03 DIAGNOSIS — R41.89 COGNITIVE CHANGES: ICD-10-CM

## 2024-05-03 DIAGNOSIS — K70.31 ALCOHOLIC CIRRHOSIS OF LIVER WITH ASCITES (HCC): ICD-10-CM

## 2024-05-03 DIAGNOSIS — G58.7 MONONEURITIS MULTIPLEX: ICD-10-CM

## 2024-05-03 DIAGNOSIS — R20.8 DECREASED VIBRATORY SENSE: ICD-10-CM

## 2024-05-03 PROCEDURE — 99214 OFFICE O/P EST MOD 30 MIN: CPT | Performed by: OTHER

## 2024-05-03 NOTE — PROGRESS NOTES
[facility] Progress Note    HPI  Chief Complaint   Patient presents with    Neurologic Problem     Cognitive issues and no improvement and had a fall last month and hurt left shoulder       As per my initial H&P from 3/1/2024,   \" Richard Palacios is a 58 year old, who presents for evaluation of tremor, balance issues and memory changes.     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  He was reportedly doing well post-operatively and on Envarsus (tacrolimus ER) 7 mg daily and CellCept 720 mg bid for immunosuppression but re-hospitalized at [facility] after transfer from [facility] 12/28-1/5/2024 for NATHALY and hyperkalemia with supra-therapeutic tacrolimus level. He then had low back pain and found to have likely hemangioma in spine.  He was seen by hematology due to concern for possible myeloma but thought to have benign hemangioma; lower back improved with conservative management. He also had complications of procedure with biliary stent and stricture noted. He was on ursodiol.     Post operatively, he was noted to have issues with memory as well as balance and tremor and recommended to see neurology.     Patient states he started to have issues with tremor and memory in November which first started as his liver was failing.  He states even after liver transplant he has been having fatigue and balance issues and tremor.  Tremor is at rest and with movement.  He notes he may be worse on one side or the other depending on if he is holding in object in the his hands.     He admits to some tingling in hands but denies numbness/tingling in feet. He denies any episodes of speech arrest or loss of consciousness but admits he has issues with recalling conversations. He has some low back pain but not radiating to legs and denies bowel / bladder incontinence. Overall, he feels his memory is worsening as the day goes on and admits he has been  taking Flexeril, tramadol and trazodone near daily.      He denies any falls but is off balance when walking.       He states his liver rapidly failed in the past year but was diagnosed with cirrhosis 9/2022.      He states he first noted he was having issues with calculating a budget for his presentation at an end of the year conference and this was not a usual occurrence.  He also noted he would obtain the wrong object for what he needed (ie went to kitchen to get a spoon for his jello and then would come back with a cracker).          Otherwise, patient denies any recent weight change, fevers, chills, nausea, double vision/ blurry vision / loss of vision, chest pain, palpitations, shortness of breath, rashes, joint pains, bowel / bladder incontinence or mood issues. \"       Patient since last visit has been feeling his memory is worsening - he is driving and able to take his medications on own but notes he has a hard time remembering names.       He also notes pain in the mid to lower back and some radiation to R calf at times.  He denies prior history of spinal surgery. He notes pain when he is lying down as well.         Past Medical History:    Abdominal pain    Ascites    COVID    Disorder of liver    Cirrhosis    Esophageal reflux    Essential hypertension    ETOH abuse    High blood pressure    Hypoalbuminemia    Nausea    Personal history of alcoholism (HCC)    Weight loss     Past Surgical History:   Procedure Laterality Date    Colonoscopy N/A 11/20/2023    Procedure: .;  Surgeon: Gus Ocampo MD;  Location:  ENDOSCOPY    Colonoscopy N/A 11/27/2023    Procedure: COLONOSCOPY with cold snare polypectomy and clip placement x1;  Surgeon: Robin Raya DO;  Location:  ENDOSCOPY    Organ transplant  12/19/2023    Liver transplant    Tonsillectomy       Family History   Problem Relation Age of Onset    Other (alcoholism) Father     Diabetes Mother         Type I    Other (CAD) Mother      Social  History     Socioeconomic History    Marital status:    Tobacco Use    Smoking status: Former     Current packs/day: 0.00     Types: Cigarettes     Quit date: 1984     Years since quittin.2     Passive exposure: Never    Smokeless tobacco: Current    Tobacco comments:     Still not smoking cigarettes since . Do smoke a couple of cigars a week.   Vaping Use    Vaping status: Never Used   Substance and Sexual Activity    Alcohol use: Never    Drug use: No   Other Topics Concern    Caffeine Concern Yes     Comment: 1 cup a day    Exercise No       No Known Allergies      Current Outpatient Medications:     Sildenafil Citrate 100 MG Oral Tab, Take 0.5-1 tablets ( mg total) by mouth daily as needed for Erectile Dysfunction., Disp: 45 tablet, Rfl: 0    traZODone 100 MG Oral Tab, Take 1 tablet (100 mg total) by mouth nightly., Disp: 90 tablet, Rfl: 1    LOKELMA 10 g Oral Powd Pack, Take 1 packet (10 g total) by mouth daily., Disp: 90 packet, Rfl: 1    Tacrolimus ER (ENVARSUS XR) 0.75 MG Oral Tablet 24 Hr, Take 1 tablet by mouth as needed., Disp: , Rfl:     Magnesium Oxide -Mg Supplement 400 (240 Mg) MG Oral Tab, Take 1 tablet (400 mg total) by mouth daily., Disp: 30 tablet, Rfl: 1    ursodiol 300 MG Oral Cap, Take 1 capsule (300 mg total) by mouth 3 (three) times daily., Disp: 270 capsule, Rfl: 1    ergocalciferol 1.25 MG (48156 UT) Oral Cap, Take 1 capsule (50,000 Units total) by mouth once a week., Disp: , Rfl:     ENVARSUS XR 1 MG Oral Tablet 24 Hr, in the morning, at noon, and at bedtime., Disp: , Rfl:     allopurinol 100 MG Oral Tab, TAKE 1 TABLET(100 MG) BY MOUTH DAILY, Disp: 90 tablet, Rfl: 3    tacrolimus 1 MG Oral Cap, Take 3 capsules (3 mg total) by mouth daily., Disp: , Rfl:     Mycophenolate Sodium (MYCOPHENOLIC ACID OR), Take 720 mg by mouth in the morning and 720 mg before bedtime., Disp: , Rfl:     sulfamethoxazole-trimethoprim 400-80 MG Oral Tab, Take 1 tablet by mouth every  evening. 2 tabs mon and thurs, Disp: , Rfl:     valGANciclovir 450 MG Oral Tab, Take 1 tablet (450 mg total) by mouth daily., Disp: , Rfl:     aspirin 81 MG Oral Tab EC, Take 1 tablet (81 mg total) by mouth daily., Disp: , Rfl:     pantoprazole 40 MG Oral Tab EC, Take 1 tablet (40 mg total) by mouth 2 (two) times daily before meals., Disp: 60 tablet, Rfl: 11    Review of Systems:  No chest pain or palpitations; otherwise as noted in HPI.    Exam:  /79 (BP Location: Right arm, Patient Position: Sitting, Cuff Size: large)   Pulse 98   Resp 16   Ht 68\"   Wt 166 lb (75.3 kg)   BMI 25.24 kg/m²   Estimated body mass index is 25.24 kg/m² as calculated from the following:    Height as of this encounter: 68\".    Weight as of this encounter: 166 lb (75.3 kg).    Gen: well developed, well nourished, no acute distress  HEENT: normocephalic  Heart; normal S1/S2, regular rate and rhythm  Lungs: clear to auscultation bilaterally  Extremities: no edema, peripheral pulses intact    Neck: supple, full range of motion; no carotid bruits    Fundoscopic Exam: optic discs sharp bilaterally    Neuro:  Mental status:  Orientation: Alert and oriented to person, place, time  Speech Fluent and conversational      MOCA: 27 / 30  Visuospatial/executive function: 5/5  Naming: 3/3 (able to give names correctly today)  Attention: 5/6 (wrong initially on 3 numbers in reverse order but able to correct)  Language: 3/3 ( able to give 15 words in 1 minute)  Abstraction: 2/2  Delayed recall: 3/5 (4/5 with hints)  Orientation; 6/6    CN: PERRL, EOMI with no nystagmus, VFF, smile symmetric, sensation intact, tongue and palate midline, SCM intact, otherwise, CN 2-12 intact  Motor: 5/5 strength throughout, tone normal    Tremor noted, high frequency low amplitude at rest bilaterally or with sustaining posture, slightly more promienent R side; no decrement of amplitude but slow bilaterally; no clear rigidity or bradykinesia; overall slow rapid  alternating movements and slow but not ataxic FNF and HKS bilaterally    DTR:  3+ brisk patellars, 1+ ankle jerks, 2+ but slightly brisker R UE otherwise, no clonus, toes downgoing bilaterally    Sensory: intact to light touch throughout; Pin is reduced left LE up to above the knee and intact R side   Vibration is reduced -  2-3 sec great toe left and 7 on R    Coord: FNF intact with no tremor or dysmetria; rapid alternating movements intact bilaterally  Romberg: present  Gait: Independent without cane or walker but unsteady swaying from side to side; not able to walk on heel, toes or tandem     Labs:  New  Component      Latest Ref Rng 3/5/2024 3/26/2024 4/5/2024   WBC      4.0 - 11.0 x10(3) uL   4.9    RBC      4.30 - 5.70 x10(6)uL   4.66    Hemoglobin      13.0 - 17.5 g/dL   13.5    Hematocrit      39.0 - 53.0 %   40.8    Platelet Count      150.0 - 450.0 10(3)uL   200.0    MCV      80.0 - 100.0 fL   87.6    MCH      26.0 - 34.0 pg   29.0    MCHC      31.0 - 37.0 g/dL   33.1    RDW      %   12.9    Prelim Neutrophil Abs      1.50 - 7.70 x10 (3) uL   3.26    Neutrophils Absolute      1.50 - 7.70 x10(3) uL   3.26    Lymphocytes Absolute      1.00 - 4.00 x10(3) uL   1.21    Monocytes Absolute      0.10 - 1.00 x10(3) uL   0.31    Eosinophils Absolute      0.00 - 0.70 x10(3) uL   0.09    Basophils Absolute      0.00 - 0.20 x10(3) uL   0.03    Immature Granulocyte Absolute      0.00 - 1.00 x10(3) uL   0.02    Neutrophils %      %   66.3    Lymphocytes %      %   24.6    Monocytes %      %   6.3    Eosinophils %      %   1.8    Basophils %      %   0.6    Immature Granulocyte %      %   0.4    Glucose      70 - 99 mg/dL   96    Sodium      136 - 145 mmol/L   141    Potassium      3.5 - 5.1 mmol/L   4.3    Chloride      98 - 112 mmol/L   109    Carbon Dioxide, Total      21.0 - 32.0 mmol/L   27.0    ANION GAP      0 - 18 mmol/L   5    BUN      9 - 23 mg/dL   16    CREATININE      0.70 - 1.30 mg/dL   1.01    CALCIUM       8.5 - 10.1 mg/dL   9.8    CALCULATED OSMOLALITY      275 - 295 mOsm/kg   293    EGFR      >=60 mL/min/1.73m2   87    AST (SGOT)      15 - 37 U/L   16    ALT (SGPT)      16 - 61 U/L   23    ALKALINE PHOSPHATASE      45 - 117 U/L   91    Total Bilirubin      0.1 - 2.0 mg/dL   0.5    PROTEIN, TOTAL      6.4 - 8.2 g/dL   6.9    Albumin      3.4 - 5.0 g/dL   3.7    Globulin      2.8 - 4.4 g/dL   3.2    A/G Ratio      1.0 - 2.0    1.2    Patient Fasting for CMP?   No    Vitamin B12      193 - 986 pg/mL 420      AMMONIA      11 - 32 umol/L 15      Tacrolimus Lvl      2.0 - 20.0 ng/mL  5.0  6.5    Magnesium, Serum      1.6 - 2.6 mg/dL   1.7          Imaging:  New    MRI brain with and without contrast (4/5/2024):   FINDINGS:  The ventricles and sulci are within normal limits.  There is no midline shift or mass effect.  The basal cisterns are patent.  The gray-white matter differentiation is intact.  The craniocervical junction is unremarkable.       Minimal chronic microvascular ischemic changes in the cerebral white matter.     There is no acute intracranial hemorrhage or extra-axial fluid collection identified.  No significant abnormal parenchymal gradient susceptibility. There is no abnormal parenchymal or leptomeningeal enhancement.  There is no restricted diffusion to  suggest acute ischemia/infarction.     Right maxillary retention cyst.  Minimal ethmoid, right frontal, and bilateral maxillary sinus mucosal thickening.  Trace fluid in the right mastoid air cells.  The expected major intracranial flow voids are present.                   Impression   CONCLUSION:       1. No acute intracranial abnormality identified.       2. Minimal chronic microvascular ischemic changes in the cerebral white matter.      Other procedures  New    NCS/EMG (3/19/2024):     Sensory NCS      Nerve / Sites Rec. Site Onset Lat Peak Lat NP Amp PP Amp Segments Distance Velocity Comment       ms ms µV µV   cm m/s     R Median - Dig II  (Antidromic)      Wrist Index 3.85 4.69 24.6 49.6 Wrist - Index 14 36        Ref.   ?3.30 ?4.00 ?7.0 ?8.0 Ref.         R Ulnar - Dig V (Antidromic)      Wrist Dig V 2.55 3.44 42.1 45.6 Wrist - Dig V 12 47        Ref.   ?3.10 ?4.00 ?5.0 ?4.0 Ref.         R Radial - Superficial (Antidromic)      Forearm Wrist 1.93 2.60 28.5 24.3 Forearm - Wrist 10 52        Ref.   ?2.20 ?2.80 ?7.0 ?11.0 Ref.         R Sural - (Antidromic)      Calf Ankle 3.49 4.38 7.6 9.9 Calf - Ankle 14 40        Ref.   ?3.60 ?4.50 ?4.0 ?4.0 Ref.            2 Ankle 3.59 4.43 5.7 10.7           L Sural - (Antidromic)      Calf Ankle 3.65 4.64 6.6 8.4 Calf - Ankle 14 38        Ref.   ?3.60 ?4.50 ?4.0 ?4.0 Ref.             Motor NCS      Nerve / Sites Muscle Latency Amplitude Segments Dist. Lat Diff Velocity Comments       ms mV   cm ms m/s     R Median - APB      Wrist APB 4.54 6.0 Wrist - APB 7            Ref.   ?4.70 ?4.2 Ref.              Elbow APB 9.42 5.0 Elbow - Wrist 23 4.88 47.2        Ref.       Ref.     ?47.0     R Ulnar - ADM      Wrist ADM 2.73 11.0 Wrist - ADM 7            Ref.   ?3.70 ?7.9 Ref.              B.Elbow ADM 7.31 10.9 B.Elbow - Wrist 22.5 4.58 49.1        Ref.       Ref.     ?52.0     R Peroneal - EDB      Ankle EDB 5.92 4.4 Ankle - EDB 7            Ref.   ?6.50 ?1.1 Ref.              B. Fib Head EDB 14.92 3.8 B. Fib Head - Ankle 32.5 9.00 36.1        Ref.       Ref.     ?36.0     L Peroneal - EDB      Ankle EDB 9.65 1.0 Ankle - EDB 8            Ref.   ?6.50 ?1.1 Ref.              B. Fib Head EDB 18.33 1.2 B. Fib Head - Ankle 30.5 8.69 35.1        Ref.       Ref.     ?36.0     R Tibial - AH      Ankle AH 4.65 9.3 Ankle - AH 8            Ref.   ?6.10 ?5.3 Ref.              Knee AH 16.83 5.4 Knee - Ankle 40 12.19 32.8        Ref.       Ref.     ?34.0     L Tibial - AH      Ankle AH 6.08 9.9 Ankle - AH 8            Ref.   ?6.10 ?5.3 Ref.              Knee AH 17.85 6.1 Knee - Ankle 40 11.77 34.0        Ref.       Ref.     ?34.0          F  Wave      Nerve M Latency F Latency     ms ms   R Peroneal - EDB 6.6 57.5   Ref.   ?63.6   R Tibial - AH 5.4 56.8   Ref.   ?61.1   L Tibial - AH 7.9 58.9   Ref.   ?61.1   L Peroneal - EDB NR NR   Ref.   ?63.6   R Median - APB 4.9 31.2   Ref.   ?31.5   R Ulnar - ADM 3.3 30.1   Ref.   ?30.9                   EMG Summary Table       Spontaneous MUAP Recruitment   Muscle IA Fib PSW Fasc H.F. Amp Dur. PPP Pattern   R. Deltoid N None None None None N N N N   R. Biceps brachii N None None None None N N N N   R. Triceps brachii N None None None None N N N N   R. Extensor digitorum communis N None None None None N N N N   R. First dorsal interosseous N None None None None N N N N   R. Vastus medialis N None None None None N N N N   L. Vastus medialis N None None None None N N N N   R. Peroneus longus N None None None None N N N N   L. Peroneus longus N None None None None N N N N   R. Tibialis anterior N None None None None N N N N   L. Tibialis anterior N None None None None N N N N   R. Gastrocnemius N None None None None N N N N   L. Gastrocnemius N None None None None N N N N   R. Extensor hallucis longus N None None None None N N N N   L. Extensor hallucis longus N None None None None N N N N       Summary     Nerve conduction studies;   Right sural sensory response was normal.  Left sural sensory response was borderline due to mildly prolonged peak latency, with normal amplitude, and borderline conduction velocity; rapid cooling should be considered.  Right median sensory response was abnormal due to prolonged peak latency, with normal amplitude, and slowed conduction velocity.  Right ulnar sensory response was borderline due to borderline conduction velocity, with normal amplitude, normal peak latency; rapid cooling should be considered.  Right radial sensory response was normal.  Right common peroneal motor response was normal; F-wave response was normal.  Left common peroneal motor response was abnormal due  to markedly prolonged distal motor latency, with reduced amplitude, and mildly slowed conduction velocity; F-wave response was absent.  Right tibial motor response was borderline due to borderline conduction velocity, with normal amplitude and normal distal motor latency; F-wave response was normal.  Left tibial motor response was normal; F-wave response was normal.  Right median motor response was normal; F-wave response was normal.  Right ulnar motor response was normal; F-wave response was normal.     EMG (needle exam):  Concentric needle EMG study was normal in all 15 tested muscles: R. Deltoid, R. Biceps brachii, R. Triceps brachii, R. Extensor digitorum communis, R. First dorsal interosseous, R. Vastus medialis, L. Vastus medialis, R. Peroneus longus, L. Peroneus longus, R. Tibialis anterior, L. Tibialis anterior, R. Gastrocnemius, L. Gastrocnemius, R. Extensor hallucis longus, L. Extensor hallucis longus.              Conclusion:   This is a mildly abnormal study.  There is electrophysiologic evidence to suggest asymmetry of responses in the left lower extremity relative to the right lower extremity with demyelinating changes greater than axonal changes in the left tibial motor response.  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      Clinical comment:  This does not appear typical for a length dependent polyneuropathy or a primary demyelinating neuropathy; there is no suggestion for a myopathy; while there are some demyelinating sensory and motor features in the right upper extremity and left lower extremity, this does not meet criteria for chronic inflammatory demyelinating polyneuropathy.  There are no acute denervation changes noted.  The significance of these findings is questionable and may be technically related or suggest an atypical neuropathy or possibly a chronic lumbosacral radiculopathy superimposed upon a  right carpal tunnel syndrome.  Clinical correlation is advised             Impression/ Plan:  Richard Palacios is a 58 year old male who originally presented 3/1/2024 for evaluation of tremor, balance issues and memory changes.      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  He was reportedly doing well post-operatively and on Envarsus (tacrolimus ER) 7 mg daily and CellCept 720 mg bid for immunosuppression but re-hospitalized at [facility] after transfer from [facility] 12/28-1/5/2024 for NATHALY and hyperkalemia with supra-therapeutic tacrolimus level. He then had low back pain and found to have likely hemangioma in spine.  He was seen by hematology due to concern for possible myeloma but thought to have benign hemangioma; lower back improved with conservative management. He also had complications of procedure with biliary stent and stricture noted. He was on ursodiol.     Post operatively, he was noted to have issues with memory as well       Exam shows impaired gait as well as tremor but no signs of Parkinson's disease. Tremor is at rest and with movement and high frequency, low amplitude and most likely metabolic. In addition, cognitive evaluation initially was within normal limits with MOCA screening score 27/30 though with mild impairment in delayed recall and slow responses during attention testing.      Exam shows decreased sensation as well and could be due to underlying neuropathy - NCS/EMG was done and showed some atypical findings - NCS showed asymmetry in left lower extremity in terms of responses with some demyelinating type sensory neuropathy left sural nerve and left common peroneal motor nerve of unclear etiology; R hand also shows some right median mononeuropathy which could suggest a mononeuritis multiplex presentation - exam shows decreased sensation left leg as well and he endorses some localized pain in the mid to lower back as well - given he  continues to have balance issues with no readily apparent etiology on MRI brain as well as peripheral nerve workup, will check MRI C/T/L spine as well as monoclonal protein study, ESR, CRP, AUDRA, RF and vasculitis profile; of note, B12 and ammonia level done were normal.     1. Acute midline low back pain without sciatica  As noted above   - Sed Rate, Westergren (Automated); Future  - Monoclonal Protein Study; Future  - MRI CERVICAL+THOR+LUMB SPINE (ALL W+WO) (CPT=72156/67864/98897); Future  - C-Reactive Protein; Future  - Connective Tissue Disease (AUDRA) Screen, Reflex Specific Antibody; Future  - ANCA Panel Vasculitis; Future    2. Mononeuritis multiplex  As noted above   - Sed Rate, Westergren (Automated); Future  - Monoclonal Protein Study; Future  - MRI CERVICAL+THOR+LUMB SPINE (ALL W+WO) (CPT=72156/55415/38527); Future  - C-Reactive Protein; Future  - Connective Tissue Disease (AUDRA) Screen, Reflex Specific Antibody; Future  - ANCA Panel Vasculitis; Future    3. Tremor observed on examination  As noted above   - Sed Rate, Westergren (Automated); Future  - Monoclonal Protein Study; Future  - MRI CERVICAL+THOR+LUMB SPINE (ALL W+WO) (CPT=72156/18418/08067); Future  - C-Reactive Protein; Future  - Connective Tissue Disease (AUDRA) Screen, Reflex Specific Antibody; Future  - ANCA Panel Vasculitis; Future    4. Decreased vibratory sense  As noted above   - Sed Rate, Westergren (Automated); Future  - Monoclonal Protein Study; Future  - MRI CERVICAL+THOR+LUMB SPINE (ALL W+WO) (CPT=72156/03280/27992); Future  - C-Reactive Protein; Future  - Connective Tissue Disease (AUDRA) Screen, Reflex Specific Antibody; Future  - ANCA Panel Vasculitis; Future    5. S/P liver transplant (HCC)  As noted above   - Sed Rate, Westergren (Automated); Future  - Monoclonal Protein Study; Future  - MRI CERVICAL+THOR+LUMB SPINE (ALL W+WO) (CPT=72156/67724/35548); Future  - C-Reactive Protein; Future  - Connective Tissue Disease (AUDRA) Screen, Reflex  Specific Antibody; Future  - ANCA Panel Vasculitis; Future    6. Alcoholic cirrhosis of liver with ascites (HCC)  As noted above   - Sed Rate, Westergren (Automated); Future  - Monoclonal Protein Study; Future  - MRI CERVICAL+THOR+LUMB SPINE (ALL W+WO) (CPT=72156/42919/33839); Future  - C-Reactive Protein; Future  - Connective Tissue Disease (AUDRA) Screen, Reflex Specific Antibody; Future  - ANCA Panel Vasculitis; Future    7. Cognitive changes  As noted above   - Sed Rate, Westergren (Automated); Future  - Monoclonal Protein Study; Future  - MRI CERVICAL+THOR+LUMB SPINE (ALL W+WO) (CPT=72156/61496/13904); Future  - C-Reactive Protein; Future  - Connective Tissue Disease (AUDRA) Screen, Reflex Specific Antibody; Future  - ANCA Panel Vasculitis; Future    Return in about 3 months (around 8/3/2024).    Nir Blakely MD, Neurology  [facility]  Pager [phone]  5/3/2024

## 2024-05-03 NOTE — PATIENT INSTRUCTIONS
Refill policies:    Allow 2-3 business days for refills; controlled substances may take longer.  Contact your pharmacy at least 5 days prior to running out of medication and have them send an electronic request or submit request through the “request refill” option in your [employer] account.  Refills are not addressed on weekends; covering physicians do not authorize routine medications on weekends.  No narcotics or controlled substances are refilled after noon on Fridays or by on call physicians.  By law, narcotics must be electronically prescribed.  A 30 day supply with no refills is the maximum allowed.  If your prescription is due for a refill, you may be due for a follow up appointment.  To best provide you care, patients receiving routine medications need to be seen at least once a year.  Patients receiving narcotic/controlled substance medications need to be seen at least once every 3 months.  In the event that your preferred pharmacy does not have the requested medication in stock (e.g. Backordered), it is your responsibility to find another pharmacy that has the requested medication available.  We will gladly send a new prescription to that pharmacy at your request.    Scheduling Tests:    If your physician has ordered radiology tests such as MRI or CT scans, please contact Central Scheduling at [phone] right away to schedule the test.  Once scheduled, the [facility] Centralized Referral Team will work with your insurance carrier to obtain pre-certification or prior authorization.  Depending on your insurance carrier, approval may take 3-10 days.  It is highly recommended patients assure they have received an authorization before having a test performed.  If test is done without insurance authorization, patient may be responsible for the entire amount billed.      Precertification and Prior Authorizations:  If your physician has recommended that you have a procedure or additional testing performed the [facility]  Centralized Referral Team will contact your insurance carrier to obtain pre-certification or prior authorization.    You are strongly encouraged to contact your insurance carrier to verify that your procedure/test has been approved and is a COVERED benefit.  Although the [facility] Centralized Referral Team does its due diligence, the insurance carrier gives the disclaimer that \"Although the procedure is authorized, this does not guarantee payment.\"    Ultimately the patient is responsible for payment.   Thank you for your understanding in this matter.  Paperwork Completion:  If you require FMLA or disability paperwork for your recovery, please make sure to either drop it off or have it faxed to our office at [phone]. Be sure the form has your name and date of birth on it.  The form will be faxed to our Forms Department and they will complete it for you.  There is a 25$ fee for all forms that need to be filled out.  Please be aware there is a 10-14 day turnaround time.  You will need to sign a release of information (VITA) form if your paperwork does not come with one.  You may call the Forms Department with any questions at [phone].  Their fax number is [phone].

## 2024-05-06 ENCOUNTER — LAB ENCOUNTER (OUTPATIENT)
Dept: LAB | Age: 59
End: 2024-05-06
Attending: Other
Payer: COMMERCIAL

## 2024-05-06 DIAGNOSIS — Z51.81 ENCOUNTER FOR THERAPEUTIC DRUG MONITORING: ICD-10-CM

## 2024-05-06 DIAGNOSIS — K70.31 ALCOHOLIC CIRRHOSIS OF LIVER WITH ASCITES (HCC): ICD-10-CM

## 2024-05-06 DIAGNOSIS — R20.8 DECREASED VIBRATORY SENSE: ICD-10-CM

## 2024-05-06 DIAGNOSIS — M54.50 ACUTE MIDLINE LOW BACK PAIN WITHOUT SCIATICA: ICD-10-CM

## 2024-05-06 DIAGNOSIS — Z94.4 LIVER REPLACED BY TRANSPLANT (HCC): ICD-10-CM

## 2024-05-06 DIAGNOSIS — R41.89 COGNITIVE CHANGES: ICD-10-CM

## 2024-05-06 DIAGNOSIS — D84.9 IMMUNOSUPPRESSION (HCC): ICD-10-CM

## 2024-05-06 DIAGNOSIS — G58.7 MONONEURITIS MULTIPLEX: ICD-10-CM

## 2024-05-06 DIAGNOSIS — R25.1 TREMOR OBSERVED ON EXAMINATION: ICD-10-CM

## 2024-05-06 DIAGNOSIS — Z94.4 S/P LIVER TRANSPLANT (HCC): ICD-10-CM

## 2024-05-06 LAB
ALBUMIN SERPL-MCNC: 3.9 G/DL (ref 3.4–5)
ALBUMIN/GLOB SERPL: 1.1 {RATIO} (ref 1–2)
ALP LIVER SERPL-CCNC: 112 U/L
ALT SERPL-CCNC: 27 U/L
ANION GAP SERPL CALC-SCNC: 9 MMOL/L (ref 0–18)
AST SERPL-CCNC: 18 U/L (ref 15–37)
BASOPHILS # BLD AUTO: 0.04 X10(3) UL (ref 0–0.2)
BASOPHILS NFR BLD AUTO: 0.9 %
BILIRUB SERPL-MCNC: 0.6 MG/DL (ref 0.1–2)
BUN BLD-MCNC: 18 MG/DL (ref 9–23)
CALCIUM BLD-MCNC: 9.9 MG/DL (ref 8.5–10.1)
CHLORIDE SERPL-SCNC: 105 MMOL/L (ref 98–112)
CO2 SERPL-SCNC: 23 MMOL/L (ref 21–32)
CREAT BLD-MCNC: 1 MG/DL
CRP SERPL-MCNC: <0.29 MG/DL (ref ?–0.3)
EGFRCR SERPLBLD CKD-EPI 2021: 88 ML/MIN/1.73M2 (ref 60–?)
EOSINOPHIL # BLD AUTO: 0.1 X10(3) UL (ref 0–0.7)
EOSINOPHIL NFR BLD AUTO: 2.3 %
ERYTHROCYTE [DISTWIDTH] IN BLOOD BY AUTOMATED COUNT: 13.7 %
ERYTHROCYTE [SEDIMENTATION RATE] IN BLOOD: 10 MM/HR
FASTING STATUS PATIENT QL REPORTED: YES
GLOBULIN PLAS-MCNC: 3.5 G/DL (ref 2.8–4.4)
GLUCOSE BLD-MCNC: 111 MG/DL (ref 70–99)
HCT VFR BLD AUTO: 42.1 %
HGB BLD-MCNC: 14.6 G/DL
IMM GRANULOCYTES # BLD AUTO: 0.03 X10(3) UL (ref 0–1)
IMM GRANULOCYTES NFR BLD: 0.7 %
INR BLD: 0.92 (ref 0.8–1.2)
LYMPHOCYTES # BLD AUTO: 1.25 X10(3) UL (ref 1–4)
LYMPHOCYTES NFR BLD AUTO: 29.3 %
MAGNESIUM SERPL-MCNC: 2.2 MG/DL (ref 1.6–2.6)
MCH RBC QN AUTO: 29.6 PG (ref 26–34)
MCHC RBC AUTO-ENTMCNC: 34.7 G/DL (ref 31–37)
MCV RBC AUTO: 85.4 FL
MONOCYTES # BLD AUTO: 0.31 X10(3) UL (ref 0.1–1)
MONOCYTES NFR BLD AUTO: 7.3 %
NEUTROPHILS # BLD AUTO: 2.54 X10 (3) UL (ref 1.5–7.7)
NEUTROPHILS # BLD AUTO: 2.54 X10(3) UL (ref 1.5–7.7)
NEUTROPHILS NFR BLD AUTO: 59.5 %
OSMOLALITY SERPL CALC.SUM OF ELEC: 287 MOSM/KG (ref 275–295)
PLATELET # BLD AUTO: 178 10(3)UL (ref 150–450)
POTASSIUM SERPL-SCNC: 4.4 MMOL/L (ref 3.5–5.1)
PROT SERPL-MCNC: 7.4 G/DL (ref 6.4–8.2)
PROTHROMBIN TIME: 12.4 SECONDS (ref 11.6–14.8)
RBC # BLD AUTO: 4.93 X10(6)UL
SODIUM SERPL-SCNC: 137 MMOL/L (ref 136–145)
WBC # BLD AUTO: 4.3 X10(3) UL (ref 4–11)

## 2024-05-06 PROCEDURE — 80197 ASSAY OF TACROLIMUS: CPT

## 2024-05-06 PROCEDURE — 84165 PROTEIN E-PHORESIS SERUM: CPT

## 2024-05-06 PROCEDURE — 86334 IMMUNOFIX E-PHORESIS SERUM: CPT

## 2024-05-06 PROCEDURE — 85610 PROTHROMBIN TIME: CPT

## 2024-05-06 PROCEDURE — 83521 IG LIGHT CHAINS FREE EACH: CPT

## 2024-05-06 PROCEDURE — 86038 ANTINUCLEAR ANTIBODIES: CPT

## 2024-05-06 PROCEDURE — 86225 DNA ANTIBODY NATIVE: CPT

## 2024-05-06 PROCEDURE — 80053 COMPREHEN METABOLIC PANEL: CPT

## 2024-05-06 PROCEDURE — 86140 C-REACTIVE PROTEIN: CPT

## 2024-05-06 PROCEDURE — 36415 COLL VENOUS BLD VENIPUNCTURE: CPT

## 2024-05-06 PROCEDURE — 83516 IMMUNOASSAY NONANTIBODY: CPT

## 2024-05-06 PROCEDURE — 83735 ASSAY OF MAGNESIUM: CPT

## 2024-05-06 PROCEDURE — 85025 COMPLETE CBC W/AUTO DIFF WBC: CPT

## 2024-05-06 PROCEDURE — 85652 RBC SED RATE AUTOMATED: CPT

## 2024-05-06 PROCEDURE — 86037 ANCA TITER EACH ANTIBODY: CPT

## 2024-05-07 LAB
ALBUMIN SERPL ELPH-MCNC: 4.33 G/DL (ref 3.75–5.21)
ALBUMIN/GLOB SERPL: 1.62 {RATIO} (ref 1–2)
ALPHA1 GLOB SERPL ELPH-MCNC: 0.32 G/DL (ref 0.19–0.46)
ALPHA2 GLOB SERPL ELPH-MCNC: 0.64 G/DL (ref 0.48–1.05)
ANTI-MPO ANTIBODIES: <0.2 UNITS
ANTI-PR3 ANTIBODIES: <0.2 UNITS
B-GLOBULIN SERPL ELPH-MCNC: 0.82 G/DL (ref 0.68–1.23)
DSDNA IGG SERPL IA-ACNC: 2.3 IU/ML
ENA AB SER QL IA: 0.6 UG/L
ENA AB SER QL IA: NEGATIVE
GAMMA GLOB SERPL ELPH-MCNC: 0.9 G/DL (ref 0.62–1.7)
KAPPA LC FREE SER-MCNC: 3.38 MG/DL (ref 0.33–1.94)
KAPPA LC FREE/LAMBDA FREE SER NEPH: 1.48 {RATIO} (ref 0.26–1.65)
LAMBDA LC FREE SERPL-MCNC: 2.29 MG/DL (ref 0.57–2.63)
PROT SERPL-MCNC: 7 G/DL (ref 5.7–8.2)

## 2024-05-08 LAB — TACROLIMUS LVL: 5.9 NG/ML

## 2024-05-12 DIAGNOSIS — N52.9 ERECTILE DYSFUNCTION, UNSPECIFIED ERECTILE DYSFUNCTION TYPE: ICD-10-CM

## 2024-05-12 DIAGNOSIS — M54.50 MIDLINE LOW BACK PAIN, UNSPECIFIED CHRONICITY, UNSPECIFIED WHETHER SCIATICA PRESENT: Primary | ICD-10-CM

## 2024-05-13 RX ORDER — SILDENAFIL 100 MG/1
TABLET, FILM COATED ORAL DAILY PRN
Qty: 45 TABLET | Refills: 0 | Status: SHIPPED | OUTPATIENT
Start: 2024-05-13

## 2024-05-13 RX ORDER — CYCLOBENZAPRINE HCL 5 MG
5 TABLET ORAL 3 TIMES DAILY PRN
Qty: 270 TABLET | Refills: 0 | Status: SHIPPED | OUTPATIENT
Start: 2024-05-13 | End: 2024-08-11

## 2024-05-28 RX ORDER — URSODIOL 300 MG/1
300 CAPSULE ORAL 3 TIMES DAILY
Qty: 270 CAPSULE | Refills: 1 | Status: SHIPPED | OUTPATIENT
Start: 2024-05-28

## 2024-05-28 NOTE — TELEPHONE ENCOUNTER
A refill request was received for:  Requested Prescriptions     Pending Prescriptions Disp Refills    URSODIOL 300 MG Oral Cap [Pharmacy Med Name: URSODIOL 300MG CAPSULES] 270 capsule 1     Sig: TAKE 1 CAPSULE(300 MG) BY MOUTH THREE TIMES DAILY       Last refill date:   2/23/2024    Last office visit:  2/9/2024(Hospital Discharge)    Follow up due:  Future Appointments   Date Time Provider Department Center   6/11/2024  7:45 AM YUDITH MR RM1 (1.5T) [facility] [address]   7/15/2024  1:45 PM Crystal Bryan MD [facility]

## 2024-06-03 ENCOUNTER — LAB ENCOUNTER (OUTPATIENT)
Dept: LAB | Age: 59
End: 2024-06-03
Attending: INTERNAL MEDICINE
Payer: COMMERCIAL

## 2024-06-03 ENCOUNTER — TELEPHONE (OUTPATIENT)
Dept: FAMILY MEDICINE CLINIC | Facility: CLINIC | Age: 59
End: 2024-06-03

## 2024-06-03 DIAGNOSIS — Z13.220 LIPID SCREENING: Primary | ICD-10-CM

## 2024-06-03 DIAGNOSIS — Z51.81 ENCOUNTER FOR THERAPEUTIC DRUG MONITORING: ICD-10-CM

## 2024-06-03 DIAGNOSIS — D84.9 IMMUNOSUPPRESSION (HCC): ICD-10-CM

## 2024-06-03 DIAGNOSIS — Z94.4 LIVER REPLACED BY TRANSPLANT (HCC): ICD-10-CM

## 2024-06-03 LAB
ALBUMIN SERPL-MCNC: 4 G/DL (ref 3.4–5)
ALBUMIN/GLOB SERPL: 1.2 {RATIO} (ref 1–2)
ALP LIVER SERPL-CCNC: 103 U/L
ALT SERPL-CCNC: 24 U/L
ANION GAP SERPL CALC-SCNC: 6 MMOL/L (ref 0–18)
AST SERPL-CCNC: 17 U/L (ref 15–37)
BASOPHILS # BLD AUTO: 0.05 X10(3) UL (ref 0–0.2)
BASOPHILS NFR BLD AUTO: 0.8 %
BILIRUB SERPL-MCNC: 0.5 MG/DL (ref 0.1–2)
BUN BLD-MCNC: 17 MG/DL (ref 9–23)
CALCIUM BLD-MCNC: 9.6 MG/DL (ref 8.5–10.1)
CHLORIDE SERPL-SCNC: 105 MMOL/L (ref 98–112)
CO2 SERPL-SCNC: 25 MMOL/L (ref 21–32)
CREAT BLD-MCNC: 1.21 MG/DL
EGFRCR SERPLBLD CKD-EPI 2021: 70 ML/MIN/1.73M2 (ref 60–?)
EOSINOPHIL # BLD AUTO: 0.12 X10(3) UL (ref 0–0.7)
EOSINOPHIL NFR BLD AUTO: 1.9 %
ERYTHROCYTE [DISTWIDTH] IN BLOOD BY AUTOMATED COUNT: 13.7 %
FASTING STATUS PATIENT QL REPORTED: NO
GLOBULIN PLAS-MCNC: 3.4 G/DL (ref 2.8–4.4)
GLUCOSE BLD-MCNC: 106 MG/DL (ref 70–99)
HCT VFR BLD AUTO: 40.7 %
HGB BLD-MCNC: 14.4 G/DL
IMM GRANULOCYTES # BLD AUTO: 0.03 X10(3) UL (ref 0–1)
IMM GRANULOCYTES NFR BLD: 0.5 %
LYMPHOCYTES # BLD AUTO: 1.6 X10(3) UL (ref 1–4)
LYMPHOCYTES NFR BLD AUTO: 25.2 %
MAGNESIUM SERPL-MCNC: 1.9 MG/DL (ref 1.6–2.6)
MCH RBC QN AUTO: 30.2 PG (ref 26–34)
MCHC RBC AUTO-ENTMCNC: 35.4 G/DL (ref 31–37)
MCV RBC AUTO: 85.3 FL
MONOCYTES # BLD AUTO: 0.4 X10(3) UL (ref 0.1–1)
MONOCYTES NFR BLD AUTO: 6.3 %
NEUTROPHILS # BLD AUTO: 4.15 X10 (3) UL (ref 1.5–7.7)
NEUTROPHILS # BLD AUTO: 4.15 X10(3) UL (ref 1.5–7.7)
NEUTROPHILS NFR BLD AUTO: 65.3 %
OSMOLALITY SERPL CALC.SUM OF ELEC: 284 MOSM/KG (ref 275–295)
PLATELET # BLD AUTO: 178 10(3)UL (ref 150–450)
POTASSIUM SERPL-SCNC: 5 MMOL/L (ref 3.5–5.1)
PROT SERPL-MCNC: 7.4 G/DL (ref 6.4–8.2)
RBC # BLD AUTO: 4.77 X10(6)UL
SODIUM SERPL-SCNC: 136 MMOL/L (ref 136–145)
WBC # BLD AUTO: 6.4 X10(3) UL (ref 4–11)

## 2024-06-03 PROCEDURE — 80197 ASSAY OF TACROLIMUS: CPT

## 2024-06-03 PROCEDURE — 85025 COMPLETE CBC W/AUTO DIFF WBC: CPT

## 2024-06-03 PROCEDURE — 83735 ASSAY OF MAGNESIUM: CPT

## 2024-06-03 PROCEDURE — 36415 COLL VENOUS BLD VENIPUNCTURE: CPT

## 2024-06-03 PROCEDURE — 80053 COMPREHEN METABOLIC PANEL: CPT

## 2024-06-06 LAB — TACROLIMUS LVL: 11.5 NG/ML

## 2024-06-10 ENCOUNTER — LABORATORY ENCOUNTER (OUTPATIENT)
Dept: LAB | Age: 59
End: 2024-06-10
Attending: FAMILY MEDICINE
Payer: COMMERCIAL

## 2024-06-10 DIAGNOSIS — Z94.4 LIVER REPLACED BY TRANSPLANT (HCC): ICD-10-CM

## 2024-06-10 DIAGNOSIS — D84.9 IMMUNOSUPPRESSION (HCC): ICD-10-CM

## 2024-06-10 DIAGNOSIS — Z51.81 ENCOUNTER FOR THERAPEUTIC DRUG MONITORING: ICD-10-CM

## 2024-06-10 LAB
ALBUMIN SERPL-MCNC: 4 G/DL (ref 3.4–5)
ALBUMIN/GLOB SERPL: 1.1 {RATIO} (ref 1–2)
ALP LIVER SERPL-CCNC: 101 U/L
ALT SERPL-CCNC: 21 U/L
ANION GAP SERPL CALC-SCNC: 6 MMOL/L (ref 0–18)
AST SERPL-CCNC: 10 U/L (ref 15–37)
BASOPHILS # BLD AUTO: 0.05 X10(3) UL (ref 0–0.2)
BASOPHILS NFR BLD AUTO: 0.9 %
BILIRUB SERPL-MCNC: 0.6 MG/DL (ref 0.1–2)
BUN BLD-MCNC: 20 MG/DL (ref 9–23)
CALCIUM BLD-MCNC: 10 MG/DL (ref 8.5–10.1)
CHLORIDE SERPL-SCNC: 107 MMOL/L (ref 98–112)
CO2 SERPL-SCNC: 26 MMOL/L (ref 21–32)
CREAT BLD-MCNC: 1.43 MG/DL
EGFRCR SERPLBLD CKD-EPI 2021: 57 ML/MIN/1.73M2 (ref 60–?)
EOSINOPHIL # BLD AUTO: 0.15 X10(3) UL (ref 0–0.7)
EOSINOPHIL NFR BLD AUTO: 2.8 %
ERYTHROCYTE [DISTWIDTH] IN BLOOD BY AUTOMATED COUNT: 13.8 %
FASTING STATUS PATIENT QL REPORTED: YES
GLOBULIN PLAS-MCNC: 3.6 G/DL (ref 2.8–4.4)
GLUCOSE BLD-MCNC: 108 MG/DL (ref 70–99)
HCT VFR BLD AUTO: 43 %
HGB BLD-MCNC: 14.6 G/DL
IMM GRANULOCYTES # BLD AUTO: 0.02 X10(3) UL (ref 0–1)
IMM GRANULOCYTES NFR BLD: 0.4 %
LYMPHOCYTES # BLD AUTO: 1.2 X10(3) UL (ref 1–4)
LYMPHOCYTES NFR BLD AUTO: 22.1 %
MAGNESIUM SERPL-MCNC: 1.9 MG/DL (ref 1.6–2.6)
MCH RBC QN AUTO: 30 PG (ref 26–34)
MCHC RBC AUTO-ENTMCNC: 34 G/DL (ref 31–37)
MCV RBC AUTO: 88.3 FL
MONOCYTES # BLD AUTO: 0.34 X10(3) UL (ref 0.1–1)
MONOCYTES NFR BLD AUTO: 6.3 %
NEUTROPHILS # BLD AUTO: 3.68 X10 (3) UL (ref 1.5–7.7)
NEUTROPHILS # BLD AUTO: 3.68 X10(3) UL (ref 1.5–7.7)
NEUTROPHILS NFR BLD AUTO: 67.5 %
OSMOLALITY SERPL CALC.SUM OF ELEC: 291 MOSM/KG (ref 275–295)
PLATELET # BLD AUTO: 190 10(3)UL (ref 150–450)
POTASSIUM SERPL-SCNC: 5.2 MMOL/L (ref 3.5–5.1)
PROT SERPL-MCNC: 7.6 G/DL (ref 6.4–8.2)
RBC # BLD AUTO: 4.87 X10(6)UL
SODIUM SERPL-SCNC: 139 MMOL/L (ref 136–145)
WBC # BLD AUTO: 5.4 X10(3) UL (ref 4–11)

## 2024-06-10 PROCEDURE — 80197 ASSAY OF TACROLIMUS: CPT

## 2024-06-10 PROCEDURE — 85025 COMPLETE CBC W/AUTO DIFF WBC: CPT

## 2024-06-10 PROCEDURE — 80053 COMPREHEN METABOLIC PANEL: CPT

## 2024-06-10 PROCEDURE — 36415 COLL VENOUS BLD VENIPUNCTURE: CPT

## 2024-06-10 PROCEDURE — 83735 ASSAY OF MAGNESIUM: CPT

## 2024-06-11 ENCOUNTER — HOSPITAL ENCOUNTER (OUTPATIENT)
Dept: MRI IMAGING | Age: 59
Discharge: HOME | End: 2024-06-11
Attending: Other
Payer: COMMERCIAL

## 2024-06-11 DIAGNOSIS — Z94.4 S/P LIVER TRANSPLANT (HCC): ICD-10-CM

## 2024-06-11 DIAGNOSIS — K70.31 ALCOHOLIC CIRRHOSIS OF LIVER WITH ASCITES (HCC): ICD-10-CM

## 2024-06-11 DIAGNOSIS — R20.8 DECREASED VIBRATORY SENSE: ICD-10-CM

## 2024-06-11 DIAGNOSIS — R41.89 COGNITIVE CHANGES: ICD-10-CM

## 2024-06-11 DIAGNOSIS — M54.50 ACUTE MIDLINE LOW BACK PAIN WITHOUT SCIATICA: ICD-10-CM

## 2024-06-11 DIAGNOSIS — G58.7 MONONEURITIS MULTIPLEX: ICD-10-CM

## 2024-06-11 DIAGNOSIS — R25.1 TREMOR OBSERVED ON EXAMINATION: ICD-10-CM

## 2024-06-11 PROCEDURE — A9575 INJ GADOTERATE MEGLUMI 0.1ML: HCPCS | Performed by: OTHER

## 2024-06-11 PROCEDURE — 72157 MRI CHEST SPINE W/O & W/DYE: CPT | Performed by: OTHER

## 2024-06-11 PROCEDURE — 72156 MRI NECK SPINE W/O & W/DYE: CPT | Performed by: OTHER

## 2024-06-11 PROCEDURE — 72158 MRI LUMBAR SPINE W/O & W/DYE: CPT | Performed by: OTHER

## 2024-06-11 RX ORDER — GADOTERATE MEGLUMINE 376.9 MG/ML
20 INJECTION INTRAVENOUS
Status: COMPLETED | OUTPATIENT
Start: 2024-06-11 | End: 2024-06-11

## 2024-06-11 RX ADMIN — GADOTERATE MEGLUMINE 17 ML: 376.9 INJECTION INTRAVENOUS at 10:16:00

## 2024-06-12 LAB — TACROLIMUS LVL: 6.2 NG/ML

## 2024-06-21 ENCOUNTER — LAB ENCOUNTER (OUTPATIENT)
Dept: LAB | Age: 59
End: 2024-06-21
Attending: FAMILY MEDICINE

## 2024-06-21 DIAGNOSIS — D84.9 IMMUNOSUPPRESSION (HCC): ICD-10-CM

## 2024-06-21 DIAGNOSIS — Z94.4 LIVER REPLACED BY TRANSPLANT (HCC): ICD-10-CM

## 2024-06-21 DIAGNOSIS — Z13.220 LIPID SCREENING: ICD-10-CM

## 2024-06-21 DIAGNOSIS — Z51.81 ENCOUNTER FOR THERAPEUTIC DRUG MONITORING: ICD-10-CM

## 2024-06-21 LAB
ALBUMIN SERPL-MCNC: 4 G/DL (ref 3.4–5)
ALBUMIN/GLOB SERPL: 1.1 {RATIO} (ref 1–2)
ALP LIVER SERPL-CCNC: 107 U/L
ALT SERPL-CCNC: 24 U/L
ANION GAP SERPL CALC-SCNC: 6 MMOL/L (ref 0–18)
AST SERPL-CCNC: 20 U/L (ref 15–37)
BASOPHILS # BLD AUTO: 0.03 X10(3) UL (ref 0–0.2)
BASOPHILS NFR BLD AUTO: 0.6 %
BILIRUB SERPL-MCNC: 0.8 MG/DL (ref 0.1–2)
BUN BLD-MCNC: 18 MG/DL (ref 9–23)
CALCIUM BLD-MCNC: 9.9 MG/DL (ref 8.5–10.1)
CHLORIDE SERPL-SCNC: 106 MMOL/L (ref 98–112)
CHOLEST SERPL-MCNC: 180 MG/DL (ref ?–200)
CO2 SERPL-SCNC: 25 MMOL/L (ref 21–32)
CREAT BLD-MCNC: 1.31 MG/DL
EGFRCR SERPLBLD CKD-EPI 2021: 63 ML/MIN/1.73M2 (ref 60–?)
EOSINOPHIL # BLD AUTO: 0.1 X10(3) UL (ref 0–0.7)
EOSINOPHIL NFR BLD AUTO: 2.1 %
ERYTHROCYTE [DISTWIDTH] IN BLOOD BY AUTOMATED COUNT: 13.2 %
FASTING PATIENT LIPID ANSWER: YES
FASTING STATUS PATIENT QL REPORTED: YES
GLOBULIN PLAS-MCNC: 3.6 G/DL (ref 2.8–4.4)
GLUCOSE BLD-MCNC: 117 MG/DL (ref 70–99)
HCT VFR BLD AUTO: 44.8 %
HDLC SERPL-MCNC: 53 MG/DL (ref 40–59)
HGB BLD-MCNC: 15.1 G/DL
IMM GRANULOCYTES # BLD AUTO: 0.01 X10(3) UL (ref 0–1)
IMM GRANULOCYTES NFR BLD: 0.2 %
LDLC SERPL CALC-MCNC: 92 MG/DL (ref ?–100)
LYMPHOCYTES # BLD AUTO: 1.05 X10(3) UL (ref 1–4)
LYMPHOCYTES NFR BLD AUTO: 22.1 %
MAGNESIUM SERPL-MCNC: 1.8 MG/DL (ref 1.6–2.6)
MCH RBC QN AUTO: 29.5 PG (ref 26–34)
MCHC RBC AUTO-ENTMCNC: 33.7 G/DL (ref 31–37)
MCV RBC AUTO: 87.7 FL
MONOCYTES # BLD AUTO: 0.32 X10(3) UL (ref 0.1–1)
MONOCYTES NFR BLD AUTO: 6.7 %
NEUTROPHILS # BLD AUTO: 3.25 X10 (3) UL (ref 1.5–7.7)
NEUTROPHILS # BLD AUTO: 3.25 X10(3) UL (ref 1.5–7.7)
NEUTROPHILS NFR BLD AUTO: 68.3 %
NONHDLC SERPL-MCNC: 127 MG/DL (ref ?–130)
OSMOLALITY SERPL CALC.SUM OF ELEC: 287 MOSM/KG (ref 275–295)
PLATELET # BLD AUTO: 196 10(3)UL (ref 150–450)
POTASSIUM SERPL-SCNC: 4.5 MMOL/L (ref 3.5–5.1)
PROT SERPL-MCNC: 7.6 G/DL (ref 6.4–8.2)
RBC # BLD AUTO: 5.11 X10(6)UL
SODIUM SERPL-SCNC: 137 MMOL/L (ref 136–145)
TRIGL SERPL-MCNC: 204 MG/DL (ref 30–149)
VLDLC SERPL CALC-MCNC: 33 MG/DL (ref 0–30)
WBC # BLD AUTO: 4.8 X10(3) UL (ref 4–11)

## 2024-06-21 PROCEDURE — 80053 COMPREHEN METABOLIC PANEL: CPT

## 2024-06-21 PROCEDURE — 85025 COMPLETE CBC W/AUTO DIFF WBC: CPT

## 2024-06-21 PROCEDURE — 80061 LIPID PANEL: CPT

## 2024-06-21 PROCEDURE — 83735 ASSAY OF MAGNESIUM: CPT

## 2024-06-21 PROCEDURE — 80197 ASSAY OF TACROLIMUS: CPT

## 2024-06-21 PROCEDURE — 36415 COLL VENOUS BLD VENIPUNCTURE: CPT

## 2024-06-24 LAB — TACROLIMUS LVL: 5.3 NG/ML

## 2024-07-15 ENCOUNTER — OFFICE VISIT (OUTPATIENT)
Dept: SURGERY | Facility: CLINIC | Age: 59
End: 2024-07-15

## 2024-07-15 ENCOUNTER — OFFICE VISIT (OUTPATIENT)
Dept: NEUROLOGY | Facility: CLINIC | Age: 59
End: 2024-07-15
Payer: COMMERCIAL

## 2024-07-15 VITALS
SYSTOLIC BLOOD PRESSURE: 132 MMHG | TEMPERATURE: 98 F | HEART RATE: 82 BPM | RESPIRATION RATE: 19 BRPM | DIASTOLIC BLOOD PRESSURE: 85 MMHG | OXYGEN SATURATION: 97 %

## 2024-07-15 VITALS
SYSTOLIC BLOOD PRESSURE: 132 MMHG | HEIGHT: 68 IN | BODY MASS INDEX: 25.16 KG/M2 | RESPIRATION RATE: 16 BRPM | DIASTOLIC BLOOD PRESSURE: 72 MMHG | HEART RATE: 84 BPM | WEIGHT: 166 LBS

## 2024-07-15 DIAGNOSIS — Z94.4 STATUS POST LIVER TRANSPLANT (HCC): Primary | ICD-10-CM

## 2024-07-15 DIAGNOSIS — Z94.4 S/P LIVER TRANSPLANT (HCC): ICD-10-CM

## 2024-07-15 DIAGNOSIS — R25.1 TREMOR OBSERVED ON EXAMINATION: ICD-10-CM

## 2024-07-15 DIAGNOSIS — M54.16 LUMBAR RADICULOPATHY: Primary | ICD-10-CM

## 2024-07-15 DIAGNOSIS — M51.36 LUMBAR DEGENERATIVE DISC DISEASE: ICD-10-CM

## 2024-07-15 PROCEDURE — 99213 OFFICE O/P EST LOW 20 MIN: CPT | Performed by: OTHER

## 2024-07-15 NOTE — PROGRESS NOTES
[facility] – part of the [facility] Progress Note    HPI  Chief Complaint   Patient presents with    Neurologic Problem     Cognitive changes, reports no change in Sx's    Back Pain     LBP, radiating down BLE, notes balance changes and pain w/ walking    Test Results     MRI Brain/C/T spine, EMG        As per my initial H&P from 3/1/2024,   \" Richard Palacios is a 58 year old, who presents for evaluation of tremor, balance issues and memory changes.     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  He was reportedly doing well post-operatively and on Envarsus (tacrolimus ER) 7 mg daily and CellCept 720 mg bid for immunosuppression but re-hospitalized at [facility] after transfer from [facility] 12/28-1/5/2024 for NATHALY and hyperkalemia with supra-therapeutic tacrolimus level. He then had low back pain and found to have likely hemangioma in spine.  He was seen by hematology due to concern for possible myeloma but thought to have benign hemangioma; lower back improved with conservative management. He also had complications of procedure with biliary stent and stricture noted. He was on ursodiol.     Post operatively, he was noted to have issues with memory as well as balance and tremor and recommended to see neurology.     Patient states he started to have issues with tremor and memory in November which first started as his liver was failing.  He states even after liver transplant he has been having fatigue and balance issues and tremor.  Tremor is at rest and with movement.  He notes he may be worse on one side or the other depending on if he is holding in object in the his hands.     He admits to some tingling in hands but denies numbness/tingling in feet. He denies any episodes of speech arrest or loss of consciousness but admits he has issues with recalling conversations. He has some low back pain but not radiating to legs and denies bowel / bladder  incontinence. Overall, he feels his memory is worsening as the day goes on and admits he has been taking Flexeril, tramadol and trazodone near daily.      He denies any falls but is off balance when walking.       He states his liver rapidly failed in the past year but was diagnosed with cirrhosis 9/2022.      He states he first noted he was having issues with calculating a budget for his presentation at an end of the year conference and this was not a usual occurrence.  He also noted he would obtain the wrong object for what he needed (ie went to kitchen to get a spoon for his jello and then would come back with a cracker).          Otherwise, patient denies any recent weight change, fevers, chills, nausea, double vision/ blurry vision / loss of vision, chest pain, palpitations, shortness of breath, rashes, joint pains, bowel / bladder incontinence or mood issues. \"       Prior notes as per 5/3/2024.  Patient since last visit has been feeling his memory is worsening - he is driving and able to take his medications on own but notes he has a hard time remembering names.       He also notes pain in the mid to lower back and some radiation to R calf at times.  He denies prior history of spinal surgery. He notes pain when he is lying down as well.       Patient last seen 5/3/2024.  He overall is stable in terms of memory and denies getting lost when driving. He has continued low midline back pain at time radiating to L leg and notes balance changes and pain when walking but no falls.         Past Medical History:    Abdominal pain    Ascites    Back problem    COVID    Disorder of liver    Cirrhosis - no longer existent since surgery 12/19/23    Esophageal reflux    Essential hypertension    ETOH abuse    Gout    Hypoalbuminemia    Nausea    Personal history of alcoholism (HCC)    Weight loss     Past Surgical History:   Procedure Laterality Date    Colonoscopy N/A 11/20/2023    Procedure: .;  Surgeon: Gus Ocampo,  MD;  Location:  ENDOSCOPY    Colonoscopy N/A 2023    Procedure: COLONOSCOPY with cold snare polypectomy and clip placement x1;  Surgeon: Robin Raya DO;  Location:  ENDOSCOPY    Organ transplant  2023    Liver transplant    Tonsillectomy       Family History   Problem Relation Age of Onset    Other (alcoholism) Father     Diabetes Mother         Type I    Other (CAD) Mother      Social History     Socioeconomic History    Marital status:    Tobacco Use    Smoking status: Former     Current packs/day: 0.00     Types: Cigarettes     Quit date: 1984     Years since quittin.4     Passive exposure: Never    Smokeless tobacco: Current    Tobacco comments:     Still not smoking cigarettes since . Do smoke a couple of cigars a week.   Vaping Use    Vaping status: Never Used   Substance and Sexual Activity    Alcohol use: Never    Drug use: No   Other Topics Concern    Caffeine Concern Yes     Comment: 1 cup a day    Exercise No       No Known Allergies      Current Outpatient Medications:     URSODIOL 300 MG Oral Cap, TAKE 1 CAPSULE(300 MG) BY MOUTH THREE TIMES DAILY, Disp: 270 capsule, Rfl: 1    cyclobenzaprine 5 MG Oral Tab, Take 1 tablet (5 mg total) by mouth 3 (three) times daily as needed for Muscle spasms., Disp: 270 tablet, Rfl: 0    SILDENAFIL CITRATE 100 MG Oral Tab, TAKE 1/2 TO 1 TABLET(50  MG) BY MOUTH DAILY AS NEEDED FOR ERECTILE DYSFUNCTION, Disp: 45 tablet, Rfl: 0    traZODone 100 MG Oral Tab, Take 1 tablet (100 mg total) by mouth nightly., Disp: 90 tablet, Rfl: 1    LOKELMA 10 g Oral Powd Pack, Take 1 packet (10 g total) by mouth daily., Disp: 90 packet, Rfl: 1    Tacrolimus ER (ENVARSUS XR) 0.75 MG Oral Tablet 24 Hr, Take 1 tablet by mouth as needed., Disp: , Rfl:     Magnesium Oxide -Mg Supplement 400 (240 Mg) MG Oral Tab, Take 1 tablet (400 mg total) by mouth daily., Disp: 30 tablet, Rfl: 1    ergocalciferol 1.25 MG (74157 UT) Oral Cap, Take 1 capsule  (50,000 Units total) by mouth once a week. Q Sunday, Disp: , Rfl:     ENVARSUS XR 1 MG Oral Tablet 24 Hr, in the morning, at noon, and at bedtime., Disp: , Rfl:     allopurinol 100 MG Oral Tab, TAKE 1 TABLET(100 MG) BY MOUTH DAILY, Disp: 90 tablet, Rfl: 3    tacrolimus 1 MG Oral Cap, Take 2 capsules (2 mg total) by mouth daily., Disp: , Rfl:     Mycophenolate Sodium (MYCOPHENOLIC ACID OR), Take 720 mg by mouth in the morning and 720 mg before bedtime., Disp: , Rfl:     sulfamethoxazole-trimethoprim 400-80 MG Oral Tab, Take 1 tablet by mouth every evening. 2 tabs mon and thurs, Disp: , Rfl:     valGANciclovir 450 MG Oral Tab, Take 1 tablet (450 mg total) by mouth daily., Disp: , Rfl:     aspirin 81 MG Oral Tab EC, Take 1 tablet (81 mg total) by mouth daily., Disp: , Rfl:     pantoprazole 40 MG Oral Tab EC, Take 1 tablet (40 mg total) by mouth 2 (two) times daily before meals., Disp: 60 tablet, Rfl: 11    Review of Systems:  No chest pain or palpitations; otherwise as noted in HPI.    Exam:  /72 (BP Location: Left arm, Patient Position: Sitting, Cuff Size: adult)   Pulse 84   Resp 16   Ht 68\"   Wt 166 lb (75.3 kg)   BMI 25.24 kg/m²   Estimated body mass index is 25.24 kg/m² as calculated from the following:    Height as of this encounter: 68\".    Weight as of this encounter: 166 lb (75.3 kg).    Gen: well developed, well nourished, no acute distress  HEENT: normocephalic  Heart; normal S1/S2, regular rate and rhythm  Lungs: clear to auscultation bilaterally  Extremities: no edema, peripheral pulses intact    Neck: supple, full range of motion; no carotid bruits    Fundoscopic Exam: optic discs sharp bilaterally    Neuro:  Mental status:  Orientation: Alert and oriented to person, place, time  Speech Fluent and conversational        CN: PERRL, EOMI with no nystagmus, VFF, smile symmetric, sensation intact, tongue and palate midline, SCM intact, otherwise, CN 2-12 intact  Motor: 5/5 strength throughout, tone  normal    Tremor noted, high frequency low amplitude at rest bilaterally or with sustaining posture, slightly more promienent R side; no decrement of amplitude but slow bilaterally; no clear rigidity or bradykinesia; overall slow rapid alternating movements and slow but not ataxic FNF and HKS bilaterally    DTR:  3+ brisk patellars, 1+ ankle jerks, 2+ but slightly brisker R UE otherwise, no clonus, toes downgoing bilaterally    Sensory: intact to light touch throughout; Pin is reduced left LE up to above the knee and intact R side   Vibration is reduced -  2-3 sec great toe left and 7 on R    Coord: FNF intact with no tremor or dysmetria; rapid alternating movements intact bilaterally  Romberg: present  Gait: Independent without cane or walker - overall improved, able to walk on heels, toes and briefly tandem    Labs:  New    Component      Latest Ref Rng 5/6/2024   PROTEIN, TOTAL      5.7 - 8.2 g/dL 7.0    Albumin      3.75 - 5.21 g/dL 4.33    ALPHA-1-GLOBULINS      0.19 - 0.46 g/dL 0.32    ALPHA-2-GLOBULINS      0.48 - 1.05 g/dL 0.64    BETA GLOBULINS      0.68 - 1.23 g/dL 0.82    GAMMA GLOBULINS      0.62 - 1.70 g/dL 0.90    ALBUMIN/GLOBULIN RATIO      1.00 - 2.00  1.62    SPE INTERPRETATION No apparent monoclonal protein on serum electrophoresis.    IMMUNOFIXATION No monoclonal protein detected by immunofixation.    KAPPA FREE LIGHT CHAIN      0.330 - 1.940 mg/dL 3.382 (H)    LAMBDA FREE LIGHT CHAIN      0.571 - 2.630 mg/dL 2.288    KAPPA/LAMBDA FLC RATIO      0.26 - 1.65  1.48    Reviewed By: Reviewed by Andriy Arboleda M.D. Pathology 05/07/24 at 3:25 PM    MYELOPEROX ANTIBODIES, IGG      0.0 - 0.9 units <0.2    SERINE PROTEASE3, IGG      0.0 - 0.9 units <0.2    Cytoplasmic (C-ANCA)      Neg:<1:20 titer <1:20    Perinuclear (P-ANCA)      Neg:<1:20 titer <1:20    ATYPICAL PANCA      Neg:<1:20 titer <1:20    Expanded STAN Antibody Screen, IGG      <0.7 ug/l 0.60    Anti-dsDNA antibody      <10 IU/mL 2.3     Connective Tissue Disease Screen Interpretation      Negative  Negative    SED RATE      0 - 20 mm/Hr 10    C-REACTIVE PROTEIN      <0.30 mg/dL <0.29       Prior as noted below    Component      Latest Ref Rng 3/5/2024 3/26/2024 4/5/2024   WBC      4.0 - 11.0 x10(3) uL   4.9    RBC      4.30 - 5.70 x10(6)uL   4.66    Hemoglobin      13.0 - 17.5 g/dL   13.5    Hematocrit      39.0 - 53.0 %   40.8    Platelet Count      150.0 - 450.0 10(3)uL   200.0    MCV      80.0 - 100.0 fL   87.6    MCH      26.0 - 34.0 pg   29.0    MCHC      31.0 - 37.0 g/dL   33.1    RDW      %   12.9    Prelim Neutrophil Abs      1.50 - 7.70 x10 (3) uL   3.26    Neutrophils Absolute      1.50 - 7.70 x10(3) uL   3.26    Lymphocytes Absolute      1.00 - 4.00 x10(3) uL   1.21    Monocytes Absolute      0.10 - 1.00 x10(3) uL   0.31    Eosinophils Absolute      0.00 - 0.70 x10(3) uL   0.09    Basophils Absolute      0.00 - 0.20 x10(3) uL   0.03    Immature Granulocyte Absolute      0.00 - 1.00 x10(3) uL   0.02    Neutrophils %      %   66.3    Lymphocytes %      %   24.6    Monocytes %      %   6.3    Eosinophils %      %   1.8    Basophils %      %   0.6    Immature Granulocyte %      %   0.4    Glucose      70 - 99 mg/dL   96    Sodium      136 - 145 mmol/L   141    Potassium      3.5 - 5.1 mmol/L   4.3    Chloride      98 - 112 mmol/L   109    Carbon Dioxide, Total      21.0 - 32.0 mmol/L   27.0    ANION GAP      0 - 18 mmol/L   5    BUN      9 - 23 mg/dL   16    CREATININE      0.70 - 1.30 mg/dL   1.01    CALCIUM      8.5 - 10.1 mg/dL   9.8    CALCULATED OSMOLALITY      275 - 295 mOsm/kg   293    EGFR      >=60 mL/min/1.73m2   87    AST (SGOT)      15 - 37 U/L   16    ALT (SGPT)      16 - 61 U/L   23    ALKALINE PHOSPHATASE      45 - 117 U/L   91    Total Bilirubin      0.1 - 2.0 mg/dL   0.5    PROTEIN, TOTAL      6.4 - 8.2 g/dL   6.9    Albumin      3.4 - 5.0 g/dL   3.7    Globulin      2.8 - 4.4 g/dL   3.2    A/G Ratio      1.0 - 2.0    1.2     Patient Fasting for CMP?   No    Vitamin B12      193 - 986 pg/mL 420      AMMONIA      11 - 32 umol/L 15      Tacrolimus Lvl      2.0 - 20.0 ng/mL  5.0  6.5    Magnesium, Serum      1.6 - 2.6 mg/dL   1.7          Imaging:  New    MRI CERVICAL+THOR+LUMB SPINE (ALL W+WO) (CPT=72156/62384/43306)    Result Date: 6/11/2024    CERVICAL FINDINGS:   There is mild straightening of the normal cervical lordosis with anatomic alignment.  Vertebral body heights are well-maintained.  Mild degenerative disc space loss, disc desiccation, endplate spurring, and degenerative endplate marrow signal changes   most pronounced at C5-6 and C6-7.  No focal worrisome marrow signal abnormality is seen.  No suspicious enhancement.  Degenerative enhancement noted along the spinous processes of C6 and C7 along the interspinous regions.      The cervical spinal cord has a normal course and caliber.  No focal cord signal abnormality is seen.  No focal mass or fluid collection is seen in the cervical spinal canal.  The paraspinal soft tissues are unremarkable.  The craniocervical junction is   unremarkable.      C2-3: There is no significant abnormality.      C3-4: There is a minimal posterior disk osteophyte complex with minimal uncovertebral and facet joint degenerative changes.  There is no significant spinal canal or neural foraminal stenosis.      C4-5: There is a minimal posterior disk osteophyte complex with minimal uncovertebral and facet joint degenerative changes.  There is no significant spinal canal or neural foraminal stenosis.      C5-6: There is a posterior disk osteophyte complex with mild to moderate uncovertebral and facet joint degenerative changes.  There is mild spinal canal stenosis with minimal flattening the ventral spinal cord.  Mild-to-moderate right and moderate left   neural foraminal stenosis.      C6-7: There is a posterior disk osteophyte complex with mild-to-moderate uncovertebral and facet joint degenerative  changes.  There is mild spinal canal stenosis with minimal flattening the ventral spinal cord.  Mild to moderate bilateral neural   foraminal stenosis.      C7-T1: There are minimal uncovertebral and facet joint degenerative changes.  There is no significant spinal canal or neural foraminal stenosis.      THORACIC FINDINGS    There is normal thoracic kyphosis with anatomic alignment.  Vertebral body heights are well-maintained.  Scattered mild degenerative disc space loss, disc desiccation, endplate spurring, and degenerative endplate marrow signal changes scattered in the   thoracic spine, most pronounced in the mid to lower thoracic spine.  A few scattered Schmorl's nodes are noted.  No focal worrisome marrow signal abnormality is identified.  No suspicious enhancement.      The thoracic spinal cord has a normal course and caliber.  No focal cord signal abnormality is seen.  No focal mass or fluid collection is seen in the thoracic spinal canal.  The paraspinal soft tissues are grossly unremarkable.      There is no significant spinal canal or neural foraminal stenosis at any level in the thoracic spine.  Minimal left paracentral disc protrusion at T3-4.  Small right paracentral disc protrusion at T6-7.  Small left paracentral disc protrusion at T7-8.    Minimal left paracentral disc protrusion at T8-9.  Minimal left paracentral disc protrusion at T9-10.  Minimal right paracentral disc protrusion at T10-11.      LUMBAR FINDINGS:   Patient motion noted.      Stable holovertebral hemangioma at the L1 level resulting in minimal cortical expansion along the dorsal cortex of the vertebral body.  No pathologic fracture identified.  Stable small hemangioma also noted in the L2 vertebral body.  The suspected   subcentimeter hemangioma in the dorsal aspect of the L5 vertebral body is also again noted, however limited in evaluation due to patient motion, but appearing grossly unchanged.      There is normal lumbar lordosis  with anatomic alignment.  Vertebral body heights are well-maintained.  Mild degenerative disc space loss, disc desiccation, endplate spurring, and degenerative endplate marrow signal changes scattered in the lumbar spine.    No suspicious enhancement.  Scattered degenerative enhancement noted, particularly in the mid to lower lumbar spine along the facet joints and interspinous regions.      The distal spinal cord and conus medullaris have a normal signal and morphology.  The conus medullaris terminates at the mid L1 level.  The roots of the cauda equina are unremarkable.  No focal mass or fluid collection is seen in the lumbar spinal canal.     The paraspinal soft tissues are unremarkable.      T12-L1: There is no significant abnormality.      L1-2:  Minimal diffuse disc bulge with mild facet arthropathy. There is no significant spinal canal or neural foraminal stenosis. No significant interval change.      L2-3:  Mild diffuse disc bulge with mild facet arthropathy. There is no significant spinal canal stenosis.  There is mild left neural foraminal stenosis. No significant interval change.      L3-4:  Diffuse disc bulge with mild facet arthropathy.  There is no significant spinal canal stenosis.  There is mild right neural foraminal stenosis. No significant interval change.      L4-5:  Diffuse disc bulge with a small superimposed broad-based central disc protrusion.  Mild to moderate facet arthropathy and thickening of ligamentum flavum.  No significant spinal canal stenosis.  Mild bilateral neural foraminal stenosis. No   significant interval change.      L5-S1:  Minimal diffuse disc bulge with mild facet arthropathy. There is no significant spinal canal or neural foraminal stenosis. No significant interval change.     CONCLUSION:   1. Stable holovertebral hemangioma at the L1 level resulting in minimal cortical expansion along the dorsal cortex of the vertebral body.  No pathologic fracture identified.  Stable  small hemangioma also noted in the L2 vertebral body.  2. Multilevel degenerative changes the cervical spine are most pronounced at the C5-6 and C6-7 levels.  3. Mild spinal canal stenosis at C5-6 and C6-7.  4. Mild-to-moderate right and moderate left neural foraminal stenosis at C5-6.  Mild to moderate bilateral neural foraminal stenosis at C6-7.  5. Multilevel degenerative changes in the thoracic spine as above without significant spinal canal or neural foraminal stenosis at any level.  6. Stable multilevel degenerative changes lumbar spine as above without significant spinal canal stenosis at any level.  7. Stable mild left neural foraminal stenosis at L2-3.  Stable mild right neural foraminal stenosis at L3-4.  Stable mild bilateral neural foraminal stenosis at L4-5.  8. Stable facet arthropathy in the lumbar spine as above.  9. No focal spinal cord signal abnormality identified in the cervical or thoracic spinal cord as above.  Please see above for further details.     Prior as noted below    MRI brain with and without contrast (4/5/2024):   FINDINGS:  The ventricles and sulci are within normal limits.  There is no midline shift or mass effect.  The basal cisterns are patent.  The gray-white matter differentiation is intact.  The craniocervical junction is unremarkable.       Minimal chronic microvascular ischemic changes in the cerebral white matter.     There is no acute intracranial hemorrhage or extra-axial fluid collection identified.  No significant abnormal parenchymal gradient susceptibility. There is no abnormal parenchymal or leptomeningeal enhancement.  There is no restricted diffusion to  suggest acute ischemia/infarction.     Right maxillary retention cyst.  Minimal ethmoid, right frontal, and bilateral maxillary sinus mucosal thickening.  Trace fluid in the right mastoid air cells.  The expected major intracranial flow voids are present.                   Impression   CONCLUSION:       1. No acute  intracranial abnormality identified.       2. Minimal chronic microvascular ischemic changes in the cerebral white matter.      Other procedures    None new    Prior as noted below    NCS/EMG (3/19/2024):     Sensory NCS      Nerve / Sites Rec. Site Onset Lat Peak Lat NP Amp PP Amp Segments Distance Velocity Comment       ms ms µV µV   cm m/s     R Median - Dig II (Antidromic)      Wrist Index 3.85 4.69 24.6 49.6 Wrist - Index 14 36        Ref.   ?3.30 ?4.00 ?7.0 ?8.0 Ref.         R Ulnar - Dig V (Antidromic)      Wrist Dig V 2.55 3.44 42.1 45.6 Wrist - Dig V 12 47        Ref.   ?3.10 ?4.00 ?5.0 ?4.0 Ref.         R Radial - Superficial (Antidromic)      Forearm Wrist 1.93 2.60 28.5 24.3 Forearm - Wrist 10 52        Ref.   ?2.20 ?2.80 ?7.0 ?11.0 Ref.         R Sural - (Antidromic)      Calf Ankle 3.49 4.38 7.6 9.9 Calf - Ankle 14 40        Ref.   ?3.60 ?4.50 ?4.0 ?4.0 Ref.            2 Ankle 3.59 4.43 5.7 10.7           L Sural - (Antidromic)      Calf Ankle 3.65 4.64 6.6 8.4 Calf - Ankle 14 38        Ref.   ?3.60 ?4.50 ?4.0 ?4.0 Ref.             Motor NCS      Nerve / Sites Muscle Latency Amplitude Segments Dist. Lat Diff Velocity Comments       ms mV   cm ms m/s     R Median - APB      Wrist APB 4.54 6.0 Wrist - APB 7            Ref.   ?4.70 ?4.2 Ref.              Elbow APB 9.42 5.0 Elbow - Wrist 23 4.88 47.2        Ref.       Ref.     ?47.0     R Ulnar - ADM      Wrist ADM 2.73 11.0 Wrist - ADM 7            Ref.   ?3.70 ?7.9 Ref.              B.Elbow ADM 7.31 10.9 B.Elbow - Wrist 22.5 4.58 49.1        Ref.       Ref.     ?52.0     R Peroneal - EDB      Ankle EDB 5.92 4.4 Ankle - EDB 7            Ref.   ?6.50 ?1.1 Ref.              B. Fib Head EDB 14.92 3.8 B. Fib Head - Ankle 32.5 9.00 36.1        Ref.       Ref.     ?36.0     L Peroneal - EDB      Ankle EDB 9.65 1.0 Ankle - EDB 8            Ref.   ?6.50 ?1.1 Ref.              B. Fib Head EDB 18.33 1.2 B. Fib Head - Ankle 30.5 8.69 35.1        Ref.       Ref.      ?36.0     R Tibial - AH      Ankle AH 4.65 9.3 Ankle - AH 8            Ref.   ?6.10 ?5.3 Ref.              Knee AH 16.83 5.4 Knee - Ankle 40 12.19 32.8        Ref.       Ref.     ?34.0     L Tibial - AH      Ankle AH 6.08 9.9 Ankle - AH 8            Ref.   ?6.10 ?5.3 Ref.              Knee AH 17.85 6.1 Knee - Ankle 40 11.77 34.0        Ref.       Ref.     ?34.0         F  Wave      Nerve M Latency F Latency     ms ms   R Peroneal - EDB 6.6 57.5   Ref.   ?63.6   R Tibial - AH 5.4 56.8   Ref.   ?61.1   L Tibial - AH 7.9 58.9   Ref.   ?61.1   L Peroneal - EDB NR NR   Ref.   ?63.6   R Median - APB 4.9 31.2   Ref.   ?31.5   R Ulnar - ADM 3.3 30.1   Ref.   ?30.9                   EMG Summary Table       Spontaneous MUAP Recruitment   Muscle IA Fib PSW Fasc H.F. Amp Dur. PPP Pattern   R. Deltoid N None None None None N N N N   R. Biceps brachii N None None None None N N N N   R. Triceps brachii N None None None None N N N N   R. Extensor digitorum communis N None None None None N N N N   R. First dorsal interosseous N None None None None N N N N   R. Vastus medialis N None None None None N N N N   L. Vastus medialis N None None None None N N N N   R. Peroneus longus N None None None None N N N N   L. Peroneus longus N None None None None N N N N   R. Tibialis anterior N None None None None N N N N   L. Tibialis anterior N None None None None N N N N   R. Gastrocnemius N None None None None N N N N   L. Gastrocnemius N None None None None N N N N   R. Extensor hallucis longus N None None None None N N N N   L. Extensor hallucis longus N None None None None N N N N       Summary     Nerve conduction studies;   Right sural sensory response was normal.  Left sural sensory response was borderline due to mildly prolonged peak latency, with normal amplitude, and borderline conduction velocity; rapid cooling should be considered.  Right median sensory response was abnormal due to prolonged peak latency, with normal amplitude, and  slowed conduction velocity.  Right ulnar sensory response was borderline due to borderline conduction velocity, with normal amplitude, normal peak latency; rapid cooling should be considered.  Right radial sensory response was normal.  Right common peroneal motor response was normal; F-wave response was normal.  Left common peroneal motor response was abnormal due to markedly prolonged distal motor latency, with reduced amplitude, and mildly slowed conduction velocity; F-wave response was absent.  Right tibial motor response was borderline due to borderline conduction velocity, with normal amplitude and normal distal motor latency; F-wave response was normal.  Left tibial motor response was normal; F-wave response was normal.  Right median motor response was normal; F-wave response was normal.  Right ulnar motor response was normal; F-wave response was normal.     EMG (needle exam):  Concentric needle EMG study was normal in all 15 tested muscles: R. Deltoid, R. Biceps brachii, R. Triceps brachii, R. Extensor digitorum communis, R. First dorsal interosseous, R. Vastus medialis, L. Vastus medialis, R. Peroneus longus, L. Peroneus longus, R. Tibialis anterior, L. Tibialis anterior, R. Gastrocnemius, L. Gastrocnemius, R. Extensor hallucis longus, L. Extensor hallucis longus.              Conclusion:   This is a mildly abnormal study.  There is electrophysiologic evidence to suggest asymmetry of responses in the left lower extremity relative to the right lower extremity with demyelinating changes greater than axonal changes in the left tibial motor response.  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      Clinical comment:  This does not appear typical for a length dependent polyneuropathy or a primary demyelinating neuropathy; there is no suggestion for a myopathy; while there are some demyelinating sensory and motor features in  the right upper extremity and left lower extremity, this does not meet criteria for chronic inflammatory demyelinating polyneuropathy.  There are no acute denervation changes noted.  The significance of these findings is questionable and may be technically related or suggest an atypical neuropathy or possibly a chronic lumbosacral radiculopathy superimposed upon a right carpal tunnel syndrome.  Clinical correlation is advised             Impression/ Plan:  Richard Palacios is a 59 year old male who originally presented 3/1/2024 for evaluation of tremor, balance issues and memory changes.      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  He was reportedly doing well post-operatively and on Envarsus (tacrolimus ER) 7 mg daily and CellCept 720 mg bid for immunosuppression but re-hospitalized at [facility] after transfer from [facility] 12/28-1/5/2024 for NATHALY and hyperkalemia with supra-therapeutic tacrolimus level. He then had low back pain and found to have likely hemangioma in spine.  He was seen by hematology due to concern for possible myeloma but thought to have benign hemangioma; lower back improved with conservative management. He also had complications of procedure with biliary stent and stricture noted. He was on ursodiol.     Post operatively, he was noted to have issues with memory as well     Exam shows decreased sensation as well and could be due to underlying neuropathy - NCS/EMG was done and showed some atypical findings - NCS showed asymmetry in left lower extremity in terms of responses with some demyelinating type sensory neuropathy left sural nerve and left common peroneal motor nerve of unclear etiology; R hand also shows some right median mononeuropathy which could suggest a mononeuritis multiplex presentation - exam shows decreased sensation left leg as well and he endorses some localized pain in the mid to lower back as well - given he continues  to have balance issues with no readily apparent etiology on MRI brain as well as peripheral nerve workup, will MRI C/T/L spine checked as well as monoclonal protein study, ESR, CRP, AUDRA, RF and vasculitis profile.       Overall, walking appears improved, now able to walk without any issues with tandem, walking on heels, or toes.  He continues to have pain in the low back, mainly in the left side, but no bowel/bladder incontinence. He has had workup with MRI C/T/L spine not showing any myelopathy to account for balance issues - suspect he may have lumbar stenosis and/or radiculopathy and will refer to PT for evaluation and management.     1. Lumbar radiculopathy  As noted above   - Physical Therapy Referral - [place] Location    2. Tremor observed on examination  As noted above     3. S/P liver transplant (HCC)  As noted above     4. Lumbar degenerative disc disease  As noted above   - Physical Therapy Referral - [facility] Location    Return in about 3 months (around 10/15/2024).    Nir Blakely MD, Neurology  [facility]  Pager [phone]  7/15/2024

## 2024-07-15 NOTE — PATIENT INSTRUCTIONS
Refill policies:    Allow 2-3 business days for refills; controlled substances may take longer.  Contact your pharmacy at least 5 days prior to running out of medication and have them send an electronic request or submit request through the “request refill” option in your [employer] account.  Refills are not addressed on weekends; covering physicians do not authorize routine medications on weekends.  No narcotics or controlled substances are refilled after noon on Fridays or by on call physicians.  By law, narcotics must be electronically prescribed.  A 30 day supply with no refills is the maximum allowed.  If your prescription is due for a refill, you may be due for a follow up appointment.  To best provide you care, patients receiving routine medications need to be seen at least once a year.  Patients receiving narcotic/controlled substance medications need to be seen at least once every 3 months.  In the event that your preferred pharmacy does not have the requested medication in stock (e.g. Backordered), it is your responsibility to find another pharmacy that has the requested medication available.  We will gladly send a new prescription to that pharmacy at your request.    Scheduling Tests:    If your physician has ordered radiology tests such as MRI or CT scans, please contact Central Scheduling at [phone] right away to schedule the test.  Once scheduled, the [facility] Centralized Referral Team will work with your insurance carrier to obtain pre-certification or prior authorization.  Depending on your insurance carrier, approval may take 3-10 days.  It is highly recommended patients assure they have received an authorization before having a test performed.  If test is done without insurance authorization, patient may be responsible for the entire amount billed.      Precertification and Prior Authorizations:  If your physician has recommended that you have a procedure or additional testing performed the [facility]  Centralized Referral Team will contact your insurance carrier to obtain pre-certification or prior authorization.    You are strongly encouraged to contact your insurance carrier to verify that your procedure/test has been approved and is a COVERED benefit.  Although the [facility] Centralized Referral Team does its due diligence, the insurance carrier gives the disclaimer that \"Although the procedure is authorized, this does not guarantee payment.\"    Ultimately the patient is responsible for payment.   Thank you for your understanding in this matter.  Paperwork Completion:  If you require FMLA or disability paperwork for your recovery, please make sure to either drop it off or have it faxed to our office at [phone]. Be sure the form has your name and date of birth on it.  The form will be faxed to our Forms Department and they will complete it for you.  There is a 25$ fee for all forms that need to be filled out.  Please be aware there is a 10-14 day turnaround time.  You will need to sign a release of information (VITA) form if your paperwork does not come with one.  You may call the Forms Department with any questions at [phone].  Their fax number is [phone].

## 2024-07-23 ENCOUNTER — LAB ENCOUNTER (OUTPATIENT)
Dept: LAB | Age: 59
End: 2024-07-23
Attending: INTERNAL MEDICINE
Payer: COMMERCIAL

## 2024-07-23 DIAGNOSIS — Z94.4 LIVER REPLACED BY TRANSPLANT (HCC): ICD-10-CM

## 2024-07-23 DIAGNOSIS — D84.9 IMMUNOSUPPRESSION (HCC): ICD-10-CM

## 2024-07-23 DIAGNOSIS — Z51.81 ENCOUNTER FOR THERAPEUTIC DRUG MONITORING: ICD-10-CM

## 2024-07-23 LAB
BASOPHILS # BLD AUTO: 0.05 X10(3) UL (ref 0–0.2)
BASOPHILS NFR BLD AUTO: 1.2 %
EOSINOPHIL # BLD AUTO: 0.16 X10(3) UL (ref 0–0.7)
EOSINOPHIL NFR BLD AUTO: 3.7 %
ERYTHROCYTE [DISTWIDTH] IN BLOOD BY AUTOMATED COUNT: 13 %
HCT VFR BLD AUTO: 47.9 %
HGB BLD-MCNC: 16.4 G/DL
IMM GRANULOCYTES # BLD AUTO: 0.05 X10(3) UL (ref 0–1)
IMM GRANULOCYTES NFR BLD: 1.2 %
LYMPHOCYTES # BLD AUTO: 1.35 X10(3) UL (ref 1–4)
LYMPHOCYTES NFR BLD AUTO: 31.5 %
MCH RBC QN AUTO: 29.5 PG (ref 26–34)
MCHC RBC AUTO-ENTMCNC: 34.2 G/DL (ref 31–37)
MCV RBC AUTO: 86.2 FL
MONOCYTES # BLD AUTO: 0.46 X10(3) UL (ref 0.1–1)
MONOCYTES NFR BLD AUTO: 10.7 %
NEUTROPHILS # BLD AUTO: 2.21 X10 (3) UL (ref 1.5–7.7)
NEUTROPHILS # BLD AUTO: 2.21 X10(3) UL (ref 1.5–7.7)
NEUTROPHILS NFR BLD AUTO: 51.7 %
PLATELET # BLD AUTO: 183 10(3)UL (ref 150–450)
RBC # BLD AUTO: 5.56 X10(6)UL
WBC # BLD AUTO: 4.3 X10(3) UL (ref 4–11)

## 2024-07-23 PROCEDURE — 85025 COMPLETE CBC W/AUTO DIFF WBC: CPT

## 2024-07-23 PROCEDURE — 36415 COLL VENOUS BLD VENIPUNCTURE: CPT

## 2024-07-23 PROCEDURE — 83735 ASSAY OF MAGNESIUM: CPT

## 2024-07-23 PROCEDURE — 80053 COMPREHEN METABOLIC PANEL: CPT

## 2024-07-23 PROCEDURE — 80197 ASSAY OF TACROLIMUS: CPT

## 2024-07-24 LAB
ALBUMIN SERPL-MCNC: 4.6 G/DL (ref 3.2–4.8)
ALBUMIN/GLOB SERPL: 1.7 {RATIO} (ref 1–2)
ALP LIVER SERPL-CCNC: 95 U/L
ALT SERPL-CCNC: 19 U/L
ANION GAP SERPL CALC-SCNC: 5 MMOL/L (ref 0–18)
AST SERPL-CCNC: 24 U/L (ref ?–34)
BILIRUB SERPL-MCNC: 0.7 MG/DL (ref 0.3–1.2)
BUN BLD-MCNC: 8 MG/DL (ref 9–23)
CALCIUM BLD-MCNC: 9.6 MG/DL (ref 8.7–10.4)
CHLORIDE SERPL-SCNC: 106 MMOL/L (ref 98–112)
CO2 SERPL-SCNC: 26 MMOL/L (ref 21–32)
CREAT BLD-MCNC: 1.27 MG/DL
EGFRCR SERPLBLD CKD-EPI 2021: 65 ML/MIN/1.73M2 (ref 60–?)
FASTING STATUS PATIENT QL REPORTED: YES
GLOBULIN PLAS-MCNC: 2.7 G/DL (ref 2.8–4.4)
GLUCOSE BLD-MCNC: 105 MG/DL (ref 70–99)
MAGNESIUM SERPL-MCNC: 1.7 MG/DL (ref 1.6–2.6)
OSMOLALITY SERPL CALC.SUM OF ELEC: 283 MOSM/KG (ref 275–295)
POTASSIUM SERPL-SCNC: 4.8 MMOL/L (ref 3.5–5.1)
PROT SERPL-MCNC: 7.3 G/DL (ref 5.7–8.2)
SODIUM SERPL-SCNC: 137 MMOL/L (ref 136–145)

## 2024-07-25 ENCOUNTER — ANESTHESIA (OUTPATIENT)
Dept: ENDOSCOPY | Facility: HOSPITAL | Age: 59
End: 2024-07-25
Payer: COMMERCIAL

## 2024-07-25 ENCOUNTER — HOSPITAL ENCOUNTER (OUTPATIENT)
Facility: HOSPITAL | Age: 59
Setting detail: HOSPITAL OUTPATIENT SURGERY
Discharge: HOME | End: 2024-07-25
Attending: INTERNAL MEDICINE | Admitting: INTERNAL MEDICINE
Payer: COMMERCIAL

## 2024-07-25 ENCOUNTER — ANESTHESIA EVENT (OUTPATIENT)
Dept: ENDOSCOPY | Facility: HOSPITAL | Age: 59
End: 2024-07-25
Payer: COMMERCIAL

## 2024-07-25 VITALS
RESPIRATION RATE: 18 BRPM | HEART RATE: 61 BPM | HEIGHT: 68 IN | BODY MASS INDEX: 27.28 KG/M2 | DIASTOLIC BLOOD PRESSURE: 78 MMHG | OXYGEN SATURATION: 100 % | TEMPERATURE: 98 F | WEIGHT: 180 LBS | SYSTOLIC BLOOD PRESSURE: 145 MMHG

## 2024-07-25 DIAGNOSIS — Z80.0 FAMILY HISTORY OF COLON CANCER: ICD-10-CM

## 2024-07-25 DIAGNOSIS — D49.0 IPMN (INTRADUCTAL PAPILLARY MUCINOUS NEOPLASM): ICD-10-CM

## 2024-07-25 DIAGNOSIS — D64.9 ANEMIA, UNSPECIFIED TYPE: ICD-10-CM

## 2024-07-25 PROCEDURE — 0DB38ZX EXCISION OF LOWER ESOPHAGUS, VIA NATURAL OR ARTIFICIAL OPENING ENDOSCOPIC, DIAGNOSTIC: ICD-10-PCS | Performed by: INTERNAL MEDICINE

## 2024-07-25 PROCEDURE — 88305 TISSUE EXAM BY PATHOLOGIST: CPT | Performed by: INTERNAL MEDICINE

## 2024-07-25 PROCEDURE — 0DJD8ZZ INSPECTION OF LOWER INTESTINAL TRACT, VIA NATURAL OR ARTIFICIAL OPENING ENDOSCOPIC: ICD-10-PCS | Performed by: INTERNAL MEDICINE

## 2024-07-25 PROCEDURE — 0DB68ZX EXCISION OF STOMACH, VIA NATURAL OR ARTIFICIAL OPENING ENDOSCOPIC, DIAGNOSTIC: ICD-10-PCS | Performed by: INTERNAL MEDICINE

## 2024-07-25 PROCEDURE — 88321 CONSLTJ&REPRT SLD PREP ELSWR: CPT | Performed by: INTERNAL MEDICINE

## 2024-07-25 PROCEDURE — 0DB98ZX EXCISION OF DUODENUM, VIA NATURAL OR ARTIFICIAL OPENING ENDOSCOPIC, DIAGNOSTIC: ICD-10-PCS | Performed by: INTERNAL MEDICINE

## 2024-07-25 RX ORDER — ONDANSETRON 2 MG/ML
4 INJECTION INTRAMUSCULAR; INTRAVENOUS ONCE AS NEEDED
Status: DISCONTINUED | OUTPATIENT
Start: 2024-07-25 | End: 2024-07-25

## 2024-07-25 RX ORDER — LIDOCAINE HYDROCHLORIDE 10 MG/ML
INJECTION, SOLUTION EPIDURAL; INFILTRATION; INTRACAUDAL; PERINEURAL AS NEEDED
Status: DISCONTINUED | OUTPATIENT
Start: 2024-07-25 | End: 2024-07-25 | Stop reason: SURG

## 2024-07-25 RX ORDER — SODIUM CHLORIDE, SODIUM LACTATE, POTASSIUM CHLORIDE, CALCIUM CHLORIDE 600; 310; 30; 20 MG/100ML; MG/100ML; MG/100ML; MG/100ML
INJECTION, SOLUTION INTRAVENOUS CONTINUOUS
Status: DISCONTINUED | OUTPATIENT
Start: 2024-07-25 | End: 2024-07-25

## 2024-07-25 RX ORDER — NALOXONE HYDROCHLORIDE 0.4 MG/ML
0.08 INJECTION, SOLUTION INTRAMUSCULAR; INTRAVENOUS; SUBCUTANEOUS ONCE AS NEEDED
Status: DISCONTINUED | OUTPATIENT
Start: 2024-07-25 | End: 2024-07-25

## 2024-07-25 RX ADMIN — SODIUM CHLORIDE, SODIUM LACTATE, POTASSIUM CHLORIDE, CALCIUM CHLORIDE: 600; 310; 30; 20 INJECTION, SOLUTION INTRAVENOUS at 15:40:00

## 2024-07-25 RX ADMIN — SODIUM CHLORIDE, SODIUM LACTATE, POTASSIUM CHLORIDE, CALCIUM CHLORIDE: 600; 310; 30; 20 INJECTION, SOLUTION INTRAVENOUS at 15:00:00

## 2024-07-25 RX ADMIN — LIDOCAINE HYDROCHLORIDE 30 MG: 10 INJECTION, SOLUTION EPIDURAL; INFILTRATION; INTRACAUDAL; PERINEURAL at 15:05:00

## 2024-07-25 NOTE — ANESTHESIA POSTPROCEDURE EVALUATION
[facility]    Richard Palacios Patient Status:  Hospital Outpatient Surgery   Age/Gender 59 year old male MRN [id number]   Location [facility] ENDOSCOPY PAIN CENTER Attending Claudio Adams MD   Hosp Day # 0 PCP Lg Sosa MD       Anesthesia Post-op Note    ESOPHAGOGASTRODUODENOSCOPY (EGD) WITH BIOPSIES, ENDOSCOPIC ULTRASOUND (EUS), COLONOSCOPY    Procedure Summary       Date: 07/25/24 Room / Location:  ENDOSCOPY 02 / [facility] ENDOSCOPY    Anesthesia Start: 1500 Anesthesia Stop:     Procedures:       ESOPHAGOGASTRODUODENOSCOPY (EGD) WITH BIOPSIES, ENDOSCOPIC ULTRASOUND (EUS), COLONOSCOPY      ENDOSCOPIC ULTRASOUND (EUS)      COLONOSCOPY Diagnosis:       IPMN (intraductal papillary mucinous neoplasm)      Family history of colon cancer      Anemia, unspecified type      (EG:  BARRETS ESOPHAGUS, ESOPHAGITIS, HIATAL HERNIA    EUS: pancreatic cysts  COLON: normal)    Surgeons: Claudio Adams MD Anesthesiologist: Francis Hutchinson MD    Anesthesia Type: MAC ASA Status: 3            Anesthesia Type: MAC    Vitals Value Taken Time   /66 07/25/24 1543   Temp 97.9 07/25/24 1543   Pulse 78 07/25/24 1543   Resp 16 07/25/24 1543   SpO2 95 07/25/24 1543       Patient Location: Endoscopy    Anesthesia Type: MAC    Airway Patency: patent    Postop Pain Control: adequate    Mental Status: preanesthetic baseline    Nausea/Vomiting: none    Cardiopulmonary/Hydration status: stable euvolemic    Complications: no apparent anesthesia related complications    Postop vital signs: stable    Dental Exam: Unchanged from Preop    Patient to be discharged from PACU when criteria met.

## 2024-07-25 NOTE — OPERATIVE REPORT
Richard Palacios Patient Status:  Hospital Outpatient Surgery    1965 MRN [id number]   Formerly [facility] ENDOSCOPY PAIN CENTER Attending Caludio Adams MD   Date 2024 PCP Lg Sosa MD     PREOPERATIVE DIAGNOSIS/INDICATION: Crwo's, Hiatal hernia, post liver transplant  POSTOPERTATIVE DIAGNOSIS: Same, hiatal hernia, esophagitis, pancreas cystic lesions suspicious for SB-IPMN  PROCEDURE PERFORMED: EUS/EGD biopsy  TIME OUT WAS PERFORMED    SEDATION: MAC sedation provided by General Anesthesia    INFORMED CONSENT: Risks, benefits and alternatives to the procedure were explained to the patient including but not limited to bleeding, infection, perforation, adverse drug reactions, pancreatitis and the need for hospitalization and surgery if this occurs, the patient understands and agrees to procedure.  PROCEDURE DESCRIPTION: The upper endoscope and later the therapeutic side viewing echoendoscope were introduced into the patient’s mouth, hypo pharynx, esophagus, stomach and the first and second portion of the duodenum, straightening of the endoscope was performed to obtain a direct view of the major ampulla, retroflexion was performed in the stomach with the EGD scope only. Careful examination of the above described areas was performed on withdrawal of the endoscope. The patient tolerated the procedure well and there were no immediate complications noted during the procedure, the patient was transported to the recovery area in stable condition.  FINDINGS:  ESOPHAGUS: The SCJ M was located at 33 cm, the C at 34 cm, the GEj was located at 38 cm, there was LA class B erosive esophagitis  GEJ: 3 cm hiatal hernia, Hill grade 3  STOMACH: Normal  DUODENUM: Normal  ECHO: Imaging was performed through the esophagus, stomach and duodenum. The celiac axis and the left adrenal gland appear wnl, the visualized portions of the left hepatic lobe showed post transplant changes, the visualized pancreatic  parenchyma  showed two 4-5 mm anechoic simple body and tail cysts, one 14 x 8 mm anechoic septated cyst at the neck and a 4 mm anechoic cyst at the head and a 5 mm anechoic cyst at the uncinate process with no solid componennt or mural nodules, the main PD appear wnl, the biliary tree  showed post transplant changes, the confluence of the portal vein, superior mesenteric vein and splenic vein  showed post transplant changes.  THERAPEUTICS: Cold forceps biopsies were performed from duodenum, antrum, esophagus at 37 and 35 cm in a four quadrant fashion  RECOMMENDATIONS:   Post EUS/EGD precautions, watch for bleeding, infection, perforation, adverse drug reactions and pancreatitis.  Call status report post procedure.  MRI pancreas in 1 year  EDG in 3 years  Antireflux measures  Continue PPI's    Claudio Adams MD  7/25/2024  3:44 PM

## 2024-07-25 NOTE — DISCHARGE INSTRUCTIONS
Home Care Instructions for Colonoscopy and/or Gastroscopy with Sedation    Diet:  - Resume your regular diet as tolerated unless otherwise instructed.  - Start with light meals to minimize bloating.  - Do not drink alcohol today.    Medication:  - If you have questions about resuming your normal medications, please contact your Primary Care Physician.    Activities:  - Take it easy today. Do not return to work today.  - Do not drive today.  - Do not operate any machinery today (including kitchen equipment).    Colonoscopy:  - You may notice some rectal \"spotting\" (a little blood on the toilet tissue) for a day or two after the exam. This is normal.  - If you experience any rectal bleeding (not spotting), persistent tenderness or sharp severe abdominal pains, oral temperature over 100 degrees Fahrenheit, light-headedness or dizziness, or any other problems, contact your doctor.    Gastroscopy:  - You may have a sore throat for 2-3 days following the exam. This is normal. Gargling with warm salt water (1/2 tsp salt to 1 glass warm water) or using throat lozenges will help.  - If you experience any sharp pain in your neck, abdomen or chest, vomiting of blood, oral temperature over 100 degrees Fahrenheit, light-headedness or dizziness, or any other problems, contact your doctor.    **If unable to reach your doctor, please go to the [facility] Emergency Room**    - Your referring physician will receive a full report of your examination.  - If you do not hear from your doctor's office within two weeks of your biopsy, please call them for your results.

## 2024-07-25 NOTE — H&P
[facility]  Pre-op H and P    Richard Palacios Patient Status:  Hospital Outpatient Surgery    1965 MRN [id number]   Location [facility] ENDOSCOPY PAIN CENTER Attending Claudio Adams MD   Date 2024 PCP Lg Sosa MD     CC: alcohol-related cirrhosis s/p liver transplant in 2023, long segment Crow's esophagus, and HTN who is being seen at the request of Lg Sosa MD for follow up. He had a biliary stent placed for a rising alk phos and has a repeat ERCP with [facility] on 3/7 for exchange or removal. He is following with Dr. Bryan at [facility] for his liver transplant care and has been doing well from that standpoint. His last EGD showed Crow's, with biopsies indefinite for dysplasia, and a repeat EGD is needed for repeat biopsies; EUS is also indicated to evaluate pancreatic cysts, which are likely IPMNs. He also has not had adequate screening for CRC due to poor preps.     History of Present Illness:  Richard Palacios is a a(n) 59 year old male. alcohol-related cirrhosis s/p liver transplant in 2023, long segment Crow's esophagus, and HTN who is being seen at the request of Lg Sosa MD for follow up. He had a biliary stent placed for a rising alk phos and has a repeat ERCP with [facility] on 3/7 for exchange or removal. He is following with Dr. Bryan at [facility] for his liver transplant care and has been doing well from that standpoint. His last EGD showed Crow's, with biopsies indefinite for dysplasia, and a repeat EGD is needed for repeat biopsies; EUS is also indicated to evaluate pancreatic cysts, which are likely IPMNs. He also has not had adequate screening for CRC due to poor preps.     History:  Past Medical History:    Abdominal pain    Ascites    Back problem    COVID    Disorder of liver    Cirrhosis - no longer existent since surgery 23    Esophageal reflux    Essential hypertension    ETOH abuse    Gout    Hypoalbuminemia    Nausea    Personal history of  alcoholism (HCC)    Weight loss     Past Surgical History:   Procedure Laterality Date    Colonoscopy N/A 11/20/2023    Procedure: .;  Surgeon: Gus Ocampo MD;  Location:  ENDOSCOPY    Colonoscopy N/A 11/27/2023    Procedure: COLONOSCOPY with cold snare polypectomy and clip placement x1;  Surgeon: Robin Raya DO;  Location:  ENDOSCOPY    Organ transplant  12/19/2023    Liver transplant    Tonsillectomy       Family History   Problem Relation Age of Onset    Other (alcoholism) Father     Diabetes Mother         Type I    Other (CAD) Mother       reports that he quit smoking about 40 years ago. His smoking use included cigarettes. He has never been exposed to tobacco smoke. He uses smokeless tobacco. He reports that he does not drink alcohol and does not use drugs.    Allergies:  No Known Allergies    Medications:    Current Facility-Administered Medications:     lactated ringers infusion, , Intravenous, Continuous    Physical Exam:    Height 5' 8\" (1.727 m), weight 166 lb (75.3 kg).    General: Appears alert, oriented x3 and in no acute distress.  CV: Normal rate   Lungs: Normal effort   Skin: Warm and dry.  Laboratory Data:       Imaging:      Assessment/Plan/Procedure:  Patient Active Problem List   Diagnosis    Gouty arthropathy, unspecified    Family history of colon cancer    Routine general medical examination at a health care facility    Sebaceous cyst    Steatohepatitis    Metabolic syndrome    Tobacco abuse    Gout    Tobacco abuse counseling    Gallstones    Immunization deficiency    Other ascites    Thrombocytopenia (HCC)    Cirrhosis of liver (HCC)    Coagulopathy (HCC)    Hypoalbuminemia    S/P liver transplant (HCC)       alcohol-related cirrhosis s/p liver transplant in 12/2023, long segment Crow's esophagus, and HTN who is being seen at the request of Lg Sosa MD for follow up. He had a biliary stent placed for a rising alk phos and has a repeat ERCP with [facility] on 3/7 for  exchange or removal. He is following with Dr. Bryan at [facility] for his liver transplant care and has been doing well from that standpoint. His last EGD showed Crow's, with biopsies indefinite for dysplasia, and a repeat EGD is needed for repeat biopsies; EUS is also indicated to evaluate pancreatic cysts, which are likely IPMNs. He also has not had adequate screening for CRC due to poor preps.     Plan:  EGDEUS and colonoscopy    Claudio Adams MD  7/25/2024  2:01 PM

## 2024-07-25 NOTE — ANESTHESIA PREPROCEDURE EVALUATION
PRE-OP EVALUATION    Patient Name: Richard Palacios    Admit Diagnosis: IPMN (intraductal papillary mucinous neoplasm) [D49.0]  Family history of colon cancer [Z80.0]  Anemia, unspecified type [D64.9]    Pre-op Diagnosis: IPMN (intraductal papillary mucinous neoplasm) [D49.0]  Family history of colon cancer [Z80.0]  Anemia, unspecified type [D64.9]    ESOPHAGOGASTRODUODENOSCOPY (EGD), ENDOSCOPIC ULTRASOUND (EUS), COLONOSCOPY    Anesthesia Procedure: ESOPHAGOGASTRODUODENOSCOPY (EGD), ENDOSCOPIC ULTRASOUND (EUS), COLONOSCOPY  ENDOSCOPIC ULTRASOUND (EUS)  COLONOSCOPY    Surgeons and Role:     * Claudio Adams MD - Primary    Pre-op vitals reviewed.  Temp: 98 °F (36.7 °C)  Pulse: 82  Resp: 18  BP: 133/71  SpO2: 95 %  Body mass index is 27.37 kg/m².    Current medications reviewed.  Hospital Medications:   lactated ringers infusion   Intravenous Continuous       Outpatient Medications:     Medications Prior to Admission   Medication Sig Dispense Refill Last Dose    URSODIOL 300 MG Oral Cap TAKE 1 CAPSULE(300 MG) BY MOUTH THREE TIMES DAILY 270 capsule 1 7/25/2024    cyclobenzaprine 5 MG Oral Tab Take 1 tablet (5 mg total) by mouth 3 (three) times daily as needed for Muscle spasms. 270 tablet 0 7/22/2024    SILDENAFIL CITRATE 100 MG Oral Tab TAKE 1/2 TO 1 TABLET(50  MG) BY MOUTH DAILY AS NEEDED FOR ERECTILE DYSFUNCTION 45 tablet 0 Past Month    traZODone 100 MG Oral Tab Take 1 tablet (100 mg total) by mouth nightly. 90 tablet 1 Past Week    LOKELMA 10 g Oral Powd Pack Take 1 packet (10 g total) by mouth daily. 90 packet 1 Past Week    Tacrolimus ER (ENVARSUS XR) 0.75 MG Oral Tablet 24 Hr Take 1 tablet by mouth as needed.   7/25/2024    Magnesium Oxide -Mg Supplement 400 (240 Mg) MG Oral Tab Take 1 tablet (400 mg total) by mouth daily. 30 tablet 1 7/25/2024    ergocalciferol 1.25 MG (94161 UT) Oral Cap Take 1 capsule (50,000 Units total) by mouth once a week. Q Sunday 7/21/2024    ENVARSUS XR 1 MG Oral Tablet 24  Hr in the morning, at noon, and at bedtime.   7/25/2024    allopurinol 100 MG Oral Tab TAKE 1 TABLET(100 MG) BY MOUTH DAILY 90 tablet 3 7/25/2024    tacrolimus 1 MG Oral Cap Take 2 capsules (2 mg total) by mouth daily.   7/25/2024    Mycophenolate Sodium (MYCOPHENOLIC ACID OR) Take 720 mg by mouth in the morning and 720 mg before bedtime.   7/25/2024    sulfamethoxazole-trimethoprim 400-80 MG Oral Tab Take 1 tablet by mouth every evening. 2 tabs mon and thurs   7/25/2024    valGANciclovir 450 MG Oral Tab Take 1 tablet (450 mg total) by mouth daily.   7/25/2024    aspirin 81 MG Oral Tab EC Take 1 tablet (81 mg total) by mouth daily.   7/25/2024    pantoprazole 40 MG Oral Tab EC Take 1 tablet (40 mg total) by mouth 2 (two) times daily before meals. 60 tablet 11 7/25/2024       Allergies: Patient has no known allergies.      Anesthesia Evaluation  Patient Active Problem List   Diagnosis    Gouty arthropathy, unspecified    Family history of colon cancer    Routine general medical examination at a health care facility    Sebaceous cyst    Steatohepatitis    Metabolic syndrome    Tobacco abuse    Gout    Tobacco abuse counseling    Gallstones    Immunization deficiency    Other ascites    Thrombocytopenia (HCC)    Cirrhosis of liver (HCC)    Coagulopathy (HCC)    Hypoalbuminemia    S/P liver transplant (HCC)        Past Medical History:    Abdominal pain    Ascites    Back problem    COVID    Disorder of liver    Cirrhosis - no longer existent since surgery 12/19/23    Esophageal reflux    Essential hypertension    ETOH abuse    Gout    Hypoalbuminemia    Nausea    Personal history of alcoholism (HCC)    Weight loss        Past Surgical History:   Procedure Laterality Date    Colonoscopy N/A 11/20/2023    Procedure: .;  Surgeon: Gus Ocampo MD;  Location:  ENDOSCOPY    Colonoscopy N/A 11/27/2023    Procedure: COLONOSCOPY with cold snare polypectomy and clip placement x1;  Surgeon: Robin Raya DO;  Location:   ENDOSCOPY    Organ transplant  2023    Liver transplant    Tonsillectomy       Social History     Socioeconomic History    Marital status:    Tobacco Use    Smoking status: Former     Current packs/day: 0.00     Types: Cigarettes     Quit date: 1984     Years since quittin.4     Passive exposure: Never    Smokeless tobacco: Current    Tobacco comments:     Still not smoking cigarettes since . Do smoke a couple of cigars a week.   Vaping Use    Vaping status: Never Used   Substance and Sexual Activity    Alcohol use: Never    Drug use: No   Other Topics Concern    Caffeine Concern Yes     Comment: 1 cup a day    Exercise No     History   Drug Use No     Available pre-op labs reviewed.  Lab Results   Component Value Date    WBC 4.3 2024    RBC 5.56 2024    HGB 16.4 2024    HCT 47.9 2024    MCV 86.2 2024    MCH 29.5 2024    MCHC 34.2 2024    RDW 13.0 2024    .0 2024     Lab Results   Component Value Date     2024    K 4.8 2024     2024    CO2 26.0 2024    BUN 8 (L) 2024    CREATSERUM 1.27 2024     (H) 2024    CA 9.6 2024            Airway      Mallampati: II  Mouth opening: >3 FB  TM distance: 4 - 6 cm  Neck ROM: full Cardiovascular      Rhythm: regular  Rate: normal     Dental    Dentition appears grossly intact         Pulmonary    Pulmonary exam normal.  Breath sounds clear to auscultation bilaterally.               Other findings              ASA: 3   Plan: MAC  NPO status verified and     Post-procedure pain management plan discussed with surgeon and patient.    Comment: Plan is MAC anesthesia, which likely will include deep sedation.  Implied that memory of procedure is unlikely although intraop recall, if it occurs, may be a reasonable and comfortable experience with this anesthetic.  Aware that general anesthesia is not intended though deep sedation may  include brief moments of general anesthesia.   Questions answered. Accepts. The consent was signed without further questions.   Plan/risks discussed with: patient  Use of blood product(s) discussed with: patient    Consented to blood products.          Present on Admission:  **None**

## 2024-07-25 NOTE — OPERATIVE REPORT
Richard Palacios Patient Status:  [facility] Outpatient Surgery    1965 MRN [id number]   [facility] Attending Claudio Adams MD   Date 2024 PCP Lg Sosa MD     PREOPERATIVE DIAGNOSIS/INDICATION: Screening, family h/o colon cancer  POSTOPERTATIVE DIAGNOSIS: Normal  PROCEDURE PERFORMED: COLONOSCOPY  SEDATION: MAC sedation provided by General Anesthesia    TIME OUT WAS PERFORMED    INFORMED CONSENT: Risks, benefits and alternatives to the procedure were explained to the patient including but not limited to bleeding, infection, perforation, adverse drug reactions, pancreatitis and the need for hospitalization and surgery if this occurs, the patient understands and agrees to procedure.  PROCEDURE DESCRIPTION: After careful digital rectal examination a pediatric colonoscope was introduced into the patients rectum, advanced pass the recto sigmoid junction, into the descending colon, splenic flexure, transverse colon, hepatic flexure, ascending colon, cecum and the last 5-10cm of the terminal ileum, confirmed by landmarks, including the appendiceal orifice and ileocecal valve. Careful examination of the above described areas was performed on withdrawal of the endoscope. Retroflexion was performed on the rectum. The patient tolerated the procedure well, there were no immediate complication immediately following the procedure, and the patient was transferred to recovery in stable condition.  QUALITY OF PREPARATION: [place] Bowel Preparation Scale:            -      Right colon 2, Transverse colon 3, Left colon 3   FINDINGS/THERAPEUTICS:  TERMINAL ILEUM: Normal  COLON: Normal  RECOMMENDATIONS:   Post Colonoscopy precautions, watch for bleeding, infection, perforation, adverse drug reactions   Repeat colonoscopy in 5 years.    Claudio Adams MD  2024  3:44 PM

## 2024-07-26 LAB — TACROLIMUS LVL: 5.2 NG/ML

## 2024-07-29 ENCOUNTER — HOSPITAL ENCOUNTER (OUTPATIENT)
Dept: CT IMAGING | Age: 59
Discharge: HOME | End: 2024-07-29
Attending: INTERNAL MEDICINE

## 2024-07-29 DIAGNOSIS — Z13.6 SCREENING FOR HEART DISEASE: ICD-10-CM

## 2024-08-05 ENCOUNTER — OFFICE VISIT (OUTPATIENT)
Dept: FAMILY MEDICINE CLINIC | Facility: CLINIC | Age: 59
End: 2024-08-05
Payer: COMMERCIAL

## 2024-08-05 VITALS
RESPIRATION RATE: 16 BRPM | BODY MASS INDEX: 28.64 KG/M2 | SYSTOLIC BLOOD PRESSURE: 126 MMHG | HEIGHT: 68 IN | WEIGHT: 189 LBS | HEART RATE: 70 BPM | OXYGEN SATURATION: 95 % | DIASTOLIC BLOOD PRESSURE: 74 MMHG

## 2024-08-05 DIAGNOSIS — Z00.00 WELLNESS EXAMINATION: Primary | ICD-10-CM

## 2024-08-05 DIAGNOSIS — K70.30 ALCOHOLIC CIRRHOSIS OF LIVER WITHOUT ASCITES (HCC): ICD-10-CM

## 2024-08-05 DIAGNOSIS — Z23 NEED FOR VACCINATION: ICD-10-CM

## 2024-08-05 DIAGNOSIS — Z12.5 PROSTATE CANCER SCREENING: ICD-10-CM

## 2024-08-05 PROBLEM — D69.6 THROMBOCYTOPENIA: Status: RESOLVED | Noted: 2023-08-22 | Resolved: 2024-08-05

## 2024-08-05 PROBLEM — D69.6 THROMBOCYTOPENIA (HCC): Status: RESOLVED | Noted: 2023-08-22 | Resolved: 2024-08-05

## 2024-08-05 PROBLEM — R18.8 OTHER ASCITES: Status: RESOLVED | Noted: 2023-08-22 | Resolved: 2024-08-05

## 2024-08-05 PROCEDURE — 90472 IMMUNIZATION ADMIN EACH ADD: CPT | Performed by: FAMILY MEDICINE

## 2024-08-05 PROCEDURE — 90750 HZV VACC RECOMBINANT IM: CPT | Performed by: FAMILY MEDICINE

## 2024-08-05 PROCEDURE — 99396 PREV VISIT EST AGE 40-64: CPT | Performed by: FAMILY MEDICINE

## 2024-08-05 PROCEDURE — 90471 IMMUNIZATION ADMIN: CPT | Performed by: FAMILY MEDICINE

## 2024-08-05 PROCEDURE — 90677 PCV20 VACCINE IM: CPT | Performed by: FAMILY MEDICINE

## 2024-08-05 PROCEDURE — 90715 TDAP VACCINE 7 YRS/> IM: CPT | Performed by: FAMILY MEDICINE

## 2024-08-05 NOTE — PROGRESS NOTES
HPI:   Richard Palacios is a 59 year old male who presents for an Annual Health Visit.     Had colonoscopy done.    Had endoscopy and EUS at the time as well, though I' am currently struggling to see full results    Starting PT.    No particular concerns     Life has slowed down, issues with back and tremor, feels like set back substantially since I saw him last. Not working currently. He is on disability.        Allergies:   No Known Allergies    CURRENT MEDICATIONS   Current Outpatient Medications   Medication Sig Dispense Refill    URSODIOL 300 MG Oral Cap TAKE 1 CAPSULE(300 MG) BY MOUTH THREE TIMES DAILY 270 capsule 1    cyclobenzaprine 5 MG Oral Tab Take 1 tablet (5 mg total) by mouth 3 (three) times daily as needed for Muscle spasms. 270 tablet 0    SILDENAFIL CITRATE 100 MG Oral Tab TAKE 1/2 TO 1 TABLET(50  MG) BY MOUTH DAILY AS NEEDED FOR ERECTILE DYSFUNCTION 45 tablet 0    traZODone 100 MG Oral Tab Take 1 tablet (100 mg total) by mouth nightly. 90 tablet 1    LOKELMA 10 g Oral Powd Pack Take 1 packet (10 g total) by mouth daily. 90 packet 1    Tacrolimus ER (ENVARSUS XR) 0.75 MG Oral Tablet 24 Hr Take 1 tablet by mouth as needed.      Magnesium Oxide -Mg Supplement 400 (240 Mg) MG Oral Tab Take 1 tablet (400 mg total) by mouth daily. 30 tablet 1    ergocalciferol 1.25 MG (14960 UT) Oral Cap Take 1 capsule (50,000 Units total) by mouth once a week. Q Sunday      ENVARSUS XR 1 MG Oral Tablet 24 Hr in the morning, at noon, and at bedtime.      allopurinol 100 MG Oral Tab TAKE 1 TABLET(100 MG) BY MOUTH DAILY 90 tablet 3    tacrolimus 1 MG Oral Cap Take 2 capsules (2 mg total) by mouth daily.      Mycophenolate Sodium (MYCOPHENOLIC ACID OR) Take 720 mg by mouth in the morning and 720 mg before bedtime.      sulfamethoxazole-trimethoprim 400-80 MG Oral Tab Take 1 tablet by mouth every evening. 2 tabs mon and thurs      valGANciclovir 450 MG Oral Tab Take 1 tablet (450 mg total) by mouth daily.      aspirin  81 MG Oral Tab EC Take 1 tablet (81 mg total) by mouth daily.      pantoprazole 40 MG Oral Tab EC Take 1 tablet (40 mg total) by mouth 2 (two) times daily before meals. 60 tablet 11      HISTORICAL INFORMATION   Past Medical History:    Abdominal pain    Ascites    Back problem    COVID    Disorder of liver    Cirrhosis - no longer existent since surgery 23    Esophageal reflux    Essential hypertension    ETOH abuse    Gout    Hypoalbuminemia    Nausea    Personal history of alcoholism (HCC)    Weight loss      Past Surgical History:   Procedure Laterality Date    Colonoscopy N/A 2023    Procedure: .;  Surgeon: Gus Ocampo MD;  Location:  ENDOSCOPY    Colonoscopy N/A 2023    Procedure: COLONOSCOPY with cold snare polypectomy and clip placement x1;  Surgeon: Robin Raya DO;  Location:  ENDOSCOPY    Colonoscopy N/A 2024    Procedure: COLONOSCOPY;  Surgeon: Claudio Adams MD;  Location:  ENDOSCOPY    Organ transplant  2023    Liver transplant    Tonsillectomy        Family History   Problem Relation Age of Onset    Other (alcoholism) Father     Diabetes Mother         Type I    Other (CAD) Mother       SOCIAL HISTORY   Social History     Socioeconomic History    Marital status:    Tobacco Use    Smoking status: Former     Current packs/day: 0.00     Types: Cigarettes     Quit date: 1984     Years since quittin.5     Passive exposure: Never    Smokeless tobacco: Current    Tobacco comments:     Still not smoking cigarettes since . Do smoke a couple of cigars a week.   Vaping Use    Vaping status: Never Used   Substance and Sexual Activity    Alcohol use: Never    Drug use: No   Other Topics Concern    Caffeine Concern Yes     Comment: 1 cup a day    Exercise No     Social Determinants of Health     Financial Resource Strain: Low Risk  (10/24/2023)    Financial Resource Strain     Difficulty of Paying Living Expenses: Not very hard     Med  Affordability: No   Food Insecurity: No Food Insecurity (10/19/2023)    Food Insecurity     Food Insecurity: Never true   Transportation Needs: No Transportation Needs (10/24/2023)    Transportation Needs     Lack of Transportation: No   Housing Stability: Low Risk  (10/19/2023)    Housing Stability     Housing Instability: No     Social History     Social History Narrative    Not on file        REVIEW OF SYSTEMS:     Constitutional: negative  Eyes: negative  ENT: negative  Respiratory: negative  Cardiovascular: negative  Gastrointestinal: negative  Integument/Breast: negative  Genitourinary: negative  Heme/Lymph: negative  Musculoskeletal:  daily issues with his back, starting PT, very stiff in the morning , strulgges after that as the day goes on as well.  Neurological:  tremors  Psych:  anxiety comes and goes, dealing with the challenges of the above  Endocrine: negative  Allergic/Immune: negative    EXAM:   /74   Pulse 70   Resp 16   Ht 5' 8\" (1.727 m)   Wt 189 lb (85.7 kg)   SpO2 95%   BMI 28.74 kg/m²    Wt Readings from Last 6 Encounters:   08/05/24 189 lb (85.7 kg)   07/25/24 180 lb (81.6 kg)   07/15/24 166 lb (75.3 kg)   05/03/24 166 lb (75.3 kg)   03/18/24 166 lb 3.2 oz (75.4 kg)   03/01/24 170 lb (77.1 kg)     Body mass index is 28.74 kg/m².    General: alert, appears stated age, and cooperative  Head: Normocephalic, without obvious abnormality, atraumatic  Eyes: conjunctivae/corneas clear. PERRL, EOM's intact. Fundi benign.  Ears: normal TM's and external ear canals both ears  Nose: Nares normal. Septum midline. Mucosa normal. No drainage or sinus tenderness.  Throat: lips, mucosa, and tongue normal; teeth and gums normal  Neck: no adenopathy, no carotid bruit, no JVD, supple, symmetrical, trachea midline, and thyroid not enlarged, symmetric, no tenderness/mass/nodules  Heart: S1, S2 normal, no murmur, click, rub or gallop, regular rate and rhythm  Lungs: clear to auscultation  bilaterally  Chest wall: no tenderness  Abdomen: soft, non-tender; bowel sounds normal; no masses,  no organomegaly  : deferred  Back: symmetric, no curvature. ROM normal. No CVA tenderness.  Extremities: extremities normal, atraumatic, no cyanosis or edema  Pulses: 2+ and symmetric  Skin: Skin color, texture, turgor normal. No rashes or lesions  Lymph Nodes: Cervical, supraclavicular, and axillary nodes normal.  Neurologic: Grossly normal    ASSESSMENT AND PLAN:   Diagnoses and all orders for this visit:    Wellness examination    Prostate cancer screening  -     PSA Screen; Future    Need for vaccination  -     Prevnar 20 (PCV20) [32077]  -     TdaP (Adacel, Boostrix) [16457]  -     Zoster Recombinant Adjuvanted (Shingrix -Shingles) [90959]    Alcoholic cirrhosis of liver without ascites (HCC)    Ascites resolved at this time, and controlled  Reviewed recent vist notes, colonoscopy, pending pathology, have not see EUS results yet.    PSA ordered    Calcium score up, likely will need statin, seeing cardiology, need to review if impact with current medications    There are no Patient Instructions on file for this visit.    The patient indicates understanding of these issues and agrees to the plan.    Problem List:  Patient Active Problem List   Diagnosis    Gouty arthropathy, unspecified    Family history of colon cancer    Routine general medical examination at a health care facility    Sebaceous cyst    Steatohepatitis    Metabolic syndrome    Tobacco abuse    Gout    Tobacco abuse counseling    Gallstones    Immunization deficiency    Other ascites    Thrombocytopenia (HCC)    Cirrhosis of liver (HCC)    Coagulopathy (HCC)    Hypoalbuminemia    S/P liver transplant (HCC)       Lg Sosa MD  8/5/2024  7:34 AM

## 2024-08-14 ENCOUNTER — TELEPHONE (OUTPATIENT)
Dept: PHYSICAL THERAPY | Facility: HOSPITAL | Age: 59
End: 2024-08-14

## 2024-08-15 ENCOUNTER — OFFICE VISIT (OUTPATIENT)
Dept: PHYSICAL THERAPY | Facility: HOSPITAL | Age: 59
End: 2024-08-15
Attending: Other
Payer: COMMERCIAL

## 2024-08-15 DIAGNOSIS — M51.36 LUMBAR DEGENERATIVE DISC DISEASE: ICD-10-CM

## 2024-08-15 DIAGNOSIS — M54.16 LUMBAR RADICULOPATHY: Primary | ICD-10-CM

## 2024-08-15 PROCEDURE — 97161 PT EVAL LOW COMPLEX 20 MIN: CPT

## 2024-08-15 PROCEDURE — 97110 THERAPEUTIC EXERCISES: CPT

## 2024-08-15 NOTE — PROGRESS NOTES
SPINE EVALUATION:     Diagnosis:   Lumbar radiculopathy  Degenerative disc disease      Referring Provider: Nir Blakely  Date of Evaluation:    8/15/2024    Precautions:   recent liver transplant Next MD visit:   none scheduled  Date of Surgery: n/a     PATIENT SUMMARY   Rihcard Palacios is a 59 year old male who presents to therapy today with complaints of low back pain  since December of 2023. At that time he had a liver transplant.  Low back pain started while admitted in the hospital. After his transplant when he started moving around. He was basically bed bound prior to transplant. Pt had episode of severe back pain  due to \" bleed in the spine\" in January 2024  managed by medication, on MRI had L1 vertebral body hemangioma.  LB pain has been controlled by medication but in the past 4 months he has been having tightness of the back, unsteady walking and weakness of LE's. He states that as the day goes on he has progressive worsening of pain, unsteadiness and weakness so that he has to stay sitting. He occasionally has radiating pain down the back of the thigh, denies numbness on LE's other than tingling of the toes. One fall episode without major injury in March.    Pt describes pain level current 0/10, at best 0/10, at worst 7/10.   Current functional limitations include decrease endurance, difficulty standing and walking, difficulty with daily tasks and chores.     Richard describes prior level of function as pain free on LB prior to surgery and was very active. Currently he needs to modify his activities.  Pt goals include inc.  Past medical history was reviewed with Richard. Significant findings include liver transplant 12/19/2023, spine bleed Jan 2024  Pt denies diplopia, dysarthria, dysphasia, dizziness, drop attacks, bowel/bladder changes, saddle anesthesia, and RABIA LE N/T.    ASSESSMENT  Richard presents to physical therapy evaluation with primary c/o LB pain, weakness and decreased function . The  results of the objective tests and measures show weakness of BLE, pain and tenderness over LB area.  Functional deficits include but are not limited to difficulty sitting, standing and walking, fatigue as the day goes on.  Pt tested positive for SLR on bilat LE's, limited sit<>stand test results indicative of a fall risk. Signs and symptoms are consistent with diagnosis of degenerative disc disease. Lumbar radiculopathy. . Pt and PT discussed evaluation findings, pathology, POC and HEP.  Pt voiced understanding and performs HEP correctly without reported pain. Skilled Physical Therapy is medically necessary to address the above impairments and reach functional goals.     OBJECTIVE:   Observation/Posture: Upper back kyphosis, sits slumped.  Neuro Screen: (-)  Sensation: pt complains of tingling on both feet and toes since the surgery but intact to touch     AROM: (* denotes performed with pain)  Flexion: 60  Extension: 15  Sidebending: R 20; L 15  Rotation: R 75%; L 75%    Accessory motion: NA  Palpation: marked tenderness mid to LB areas to touch with pt recoiling in reaction.    Strength: (* denotes performed with pain)  UE/Scapular LE   NA this visit BLE grossly graded 4-/5 at the hips, 4/5 at ankle and knees, B EHL graded 4/5     Flexibility:   UE/Scapular LE   NA this visit Mild tightness of R and L hamstrings hip rotators     Special tests:   SLR (+) on BLE  30 sec sit<>stand : 6 ( norm for age group is 14)    Gait: pt ambulates on level ground with  fairly normal gait, however he states that he does well in the morning but has tremors and unsteady gait at midday on to evening hours .  Balance: SLS R 5 sec, L 3 sec    Today’s Treatment and Response:   Pt education was provided on exam findings, treatment diagnosis, treatment plan, expectations, and prognosis. Pt was also provided recommendations for postural corrections, importance of doing exercises on a regular basis and he verbalized understanding.  Today pt  was instructed and educated on HEP as follows:  Access Code: [email or web address]  URL: [email or web address]  Date: 08/15/2024  Prepared by: Linda Harris    Exercises  - Supine Lower Trunk Rotation  - 2 x daily - 1 sets - 3 reps - 15ct hold  - Supine Single Knee to Chest Stretch  - 2 x daily - 1 sets - 3 reps - 15 sec hold  - Heel Raises with Counter Support  - 2 x daily - 1 sets - 10 reps  - Standing Hip Abduction with Counter Support  - 2 x daily - 1 sets - 10 reps  - Standing Hip Extension with Counter Support  - 2 x daily - 1 x weekly - 1 sets - 10 reps    Charges: PT Eval Low Complexity, 10 mins TE      Total Timed Treatment: 10 min     Total Treatment Time: 40 min     Based on clinical rationale and outcome measures, this evaluation involved Low Complexity decision making due to 1-2 personal factors/comorbidities, 3 body structures involved/activity limitations, and evolving symptoms including changing pain levels.  PLAN OF CARE:    Goals: (to be met in 10 visits)   Pt will report LB pain no worse than 3-5/10 when getting up in the morning  Decrease tenderness/soft tissue restrictions on LB area to minimize compressive like forces on the back  Pt will improve Oswestry score by 10-15% for improved overall functional mobility during day time  Pt will increase BLE strength by 1/4 grade to improve walking balance  Pt will be independent with HEP for self management of symptoms post discharge    Frequency / Duration: Patient will be seen for 2 x/week or a total of 10 visits over a 90 day period. Treatment will include: Manual Therapy, Therapeutic Exercise, and Home Exercise Program instruction, modalities as needed    Education or treatment limitation: None  Rehab Potential:good    Oswestry Disability Index Score  Score: 48 % (8/12/2024  7:15 PM)      Patient/Family/Caregiver was advised of these findings, precautions, and treatment options and has agreed to actively participate in planning and for this  course of care.    Thank you for your referral. Please co-sign or sign and return this letter via fax as soon as possible to [phone]. If you have any questions, please contact me at Dept: [phone]    Sincerely,  Electronically signed by therapist: Linda Harris PT    Physician's certification required: Yes  I certify the need for these services furnished under this plan of treatment and while under my care.    X___________________________________________________ Date____________________    Certification From: 8/15/2024  To:11/13/2024

## 2024-08-16 ENCOUNTER — LAB ENCOUNTER (OUTPATIENT)
Dept: LAB | Age: 59
End: 2024-08-16
Attending: INTERNAL MEDICINE
Payer: COMMERCIAL

## 2024-08-16 DIAGNOSIS — D84.9 IMMUNOSUPPRESSION (HCC): ICD-10-CM

## 2024-08-16 DIAGNOSIS — Z12.5 PROSTATE CANCER SCREENING: ICD-10-CM

## 2024-08-16 DIAGNOSIS — Z51.81 ENCOUNTER FOR THERAPEUTIC DRUG MONITORING: ICD-10-CM

## 2024-08-16 DIAGNOSIS — Z94.4 LIVER REPLACED BY TRANSPLANT (HCC): ICD-10-CM

## 2024-08-16 LAB
ALBUMIN SERPL-MCNC: 4.6 G/DL (ref 3.2–4.8)
ALBUMIN/GLOB SERPL: 1.7 {RATIO} (ref 1–2)
ALP LIVER SERPL-CCNC: 96 U/L
ALT SERPL-CCNC: 16 U/L
ANION GAP SERPL CALC-SCNC: 7 MMOL/L (ref 0–18)
AST SERPL-CCNC: 19 U/L (ref ?–34)
BASOPHILS # BLD AUTO: 0.05 X10(3) UL (ref 0–0.2)
BASOPHILS NFR BLD AUTO: 0.9 %
BILIRUB SERPL-MCNC: 0.5 MG/DL (ref 0.3–1.2)
BUN BLD-MCNC: 12 MG/DL (ref 9–23)
CALCIUM BLD-MCNC: 10.3 MG/DL (ref 8.7–10.4)
CHLORIDE SERPL-SCNC: 106 MMOL/L (ref 98–112)
CO2 SERPL-SCNC: 24 MMOL/L (ref 21–32)
COMPLEXED PSA SERPL-MCNC: 1.13 NG/ML (ref ?–4)
CREAT BLD-MCNC: 1.24 MG/DL
EGFRCR SERPLBLD CKD-EPI 2021: 67 ML/MIN/1.73M2 (ref 60–?)
EOSINOPHIL # BLD AUTO: 0.18 X10(3) UL (ref 0–0.7)
EOSINOPHIL NFR BLD AUTO: 3.4 %
ERYTHROCYTE [DISTWIDTH] IN BLOOD BY AUTOMATED COUNT: 13.4 %
FASTING STATUS PATIENT QL REPORTED: YES
GLOBULIN PLAS-MCNC: 2.7 G/DL (ref 2–3.5)
GLUCOSE BLD-MCNC: 116 MG/DL (ref 70–99)
HCT VFR BLD AUTO: 45 %
HGB BLD-MCNC: 15.6 G/DL
IMM GRANULOCYTES # BLD AUTO: 0.05 X10(3) UL (ref 0–1)
IMM GRANULOCYTES NFR BLD: 0.9 %
LYMPHOCYTES # BLD AUTO: 1.37 X10(3) UL (ref 1–4)
LYMPHOCYTES NFR BLD AUTO: 25.7 %
MAGNESIUM SERPL-MCNC: 1.7 MG/DL (ref 1.6–2.6)
MCH RBC QN AUTO: 29.8 PG (ref 26–34)
MCHC RBC AUTO-ENTMCNC: 34.7 G/DL (ref 31–37)
MCV RBC AUTO: 86 FL
MONOCYTES # BLD AUTO: 0.32 X10(3) UL (ref 0.1–1)
MONOCYTES NFR BLD AUTO: 6 %
NEUTROPHILS # BLD AUTO: 3.37 X10 (3) UL (ref 1.5–7.7)
NEUTROPHILS # BLD AUTO: 3.37 X10(3) UL (ref 1.5–7.7)
NEUTROPHILS NFR BLD AUTO: 63.1 %
OSMOLALITY SERPL CALC.SUM OF ELEC: 285 MOSM/KG (ref 275–295)
PLATELET # BLD AUTO: 196 10(3)UL (ref 150–450)
POTASSIUM SERPL-SCNC: 4.9 MMOL/L (ref 3.5–5.1)
PROT SERPL-MCNC: 7.3 G/DL (ref 5.7–8.2)
RBC # BLD AUTO: 5.23 X10(6)UL
SODIUM SERPL-SCNC: 137 MMOL/L (ref 136–145)
WBC # BLD AUTO: 5.3 X10(3) UL (ref 4–11)

## 2024-08-16 PROCEDURE — 85025 COMPLETE CBC W/AUTO DIFF WBC: CPT

## 2024-08-16 PROCEDURE — 36415 COLL VENOUS BLD VENIPUNCTURE: CPT

## 2024-08-16 PROCEDURE — 83735 ASSAY OF MAGNESIUM: CPT

## 2024-08-16 PROCEDURE — 80197 ASSAY OF TACROLIMUS: CPT

## 2024-08-16 PROCEDURE — 80053 COMPREHEN METABOLIC PANEL: CPT

## 2024-08-19 ENCOUNTER — OFFICE VISIT (OUTPATIENT)
Dept: PHYSICAL THERAPY | Facility: HOSPITAL | Age: 59
End: 2024-08-19
Attending: Other
Payer: COMMERCIAL

## 2024-08-19 LAB — TACROLIMUS LVL: 8.6 NG/ML

## 2024-08-19 PROCEDURE — 97110 THERAPEUTIC EXERCISES: CPT

## 2024-08-19 NOTE — PROGRESS NOTES
Diagnosis:   Lumbar radiculopathy , DDD      Referring Provider: Nir Blakely  Date of Evaluation:    8/15/2024    Precautions:  recent liver transplant 12/23 Next MD visit:   none scheduled  Date of Surgery: n/a   Insurance Primary/Secondary: [employer] / N/A     # Auth Visits: 10            Subjective: Pt reports continued LB pain, denies numbness/tingling on LE's. LE's feel weak    Pain: 5/10 pre treatment, 6/10 post treatment      Objective: see grid. Ambulated into clinic with no major gait deviations. No tremors observed throughout treatment. He did not react strongly to LB muscle palpation today. One episode of toes getting caught on curb box while doing step ups.       Assessment: During palpation of LB  pt reported minimal tenderness which is a better reaction than at eval where pt reacted strongly to touch. No tremors were evident today. He did report a slight increase in LB pain at end of treatment, declined CP.      Goals:   Pt will report LB pain no worse than 3-5/10 when getting up in the morning  Decrease tenderness/soft tissue restrictions on LB area to minimize compressive like forces on the back  Pt will improve Oswestry score by 10-15% for improved overall functional mobility during day time  Pt will increase BLE strength by 1/4 grade to improve walking balance  Pt will be independent with HEP for self management of symptoms post discharge    Plan: Continue PT, strengthening trunk and LE's  Date: 8/19/2024  TX#: 2/10 Date:                 TX#: 3/ Date:                 TX#: 4/ Date:                 TX#: 5/ Date:   Tx#: 6/   TE  Nustep level 2 x 5 minutes  Ankle stretches PF/DF on half foam roll  Step ups R and L lead 4\" curb 10 reps ea  In parallel bars: forward/retro ambulation, lateral stepping , forward moving march 3 rounds ea  Prone hip extension 10 reps ea leg  Bridging exercises x 10  Clamshells with green band  In HL gentle UE presses on small Swiss ball for core activation  Dynadisc seated  slouch correction, lateral pelvic tilts                HEP: Access Code: [email or web address]  URL: [email or web address]  Date: 08/19/2024  Prepared by: Linda Harris    Exercises  - Supine Bridge  - 2 x daily - 1 sets - 10 reps  - Hooklying Clamshell with Resistance  - 2 x daily - 1 sets - 10 reps    Charges: 40 min TE       Total Timed Treatment: 40 min  Total Treatment Time: 40 min

## 2024-08-20 ENCOUNTER — ANESTHESIA (OUTPATIENT)
Dept: ENDOSCOPY | Facility: HOSPITAL | Age: 59
End: 2024-08-20
Payer: COMMERCIAL

## 2024-08-20 ENCOUNTER — ANESTHESIA EVENT (OUTPATIENT)
Dept: ENDOSCOPY | Facility: HOSPITAL | Age: 59
End: 2024-08-20
Payer: COMMERCIAL

## 2024-08-20 ENCOUNTER — HOSPITAL ENCOUNTER (OUTPATIENT)
Facility: HOSPITAL | Age: 59
Setting detail: HOSPITAL OUTPATIENT SURGERY
Discharge: HOME | End: 2024-08-20
Attending: INTERNAL MEDICINE | Admitting: INTERNAL MEDICINE
Payer: COMMERCIAL

## 2024-08-20 VITALS
OXYGEN SATURATION: 97 % | SYSTOLIC BLOOD PRESSURE: 168 MMHG | DIASTOLIC BLOOD PRESSURE: 88 MMHG | TEMPERATURE: 98 F | HEART RATE: 59 BPM | HEIGHT: 68 IN | RESPIRATION RATE: 16 BRPM | BODY MASS INDEX: 28.04 KG/M2 | WEIGHT: 185 LBS

## 2024-08-20 PROCEDURE — 0D558ZZ DESTRUCTION OF ESOPHAGUS, VIA NATURAL OR ARTIFICIAL OPENING ENDOSCOPIC: ICD-10-PCS | Performed by: INTERNAL MEDICINE

## 2024-08-20 RX ORDER — LIDOCAINE HYDROCHLORIDE 10 MG/ML
INJECTION, SOLUTION EPIDURAL; INFILTRATION; INTRACAUDAL; PERINEURAL AS NEEDED
Status: DISCONTINUED | OUTPATIENT
Start: 2024-08-20 | End: 2024-08-20 | Stop reason: SURG

## 2024-08-20 RX ORDER — SODIUM CHLORIDE, SODIUM LACTATE, POTASSIUM CHLORIDE, CALCIUM CHLORIDE 600; 310; 30; 20 MG/100ML; MG/100ML; MG/100ML; MG/100ML
INJECTION, SOLUTION INTRAVENOUS CONTINUOUS
Status: DISCONTINUED | OUTPATIENT
Start: 2024-08-20 | End: 2024-08-20

## 2024-08-20 RX ADMIN — LIDOCAINE HYDROCHLORIDE 100 MG: 10 INJECTION, SOLUTION EPIDURAL; INFILTRATION; INTRACAUDAL; PERINEURAL at 15:57:00

## 2024-08-20 NOTE — DISCHARGE INSTRUCTIONS

## 2024-08-20 NOTE — ANESTHESIA POSTPROCEDURE EVALUATION
[facility]    Richard Palacios Patient Status:  Hospital Outpatient Surgery   Age/Gender 59 year old male MRN [id number]   Location [facility] ENDOSCOPY [facility]IN [facility] Attending Claudio Adams MD   Hosp Day # 0 PCP Lg Sosa MD       Anesthesia Post-op Note    ESOPHAGOGASTRODUODENOSCOPY (EGD) with RADIO FREQUENCY ABLATION    Procedure Summary       Date: 08/20/24 Room / Location:  ENDOSCOPY 02 /  ENDOSCOPY    Anesthesia Start: 1553 Anesthesia Stop: 1618    Procedure: ESOPHAGOGASTRODUODENOSCOPY (EGD) with RADIO FREQUENCY ABLATION Diagnosis:       Crow's esophagus with low grade dysplasia      (barretts esophagus with low grade dysplasia)    Surgeons: Claudio Adams MD Anesthesiologist: Marlo Mckenzie MD    Anesthesia Type: MAC ASA Status: 3            Anesthesia Type: MAC    Vitals Value Taken Time   /78 08/20/24 1615   Temp 98 08/20/24 1620   Pulse 73 08/20/24 1619   Resp 18 08/20/24 1620   SpO2 95 % 08/20/24 1619   Vitals shown include unfiled device data.    Patient Location: Endoscopy    Anesthesia Type: MAC    Airway Patency: patent and extubated    Postop Pain Control: adequate    Mental Status: mildly sedated but able to meaningfully participate in the post-anesthesia evaluation    Nausea/Vomiting: none    Cardiopulmonary/Hydration status: stable euvolemic    Complications: no apparent anesthesia related complications    Postop vital signs: stable    Dental Exam: Unchanged from Preop    Patient to be discharged from PACU when criteria met.

## 2024-08-20 NOTE — H&P
[facility]  Pre-op H and P    Richard Palacios Patient Status:  Hospital Outpatient Surgery    1965 MRN [id number]   Location [facility] ENDOSCOPY PAIN CENTER Attending Claudio Adams MD   Date 2024 PCP Lg Sosa MD     CC: Crow's LGD    History of Present Illness:  Ricahrd Palacios is a a(n) 59 year old male. Crow's LGD    History:  Past Medical History:    Abdominal pain    Ascites    Back problem    COVID    Disorder of liver    Cirrhosis - no longer existent since liver transplant surgery 23    Esophageal reflux    Essential hypertension    ETOH abuse    Gout    Hypoalbuminemia    Nausea    Personal history of alcoholism (HCC)    Thrombocytopenia (HCC)    Weight loss     Past Surgical History:   Procedure Laterality Date    Colonoscopy N/A 2023    Procedure: .;  Surgeon: Gus Ocampo MD;  Location:  ENDOSCOPY    Colonoscopy N/A 2023    Procedure: COLONOSCOPY with cold snare polypectomy and clip placement x1;  Surgeon: Robin Raya DO;  Location:  ENDOSCOPY    Colonoscopy N/A 2024    Procedure: COLONOSCOPY;  Surgeon: Claudio Adams MD;  Location:  ENDOSCOPY    Organ transplant  2023    Liver transplant    Tonsillectomy       Family History   Problem Relation Age of Onset    Other (alcoholism) Father     Diabetes Mother         Type I    Other (CAD) Mother       reports that he quit smoking about 40 years ago. His smoking use included cigarettes. He has never been exposed to tobacco smoke. He uses smokeless tobacco. He reports that he does not drink alcohol and does not use drugs.    Allergies:  No Known Allergies    Medications:  No current facility-administered medications for this encounter.    Physical Exam:    Blood pressure 153/87, pulse 66, temperature 98.5 °F (36.9 °C), temperature source Temporal, resp. rate 20, height 5' 8\" (1.727 m), weight 185 lb (83.9 kg), SpO2 96%.    General: Appears alert, oriented x3 and in no acute  distress.  CV: Normal rate   Lungs: Normal effort   Skin: Warm and dry.  Laboratory Data:       Imaging:      Assessment/Plan/Procedure:  Patient Active Problem List   Diagnosis    Gouty arthropathy, unspecified    Family history of colon cancer    Routine general medical examination at a health care facility    Sebaceous cyst    Steatohepatitis    Metabolic syndrome    Tobacco abuse    Gout    Tobacco abuse counseling    Gallstones    Cirrhosis of liver (HCC)    Coagulopathy (HCC)    Hypoalbuminemia    S/P liver transplant (HCC)       Crow's LGD    Plan:  EGd    Claudio Adams MD  8/20/2024  2:28 PM

## 2024-08-20 NOTE — ANESTHESIA PREPROCEDURE EVALUATION
PRE-OP EVALUATION    Patient Name: Richard Palacios    Admit Diagnosis: Alcoholic cirrhosis of liver with ascites (HCC) [K70.31]    Pre-op Diagnosis: Alcoholic cirrhosis of liver with ascites (HCC) [K70.31]    ESOPHAGOGASTRODUODENOSCOPY (EGD) with possible ablation or endoscopic mucosal resection    Anesthesia Procedure: ESOPHAGOGASTRODUODENOSCOPY (EGD) with possible ablation or endoscopic mucosal resection    Surgeon(s) and Role:     * Robin Raya, DO - Primary    Pre-op vitals reviewed.  Temp: 98.5 °F (36.9 °C)  Pulse: 66  Resp: 20  BP: 153/87  SpO2: 96 %  Body mass index is 28.13 kg/m².    Current medications reviewed.  Hospital Medications:   lactated ringers infusion   Intravenous Continuous       Outpatient Medications:     Medications Prior to Admission   Medication Sig Dispense Refill Last Dose    URSODIOL 300 MG Oral Cap TAKE 1 CAPSULE(300 MG) BY MOUTH THREE TIMES DAILY 270 capsule 1 8/20/2024    cyclobenzaprine 5 MG Oral Tab Take 1 tablet (5 mg total) by mouth 3 (three) times daily as needed for Muscle spasms. 270 tablet 0 8/19/2024    traZODone 100 MG Oral Tab Take 1 tablet (100 mg total) by mouth nightly. 90 tablet 1 8/18/2024    LOKELMA 10 g Oral Powd Pack Take 1 packet (10 g total) by mouth daily. 90 packet 1 8/19/2024    Tacrolimus ER (ENVARSUS XR) 0.75 MG Oral Tablet 24 Hr Take 1 tablet by mouth as needed.   8/20/2024    Magnesium Oxide -Mg Supplement 400 (240 Mg) MG Oral Tab Take 1 tablet (400 mg total) by mouth daily. 30 tablet 1 8/20/2024    ergocalciferol 1.25 MG (29444 UT) Oral Cap Take 1 capsule (50,000 Units total) by mouth once a week. Q Sunday 8/18/2024    allopurinol 100 MG Oral Tab TAKE 1 TABLET(100 MG) BY MOUTH DAILY 90 tablet 3 8/20/2024    tacrolimus 1 MG Oral Cap Take 2 capsules (2 mg total) by mouth daily.   8/20/2024    Mycophenolate Sodium (MYCOPHENOLIC ACID OR) Take 720 mg by mouth in the morning and 720 mg before bedtime.   8/20/2024    valGANciclovir 450 MG Oral Tab Take  1 tablet (450 mg total) by mouth daily.   2024    aspirin 81 MG Oral Tab EC Take 1 tablet (81 mg total) by mouth daily.   2024    pantoprazole 40 MG Oral Tab EC Take 1 tablet (40 mg total) by mouth 2 (two) times daily before meals. 60 tablet 11 2024    SILDENAFIL CITRATE 100 MG Oral Tab TAKE 1/2 TO 1 TABLET(50  MG) BY MOUTH DAILY AS NEEDED FOR ERECTILE DYSFUNCTION 45 tablet 0 two weeks AGO       Allergies: Patient has no known allergies.      Anesthesia Evaluation    Patient summary reviewed.    Anesthetic Complications           GI/Hepatic/Renal      (+) GERD          (+) liver disease                 Cardiovascular                  (+) hypertension                                     Endo/Other                                  Pulmonary               (+) shortness of breath            Neuro/Psych                                      Past Surgical History:   Procedure Laterality Date    Colonoscopy N/A 2023    Procedure: .;  Surgeon: Gus Ocampo MD;  Location:  ENDOSCOPY    Colonoscopy N/A 2023    Procedure: COLONOSCOPY with cold snare polypectomy and clip placement x1;  Surgeon: Robin Raya DO;  Location:  ENDOSCOPY    Colonoscopy N/A 2024    Procedure: COLONOSCOPY;  Surgeon: Claudio Adams MD;  Location:  ENDOSCOPY    Organ transplant  2023    Liver transplant    Tonsillectomy       Social History     Socioeconomic History    Marital status:    Tobacco Use    Smoking status: Former     Current packs/day: 0.00     Types: Cigarettes     Quit date: 1984     Years since quittin.5     Passive exposure: Never    Smokeless tobacco: Current    Tobacco comments:     Still not smoking cigarettes since . Do smoke a couple of cigars a week.   Vaping Use    Vaping status: Never Used   Substance and Sexual Activity    Alcohol use: Never    Drug use: No   Other Topics Concern    Caffeine Concern Yes     Comment: 1 cup a day    Exercise No      History   Drug Use No     Available pre-op labs reviewed.  Lab Results   Component Value Date    WBC 5.3 08/16/2024    RBC 5.23 08/16/2024    HGB 15.6 08/16/2024    HCT 45.0 08/16/2024    MCV 86.0 08/16/2024    MCH 29.8 08/16/2024    MCHC 34.7 08/16/2024    RDW 13.4 08/16/2024    .0 08/16/2024     Lab Results   Component Value Date     08/16/2024    K 4.9 08/16/2024     08/16/2024    CO2 24.0 08/16/2024    BUN 12 08/16/2024    CREATSERUM 1.24 08/16/2024     (H) 08/16/2024    CA 10.3 08/16/2024            Airway      Mallampati: II  Mouth opening: >3 FB  TM distance: 4 - 6 cm  Neck ROM: full Cardiovascular    Cardiovascular exam normal.  Rhythm: regular  Rate: normal     Dental    Dentition appears grossly intact         Pulmonary    Pulmonary exam normal.  Breath sounds clear to auscultation bilaterally.               Other findings            ASA: 3   Plan: MAC  NPO status verified and patient meets guidelines.    Post-procedure pain management plan discussed with surgeon and patient.    Comment: Plan is MAC anesthesia, which likely will include deep sedation.  Implied that memory of procedure is unlikely although intraop recall, if it occurs, may be a reasonable and comfortable experience with this anesthetic.  Aware that general anesthesia is not intended though deep sedation may include brief moments of general anesthesia.   Questions answered. Accepts. The consent was signed without further questions.   Plan/risks discussed with: patient  Use of blood product(s) discussed with: patient    Consented to blood products.        Present on Admission:  **None**

## 2024-08-20 NOTE — OPERATIVE REPORT
Richard Palacios Patient Status:  Hospital Outpatient Surgery    1965 MRN [id number]   [facility] ENDOSCOPY PAIN CENTER Attending Claudio Adams MD   Date 2024 PCP Lg Ssoa MD     PREOPERATIVE DIAGNOSIS/INDICATION: H/o cirrhosis post liver transplant, no varices oin last EGD, LSBE with low grade dysplasia, esophagitis  POSTOPERTATIVE DIAGNOSIS: Same  PROCEDURE PERFORMED: EGD ablation  TIME OUT WAS PERFORMED    SEDATION: MAC sedation provided by General Anesthesia    INFORMED CONSENT: Risks, benefits and alternatives to the procedure were explained to the patient including but not limited to bleeding, infection, perforation, adverse drug reactions, pancreatitis and the need for hospitalization and surgery if this occurs, the patient understands and agrees to procedure.  PROCEDURE DESCRIPTION: A regular upper endoscope was introduced into the patient’s mouth, hypo pharynx, esophagus, stomach and the first and second portion of the duodenum, retroflexion of the endoscope was performed in the stomach. Careful examination of the above described areas was performed on withdrawal of the endoscope. The patient tolerated the procedure well, there were no immediate complication immediately following the procedure, and the patient was transferred to recovery in stable condition.  FINDINGS:  ESOPHAGUS: The SCJ M was located at 33 cm, the C at 34 cm, the GEj was located at 38 cm, there was LA class B erosive esophagitis, no varices noted.  GEJ: 3 cm hiatal hernia, Hill grade 3  STOMACH: Normal  DUODENUM: Normal  THERAPEUTICS: A guide wire was advanced through the scope with tip in gastric lumen, the scope was removed and replaced side by side with the wire, a Barrx 360 Express RFA balloon catheter was advanced over the guidewire and ablation of Crow's mucosa was performed, after this the ablated mucosa was scraped with a distal scope end attachment transparent cap and ablation was repeated,  no complications noted, patient tolerated procedure well  RECOMMENDATIONS:   Post EGD precautions, watch for bleeding, infection, perforation, adverse drug reactions   Post ablation precautions  Clear liquid diet x 24 hrs, then full liquid diet x 24 hrs, then soft diet as tolerated  Viscous lidocaine as needed  Increase Pantoprazole to 40 mg PO BID  Pain control as needed  Repeat EDG in 12 weeks.    Claudio Adams MD  8/20/2024  4:13 PM

## 2024-08-21 ENCOUNTER — APPOINTMENT (OUTPATIENT)
Dept: PHYSICAL THERAPY | Facility: HOSPITAL | Age: 59
End: 2024-08-21
Attending: Other
Payer: COMMERCIAL

## 2024-08-26 ENCOUNTER — OFFICE VISIT (OUTPATIENT)
Dept: PHYSICAL THERAPY | Facility: HOSPITAL | Age: 59
End: 2024-08-26
Attending: Other
Payer: COMMERCIAL

## 2024-08-26 NOTE — PROGRESS NOTES
Pt came in PT, informed this therapist that he had a cardiac consult this morning and had undergone an MRI today.  He states he will speak to his cardiologist tomorrow. Recommended to cancel PT visit today until cardiac consult is finished and cardiologist states we can proceed with PT. Pt agreed with plan and will continue to communicate with this therapist.

## 2024-08-28 ENCOUNTER — OFFICE VISIT (OUTPATIENT)
Dept: PHYSICAL THERAPY | Facility: HOSPITAL | Age: 59
End: 2024-08-28
Attending: Other
Payer: COMMERCIAL

## 2024-08-28 PROCEDURE — 97110 THERAPEUTIC EXERCISES: CPT

## 2024-08-28 NOTE — PROGRESS NOTES
Diagnosis:   Lumbar radiculopathy , DDD      Referring Provider: Nir Blakely  Date of Evaluation:    8/15/2024    Precautions:  recent liver transplant 12/23 Next MD visit:   none scheduled  Date of Surgery: n/a   Insurance Primary/Secondary: [employer] / N/A     # Auth Visits: 10            Subjective: Pt reports he spoke with cardiologist who stated it was okay to continue with exercises. He reports LB pain rated 5/10. Denies numbness/tingling on LE's today    Pain: 5/10 pre treatment, 6/10 post treatment      Objective: see grid. Ambulated into clinic with no major gait deviations. No tremors observed throughout treatment. He did not react strongly to LB muscle palpation today. One episode of toes getting caught on curb box while doing step ups.       Assessment: No tenderness on LB area on palpation. No significant tremors observed although some movements durign exercises were not quite smooth. He did not report any increase in pain with exercises.  Goals:   Pt will report LB pain no worse than 3-5/10 when getting up in the morning  Decrease tenderness/soft tissue restrictions on LB area to minimize compressive like forces on the back  Pt will improve Oswestry score by 10-15% for improved overall functional mobility during day time  Pt will increase BLE strength by 1/4 grade to improve walking balance  Pt will be independent with HEP for self management of symptoms post discharge    Plan: Continue PT, progress strengthening trunk and LE's as tolerated  Date: 8/19/2024  TX#: 2/10 Date:      8/28/2024           TX#: 3/10 Date:                 TX#: 4/ Date:                 TX#: 5/ Date:   Tx#: 6/   TE  Nustep level 2 x 5 minutes  Ankle stretches PF/DF on half foam roll  Step ups R and L lead 4\" curb 10 reps ea  In parallel bars: forward/retro ambulation, lateral stepping , forward moving march 3 rounds ea  Prone hip extension 10 reps ea leg  Bridging exercises x 10  Clamshells with green band  In HL gentle UE presses  on small Swiss ball for core activation  Dynadisc seated slouch correction, lateral pelvic tilts   TE  Bike ergo x 5 minutes  Heelcord stretches 10 sec hold x 3 then Df/PF on najma board x 20  # way hip exercises with YTB 10 reps R and L  Lateral stepping YTB 15 ft x 4  Step ups on Bosu R and L 10  Prone arm and contralat leg extension x 20  Quadruped  rocking forward/backward x 10  H/L  clamshells RTB  H/L bridge with band around thigh x 20   H/L with ball resting on abdomen: downward press with UEs, unilat knee presses   Swiss ball assisted DKTC and partial trunk rotation 10 reps eastanding scapular rows RTB x 20 reps               HEP: Access Code: [email or web address]  URL: [email or web address]  Date: 08/28/2024  Prepared by: Linda Harris    Exercises  - Standing Shoulder Row with Anchored Resistance  - 2 x daily - 1 sets - 10 reps  - Sidestepping  - 2 x daily  Access Code: [email or web address]  URL: [email or web address]  Date: 08/19/2024  Prepared by: Linda Harris    Exercises  - Supine Bridge  - 2 x daily - 1 sets - 10 reps  - Hooklying Clamshell with Resistance  - 2 x daily - 1 sets - 10 reps    Charges: 40 min TE       Total Timed Treatment: 40 min  Total Treatment Time: 40 min

## 2024-08-31 ENCOUNTER — APPOINTMENT (OUTPATIENT)
Dept: GENERAL RADIOLOGY | Facility: HOSPITAL | Age: 59
End: 2024-08-31
Attending: EMERGENCY MEDICINE
Payer: COMMERCIAL

## 2024-08-31 ENCOUNTER — HOSPITAL ENCOUNTER (OUTPATIENT)
Facility: HOSPITAL | Age: 59
Setting detail: OBSERVATION
Discharge: HOME | End: 2024-09-01
Attending: EMERGENCY MEDICINE | Admitting: HOSPITALIST
Payer: COMMERCIAL

## 2024-08-31 DIAGNOSIS — R50.9 FEVER, UNSPECIFIED FEVER CAUSE: Primary | ICD-10-CM

## 2024-08-31 DIAGNOSIS — D84.9 IMMUNOCOMPROMISED (HCC): ICD-10-CM

## 2024-08-31 DIAGNOSIS — Z94.4 HISTORY OF LIVER TRANSPLANT (HCC): ICD-10-CM

## 2024-08-31 PROBLEM — K21.00 GASTROESOPHAGEAL REFLUX DISEASE WITH ESOPHAGITIS WITHOUT HEMORRHAGE: Chronic | Status: ACTIVE | Noted: 2024-08-31

## 2024-08-31 LAB
ALBUMIN SERPL-MCNC: 4.9 G/DL (ref 3.2–4.8)
ALBUMIN/GLOB SERPL: 1.8 {RATIO} (ref 1–2)
ALP LIVER SERPL-CCNC: 127 U/L
ALT SERPL-CCNC: 26 U/L
ANION GAP SERPL CALC-SCNC: <0 MMOL/L (ref 0–18)
AST SERPL-CCNC: 27 U/L (ref ?–34)
BASOPHILS # BLD AUTO: 0.04 X10(3) UL (ref 0–0.2)
BASOPHILS NFR BLD AUTO: 0.4 %
BILIRUB SERPL-MCNC: 0.7 MG/DL (ref 0.3–1.2)
BILIRUB UR QL STRIP.AUTO: NEGATIVE
BUN BLD-MCNC: 14 MG/DL (ref 9–23)
CALCIUM BLD-MCNC: 10 MG/DL (ref 8.7–10.4)
CHLORIDE SERPL-SCNC: 101 MMOL/L (ref 98–112)
CLARITY UR REFRACT.AUTO: CLEAR
CO2 SERPL-SCNC: 35 MMOL/L (ref 21–32)
COLOR UR AUTO: YELLOW
CREAT BLD-MCNC: 1.16 MG/DL
EGFRCR SERPLBLD CKD-EPI 2021: 73 ML/MIN/1.73M2 (ref 60–?)
EOSINOPHIL # BLD AUTO: 0.06 X10(3) UL (ref 0–0.7)
EOSINOPHIL NFR BLD AUTO: 0.6 %
ERYTHROCYTE [DISTWIDTH] IN BLOOD BY AUTOMATED COUNT: 12.9 %
FLUAV + FLUBV RNA SPEC NAA+PROBE: NEGATIVE
FLUAV + FLUBV RNA SPEC NAA+PROBE: NEGATIVE
GLOBULIN PLAS-MCNC: 2.8 G/DL (ref 2–3.5)
GLUCOSE BLD-MCNC: 132 MG/DL (ref 70–99)
GLUCOSE UR STRIP.AUTO-MCNC: NORMAL MG/DL
HCT VFR BLD AUTO: 45.2 %
HGB BLD-MCNC: 16.5 G/DL
IMM GRANULOCYTES # BLD AUTO: 0.16 X10(3) UL (ref 0–1)
IMM GRANULOCYTES NFR BLD: 1.6 %
KETONES UR STRIP.AUTO-MCNC: NEGATIVE MG/DL
LACTATE SERPL-SCNC: 1.1 MMOL/L (ref 0.5–2)
LEUKOCYTE ESTERASE UR QL STRIP.AUTO: NEGATIVE
LYMPHOCYTES # BLD AUTO: 1.22 X10(3) UL (ref 1–4)
LYMPHOCYTES NFR BLD AUTO: 12.1 %
MCH RBC QN AUTO: 30.1 PG (ref 26–34)
MCHC RBC AUTO-ENTMCNC: 36.5 G/DL (ref 31–37)
MCV RBC AUTO: 82.3 FL
MONOCYTES # BLD AUTO: 0.87 X10(3) UL (ref 0.1–1)
MONOCYTES NFR BLD AUTO: 8.6 %
NEUTROPHILS # BLD AUTO: 7.77 X10 (3) UL (ref 1.5–7.7)
NEUTROPHILS # BLD AUTO: 7.77 X10(3) UL (ref 1.5–7.7)
NEUTROPHILS NFR BLD AUTO: 76.7 %
NITRITE UR QL STRIP.AUTO: NEGATIVE
OSMOLALITY SERPL CALC.SUM OF ELEC: 282 MOSM/KG (ref 275–295)
PH UR STRIP.AUTO: 6.5 [PH] (ref 5–8)
PLATELET # BLD AUTO: 203 10(3)UL (ref 150–450)
POTASSIUM SERPL-SCNC: 4.2 MMOL/L (ref 3.5–5.1)
PROT SERPL-MCNC: 7.7 G/DL (ref 5.7–8.2)
RBC # BLD AUTO: 5.49 X10(6)UL
RBC UR QL AUTO: NEGATIVE
RSV RNA SPEC NAA+PROBE: NEGATIVE
SARS-COV-2 RNA RESP QL NAA+PROBE: NOT DETECTED
SODIUM SERPL-SCNC: 135 MMOL/L (ref 136–145)
SP GR UR STRIP.AUTO: 1.03 (ref 1–1.03)
UROBILINOGEN UR STRIP.AUTO-MCNC: NORMAL MG/DL
WBC # BLD AUTO: 10.1 X10(3) UL (ref 4–11)

## 2024-08-31 PROCEDURE — 99223 1ST HOSP IP/OBS HIGH 75: CPT | Performed by: HOSPITALIST

## 2024-08-31 PROCEDURE — 71045 X-RAY EXAM CHEST 1 VIEW: CPT | Performed by: EMERGENCY MEDICINE

## 2024-08-31 RX ORDER — SODIUM PHOSPHATE, DIBASIC AND SODIUM PHOSPHATE, MONOBASIC 7; 19 G/230ML; G/230ML
1 ENEMA RECTAL ONCE AS NEEDED
Status: DISCONTINUED | OUTPATIENT
Start: 2024-08-31 | End: 2024-09-01

## 2024-08-31 RX ORDER — MYCOPHENOLIC ACID 360 MG/1
720 TABLET, DELAYED RELEASE ORAL 2 TIMES DAILY
Status: DISCONTINUED | OUTPATIENT
Start: 2024-08-31 | End: 2024-09-01

## 2024-08-31 RX ORDER — ACETAMINOPHEN 500 MG
500 TABLET ORAL EVERY 4 HOURS PRN
Status: DISCONTINUED | OUTPATIENT
Start: 2024-08-31 | End: 2024-09-01

## 2024-08-31 RX ORDER — CYCLOBENZAPRINE HCL 5 MG
5 TABLET ORAL 3 TIMES DAILY PRN
Status: DISCONTINUED | OUTPATIENT
Start: 2024-08-31 | End: 2024-09-01

## 2024-08-31 RX ORDER — PROCHLORPERAZINE EDISYLATE 5 MG/ML
5 INJECTION INTRAMUSCULAR; INTRAVENOUS EVERY 8 HOURS PRN
Status: DISCONTINUED | OUTPATIENT
Start: 2024-08-31 | End: 2024-09-01

## 2024-08-31 RX ORDER — ENOXAPARIN SODIUM 100 MG/ML
40 INJECTION SUBCUTANEOUS DAILY
Status: DISCONTINUED | OUTPATIENT
Start: 2024-08-31 | End: 2024-09-01

## 2024-08-31 RX ORDER — TRAMADOL HYDROCHLORIDE 50 MG/1
100 TABLET ORAL ONCE
Status: COMPLETED | OUTPATIENT
Start: 2024-08-31 | End: 2024-08-31

## 2024-08-31 RX ORDER — PANTOPRAZOLE SODIUM 40 MG/1
40 TABLET, DELAYED RELEASE ORAL
Status: DISCONTINUED | OUTPATIENT
Start: 2024-08-31 | End: 2024-09-01

## 2024-08-31 RX ORDER — BISACODYL 10 MG
10 SUPPOSITORY, RECTAL RECTAL
Status: DISCONTINUED | OUTPATIENT
Start: 2024-08-31 | End: 2024-09-01

## 2024-08-31 RX ORDER — ALLOPURINOL 100 MG/1
100 TABLET ORAL DAILY
Status: DISCONTINUED | OUTPATIENT
Start: 2024-08-31 | End: 2024-09-01

## 2024-08-31 RX ORDER — MELATONIN
3 NIGHTLY PRN
Status: DISCONTINUED | OUTPATIENT
Start: 2024-08-31 | End: 2024-09-01

## 2024-08-31 RX ORDER — VALGANCICLOVIR 450 MG/1
900 TABLET, FILM COATED ORAL DAILY
Status: DISCONTINUED | OUTPATIENT
Start: 2024-09-01 | End: 2024-08-31

## 2024-08-31 RX ORDER — ONDANSETRON 2 MG/ML
4 INJECTION INTRAMUSCULAR; INTRAVENOUS EVERY 6 HOURS PRN
Status: DISCONTINUED | OUTPATIENT
Start: 2024-08-31 | End: 2024-09-01

## 2024-08-31 RX ORDER — ASPIRIN 81 MG/1
81 TABLET ORAL DAILY
Status: DISCONTINUED | OUTPATIENT
Start: 2024-08-31 | End: 2024-09-01

## 2024-08-31 RX ORDER — TACROLIMUS 1 MG/1
2 CAPSULE ORAL DAILY
Status: DISCONTINUED | OUTPATIENT
Start: 2024-08-31 | End: 2024-08-31

## 2024-08-31 RX ORDER — VALGANCICLOVIR 450 MG/1
900 TABLET, FILM COATED ORAL DAILY
Status: DISCONTINUED | OUTPATIENT
Start: 2024-08-31 | End: 2024-09-01

## 2024-08-31 RX ORDER — URSODIOL 300 MG/1
300 CAPSULE ORAL 3 TIMES DAILY
Status: DISCONTINUED | OUTPATIENT
Start: 2024-08-31 | End: 2024-09-01

## 2024-08-31 RX ORDER — TRAMADOL HYDROCHLORIDE 50 MG/1
50 TABLET ORAL EVERY 6 HOURS PRN
Status: DISCONTINUED | OUTPATIENT
Start: 2024-08-31 | End: 2024-09-01

## 2024-08-31 RX ORDER — SENNOSIDES 8.6 MG
17.2 TABLET ORAL NIGHTLY PRN
Status: DISCONTINUED | OUTPATIENT
Start: 2024-08-31 | End: 2024-09-01

## 2024-08-31 RX ORDER — VALGANCICLOVIR 450 MG/1
450 TABLET, FILM COATED ORAL DAILY
Status: DISCONTINUED | OUTPATIENT
Start: 2024-08-31 | End: 2024-08-31

## 2024-08-31 RX ORDER — POLYETHYLENE GLYCOL 3350 17 G/17G
17 POWDER, FOR SOLUTION ORAL DAILY PRN
Status: DISCONTINUED | OUTPATIENT
Start: 2024-08-31 | End: 2024-09-01

## 2024-08-31 RX ORDER — MAGNESIUM OXIDE 400 MG/1
400 TABLET ORAL
Status: DISCONTINUED | OUTPATIENT
Start: 2024-08-31 | End: 2024-09-01

## 2024-08-31 NOTE — H&P
[facility]     Richard Palacios Patient Status:  Emergency    1965 MRN [id number]   Location [facility] EMERGENCY DEPARTMENT Attending Kayleen Wynne DO   Hosp Day # 0 PCP Lg Sosa MD     Chief Complaint: fever    Subjective:    History of Present Illness:     Richard Palacios is a 59 year old male with hx of liver transplant on immunosupresssion, GERD presents to the ER with fever that started yesterday.  102 that increased 204 patient took 500,000 Tylenol.  Patient received a COVID booster around 9:00 yesterday morning.  No chills, cough, sinus congestion.  No nausea, vomiting, diarrhea or constipation.  No chest pain, shortness of breath, dizziness, lightheadedness, or syncope.    History/Other:    Past Medical History:  Past Medical History:    Abdominal pain    Ascites    Back problem    COVID    Disorder of liver    Cirrhosis - no longer existent since liver transplant surgery 23    Esophageal reflux    Essential hypertension    ETOH abuse    Gout    Hypoalbuminemia    Nausea    Personal history of alcoholism (HCC)    Thrombocytopenia (HCC)    Weight loss     Past Surgical History:   Past Surgical History:   Procedure Laterality Date    Colonoscopy N/A 2023    Procedure: .;  Surgeon: Gus Ocampo MD;  Location:  ENDOSCOPY    Colonoscopy N/A 2023    Procedure: COLONOSCOPY with cold snare polypectomy and clip placement x1;  Surgeon: Robin Raya DO;  Location:  ENDOSCOPY    Colonoscopy N/A 2024    Procedure: COLONOSCOPY;  Surgeon: Claudio Adams MD;  Location:  ENDOSCOPY    Organ transplant  2023    Liver transplant    Tonsillectomy        Family History:   Family History   Problem Relation Age of Onset    Other (alcoholism) Father     Diabetes Mother         Type I    Other (CAD) Mother      Social History:    reports that he quit smoking about 40 years ago. His smoking use included cigarettes. He has never been  exposed to tobacco smoke. He uses smokeless tobacco. He reports that he does not drink alcohol and does not use drugs.     Allergies: No Known Allergies    Medications:    Current Facility-Administered Medications on File Prior to Encounter   Medication Dose Route Frequency Provider Last Rate Last Admin    [COMPLETED] gadoterate meglumine (Dotarem) 10 MMOL/20ML injection 20 mL  20 mL Intravenous ONCE PRN Nir Blakely MD   17 mL at 24 1016     Current Outpatient Medications on File Prior to Encounter   Medication Sig Dispense Refill    Lidocaine Viscous HCl 2 % Mouth/Throat Solution Take 15 mL by mouth every 3 (three) hours as needed for Pain (chest pain after banding). Swish and swallow 100 mL 0    acetaminophen-codeine (TYLENOL WITH CODEINE #3) 300-30 MG Oral Tab Take 1 tablet by mouth every 4 (four) hours as needed for Pain. 30 tablet 0    URSODIOL 300 MG Oral Cap TAKE 1 CAPSULE(300 MG) BY MOUTH THREE TIMES DAILY 270 capsule 1    [] cyclobenzaprine 5 MG Oral Tab Take 1 tablet (5 mg total) by mouth 3 (three) times daily as needed for Muscle spasms. 270 tablet 0    SILDENAFIL CITRATE 100 MG Oral Tab TAKE 1/2 TO 1 TABLET(50  MG) BY MOUTH DAILY AS NEEDED FOR ERECTILE DYSFUNCTION 45 tablet 0    traZODone 100 MG Oral Tab Take 1 tablet (100 mg total) by mouth nightly. 90 tablet 1    LOKELMA 10 g Oral Powd Pack Take 1 packet (10 g total) by mouth daily. 90 packet 1    Tacrolimus ER (ENVARSUS XR) 0.75 MG Oral Tablet 24 Hr Take 1 tablet by mouth as needed.      Magnesium Oxide -Mg Supplement 400 (240 Mg) MG Oral Tab Take 1 tablet (400 mg total) by mouth daily. 30 tablet 1    ergocalciferol 1.25 MG (27025 UT) Oral Cap Take 1 capsule (50,000 Units total) by mouth once a week. Q       allopurinol 100 MG Oral Tab TAKE 1 TABLET(100 MG) BY MOUTH DAILY 90 tablet 3    tacrolimus 1 MG Oral Cap Take 2 capsules (2 mg total) by mouth daily.      Mycophenolate Sodium (MYCOPHENOLIC ACID OR) Take 720 mg by  mouth in the morning and 720 mg before bedtime.      valGANciclovir 450 MG Oral Tab Take 1 tablet (450 mg total) by mouth daily.      aspirin 81 MG Oral Tab EC Take 1 tablet (81 mg total) by mouth daily.      pantoprazole 40 MG Oral Tab EC Take 1 tablet (40 mg total) by mouth 2 (two) times daily before meals. 60 tablet 11       Review of Systems:   A comprehensive review of systems was completed.    Pertinent positives and negatives noted in the HPI.    Objective:   Physical Exam:    /80   Pulse 82   Temp 99.7 °F (37.6 °C) (Oral)   Resp (!) 28   Ht 5' 8\" (1.727 m)   Wt 180 lb (81.6 kg)   SpO2 91%   BMI 27.37 kg/m²   General: No acute distress, Alert  Respiratory: No rhonchi, no wheezes  Cardiovascular: S1, S2. Regular rate and rhythm  Abdomen: Soft, Non-tender, non-distended, positive bowel sounds  Neuro: No new focal deficits  Extremities: No edema      Results:    Labs:      Labs Last 24 Hours:    Recent Labs   Lab 08/30/24  2344   RBC 5.49   HGB 16.5   HCT 45.2   MCV 82.3   MCH 30.1   MCHC 36.5   RDW 12.9   NEPRELIM 7.77*   WBC 10.1   .0       Recent Labs   Lab 08/30/24  2344   *   BUN 14   CREATSERUM 1.16   EGFRCR 73   CA 10.0   ALB 4.9*   *   K 4.2      CO2 35.0*   ALKPHO 127*   AST 27   ALT 26   BILT 0.7   TP 7.7       Lab Results   Component Value Date    INR 0.92 05/06/2024    INR 1.05 01/30/2024    INR 2.24 (H) 12/18/2023       No results for input(s): \"TROP\", \"TROPHS\", \"CK\" in the last 168 hours.    No results for input(s): \"TROP\", \"PBNP\" in the last 168 hours.    No results for input(s): \"PCT\" in the last 168 hours.    Imaging: Imaging data reviewed in Epic.    Assessment & Plan:      #Fever  - Likley due to recent covid booster  - Cultures pending  - CXR clear  - UA negative  - Transplant team says no abx unbless source is found.    # Liver transplant  - Will continue on immunosupression.    # GERD  - Will continue on PPI.      Plan of care discussed with patient at  bedside.    Donald Ivory, DO    Supplementary Documentation:     The 21st Century Cures Act makes medical notes like these available to patients in the interest of transparency. Please be advised this is a medical document. Medical documents are intended to carry relevant information, facts as evident, and the clinical opinion of the practitioner. The medical note is intended as peer to peer communication and may appear blunt or direct. It is written in medical language and may contain abbreviations or verbiage that are unfamiliar.

## 2024-08-31 NOTE — PLAN OF CARE
Problem: Patient/Family Goals  Goal: Patient/Family Long Term Goal  Description: Patient's Long Term Goal: discharge with adequate resources    Interventions:  - Monitor for fever, prn Tylenol; anti-rejection medications  - See additional Care Plan goals for specific interventions  Outcome: Progressing  Goal: Patient/Family Short Term Goal  Description: Patient's Short Term Goal: no fevers    Interventions:   - monitor temp q 4 hrs, Tylenol prn  - See additional Care Plan goals for specific interventions  8/31: maintain temp WNL; control headache  Outcome: Progressing

## 2024-08-31 NOTE — PROGRESS NOTES
Patient is alert and oriented x 4, afebrile throughout the day. C/o headache, Tramadol prn helpful. On RA, VSS, no telemetry, Lovenox s.c. Regular diet, tolerating well. Patient is continent, voids. Ambulates independently.

## 2024-08-31 NOTE — ED PROVIDER NOTES
Patient Seen in: [facility] Emergency Department      History     Chief Complaint   Patient presents with    Fever     Stated Complaint: patient had liver transplant in december and is running a 104 fever    Subjective:   HPI    Patient is a 59-year-old male status post liver transplant from December 2023 at [facility] presenting to the ED with fever.  The history is obtained from patient who is a good historian.  The patient noted a fever this evening at approximately 9 PM of 102 that ultimately increased to 104 prior to arrival.  He did take Tylenol 500 mg at that time.  He states he is limited due to 500 mg tablets within 24 hours due to his history of transplant.  He also cannot take NSAIDs.  He does tolerate tramadol.  He does have a moderate headache.  No neck pain or stiffness.  He had his COVID booster shot this morning at about 9 AM.  He believes that this is what is causing his fever.  No nasal congestion.  No cough or URI symptoms.  No abdominal pain.  No vomiting or diarrhea.  No associated urinary symptoms.  No rash.  Patient typically takes tramadol as needed for pain.  Requesting in ED.    Objective:   Past Medical History:    Abdominal pain    Ascites    Back problem    COVID    Disorder of liver    Cirrhosis - no longer existent since liver transplant surgery 12/19/23    Esophageal reflux    Essential hypertension    ETOH abuse    Gout    Hypoalbuminemia    Nausea    Personal history of alcoholism (HCC)    Thrombocytopenia (HCC)    Weight loss              Past Surgical History:   Procedure Laterality Date    Colonoscopy N/A 11/20/2023    Procedure: .;  Surgeon: Gus Ocampo MD;  Location:  ENDOSCOPY    Colonoscopy N/A 11/27/2023    Procedure: COLONOSCOPY with cold snare polypectomy and clip placement x1;  Surgeon: Robin Raya DO;  Location:  ENDOSCOPY    Colonoscopy N/A 7/25/2024    Procedure: COLONOSCOPY;  Surgeon: Claudio Adams MD;  Location:  ENDOSCOPY    Organ transplant   2023    Liver transplant    Tonsillectomy                  Social History     Socioeconomic History    Marital status:    Tobacco Use    Smoking status: Former     Current packs/day: 0.00     Types: Cigarettes     Quit date: 1984     Years since quittin.6     Passive exposure: Never    Smokeless tobacco: Current    Tobacco comments:     Still not smoking cigarettes since . Do smoke a couple of cigars a week.   Vaping Use    Vaping status: Never Used   Substance and Sexual Activity    Alcohol use: Never    Drug use: No   Other Topics Concern    Caffeine Concern Yes     Comment: 1 cup a day    Exercise No     Social Determinants of Health     Financial Resource Strain: Low Risk  (10/24/2023)    Financial Resource Strain     Difficulty of Paying Living Expenses: Not very hard     Med Affordability: No   Food Insecurity: No Food Insecurity (10/19/2023)    Food Insecurity     Food Insecurity: Never true   Transportation Needs: No Transportation Needs (10/24/2023)    Transportation Needs     Lack of Transportation: No   Housing Stability: Low Risk  (10/19/2023)    Housing Stability     Housing Instability: No              Review of Systems    Positive for stated Chief Complaint: Fever    Other systems are as noted in HPI.  Constitutional and vital signs reviewed.      All other systems reviewed and negative except as noted above.    Physical Exam     ED Triage Vitals [24 2336]   BP (!) 157/98   Pulse 110   Resp 22   Temp 100.3 °F (37.9 °C)   Temp src Oral   SpO2 99 %   O2 Device None (Room air)       Current Vitals:   Vital Signs  BP: (!) 157/98  Pulse: 93  Resp: 19  Temp: 100.3 °F (37.9 °C)  Temp src: Oral    Oxygen Therapy  SpO2: 96 %  O2 Device: None (Room air)            Physical Exam  Vitals and nursing note reviewed.   Constitutional:       General: He is not in acute distress.     Appearance: Normal appearance. He is not ill-appearing.   HENT:      Head: Normocephalic and atraumatic.       Right Ear: External ear normal.      Left Ear: External ear normal.      Nose: Nose normal.      Mouth/Throat:      Mouth: Mucous membranes are moist.      Pharynx: Oropharynx is clear. No posterior oropharyngeal erythema.   Eyes:      Conjunctiva/sclera: Conjunctivae normal.   Cardiovascular:      Rate and Rhythm: Normal rate and regular rhythm.   Pulmonary:      Effort: Pulmonary effort is normal. No respiratory distress.      Breath sounds: Normal breath sounds.   Abdominal:      General: Abdomen is flat. Bowel sounds are normal. There is no distension.      Tenderness: There is no abdominal tenderness.      Comments: Large healed surgical scar   Musculoskeletal:      Right lower leg: No edema.      Left lower leg: No edema.   Skin:     General: Skin is warm.      Capillary Refill: Capillary refill takes less than 2 seconds.      Findings: No rash.   Neurological:      Mental Status: He is alert and oriented to person, place, and time.   Psychiatric:         Mood and Affect: Mood normal.         Behavior: Behavior normal.               ED Course     Labs Reviewed   CBC WITH DIFFERENTIAL WITH PLATELET - Abnormal; Notable for the following components:       Result Value    Neutrophil Absolute Prelim 7.77 (*)     Neutrophil Absolute 7.77 (*)     All other components within normal limits   COMP METABOLIC PANEL (14) - Abnormal; Notable for the following components:    Glucose 132 (*)     Sodium 135 (*)     CO2 35.0 (*)     Anion Gap <0 (*)     Alkaline Phosphatase 127 (*)     Albumin 4.9 (*)     All other components within normal limits   LACTIC ACID, PLASMA   URINALYSIS WITH CULTURE REFLEX   RAINBOW DRAW LAVENDER   RAINBOW DRAW LIGHT GREEN   RAINBOW DRAW BLUE   RAINBOW DRAW GOLD   BLOOD CULTURE   BLOOD CULTURE   SARS-COV-2/FLU A AND B/RSV BY PCR ([facility])                      MDM      History obtained from patient and family.     Differential diagnosis includes fever due to recent vaccine administration, viral  illness, UTI, occult pneumonia, no new skin rashes or redness.    Previous records reviewed.  [facility] from [facility] was reviewed, July 15.  Patient is on Envarsus and Myfortic.  Patient is being followed by physical therapy for low back pain.  He was also recently seen by cardiology for 3-month follow-up.    Testing considered and ordered includes CBC, CMP, viral testing, lactic acid, blood cultures, UA, chest x-ray    I reviewed all results.  CBC unremarkable with white count of 10.1, normal, there is some slight neutrophilic shift.  CMP was also reviewed with a sodium of 135, bicarbonate 35, viral testing was negative.  Lactic acid is normal.  UA negative for UTI.    I personally reviewed the radiographs and my individual interpretation shows no obvious infiltrate or consolidation, possible scarring right lung  I also reviewed the official report which shows   X-RAY CHEST: 1 VIEW AP     COMPARISON: 10/19/2023     IMPRESSION:     Trace right lower lobe linear groundglass, improved from prior could reflect mild scarring/atelectasis.  No focal consolidation. No pneumothorax. Normal heart size and mediastinal contours. Upper abdomen unremarkable.      The patient does not have any cough or URI symptoms.  Therefore, no source of infection identified.  Chest x-ray results reviewed as noted above.    Others who assisted in patient's care included liver transplant team from [facility].  Called their phone number at [phone] and discussed case with on-call GI fellow, Dr. Juan Luis Noonan, who discussed case with the attending recommending observation for monitoring of return of the fever.  Only administer IV antibiotics if we identify potential source.  Otherwise only monitor for return as well as wait for initial blood culture results.    Interventions in care included tramadol per patient request for moderate headache.    Discussed plan with hospitalist as well as admission for observation.  Hold antibiotics, source of  infection can be identified.  Further discussions regarding care may be rediscussed with UI transplant team.                                         Medical Decision Making      Disposition and Plan     Clinical Impression:  No diagnosis found.     Disposition:  There is no disposition on file for this visit.  There is no disposition time on file for this visit.    Follow-up:  No follow-up provider specified.        Medications Prescribed:  Current Discharge Medication List

## 2024-08-31 NOTE — ED QUICK NOTES
Orders for admission, patient is aware of plan and ready to go upstairs. Any questions, please call ED RN Kitty at extension [phone].     Patient Covid vaccination status: Fully vaccinated and immunocompromised     COVID Test Ordered in ED: SARS-CoV-2/Flu A and B/RSV by PCR ([facility])    COVID Suspicion at Admission: N/A    Running Infusions:  None    Mental Status/LOC at time of transport: A&O x4    Other pertinent information:   CIWA score: N/A   NIH score:  N/A

## 2024-08-31 NOTE — PROGRESS NOTES
NURSING ADMISSION NOTE      Patient admitted via Cart  Oriented to room.  Safety precautions initiated.  Bed in low position.  Call light in reach.  Received patient at change of shift. Afebrile, c/o headache, Ultram 50 mg prn helpful. Home med. No telemetry, VS q 8 hrs, to monitor temp.

## 2024-09-01 VITALS
RESPIRATION RATE: 17 BRPM | HEIGHT: 68 IN | DIASTOLIC BLOOD PRESSURE: 73 MMHG | HEART RATE: 59 BPM | BODY MASS INDEX: 27.28 KG/M2 | TEMPERATURE: 98 F | WEIGHT: 180 LBS | OXYGEN SATURATION: 95 % | SYSTOLIC BLOOD PRESSURE: 128 MMHG

## 2024-09-01 LAB
ALBUMIN SERPL-MCNC: 4.4 G/DL (ref 3.2–4.8)
ALBUMIN/GLOB SERPL: 1.8 {RATIO} (ref 1–2)
ALP LIVER SERPL-CCNC: 111 U/L
ALT SERPL-CCNC: 17 U/L
ANION GAP SERPL CALC-SCNC: 8 MMOL/L (ref 0–18)
AST SERPL-CCNC: 19 U/L (ref ?–34)
BASOPHILS # BLD AUTO: 0.03 X10(3) UL (ref 0–0.2)
BASOPHILS NFR BLD AUTO: 0.4 %
BILIRUB SERPL-MCNC: 1 MG/DL (ref 0.3–1.2)
BUN BLD-MCNC: 12 MG/DL (ref 9–23)
CALCIUM BLD-MCNC: 9.5 MG/DL (ref 8.7–10.4)
CHLORIDE SERPL-SCNC: 99 MMOL/L (ref 98–112)
CO2 SERPL-SCNC: 24 MMOL/L (ref 21–32)
CREAT BLD-MCNC: 1 MG/DL
EGFRCR SERPLBLD CKD-EPI 2021: 87 ML/MIN/1.73M2 (ref 60–?)
EOSINOPHIL # BLD AUTO: 0.14 X10(3) UL (ref 0–0.7)
EOSINOPHIL NFR BLD AUTO: 1.8 %
ERYTHROCYTE [DISTWIDTH] IN BLOOD BY AUTOMATED COUNT: 12.7 %
GLOBULIN PLAS-MCNC: 2.5 G/DL (ref 2–3.5)
GLUCOSE BLD-MCNC: 104 MG/DL (ref 70–99)
HCT VFR BLD AUTO: 40.6 %
HGB BLD-MCNC: 14.5 G/DL
IMM GRANULOCYTES # BLD AUTO: 0.12 X10(3) UL (ref 0–1)
IMM GRANULOCYTES NFR BLD: 1.6 %
LYMPHOCYTES # BLD AUTO: 1.01 X10(3) UL (ref 1–4)
LYMPHOCYTES NFR BLD AUTO: 13.1 %
MAGNESIUM SERPL-MCNC: 1.7 MG/DL (ref 1.6–2.6)
MCH RBC QN AUTO: 29.7 PG (ref 26–34)
MCHC RBC AUTO-ENTMCNC: 35.7 G/DL (ref 31–37)
MCV RBC AUTO: 83 FL
MONOCYTES # BLD AUTO: 0.86 X10(3) UL (ref 0.1–1)
MONOCYTES NFR BLD AUTO: 11.2 %
NEUTROPHILS # BLD AUTO: 5.53 X10 (3) UL (ref 1.5–7.7)
NEUTROPHILS # BLD AUTO: 5.53 X10(3) UL (ref 1.5–7.7)
NEUTROPHILS NFR BLD AUTO: 71.9 %
OSMOLALITY SERPL CALC.SUM OF ELEC: 272 MOSM/KG (ref 275–295)
PLATELET # BLD AUTO: 145 10(3)UL (ref 150–450)
POTASSIUM SERPL-SCNC: 4.1 MMOL/L (ref 3.5–5.1)
PROT SERPL-MCNC: 6.9 G/DL (ref 5.7–8.2)
RBC # BLD AUTO: 4.89 X10(6)UL
SODIUM SERPL-SCNC: 131 MMOL/L (ref 136–145)
WBC # BLD AUTO: 7.7 X10(3) UL (ref 4–11)

## 2024-09-01 NOTE — PROGRESS NOTES
Patient is alert and oriented x 4, afebrile since admission, no c/o pain today. States he is feeling better. Patient is on RA, NSR on telemetry, BP WNL. Regular diet, tolerating well, continent, voids. Ambulates independently. States ready for discharge.

## 2024-09-01 NOTE — PROGRESS NOTES
NURSING DISCHARGE NOTE    Discharged Home via Ambulatory.  Accompanied by Spouse  Belongings Taken by patient/family.  Discharge instructions discussed with patient at bedside. Patient stated he had no questions, and agreeable to discharge. Patient left with wife at 1345.

## 2024-09-01 NOTE — PROGRESS NOTES
Lab results this am, Mg 1.7,  suggested Mg replacement, patient on Mg 400 mg tid. Notified Dr. Kaufman, no replacement ordered.

## 2024-09-01 NOTE — PLAN OF CARE
Patient is alert and oriented x4. Wdl on RA. VSS. Afebrile. Up as ada. No c/o pain at this time. POC discussed with pt, pt verbalizes understanding.   Problem: Patient/Family Goals  Goal: Patient/Family Long Term Goal  Description: Patient's Long Term Goal: discharge with adequate resources    Interventions:  - Monitor for fever, prn Tylenol; anti-rejection medications  - See additional Care Plan goals for specific interventions  Outcome: Progressing  Goal: Patient/Family Short Term Goal  Description: Patient's Short Term Goal: no fevers  8/31 noc: stay afebrile    Interventions:   - monitor temp q 4 hrs, Tylenol prn  - See additional Care Plan goals for specific interventions  8/31: maintain temp WNL; control headache  Outcome: Progressing     Problem: PAIN - ADULT  Goal: Verbalizes/displays adequate comfort level or patient's stated pain goal  Description: INTERVENTIONS:  - Encourage pt to monitor pain and request assistance  - Assess pain using appropriate pain scale  - Administer analgesics based on type and severity of pain and evaluate response  - Implement non-pharmacological measures as appropriate and evaluate response  - Consider cultural and social influences on pain and pain management  - Manage/alleviate anxiety  - Utilize distraction and/or relaxation techniques  - Monitor for opioid side effects  - Notify MD/LIP if interventions unsuccessful or patient reports new pain  - Anticipate increased pain with activity and pre-medicate as appropriate  Outcome: Progressing      Yes

## 2024-09-03 ENCOUNTER — PATIENT OUTREACH (OUTPATIENT)
Dept: CASE MANAGEMENT | Age: 59
End: 2024-09-03

## 2024-09-03 ENCOUNTER — TELEPHONE (OUTPATIENT)
Dept: FAMILY MEDICINE CLINIC | Facility: CLINIC | Age: 59
End: 2024-09-03

## 2024-09-03 DIAGNOSIS — Z02.9 ENCOUNTERS FOR UNSPECIFIED ADMINISTRATIVE PURPOSE: Primary | ICD-10-CM

## 2024-09-03 NOTE — PROGRESS NOTES
Transitional Care Management   Discharge Date: 24  Contact Date: 9/3/2024    Assessment:  TCM Initial Assessment    General:  Assessment completed with: Patient  Patient Subjective: Pt reports he is doing better. Pt denies fevers since discharge. Pt has a cough but denies head/chest congestion, dizziness, weakness, n/v/d, chest pain, chills, shortness of breath, confusion, issues urinating, abdominal pain and HA. Pt is eating but does not really have an appetite. Pt is making sure to hydrate and eat still. Pt is resting often and monitoring for a fever. Pt is able to urinate well, denies blood in the urine. Pt has a call into his Liver transplant team awaiting a call back. Pt has no concerns or questions at this time.  Chief Complaint: Fever  - Likley due to recent covid booster  Verify patient name and  with patient/ caregiver: Yes    Hospital Stay/Discharge:  Tell me what you understand of why you were in the hospital or emergency department: a high fever  Prior to leaving the hospital were your Discharge Instructions reviewed with you?: Yes  Did you receive a copy of your written Discharge Instructions?: Yes  What questions do you have about your Discharge Instructions?: None at this time  Do you feel better or worse since you left the hospital or emergency department?: Better    Follow - Up Appointment:  Do you have a follow-up appointment?: No  Are there any barriers to getting to your follow-up appointment?: No    Home Health/DME:  Prior to leaving the hospital was Home Health (HH) arranged for you?: No     Prior to leaving the hospital or emergency department was Durable Medical Equipment (DME), medical supplies, or infusions arranged for you?: No  Are DME/medical supply/infusions needs identified by staff during this assessment?: No     Medications/Diet:  Did any of your medications change, during or after your hospital stay or ED visit?: No  Do you understand what your medications are for and  possible side effects?: Yes  Are there any reasons that keep you from taking your medication as prescribed?: No  Any concerns about medication refills?: No    Were you given a different diet per your Discharge Instructions?: No     Questions/Concerns:  Do you have any questions or concerns that have not been discussed?: No       Nursing Interventions:    All d/c instructions reviewed with the pt. Reviewed when to call MD vs when to call 911 or go the ED. Reviewed importance of proper hydration. Educated pt on the importance of taking all meds as prescribed as well as close f/u with PCP/specialists. Pt verbalized understanding and will contact the office with any further questions or concerns.       Medication Review: Pt declined to review med list at this time.   Current Outpatient Medications   Medication Sig Dispense Refill    Lidocaine Viscous HCl 2 % Mouth/Throat Solution Take 15 mL by mouth every 3 (three) hours as needed for Pain (chest pain after banding). Swish and swallow 100 mL 0    acetaminophen-codeine (TYLENOL WITH CODEINE #3) 300-30 MG Oral Tab Take 1 tablet by mouth every 4 (four) hours as needed for Pain. 30 tablet 0    URSODIOL 300 MG Oral Cap TAKE 1 CAPSULE(300 MG) BY MOUTH THREE TIMES DAILY 270 capsule 1    SILDENAFIL CITRATE 100 MG Oral Tab TAKE 1/2 TO 1 TABLET(50  MG) BY MOUTH DAILY AS NEEDED FOR ERECTILE DYSFUNCTION 45 tablet 0    traZODone 100 MG Oral Tab Take 1 tablet (100 mg total) by mouth nightly. 90 tablet 1    LOKELMA 10 g Oral Powd Pack Take 1 packet (10 g total) by mouth daily. 90 packet 1    Tacrolimus ER 1 MG Oral Tablet 24 Hr Take 2 tablets (2 mg total) by mouth as needed.      Magnesium Oxide -Mg Supplement 400 (240 Mg) MG Oral Tab Take 1 tablet (400 mg total) by mouth daily. (Patient taking differently: Take 1 tablet (400 mg total) by mouth 3 (three) times daily.) 30 tablet 1    ergocalciferol 1.25 MG (02270 UT) Oral Cap Take 1 capsule (50,000 Units total) by mouth once a  week. Q Sunday      allopurinol 100 MG Oral Tab TAKE 1 TABLET(100 MG) BY MOUTH DAILY 90 tablet 3    tacrolimus 1 MG Oral Cap Take 2 capsules (2 mg total) by mouth daily.      Mycophenolate Sodium (MYCOPHENOLIC ACID OR) Take 720 mg by mouth in the morning and 720 mg before bedtime.      valGANciclovir 450 MG Oral Tab Take 2 tablets (900 mg total) by mouth daily.      aspirin 81 MG Oral Tab EC Take 1 tablet (81 mg total) by mouth daily.      pantoprazole 40 MG Oral Tab EC Take 1 tablet (40 mg total) by mouth 2 (two) times daily before meals. 60 tablet 11     Did patient review medications using current pill bottles and not just a medication list?  No- Pt declined at this time.   Discharge medications reviewed/discussed/and reconciled against outpatient medications with patient.  Any changes or updates to medications sent to primary care provider.  Patient Declined    SDOH:     Transportation Needs: No Transportation Needs (9/3/2024)    Transportation Needs     Lack of Transportation: No     Car Seat: Not on file       Financial Resource Strain: Low Risk  (9/3/2024)    Financial Resource Strain     Difficulty of Paying Living Expenses: Not very hard     Med Affordability: No       Follow-up Appointments:Reviewed recommended follow up appointment. Pt denies any barriers to keeping/getting to U appts.   Your appointments       Date & Time Appointment Department (Center)    Sep 04, 2024 6:00 PM CDT Physical Therapy Ortho Treatment with Linda Harris PT [facility] ([facility])        Sep 09, 2024 4:30 PM CDT Physical Therapy Ortho Treatment with Linda Harris PT [facility] Physical Therapy [facility])        Sep 19, 2024 10:15 AM CDT Physical Therapy Ortho Treatment with Linda Harris PT [facility] Physical Therapy [facility])        Sep 26, 2024 9:30 AM CDT Physical Therapy Ortho Treatment with  Linda Harris, PT [facility] ([facility])        Oct 01, 2024 8:00 AM CDT Follow-Up OV with Gus Ocampo MD [facility],  [facility] ([facility])    Please arrive 15 minutes prior to your scheduled appointment time.         Oct 03, 2024 9:30 AM CDT Physical Therapy Ortho Treatment with Linda Harris, PT [facility] ([facility])        Oct 14, 2024 9:20 AM CDT Follow Up Visit with Nir Blakely MD [facility] ([facility])        Oct 14, 2024 11:45 AM CDT Physical Therapy Ortho Treatment with Linda Harris, PT [facility] ([facility])        Oct 21, 2024 11:45 AM CDT Physical Therapy Ortho Treatment with Linda Harris, PT [facility] ([facility])        Oct 28, 2024 11:45 AM CDT Physical Therapy Ortho Treatment with Linda Harris PT [facility] ([facility])        Oct 28, 2024 1:15 PM CDT Office Visit with Crystal Bryan MD [facility] ([facility] [place])        Nov 19, 2024 8:10 AM CST EGD with REYNA GORDILLO [facility] (--)    Please arrive 60 minutes prior to your scheduled procedure time.         Dec 10, 2024 8:40 AM CST X-ray Bone Densitometry (Dexa) with BK DEXA [address] DEX[address] ([facility])    It is recommended that you wear a 2 piece outfit that does not contain any metal such as snaps and zippers, etc.  Attention women: Underwire bras will need to be removed prior to the scan.    If you have the report from a prior DEXA scan performed elsewhere, please bring the report with you to your appointment.       Dec 10, 2024 8:40 AM CST X-ray Bone Densitometry (Dexa) with BK XR [address] X-ray - [address] ([facility] [address])    It is  recommended that you wear a 2 piece outfit that does not contain any metal such as snaps and zippers, etc.  Attention women: Underwire bras will need to be removed prior to the scan.    If you have the report from a prior DEXA scan performed elsewhere, please bring the report with you to your appointment.             [facility] DEXA [address]  [address]  [address] [facility] [id number] [phone] [facility] Physical Therapy  [facility]  [address]  [facility] [address] [phone] [facility] X-ray - [address]  [address]  [address] [facility] [address] [phone]    [facility] [place]  [address]  [facility] [address] [phone] [facility]  [address]  [facility] [address] [phone] [facility]  No address on file  [phone]    [facility] Gastroenterology,  [facility] GI  [address] [facility] [address] [phone]            Transitional Care Clinic  Was TCC Ordered: No      Primary Care Provider (If no TCC appointment)  Does patient already have a PCP appointment scheduled? No  Nurse Care Manager Attempted to schedule PCP office TCM appointment with patient   -If no appointment scheduled: Explain: pt declined at this time, TE to PCP office with an update.     Specialist  Does the patient have any other follow-up appointment(s) that need to be scheduled? Yes   -If yes: Nurse Care Manager reviewed upcoming specialist appointments with patient: Yes   -Does the patient need assistance scheduling appointment(s): No- Pt has a call into his Liver Transplant Team, awaiting a call back for plan of care.     CCM referral placed:  Not Applicable    Book By Date: 9/15/24

## 2024-09-03 NOTE — TELEPHONE ENCOUNTER
Just an FYI-    Spoke to patient for TCM today.  Patient does not have an appointment scheduled at this time. Pt declined an appt with PCP as pt is contacting his transplant team.  TCM appointment recommended by 9/8/24 as patient is a High risk for readmission.  Please advise.    BOOK BY DATE (last date for TCM): 9/15/24       Thank you!

## 2024-09-04 ENCOUNTER — TELEPHONE (OUTPATIENT)
Dept: PHYSICAL THERAPY | Facility: HOSPITAL | Age: 59
End: 2024-09-04

## 2024-09-04 ENCOUNTER — APPOINTMENT (OUTPATIENT)
Dept: PHYSICAL THERAPY | Facility: HOSPITAL | Age: 59
End: 2024-09-04
Attending: Other
Payer: COMMERCIAL

## 2024-09-04 NOTE — TELEPHONE ENCOUNTER
Spoke with pt who was admitted to the hospital over the weekend. He was informed that he will need an order to resume PT. He verbalized understanding.

## 2024-09-04 NOTE — PAYOR COMM NOTE
--------------  ADMISSION REVIEW     Payor: [employer]   Subscriber #:  [id number]  Authorization Number: [id number]    Admit date: N/A  Admit time: N/A     08/31/24 0721  Place in observation Once  (Place Observation Medicine)  Once     Completed     Service: Medical  Level of Care: Acute  Patient Class: Observation   Question: Diagnosis Answer: Fever, unspecified fever cause    08/31/24 0720           REVIEW DOCUMENTATION:  Patient Seen in: [facility] Emergency Department      History     Chief Complaint   Patient presents with    Fever     Stated Complaint: patient had liver transplant in december and is running a 104 fever    Subjective:   HPI    Patient is a 59-year-old male status post liver transplant from December 2023 at [facility] presenting to the ED with fever.  The history is obtained from patient who is a good historian.  The patient noted a fever this evening at approximately 9 PM of 102 that ultimately increased to 104 prior to arrival.  He did take Tylenol 500 mg at that time.  He states he is limited due to 500 mg tablets within 24 hours due to his history of transplant.  He also cannot take NSAIDs.  He does tolerate tramadol.  He does have a moderate headache.  No neck pain or stiffness.  He had his COVID booster shot this morning at about 9 AM.  He believes that this is what is causing his fever.  No nasal congestion.  No cough or URI symptoms.  No abdominal pain.  No vomiting or diarrhea.  No associated urinary symptoms.  No rash.  Patient typically takes tramadol as needed for pain.  Requesting in ED.    Objective:   Past Medical History:    Abdominal pain    Ascites    Back problem    COVID    Disorder of liver    Cirrhosis - no longer existent since liver transplant surgery 12/19/23    Esophageal reflux    Essential hypertension    ETOH abuse    Gout    Hypoalbuminemia    Nausea    Personal history of alcoholism (HCC)    Thrombocytopenia (HCC)    Weight loss              Past Surgical  History:   Procedure Laterality Date    Colonoscopy N/A 2023    Procedure: .;  Surgeon: Gus Ocampo MD;  Location:  ENDOSCOPY    Colonoscopy N/A 2023    Procedure: COLONOSCOPY with cold snare polypectomy and clip placement x1;  Surgeon: Robin Raya DO;  Location:  ENDOSCOPY    Colonoscopy N/A 2024    Procedure: COLONOSCOPY;  Surgeon: Claudio Adams MD;  Location:  ENDOSCOPY    Organ transplant  2023    Liver transplant    Tonsillectomy       Social History     Socioeconomic History    Marital status:    Tobacco Use    Smoking status: Former     Current packs/day: 0.00     Types: Cigarettes     Quit date: 1984     Years since quittin.6     Passive exposure: Never    Smokeless tobacco: Current    Tobacco comments:     Still not smoking cigarettes since . Do smoke a couple of cigars a week.   Vaping Use    Vaping status: Never Used   Substance and Sexual Activity    Alcohol use: Never    Drug use: No   Other Topics Concern    Caffeine Concern Yes     Comment: 1 cup a day    Exercise No     Social Determinants of Health     Financial Resource Strain: Low Risk  (10/24/2023)    Financial Resource Strain     Difficulty of Paying Living Expenses: Not very hard     Med Affordability: No   Food Insecurity: No Food Insecurity (10/19/2023)    Food Insecurity     Food Insecurity: Never true   Transportation Needs: No Transportation Needs (10/24/2023)    Transportation Needs     Lack of Transportation: No   Housing Stability: Low Risk  (10/19/2023)    Housing Stability     Housing Instability: No       Review of Systems    Positive for stated Chief Complaint: Fever    Other systems are as noted in HPI.  Constitutional and vital signs reviewed.      All other systems reviewed and negative except as noted above.    Physical Exam     ED Triage Vitals [24 2336]   BP (!) 157/98   Pulse 110   Resp 22   Temp 100.3 °F (37.9 °C)   Temp src Oral   SpO2 99 %   O2 Device  None (Room air)       Current Vitals:   Vital Signs  BP: (!) 157/98  Pulse: 93  Resp: 19  Temp: 100.3 °F (37.9 °C)  Temp src: Oral    Oxygen Therapy  SpO2: 96 %  O2 Device: None (Room air)    Physical Exam  Vitals and nursing note reviewed.   Constitutional:       General: He is not in acute distress.     Appearance: Normal appearance. He is not ill-appearing.   HENT:      Head: Normocephalic and atraumatic.      Right Ear: External ear normal.      Left Ear: External ear normal.      Nose: Nose normal.      Mouth/Throat:      Mouth: Mucous membranes are moist.      Pharynx: Oropharynx is clear. No posterior oropharyngeal erythema.   Eyes:      Conjunctiva/sclera: Conjunctivae normal.   Cardiovascular:      Rate and Rhythm: Normal rate and regular rhythm.   Pulmonary:      Effort: Pulmonary effort is normal. No respiratory distress.      Breath sounds: Normal breath sounds.   Abdominal:      General: Abdomen is flat. Bowel sounds are normal. There is no distension.      Tenderness: There is no abdominal tenderness.      Comments: Large healed surgical scar   Musculoskeletal:      Right lower leg: No edema.      Left lower leg: No edema.   Skin:     General: Skin is warm.      Capillary Refill: Capillary refill takes less than 2 seconds.      Findings: No rash.   Neurological:      Mental Status: He is alert and oriented to person, place, and time.   Psychiatric:         Mood and Affect: Mood normal.         Behavior: Behavior normal.               ED Course     Labs Reviewed   CBC WITH DIFFERENTIAL WITH PLATELET - Abnormal; Notable for the following components:       Result Value    Neutrophil Absolute Prelim 7.77 (*)     Neutrophil Absolute 7.77 (*)     All other components within normal limits   COMP METABOLIC PANEL (14) - Abnormal; Notable for the following components:    Glucose 132 (*)     Sodium 135 (*)     CO2 35.0 (*)     Anion Gap <0 (*)     Alkaline Phosphatase 127 (*)     Albumin 4.9 (*)      X-RAY CHEST:  1 VIEW AP     COMPARISON: 10/19/2023     IMPRESSION:     Trace right lower lobe linear groundglass, improved from prior could reflect mild scarring/atelectasis.  No focal consolidation. No pneumothorax. Normal heart size and mediastinal contours. Upper abdomen unremarkable.      The patient does not have any cough or URI symptoms.  Therefore, no source of infection identified.  Chest x-ray results reviewed as noted above.    Others who assisted in patient's care included liver transplant team from [facility].  Called their phone number at [phone] and discussed case with on-call GI fellow, Dr. Juan Luis Noonan, who discussed case with the attending recommending observation for monitoring of return of the fever.  Only administer IV antibiotics if we identify potential source.  Otherwise only monitor for return as well as wait for initial blood culture results.    Interventions in care included tramadol per patient request for moderate headache.    Discussed plan with hospitalist as well as admission for observation.  Hold antibiotics, source of infection can be identified.  Further discussions regarding care may be rediscussed with  transplant team.        Discharged Home via Ambulatory.  Accompanied by Spouse    Discharged: 9/1/2024 1350             MEDICATIONS ADMINISTERED IN LAST 1 DAY:                Vitals (last day) before discharge       Date/Time Temp Pulse Resp BP SpO2 Weight O2 Device O2 Flow Rate (L/min) [facility]    09/01/24 1300 98.1 °F (36.7 °C) 59 17 128/73 -- -- None (Room air) 0 L/min CT    09/01/24 0525 98.3 °F (36.8 °C) 63 -- 134/69 -- -- -- -- LA    09/01/24 0525 -- -- 18 -- -- -- None (Room air) -- DS    09/01/24 0005 97.8 °F (36.6 °C) -- -- -- -- -- -- -- LA    08/31/24 2041 99.2 °F (37.3 °C) 69 -- 123/68 -- -- -- -- LA    08/31/24 2041 -- -- 18 -- -- -- None (Room air) -- DS    08/31/24 1654 98.1 °F (36.7 °C) 74 -- -- 95 % -- None (Room air) -- MN    08/31/24 1158 98.6 °F (37 °C) 74 20 118/65 96 % -- None  (Room air) -- TJ    08/31/24 0700 98.5 °F (36.9 °C) 86 20 140/90 93 % -- None (Room air) -- MNA    08/31/24 0645 -- 80 -- -- 96 % -- -- -- MU    08/31/24 0415 -- 82 -- -- 91 % -- -- -- MU    08/31/24 0330 -- 83 28 143/80 94 % -- None (Room air) -- SC    08/31/24 0248 99.7 °F (37.6 °C) -- -- -- -- -- -- -- EW    08/31/24 0230 -- 89 20 139/74 97 % -- None (Room air) -- EW    08/31/24 0200 -- 93 19 -- 96 % -- None (Room air) -- EW

## 2024-09-05 ENCOUNTER — TELEPHONE (OUTPATIENT)
Dept: FAMILY MEDICINE CLINIC | Facility: CLINIC | Age: 59
End: 2024-09-05

## 2024-09-05 DIAGNOSIS — M54.16 LUMBAR RADICULOPATHY: Primary | ICD-10-CM

## 2024-09-05 NOTE — TELEPHONE ENCOUNTER
Pt recently discharged from hospital - asking for a PT order.  Pt advised follow up with primary should be sched.  Pls call to discuss

## 2024-09-05 NOTE — TELEPHONE ENCOUNTER
Reaction to flu/covid vaccine, high fever, concerns from the liver transplant team, kept in hospital to monitor.    Pt is discharged and doing well    Needs order to continue PT for back..  pt has been doing PT, this is a renewal, not related to his hospitilization, okay for order?

## 2024-09-09 ENCOUNTER — APPOINTMENT (OUTPATIENT)
Dept: PHYSICAL THERAPY | Facility: HOSPITAL | Age: 59
End: 2024-09-09
Attending: Other
Payer: COMMERCIAL

## 2024-09-19 ENCOUNTER — OFFICE VISIT (OUTPATIENT)
Dept: PHYSICAL THERAPY | Facility: HOSPITAL | Age: 59
End: 2024-09-19
Attending: FAMILY MEDICINE
Payer: COMMERCIAL

## 2024-09-19 PROCEDURE — 97110 THERAPEUTIC EXERCISES: CPT

## 2024-09-19 NOTE — PROGRESS NOTES
Diagnosis:   Lumbar radiculopathy , DDD      Referring Provider: No ref. provider found  Date of Evaluation:    8/15/2024    Precautions:  recent liver transplant 12/23 Next MD visit:   none scheduled  Date of Surgery: n/a   Insurance Primary/Secondary: [employer] / N/A     # Auth Visits: 10            Subjective: Pt states he had the flu and was admitted to the hospital 3 weeks ago. He feels better. Still reports intermittent low back pain. Had 7/10 LB pain last night with unsupported sitting. He reports random episodes of numbness and tingling on LE's. He reports muscle soreness with doing kitchen chores  Pain: 0/10 pre treatment, 6/10 post treatment      Objective: Ambulated without device with normal gait pattern, over back in mild kyhosis  Order received to continue PT from Dr. Ian FERNANDEZ ROM wnl  BLE strength 4/5  Sensation Intact to touch  Hamstrings tightness 45 /90 on L and 50/90 on R  Trunk ROM: Flexion 60 Extension 15  L/R SB: 11/17  L/R rotation: 60%  Pt needs verbal cues to engage core during exercises  see grid.   Assessment: Pt returns to PT after hospital admission for flu. New orders received to continue PT. Overall pt appears to be improving from his initial evaluation. He is compliant with HEP and knows the value of continued exercises.  No tenderness on LB area on palpation. No significant tremors observed. He did not report any increase in pain with exercises. Focus of treatment remains LE strengthening/ core strengthening and stabilization. Pt will continue with his walking program and progress as tolerated. Will continue with same goals  Goals:   Pt will report LB pain no worse than 3-5/10 when getting up in the morning  Decrease tenderness/soft tissue restrictions on LB area to minimize compressive like forces on the back  Pt will improve Oswestry score by 10-15% for improved overall functional mobility during day time  Pt will increase BLE strength by 1/4 grade to improve walking balance  Pt  will be independent with HEP for self management of symptoms post discharge    Plan: Continue PT, progress strengthening trunk and LE's as tolerated  Date: 8/19/2024  TX#: 2/10 Date:      8/28/2024           TX#: 3/10 Date:      9/18/2024           TX#: 4/10 Date:                 TX#: 5/ Date:   Tx#: 6/   TE  Nustep level 2 x 5 minutes  Ankle stretches PF/DF on half foam roll  Step ups R and L lead 4\" curb 10 reps ea  In parallel bars: forward/retro ambulation, lateral stepping , forward moving march 3 rounds ea  Prone hip extension 10 reps ea leg  Bridging exercises x 10  Clamshells with green band  In HL gentle UE presses on small Swiss ball for core activation  Dynadisc seated slouch correction, lateral pelvic tilts   TE  Bike ergo x 5 minutes  Heelcord stretches 10 sec hold x 3 then Df/PF on najma board x 20  # way hip exercises with YTB 10 reps R and L  Lateral stepping YTB 15 ft x 4  Step ups on Bosu R and L 10  Prone arm and contralat leg extension x 20  Quadruped  rocking forward/backward x 10  H/L  clamshells RTB  H/L bridge with band around thigh x 20   H/L with ball resting on abdomen: downward press with UEs, unilat knee presses   Swiss ball assisted DKTC and partial trunk rotation 10 reps eastanding scapular rows RTB x 20 reps   TE  Bike ergo x 5 minutes  Heelcord stretches 10 sec hold x 3 then Df/PF , EV/IV on rocker board x 20  Total gym with ball between knees at 50 lbs 15 reps x 2 sets  Partial squats , step ups on Bosu  Anti rotation walk out with blue band 10 reps ea side  Forward lunges with blue band resistance held at waist level  Seated on Swiss ball; posterior UE rows 10 reps x 2 sets ff by marching exercises x 10  Supine hamstrings stretches with strap 15 sec hold x 2 on ea LE  Lower trunk rotation stretches 2 reps ea side 15 sec hold              HEP: Access Code: [email or web address]  URL: [email or web address]  Date: 08/28/2024  Prepared by: Linda Paez  -  Standing Shoulder Row with Anchored Resistance  - 2 x daily - 1 sets - 10 reps  - Sidestepping  - 2 x daily  Access Code: [email or web address]  URL: [email or web address]  Date: 08/19/2024  Prepared by: Linda Harris    Exercises  - Supine Bridge  - 2 x daily - 1 sets - 10 reps  - Hooklying Clamshell with Resistance  - 2 x daily - 1 sets - 10 reps    Charges: 45 min TE       Total Timed Treatment: 45 min  Total Treatment Time: 45 min

## 2024-09-25 RX ORDER — TRAZODONE HYDROCHLORIDE 100 MG/1
100 TABLET ORAL NIGHTLY
Qty: 90 TABLET | Refills: 1 | Status: SHIPPED | OUTPATIENT
Start: 2024-09-25

## 2024-09-25 NOTE — TELEPHONE ENCOUNTER
A refill request was received for:  Requested Prescriptions     Pending Prescriptions Disp Refills    TRAZODONE 100 MG Oral Tab [Pharmacy Med Name: TRAZODONE 100MG TABLETS] 90 tablet 1     Sig: TAKE 1 TABLET(100 MG) BY MOUTH EVERY NIGHT       Last refill date:4/15/2024       Last office visit:8/5/2024     Follow up due:  Future Appointments   Date Time Provider Department Center   9/26/2024  9:30 AM Linda Harris, PT  PHYS  [facility]   10/1/2024  8:00 AM Gus Ocampo MD SGINP [facility] SUB GI   10/3/2024  9:30 AM [facility], PT  [facility]  [facility]   10/14/2024  9:20 AM Nir Blakely MD [facility]   10/14/2024 11:45 AM Linda Harris, PT [facility]   10/21/2024 11:45 AM Linda Harris, PT [facility]   10/28/2024 11:45 AM Linda Harris, PT  PHYS  [facility]   10/28/2024  1:15 PM Crystal Bryan MD EMGSURGONC [facility] Surg/Onc   11/19/2024  8:10 AM REYNA GORDILLO SGIEDW None   12/10/2024  8:40 AM YUDITH DEXRITA [facility] YUDITH DEXRITA [facility]

## 2024-09-26 ENCOUNTER — OFFICE VISIT (OUTPATIENT)
Dept: PHYSICAL THERAPY | Facility: HOSPITAL | Age: 59
End: 2024-09-26
Attending: FAMILY MEDICINE
Payer: COMMERCIAL

## 2024-09-26 PROCEDURE — 97110 THERAPEUTIC EXERCISES: CPT

## 2024-09-26 NOTE — PROGRESS NOTES
Diagnosis:   Lumbar radiculopathy , DDD      Referring Provider: No ref. provider found  Date of Evaluation:    8/15/2024    Precautions:  recent liver transplant 12/23 Next MD visit:   none scheduled  Date of Surgery: n/a   Insurance Primary/Secondary: [employer] / N/A     # Auth Visits: 10            Subjective: Pt states he has balance and weakness of LE's as the day goes on. Typically no pain in the morning but can have pain if he \"moves too much\". Reports no change in ataxia/ balance deficits which occur later in the day.    Pain: 0/10 pre treatment, 5/10 post treatment      Objective: Ambulated without device with normal gait pattern   SLS: R 15 sec L 6 sec  see grid.     Assessment: Focus of treatment today was core stabilization/engagement and dynamic mobility. He had difficulty maintaining balance on the LLE. No ataxia was observed. He needed verbal cues to engage the core during exercises. Asked pt to schedule appointments in the afternoon so that this therapist can observe weakness/ataxia which typically happens later in the day.    Goals:   Pt will report LB pain no worse than 3-5/10 when getting up in the morning-met  Decrease tenderness/soft tissue restrictions on LB area to minimize compressive like forces on the back  Pt will improve Oswestry score by 10-15% for improved overall functional mobility during day time  Pt will increase BLE strength by 1/4 grade to improve walking balance  Pt will be independent with HEP for self management of symptoms post discharge-progressing    Plan: Continue PT, progress strengthening trunk and LE's as tolerated  Date: 8/19/2024  TX#: 2/10 Date:      8/28/2024           TX#: 3/10 Date:      9/18/2024           TX#: 4/10 Date:       9/26/2024          TX#: 5/10 Date:   Tx#: 6/   TE  Nustep level 2 x 5 minutes  Ankle stretches PF/DF on half foam roll  Step ups R and L lead 4\" curb 10 reps ea  In parallel bars: forward/retro ambulation, lateral stepping , forward moving  march 3 rounds ea  Prone hip extension 10 reps ea leg  Bridging exercises x 10  Clamshells with green band  In HL gentle UE presses on small Swiss ball for core activation  Dynadisc seated slouch correction, lateral pelvic tilts   TE  Bike ergo x 5 minutes  Heelcord stretches 10 sec hold x 3 then Df/PF on najma board x 20  # way hip exercises with YTB 10 reps R and L  Lateral stepping YTB 15 ft x 4  Step ups on Bosu R and L 10  Prone arm and contralat leg extension x 20  Quadruped  rocking forward/backward x 10  H/L  clamshells RTB  H/L bridge with band around thigh x 20   H/L with ball resting on abdomen: downward press with UEs, unilat knee presses   Swiss ball assisted DKTC and partial trunk rotation 10 reps eastanding scapular rows RTB x 20 reps   TE  Bike ergo x 5 minutes  Heelcord stretches 10 sec hold x 3 then Df/PF , EV/IV on rocker board x 20  Total gym with ball between knees at 50 lbs 15 reps x 2 sets  Partial squats , step ups on Bosu  Anti rotation walk out with blue band 10 reps ea side  Forward lunges with blue band resistance held at waist level  Seated on Swiss ball; posterior UE rows 10 reps x 2 sets ff by marching exercises x 10  Supine hamstrings stretches with strap 15 sec hold x 2 on ea LE  Lower trunk rotation stretches 2 reps ea side 15 sec hold   TE  Bike ergo x 5 minutes  Standing hamstrings and heelcord stretches  Total gym with ball between knees at 50 lbs 15 reps x 2 sets  Partial squats , step ups , forward lunges on Bosu  ON Airex pad: heel toe raises , marching exercises  Sitting on Swiss ball: marching, partial forward rolls, rotations cw and ccw, lateral pelvic shift, scapular retraction with blue band 10 reps each  Standing on airex: cath/throw on rebounder with 2 lb weight  Bird dog, cat/cow 10 reps each  SLS standing 5 tries each leg  Cone tapping with foot while in SLS 10 reps each side  Golfer's lift 10 reps  Forward and lateral step overs              HEP: Access Code:  [email or web address]  URL: [email or web address]  Date: 09/26/2024  Prepared by: Linda Harris    Exercises  - Cat Cow  - 1 x daily - 7 x weekly - 1 sets - 10 reps  - Bird Dog  - 1 x daily - 7 x weekly - 1 sets - 10 reps  Access Code: [email or web address]  URL: [email or web address]  Date: 08/28/2024  Prepared by: Linda Harris    Exercises  - Standing Shoulder Row with Anchored Resistance  - 2 x daily - 1 sets - 10 reps  - Sidestepping  - 2 x daily  Access Code: [email or web address]  URL: [email or web address]  Date: 08/19/2024  Prepared by: Linda Harris    Exercises  - Supine Bridge  - 2 x daily - 1 sets - 10 reps  - Hooklying Clamshell with Resistance  - 2 x daily - 1 sets - 10 reps    Charges: 45 min TE       Total Timed Treatment: 45 min  Total Treatment Time: 45 min

## 2024-10-03 ENCOUNTER — OFFICE VISIT (OUTPATIENT)
Dept: PHYSICAL THERAPY | Facility: HOSPITAL | Age: 59
End: 2024-10-03
Attending: FAMILY MEDICINE
Payer: COMMERCIAL

## 2024-10-03 PROCEDURE — 97110 THERAPEUTIC EXERCISES: CPT

## 2024-10-03 PROCEDURE — 97140 MANUAL THERAPY 1/> REGIONS: CPT

## 2024-10-03 NOTE — PROGRESS NOTES
Diagnosis:   Lumbar radiculopathy , DDD      Referring Provider: No ref. provider found  Date of Evaluation:    8/15/2024    Precautions:  recent liver transplant 12/23 Next MD visit:   none scheduled  Date of Surgery: n/a   Insurance Primary/Secondary: [employer] / N/A     # Auth Visits: 10            Subjective: Pt reports 3-4/10 LB pain today after having a busy weekend. He states he discovered a hernia last week and he has gone for a consult, and then will see his transplant surgeon in 2 weeks. He reports balance difficulties and weakness continues, felt in the early afternoon.   Pain: 3-4/10 pre treatment, 3-4/10 post treatment      Objective: Ambulated without device with normal gait pattern. Noted upper body kyphosis  SLS: R 10 sec L 7-10 sec .  Marked tenderness on LB area and can only tolerate light to medium pressure during STM.  see grid.     Assessment: Pt reports intermittent LB pain which increases with increased level of activity. He reports unchanged balance and weakness deficits in early afternoons. Newly diagnosed stomach hernia for which he is undergoing consults.  No change in pain level at end of treatment.  Goals:   Pt will report LB pain no worse than 3-5/10 when getting up in the morning-met  Decrease tenderness/soft tissue restrictions on LB area to minimize compressive like forces on the back-progressing  Pt will improve Oswestry score by 10-15% for improved overall functional mobility during day time  Pt will increase BLE strength by 1/4 grade to improve walking balance  Pt will be independent with HEP for self management of symptoms post discharge-progressing    Plan: Continue PT, progress strengthening trunk and LE's as tolerated  Date: 8/19/2024  TX#: 2/10 Date:      8/28/2024           TX#: 3/10 Date:      9/18/2024           TX#: 4/10 Date:       9/26/2024          TX#: 5/10 Date: 10/3/2024  Tx#: 6/10   TE  Nustep level 2 x 5 minutes  Ankle stretches PF/DF on half foam roll  Step ups R and  L lead 4\" curb 10 reps ea  In parallel bars: forward/retro ambulation, lateral stepping , forward moving march 3 rounds ea  Prone hip extension 10 reps ea leg  Bridging exercises x 10  Clamshells with green band  In HL gentle UE presses on small Swiss ball for core activation  Dynadisc seated slouch correction, lateral pelvic tilts   TE  Bike ergo x 5 minutes  Heelcord stretches 10 sec hold x 3 then Df/PF on najma board x 20  # way hip exercises with YTB 10 reps R and L  Lateral stepping YTB 15 ft x 4  Step ups on Bosu R and L 10  Prone arm and contralat leg extension x 20  Quadruped  rocking forward/backward x 10  H/L  clamshells RTB  H/L bridge with band around thigh x 20   H/L with ball resting on abdomen: downward press with UEs, unilat knee presses   Swiss ball assisted DKTC and partial trunk rotation 10 reps eastanding scapular rows RTB x 20 reps   TE  Bike ergo x 5 minutes  Heelcord stretches 10 sec hold x 3 then Df/PF , EV/IV on [employer] board x 20  Total gym with ball between knees at 50 lbs 15 reps x 2 sets  Partial squats , step ups on Bosu  Anti rotation walk out with blue band 10 reps ea side  Forward lunges with blue band resistance held at waist level  Seated on Swiss ball; posterior UE rows 10 reps x 2 sets ff by marching exercises x 10  Supine hamstrings stretches with strap 15 sec hold x 2 on ea LE  Lower trunk rotation stretches 2 reps ea side 15 sec hold   TE  Bike ergo x 5 minutes  Standing hamstrings and heelcord stretches  Total gym with ball between knees at 50 lbs 15 reps x 2 sets  Partial squats , step ups , forward lunges on Bosu  ON Airex pad: heel toe raises , marching exercises  Sitting on Swiss ball: marching, partial forward rolls, rotations cw and ccw, lateral pelvic shift, scapular retraction with blue band 10 reps each  Standing on airex: cath/throw on rebounder with 2 lb weight  Bird dog, cat/cow 10 reps each  SLS standing 5 tries each leg  Cone tapping with foot while in SLS 10  reps each side  Golfer's lift 10 reps  Forward and lateral step overs    MT  STM to thoracic and lumbar areas  TE:  Bike ergo x 5 minutes  Standing hamstrings and ITB stretches 2 reps ea side  Prone quads stretches , passive hip ER/IR with gentle end range stretche  Prone opposite arm and leg raises 10 reps x 2 sets  Prone saw and fly exercises with 2 lb weights 10 reps ea  Seated on Swiss ball: lateral pelvic shifts, pelvic rotations cw and ccw  On airex pad: partial squats and heel raises without UE support  SLS R and L multiple bouts  Forward lunges on Bosu  Retro ambulation with red band  Backward lunges with red band            HEP: Continue HEP  Charges: 35 min TE 10 mins MT       Total Timed Treatment: 45 min  Total Treatment Time: 45 min

## 2024-10-14 ENCOUNTER — OFFICE VISIT (OUTPATIENT)
Dept: PHYSICAL THERAPY | Facility: HOSPITAL | Age: 59
End: 2024-10-14
Attending: FAMILY MEDICINE
Payer: COMMERCIAL

## 2024-10-14 PROCEDURE — 97110 THERAPEUTIC EXERCISES: CPT

## 2024-10-14 NOTE — PROGRESS NOTES
Diagnosis:   Lumbar radiculopathy , DDD      Referring Provider: No ref. provider found  Date of Evaluation:    8/15/2024    Precautions:  recent liver transplant 12/23 Next MD visit:   none scheduled  Date of Surgery: n/a   Insurance Primary/Secondary: [employer] / N/A     # Auth Visits: 10            Subjective: Pt  states his balance and LE strength have improved significantly since starting PT however LB pain persists. Any increase in his level  of activity also increases his pain . Pain is relieved by heat and resting in bed. Sleep can also be disturbed and at night time he needs help from his wife to get up at times. He is scheduled for hernia repair on November 4. He has one more PT appointment which he would like to finish.  Pain:5/10 pre treatment, 6/10 post treatment      Objective: Ambulated without device with normal gait pattern. Noted upper body kyphosis.  Today's treatment focused on LE strengthening and balance exercises.  SLS: R > 30 sec L 20 sec sec - these are much improved times indicating an improvement in balance. BLE graded 4/5  see grid.     Assessment: Pt reports continued LB pain which increases with increased level of activity and he will be undergoing further consultation to manage this condition. He is scheduled for hernia repair next month. SLS times have improved indicating improved balance. Will finish remaining appointment then discharge.    Goals:   Pt will report LB pain no worse than 3-5/10 when getting up in the morning  Decrease tenderness/soft tissue restrictions on LB area to minimize compressive like forces on the back-progressing  Pt will improve Oswestry score by 10-15% for improved overall functional mobility during day time  Pt will increase BLE strength by 1/4 grade to improve walking balance-met  Pt will be independent with HEP for self management of symptoms post discharge-met    Plan: Continue PT, progress strengthening trunk and LE's as tolerated  Date: 8/19/2024  TX#:  2/10 Date:      8/28/2024           TX#: 3/10 Date:      9/18/2024           TX#: 4/10 Date:       9/26/2024          TX#: 5/10 Date: 10/3/2024  Tx#: 6/10 Date: 10/14/2024  Tx; 7/10   TE  Nustep level 2 x 5 minutes  Ankle stretches PF/DF on half foam roll  Step ups R and L lead 4\" curb 10 reps ea  In parallel bars: forward/retro ambulation, lateral stepping , forward moving march 3 rounds ea  Prone hip extension 10 reps ea leg  Bridging exercises x 10  Clamshells with green band  In HL gentle UE presses on small Swiss ball for core activation  Dynadisc seated slouch correction, lateral pelvic tilts   TE  Bike ergo x 5 minutes  Heelcord stretches 10 sec hold x 3 then Df/PF on najma board x 20  # way hip exercises with YTB 10 reps R and L  Lateral stepping YTB 15 ft x 4  Step ups on Bosu R and L 10  Prone arm and contralat leg extension x 20  Quadruped  rocking forward/backward x 10  H/L  clamshells RTB  H/L bridge with band around thigh x 20   H/L with ball resting on abdomen: downward press with UEs, unilat knee presses   Swiss ball assisted DKTC and partial trunk rotation 10 reps eastanding scapular rows RTB x 20 reps   TE  Bike ergo x 5 minutes  Heelcord stretches 10 sec hold x 3 then Df/PF , EV/IV on rocker board x 20  Total gym with ball between knees at 50 lbs 15 reps x 2 sets  Partial squats , step ups on Bosu  Anti rotation walk out with blue band 10 reps ea side  Forward lunges with blue band resistance held at waist level  Seated on Swiss ball; posterior UE rows 10 reps x 2 sets ff by marching exercises x 10  Supine hamstrings stretches with strap 15 sec hold x 2 on ea LE  Lower trunk rotation stretches 2 reps ea side 15 sec hold   TE  Bike ergo x 5 minutes  Standing hamstrings and heelcord stretches  Total gym with ball between knees at 50 lbs 15 reps x 2 sets  Partial squats , step ups , forward lunges on Bosu  ON Airex pad: heel toe raises , marching exercises  Sitting on Swiss ball: marching, partial  forward rolls, rotations cw and ccw, lateral pelvic shift, scapular retraction with blue band 10 reps each  Standing on airex: cath/throw on rebounder with 2 lb weight  Bird dog, cat/cow 10 reps each  SLS standing 5 tries each leg  Cone tapping with foot while in SLS 10 reps each side  Golfer's lift 10 reps  Forward and lateral step overs    MT  STM to thoracic and lumbar areas  TE:  Bike ergo x 5 minutes  Standing hamstrings and ITB stretches 2 reps ea side  Prone quads stretches , passive hip ER/IR with gentle end range stretche  Prone opposite arm and leg raises 10 reps x 2 sets  Prone saw and fly exercises with 2 lb weights 10 reps ea  Seated on Swiss ball: lateral pelvic shifts, pelvic rotations cw and ccw  On airex pad: partial squats and heel raises without UE support  SLS R and L multiple bouts  Forward lunges on Bosu  Retro ambulation with red band  Backward lunges with red band   TE:  Bike ergo x 5 minutes    Standing calf, hamstrings and ITB stretches 2 reps ea side  3 way hip exercises with YTB  Lateral and retro stepping with yellow band 2 rounds within parallel bars  ON Airex: partial squats, heel/toe raises  Lateral and forward step ups on 4\" curb box R and L leading 10 reps ea  Shuttle double leg  ex 10 reps x 3 sets 37 lbs  Resisted forward/backward ambulation with red band 10 ft x 10 reps           HEP: Continue HEP  Charges: 40 mins TE    Total Timed Treatment: 40 min  Total Treatment Time: 40 min   no

## 2024-10-21 ENCOUNTER — OFFICE VISIT (OUTPATIENT)
Dept: PHYSICAL THERAPY | Facility: HOSPITAL | Age: 59
End: 2024-10-21
Attending: FAMILY MEDICINE
Payer: COMMERCIAL

## 2024-10-21 ENCOUNTER — LAB ENCOUNTER (OUTPATIENT)
Dept: LAB | Age: 59
End: 2024-10-21
Attending: INTERNAL MEDICINE
Payer: COMMERCIAL

## 2024-10-21 DIAGNOSIS — Z51.81 ENCOUNTER FOR THERAPEUTIC DRUG MONITORING: ICD-10-CM

## 2024-10-21 DIAGNOSIS — Z94.4 LIVER REPLACED BY TRANSPLANT (HCC): Primary | ICD-10-CM

## 2024-10-21 DIAGNOSIS — D84.9 IMMUNOSUPPRESSION (HCC): ICD-10-CM

## 2024-10-21 LAB
ALBUMIN SERPL-MCNC: 4.9 G/DL (ref 3.2–4.8)
ALBUMIN/GLOB SERPL: 1.7 {RATIO} (ref 1–2)
ALP LIVER SERPL-CCNC: 98 U/L
ALT SERPL-CCNC: 15 U/L
ANION GAP SERPL CALC-SCNC: 0 MMOL/L (ref 0–18)
AST SERPL-CCNC: 19 U/L (ref ?–34)
BASOPHILS # BLD AUTO: 0.06 X10(3) UL (ref 0–0.2)
BASOPHILS NFR BLD AUTO: 0.9 %
BILIRUB SERPL-MCNC: 0.6 MG/DL (ref 0.3–1.2)
BUN BLD-MCNC: 14 MG/DL (ref 9–23)
CALCIUM BLD-MCNC: 10.6 MG/DL (ref 8.7–10.4)
CHLORIDE SERPL-SCNC: 107 MMOL/L (ref 98–112)
CHOLEST SERPL-MCNC: 146 MG/DL (ref ?–200)
CO2 SERPL-SCNC: 26 MMOL/L (ref 21–32)
CREAT BLD-MCNC: 1.25 MG/DL
EGFRCR SERPLBLD CKD-EPI 2021: 67 ML/MIN/1.73M2 (ref 60–?)
EOSINOPHIL # BLD AUTO: 0.14 X10(3) UL (ref 0–0.7)
EOSINOPHIL NFR BLD AUTO: 2 %
ERYTHROCYTE [DISTWIDTH] IN BLOOD BY AUTOMATED COUNT: 14.1 %
FASTING PATIENT LIPID ANSWER: YES
FASTING STATUS PATIENT QL REPORTED: YES
GLOBULIN PLAS-MCNC: 2.9 G/DL (ref 2–3.5)
GLUCOSE BLD-MCNC: 118 MG/DL (ref 70–99)
HCT VFR BLD AUTO: 46 %
HDLC SERPL-MCNC: 47 MG/DL (ref 40–59)
HGB BLD-MCNC: 15.9 G/DL
IMM GRANULOCYTES # BLD AUTO: 0.02 X10(3) UL (ref 0–1)
IMM GRANULOCYTES NFR BLD: 0.3 %
LDLC SERPL CALC-MCNC: 70 MG/DL (ref ?–100)
LYMPHOCYTES # BLD AUTO: 1.4 X10(3) UL (ref 1–4)
LYMPHOCYTES NFR BLD AUTO: 19.9 %
MAGNESIUM SERPL-MCNC: 1.7 MG/DL (ref 1.6–2.6)
MCH RBC QN AUTO: 30.3 PG (ref 26–34)
MCHC RBC AUTO-ENTMCNC: 34.6 G/DL (ref 31–37)
MCV RBC AUTO: 87.8 FL
MONOCYTES # BLD AUTO: 0.4 X10(3) UL (ref 0.1–1)
MONOCYTES NFR BLD AUTO: 5.7 %
NEUTROPHILS # BLD AUTO: 5.03 X10 (3) UL (ref 1.5–7.7)
NEUTROPHILS # BLD AUTO: 5.03 X10(3) UL (ref 1.5–7.7)
NEUTROPHILS NFR BLD AUTO: 71.2 %
NONHDLC SERPL-MCNC: 99 MG/DL (ref ?–130)
OSMOLALITY SERPL CALC.SUM OF ELEC: 278 MOSM/KG (ref 275–295)
PLATELET # BLD AUTO: 187 10(3)UL (ref 150–450)
POTASSIUM SERPL-SCNC: 5.2 MMOL/L (ref 3.5–5.1)
PROT SERPL-MCNC: 7.8 G/DL (ref 5.7–8.2)
RBC # BLD AUTO: 5.24 X10(6)UL
SODIUM SERPL-SCNC: 133 MMOL/L (ref 136–145)
TRIGL SERPL-MCNC: 173 MG/DL (ref 30–149)
VLDLC SERPL CALC-MCNC: 26 MG/DL (ref 0–30)
WBC # BLD AUTO: 7.1 X10(3) UL (ref 4–11)

## 2024-10-21 PROCEDURE — 85025 COMPLETE CBC W/AUTO DIFF WBC: CPT

## 2024-10-21 PROCEDURE — 36415 COLL VENOUS BLD VENIPUNCTURE: CPT

## 2024-10-21 PROCEDURE — 97110 THERAPEUTIC EXERCISES: CPT

## 2024-10-21 PROCEDURE — 80061 LIPID PANEL: CPT

## 2024-10-21 PROCEDURE — 83735 ASSAY OF MAGNESIUM: CPT

## 2024-10-21 PROCEDURE — 80197 ASSAY OF TACROLIMUS: CPT

## 2024-10-21 PROCEDURE — 80053 COMPREHEN METABOLIC PANEL: CPT

## 2024-10-21 NOTE — PROGRESS NOTES
Diagnosis:   Lumbar radiculopathy , DDD      Referring Provider: No ref. provider found  Date of Evaluation:    8/15/2024    Precautions:  recent liver transplant 12/23 Next MD visit:   none scheduled  Date of Surgery: n/a   Insurance Primary/Secondary: [employer] / N/A     # Auth Visits: 10           Discharge Summary  Pt has attended 8 visits in Physical Therapy.    Subjective: Pt  states his balance and LE strength have improved significantly since starting PT however LB pain persists. He is undergoing work up for surgical intervention for hernia scheduled in 2 weeks.  Today will be his last visit and he will continue with leg exercises at home .    Pain:5/10 pre treatment      Objective: Ambulated without device with normal gait pattern. Noted upper body kyphosis. Treatment shortened to 30 minutes today. Added 2 more light exercises for home and he was able to perform exercises.   Therapist instructed to do light LE exercises only , using the yellow band ( light resistance) for LE exercises and he verbalized understanding and agreement of plan.  Today's treatment focused on LE strengthening and balance exercises.  SLS: R > 30 sec L 20 sec sec BLE graded 4/5. Sensation intact to touch on  all fours. He reports intermittent numbness/tingling of both hands and feet.  see grid.     Assessment:Today is pt's last visit as he is scheduled to undergo hernia repair in the next 2 weeks. He reports balance and LE strength have improved with PT, however he still has LB pain which he plans to undergo consultations after surgery. SLS has improved indicating improved balance. He is ambulating without any significant deviations. He has been instructed to do only light exercises for LE strengthening and balance and he verbalized understanding. Not all goals were met due to medical condition.    Goals:   Pt will report LB pain no worse than 3-5/10 when getting up in the morning- not met  Decrease tenderness/soft tissue restrictions on  LB area to minimize compressive like forces on the back-not met  Pt will improve Oswestry score by 10-15% for improved overall functional mobility during day time-not met  Pt will increase BLE strength by 1/4 grade to improve walking balance-met  Pt will be independent with HEP for self management of symptoms post discharge-met    Date: 8/19/2024  TX#: 2/10 Date:      8/28/2024           TX#: 3/10 Date:      9/18/2024           TX#: 4/10 Date:       9/26/2024          TX#: 5/10 Date: 10/3/2024  Tx#: 6/10 Date: 10/14/2024  Tx; 7/10 Date 10/21/2024  Tx 8/10   TE  Nustep level 2 x 5 minutes  Ankle stretches PF/DF on half foam roll  Step ups R and L lead 4\" curb 10 reps ea  In parallel bars: forward/retro ambulation, lateral stepping , forward moving march 3 rounds ea  Prone hip extension 10 reps ea leg  Bridging exercises x 10  Clamshells with green band  In HL gentle UE presses on small Swiss ball for core activation  Dynadisc seated slouch correction, lateral pelvic tilts   TE  Bike ergo x 5 minutes  Heelcord stretches 10 sec hold x 3 then Df/PF on najma board x 20  # way hip exercises with YTB 10 reps R and L  Lateral stepping YTB 15 ft x 4  Step ups on Bosu R and L 10  Prone arm and contralat leg extension x 20  Quadruped  rocking forward/backward x 10  H/L  clamshells RTB  H/L bridge with band around thigh x 20   H/L with ball resting on abdomen: downward press with UEs, unilat knee presses   Swiss ball assisted DKTC and partial trunk rotation 10 reps eastanding scapular rows RTB x 20 reps   TE  Bike ergo x 5 minutes  Heelcord stretches 10 sec hold x 3 then Df/PF , EV/IV on rocker board x 20  Total gym with ball between knees at 50 lbs 15 reps x 2 sets  Partial squats , step ups on Bosu  Anti rotation walk out with blue band 10 reps ea side  Forward lunges with blue band resistance held at waist level  Seated on Swiss ball; posterior UE rows 10 reps x 2 sets ff by marching exercises x 10  Supine hamstrings  stretches with strap 15 sec hold x 2 on ea LE  Lower trunk rotation stretches 2 reps ea side 15 sec hold   TE  Bike ergo x 5 minutes  Standing hamstrings and heelcord stretches  Total gym with ball between knees at 50 lbs 15 reps x 2 sets  Partial squats , step ups , forward lunges on Bosu  ON Airex pad: heel toe raises , marching exercises  Sitting on Swiss ball: marching, partial forward rolls, rotations cw and ccw, lateral pelvic shift, scapular retraction with blue band 10 reps each  Standing on airex: cath/throw on rebounder with 2 lb weight  Bird dog, cat/cow 10 reps each  SLS standing 5 tries each leg  Cone tapping with foot while in SLS 10 reps each side  Golfer's lift 10 reps  Forward and lateral step overs    MT  STM to thoracic and lumbar areas  TE:  Bike ergo x 5 minutes  Standing hamstrings and ITB stretches 2 reps ea side  Prone quads stretches , passive hip ER/IR with gentle end range stretche  Prone opposite arm and leg raises 10 reps x 2 sets  Prone saw and fly exercises with 2 lb weights 10 reps ea  Seated on Swiss ball: lateral pelvic shifts, pelvic rotations cw and ccw  On airex pad: partial squats and heel raises without UE support  SLS R and L multiple bouts  Forward lunges on Bosu  Retro ambulation with red band  Backward lunges with red band   TE:  Bike ergo x 5 minutes    Standing calf, hamstrings and ITB stretches 2 reps ea side  3 way hip exercises with YTB  Lateral and retro stepping with yellow band 2 rounds within parallel bars  ON Airex: partial squats, heel/toe raises  Lateral and forward step ups on 4\" curb box R and L leading 10 reps ea  Shuttle double leg  ex 10 reps x 3 sets 37 lbs  Resisted forward/backward ambulation with red band 10 ft x 10 reps TE  Nustep level 4 x 8 minutes  On Bosu: heel/toe raises, partial squats, forward lunges, step ups  Using yellow band: seated clamshells and LAQ's, standing 3 direction hip exercises, lateral stepping 2 rounds within parallel bars with  yellow band            HEP: Added the ff exercises today  Access Code: [email or web address]  URL: [email or web address]  Date: 10/21/2024  Prepared by: Linda Harris    Exercises  - Sitting Knee Extension with Resistance  - 1 x daily - 7 x weekly - 1 sets - 10 reps  - Seated Hip Abduction  - 1 x daily - 7 x weekly - 3 sets - 10 reps  Post Oswestry Disability Index Score  Post Score: 58 % (10/21/2024 11:49 AM)    -10 % improvement    Plan:Discharge from OP PT    Patient/Family/Caregiver was advised of these findings, precautions, and treatment options and has agreed to actively participate in planning and for this course of care.    Thank you for your referral. If you have any questions, please contact me at Dept: [phone].    Sincerely,  Electronically signed by therapist: Linda Harris PT       Charges: 30 mins TE    Total Timed Treatment: 30 min  Total Treatment Time: 30 min

## 2024-10-23 LAB — TACROLIMUS LVL: 8.5 NG/ML

## 2024-10-28 ENCOUNTER — OFFICE VISIT (OUTPATIENT)
Dept: SURGERY | Facility: CLINIC | Age: 59
End: 2024-10-28

## 2024-10-28 ENCOUNTER — APPOINTMENT (OUTPATIENT)
Dept: PHYSICAL THERAPY | Facility: HOSPITAL | Age: 59
End: 2024-10-28
Payer: COMMERCIAL

## 2024-10-28 DIAGNOSIS — M54.42 CHRONIC MIDLINE LOW BACK PAIN WITH LEFT-SIDED SCIATICA: Primary | ICD-10-CM

## 2024-10-28 DIAGNOSIS — G89.29 CHRONIC MIDLINE LOW BACK PAIN WITH LEFT-SIDED SCIATICA: Primary | ICD-10-CM

## 2024-11-01 ENCOUNTER — TELEPHONE (OUTPATIENT)
Dept: FAMILY MEDICINE CLINIC | Facility: CLINIC | Age: 59
End: 2024-11-01

## 2024-11-01 DIAGNOSIS — N52.9 ERECTILE DYSFUNCTION, UNSPECIFIED ERECTILE DYSFUNCTION TYPE: ICD-10-CM

## 2024-11-01 RX ORDER — SILDENAFIL 100 MG/1
TABLET, FILM COATED ORAL DAILY PRN
Qty: 45 TABLET | Refills: 3 | Status: SHIPPED | OUTPATIENT
Start: 2024-11-01

## 2024-11-01 NOTE — TELEPHONE ENCOUNTER
Pt calling and states insurance is requesting the sildenafil be placed for a 90 day supply since patient has been taking for over 1 year

## 2024-11-01 NOTE — TELEPHONE ENCOUNTER
Requesting 90 days per insurance   Requested Prescriptions     Pending Prescriptions Disp Refills    Sildenafil Citrate 100 MG Oral Tab 45 tablet 0     Sig: Take 0.5-1 tablets ( mg total) by mouth daily as needed for Erectile Dysfunction.       LOV: 8/5/24    Filled: 5/13/24 #45 with 0 refills    Future Appointments   Date Time Provider Department Center   11/18/2024  1:00 PM Roland Colón MD ENINAPER2 [facility] [place]   11/19/2024  8:10 AM RAND, PROCEDURE SGIEDW None   12/10/2024  8:40 AM YUDITH DEXA 1 [facility] DEXA [facility]

## 2024-11-05 ENCOUNTER — LAB ENCOUNTER (OUTPATIENT)
Dept: LAB | Age: 59
End: 2024-11-05
Attending: INTERNAL MEDICINE
Payer: COMMERCIAL

## 2024-11-05 DIAGNOSIS — Z94.4 LIVER REPLACED BY TRANSPLANT (HCC): ICD-10-CM

## 2024-11-05 DIAGNOSIS — Z51.81 ENCOUNTER FOR THERAPEUTIC DRUG MONITORING: ICD-10-CM

## 2024-11-05 DIAGNOSIS — R73.09 ELEVATED GLUCOSE: ICD-10-CM

## 2024-11-05 DIAGNOSIS — D84.9 IMMUNOSUPPRESSION (HCC): ICD-10-CM

## 2024-11-05 LAB
ALBUMIN SERPL-MCNC: 4.7 G/DL (ref 3.2–4.8)
ALBUMIN/GLOB SERPL: 1.7 {RATIO} (ref 1–2)
ALP LIVER SERPL-CCNC: 97 U/L
ALT SERPL-CCNC: 19 U/L
ANION GAP SERPL CALC-SCNC: 5 MMOL/L (ref 0–18)
AST SERPL-CCNC: 19 U/L (ref ?–34)
BASOPHILS # BLD AUTO: 0.05 X10(3) UL (ref 0–0.2)
BASOPHILS NFR BLD AUTO: 0.8 %
BILIRUB SERPL-MCNC: 0.7 MG/DL (ref 0.3–1.2)
BUN BLD-MCNC: 12 MG/DL (ref 9–23)
CALCIUM BLD-MCNC: 9.9 MG/DL (ref 8.7–10.4)
CHLORIDE SERPL-SCNC: 105 MMOL/L (ref 98–112)
CO2 SERPL-SCNC: 28 MMOL/L (ref 21–32)
CREAT BLD-MCNC: 1.21 MG/DL
EGFRCR SERPLBLD CKD-EPI 2021: 69 ML/MIN/1.73M2 (ref 60–?)
EOSINOPHIL # BLD AUTO: 0.15 X10(3) UL (ref 0–0.7)
EOSINOPHIL NFR BLD AUTO: 2.4 %
ERYTHROCYTE [DISTWIDTH] IN BLOOD BY AUTOMATED COUNT: 13.4 %
FASTING STATUS PATIENT QL REPORTED: YES
GLOBULIN PLAS-MCNC: 2.7 G/DL (ref 2–3.5)
GLUCOSE BLD-MCNC: 124 MG/DL (ref 70–99)
HCT VFR BLD AUTO: 45.3 %
HGB BLD-MCNC: 15.9 G/DL
IMM GRANULOCYTES # BLD AUTO: 0.01 X10(3) UL (ref 0–1)
IMM GRANULOCYTES NFR BLD: 0.2 %
LYMPHOCYTES # BLD AUTO: 1.24 X10(3) UL (ref 1–4)
LYMPHOCYTES NFR BLD AUTO: 19.6 %
MAGNESIUM SERPL-MCNC: 1.8 MG/DL (ref 1.6–2.6)
MCH RBC QN AUTO: 30.4 PG (ref 26–34)
MCHC RBC AUTO-ENTMCNC: 35.1 G/DL (ref 31–37)
MCV RBC AUTO: 86.6 FL
MONOCYTES # BLD AUTO: 0.42 X10(3) UL (ref 0.1–1)
MONOCYTES NFR BLD AUTO: 6.6 %
NEUTROPHILS # BLD AUTO: 4.45 X10 (3) UL (ref 1.5–7.7)
NEUTROPHILS # BLD AUTO: 4.45 X10(3) UL (ref 1.5–7.7)
NEUTROPHILS NFR BLD AUTO: 70.4 %
OSMOLALITY SERPL CALC.SUM OF ELEC: 287 MOSM/KG (ref 275–295)
PLATELET # BLD AUTO: 185 10(3)UL (ref 150–450)
POTASSIUM SERPL-SCNC: 4.3 MMOL/L (ref 3.5–5.1)
PROT SERPL-MCNC: 7.4 G/DL (ref 5.7–8.2)
RBC # BLD AUTO: 5.23 X10(6)UL
SODIUM SERPL-SCNC: 138 MMOL/L (ref 136–145)
WBC # BLD AUTO: 6.3 X10(3) UL (ref 4–11)

## 2024-11-05 PROCEDURE — 80053 COMPREHEN METABOLIC PANEL: CPT

## 2024-11-05 PROCEDURE — 80197 ASSAY OF TACROLIMUS: CPT

## 2024-11-05 PROCEDURE — 85025 COMPLETE CBC W/AUTO DIFF WBC: CPT

## 2024-11-05 PROCEDURE — 83036 HEMOGLOBIN GLYCOSYLATED A1C: CPT

## 2024-11-05 PROCEDURE — 83735 ASSAY OF MAGNESIUM: CPT

## 2024-11-05 PROCEDURE — 36415 COLL VENOUS BLD VENIPUNCTURE: CPT

## 2024-11-06 ENCOUNTER — TELEPHONE (OUTPATIENT)
Dept: NEUROLOGY | Facility: CLINIC | Age: 59
End: 2024-11-06

## 2024-11-06 ENCOUNTER — TELEPHONE (OUTPATIENT)
Dept: INTERNAL MEDICINE CLINIC | Facility: CLINIC | Age: 59
End: 2024-11-06

## 2024-11-06 DIAGNOSIS — M1A.0790 IDIOPATHIC CHRONIC GOUT OF FOOT WITHOUT TOPHUS, UNSPECIFIED LATERALITY: ICD-10-CM

## 2024-11-06 DIAGNOSIS — R73.09 ELEVATED GLUCOSE: Primary | ICD-10-CM

## 2024-11-06 LAB
EST. AVERAGE GLUCOSE BLD GHB EST-MCNC: 103 MG/DL (ref 68–126)
HBA1C MFR BLD: 5.2 % (ref ?–5.7)

## 2024-11-06 RX ORDER — ALLOPURINOL 100 MG/1
TABLET ORAL
Qty: 90 TABLET | Refills: 3 | Status: SHIPPED | OUTPATIENT
Start: 2024-11-06

## 2024-11-06 NOTE — TELEPHONE ENCOUNTER
Had received Medical Records Request form [employer]  Case #[id number]    Request was faxed to [facility]

## 2024-11-06 NOTE — TELEPHONE ENCOUNTER
A refill request was received for:  Requested Prescriptions     Pending Prescriptions Disp Refills    ALLOPURINOL 100 MG Oral Tab [Pharmacy Med Name: ALLOPURINOL 100MG TABLETS] 90 tablet 3     Sig: TAKE 1 TABLET(100 MG) BY MOUTH DAILY       Last refill date:   02/05/2024    Last office visit: 08/05/2024    Follow up due:  Future Appointments   Date Time Provider Department Center   11/18/2024  1:00 PM Roland Colón MD ENINAPER2 [facility] [place]   11/19/2024  8:10 AM REYNA GORDILLO SGIEDW None   12/10/2024  8:40 AM YUDITH DEXA 1 BK DEXA [facility]

## 2024-11-06 NOTE — TELEPHONE ENCOUNTER
Pt asking for Diabetes labs.  See CMP result note from 11/5/24 per Dr Crystal Bryan.  Glucose was elevated 124

## 2024-11-08 LAB — TACROLIMUS LVL: 4.6 NG/ML

## 2024-11-16 RX ORDER — URSODIOL 300 MG/1
300 CAPSULE ORAL 3 TIMES DAILY
Qty: 270 CAPSULE | Refills: 1 | Status: SHIPPED | OUTPATIENT
Start: 2024-11-16

## 2024-11-16 NOTE — TELEPHONE ENCOUNTER
.A refill request was received for:  Requested Prescriptions     Pending Prescriptions Disp Refills    URSODIOL 300 MG Oral Cap [Pharmacy Med Name: URSODIOL 300MG CAPSULES] 270 capsule 1     Sig: TAKE 1 CAPSULE(300 MG) BY MOUTH THREE TIMES DAILY       Last refill date:   5/28/24    Last office visit: 8/5/24    Follow up due:  Future Appointments   Date Time Provider Department Center   11/18/2024  1:00 PM Roland Colón MD ENINAPER2 [facility] [place]   11/19/2024  8:10 AM REYNA GORDILLO [facility] None   12/10/2024  8:40 AM BK DEXA 1 BK DEXA [facility]

## 2024-11-18 ENCOUNTER — OFFICE VISIT (OUTPATIENT)
Dept: SURGERY | Facility: CLINIC | Age: 59
End: 2024-11-18
Payer: COMMERCIAL

## 2024-11-18 ENCOUNTER — TELEPHONE (OUTPATIENT)
Dept: SURGERY | Facility: CLINIC | Age: 59
End: 2024-11-18

## 2024-11-18 VITALS — DIASTOLIC BLOOD PRESSURE: 82 MMHG | SYSTOLIC BLOOD PRESSURE: 128 MMHG | HEART RATE: 74 BPM | OXYGEN SATURATION: 95 %

## 2024-11-18 DIAGNOSIS — M54.50 LOW BACK PAIN, UNSPECIFIED BACK PAIN LATERALITY, UNSPECIFIED CHRONICITY, UNSPECIFIED WHETHER SCIATICA PRESENT: Primary | ICD-10-CM

## 2024-11-18 DIAGNOSIS — D18.09 VERTEBRAL BODY HEMANGIOMA: ICD-10-CM

## 2024-11-18 PROCEDURE — 99204 OFFICE O/P NEW MOD 45 MIN: CPT | Performed by: NEUROLOGICAL SURGERY

## 2024-11-18 RX ORDER — MYCOPHENOLIC ACID 360 MG/1
720 TABLET, DELAYED RELEASE ORAL 2 TIMES DAILY
COMMUNITY
Start: 2024-10-21

## 2024-11-18 RX ORDER — GABAPENTIN 300 MG/1
300 CAPSULE ORAL NIGHTLY
COMMUNITY
Start: 2024-10-28 | End: 2025-10-28

## 2024-11-18 RX ORDER — TACROLIMUS 0.75 MG/1
TABLET, EXTENDED RELEASE ORAL
COMMUNITY
Start: 2024-10-26

## 2024-11-18 NOTE — TELEPHONE ENCOUNTER
Received cardiac clearance from [facility].     Placed in neurosurgery basket for provider review.

## 2024-11-18 NOTE — PATIENT INSTRUCTIONS
Refill policies:    Allow 2-3 business days for refills; controlled substances may take longer.  Contact your pharmacy at least 5 days prior to running out of medication and have them send an electronic request or submit request through the “request refill” option in your [employer] account.  Refills are not addressed on weekends; covering physicians do not authorize routine medications on weekends.  No narcotics or controlled substances are refilled after noon on Fridays or by on call physicians.  By law, narcotics must be electronically prescribed.  A 30 day supply with no refills is the maximum allowed.  If your prescription is due for a refill, you may be due for a follow up appointment.  To best provide you care, patients receiving routine medications need to be seen at least once a year.  Patients receiving narcotic/controlled substance medications need to be seen at least once every 3 months.  In the event that your preferred pharmacy does not have the requested medication in stock (e.g. Backordered), it is your responsibility to find another pharmacy that has the requested medication available.  We will gladly send a new prescription to that pharmacy at your request.    Scheduling Tests:    If your physician has ordered radiology tests such as MRI or CT scans, please contact Central Scheduling at [phone] right away to schedule the test.  Once scheduled, the [facility] Centralized Referral Team will work with your insurance carrier to obtain pre-certification or prior authorization.  Depending on your insurance carrier, approval may take 3-10 days.  It is highly recommended patients assure they have received an authorization before having a test performed.  If test is done without insurance authorization, patient may be responsible for the entire amount billed.      Precertification and Prior Authorizations:  If your physician has recommended that you have a procedure or additional testing performed the [facility]  Centralized Referral Team will contact your insurance carrier to obtain pre-certification or prior authorization.    You are strongly encouraged to contact your insurance carrier to verify that your procedure/test has been approved and is a COVERED benefit.  Although the [facility] Centralized Referral Team does its due diligence, the insurance carrier gives the disclaimer that \"Although the procedure is authorized, this does not guarantee payment.\"    Ultimately the patient is responsible for payment.   Thank you for your understanding in this matter.  Paperwork Completion:  If you require FMLA or disability paperwork for your recovery, please make sure to either drop it off or have it faxed to our office at [phone]. Be sure the form has your name and date of birth on it.  The form will be faxed to our Forms Department and they will complete it for you.  There is a 25$ fee for all forms that need to be filled out.  Please be aware there is a 10-14 day turnaround time.  You will need to sign a release of information (VITA) form if your paperwork does not come with one.  You may call the Forms Department with any questions at [phone].  Their fax number is [phone].

## 2024-11-18 NOTE — PROGRESS NOTES
Neurosurgery Clinic Visit  2024    Richard Palacios PCP:  Lg Sosa MD    1965 MRN [id number]       CHIEF COMPLAINT:  Chief Complaint   Patient presents with    New Patient    Back Pain       HISTORY OF PRESENT ILLNESS:  Richard Palacios is a(n) 59 year old male who presents today for neurosurgical consultation.  He has a history of back pain.  This dates back 1 year.  This came on after a liver transplant at [facility].  He reportedly had a \"bleed\" in his spine, which has since resolved.    Pain is constant.  It is worsened with activity.  He gets relief with heat.    He also has a ventral hernia, which is going to be repaired next month.    The pain is primarily in the upper lumbar spine, with some radiation to the left flank.  Occasional radiation to the left leg.    He has attended physical therapy, but has not seen pain management yet.    REVIEW OF SYSTEMS:  The 12 point checklist was reviewed and was negative except: As noted in HPI.  The patient has had some memory and balance difficulties since operation, for which she is seeing neurology.    ALLERGIES:  Allergies[1]    MEDICATIONS:  Current Outpatient Medications   Medication Sig Dispense Refill    gabapentin 300 MG Oral Cap Take 1 capsule (300 mg total) by mouth nightly.      mycophenolate sodium 360 MG Oral Tab EC Take 2 tablets (720 mg total) by mouth 2 (two) times daily.      ENVARSUS XR 0.75 MG Oral Tablet 24 Hr       ergocalciferol 1.25 MG (40526 UT) Oral Cap Take 1 capsule (50,000 Units total) by mouth once a week. Q       aspirin 81 MG Oral Tab EC Take 1 tablet (81 mg total) by mouth daily.      URSODIOL 300 MG Oral Cap TAKE 1 CAPSULE(300 MG) BY MOUTH THREE TIMES DAILY 270 capsule 1    ALLOPURINOL 100 MG Oral Tab TAKE 1 TABLET(100 MG) BY MOUTH DAILY 90 tablet 3    Sildenafil Citrate 100 MG Oral Tab Take 0.5-1 tablets ( mg total) by mouth daily as needed for Erectile Dysfunction. 45 tablet 3    rosuvastatin 10 MG Oral Tab Take 1  tablet (10 mg total) by mouth daily.      traMADol 50 MG Oral Tab Take 1 tablet (50 mg total) by mouth every 6 (six) hours as needed for Pain.      acetaminophen 500 MG Oral Tab Take 1 tablet (500 mg total) by mouth every 6 (six) hours as needed for Pain.      TRAZODONE 100 MG Oral Tab TAKE 1 TABLET(100 MG) BY MOUTH EVERY NIGHT 90 tablet 1    LOKELMA 10 g Oral Powd Pack Take 1 packet (10 g total) by mouth daily. 90 packet 1    Tacrolimus ER 1 MG Oral Tablet 24 Hr Take 2 tablets (2 mg total) by mouth daily.      Magnesium Oxide -Mg Supplement 400 (240 Mg) MG Oral Tab Take 1 tablet (400 mg total) by mouth daily. 30 tablet 1    valGANciclovir 450 MG Oral Tab Take 2 tablets (900 mg total) by mouth daily.      pantoprazole 40 MG Oral Tab EC Take 1 tablet (40 mg total) by mouth 2 (two) times daily before meals. 60 tablet 11       HISTORY:  Past Medical History:    Abdominal pain    Ascites    Back problem    COVID    Disorder of liver    Cirrhosis - no longer existent since liver transplant surgery 12/19/23    Esophageal reflux    Essential hypertension    ETOH abuse    Gout    Hyperlipidemia    Hypoalbuminemia    Nausea    Personal history of alcoholism (HCC)    Thrombocytopenia (HCC)    Weight loss     Past Surgical History:   Procedure Laterality Date    Colonoscopy N/A 11/20/2023    Procedure: .;  Surgeon: Gus Ocampo MD;  Location:  ENDOSCOPY    Colonoscopy N/A 11/27/2023    Procedure: COLONOSCOPY with cold snare polypectomy and clip placement x1;  Surgeon: Robin Raya DO;  Location:  ENDOSCOPY    Colonoscopy N/A 7/25/2024    Procedure: COLONOSCOPY;  Surgeon: Claudio Adams MD;  Location:  ENDOSCOPY    Organ transplant  12/19/2023    Liver transplant    Tonsillectomy       Family History   Problem Relation Age of Onset    Other (alcoholism) Father     Diabetes Mother         Type I    Other (CAD) Mother      Richard  reports that he has been smoking cigars and cigarettes. He has never been  exposed to tobacco smoke. He has never used smokeless tobacco. He reports that he does not currently use alcohol. He reports that he does not use drugs.    PHYSICAL EXAMINATION:  Vital Signs:  /82   Pulse 74   SpO2 95%   On examination, strength is 5/5 throughout.  Reflexes are 1+ and symmetric.  No Phi's or clonus.  No tenderness over the spine.    REVIEW OF STUDIES:  MRI scan of the entire spinal axis was performed in June, and an MRI of the lumbar spine was performed back in January.  These demonstrate increased T2 signal and vigorous enhancement of the L1 vertebral body.  This is likely consistent with aggressive hemangioma.  There is multilevel degenerative disease with some at least mild multilevel foraminal stenosis.      ASSESSMENT AND PLAN:  1.  Low back pain    2.  Likely L1 hemangioma    I reviewed the imaging with the patient, and we had a conversation regarding treatment options.  If this is a painful hemangioma, the patient may benefit from vertebroplasty or kyphoplasty.    I would like to review his imaging at the neuro-oncology tumor board.    I have prescribed a new MRI scan of the lumbar spine, with and without contrast.  I initially discussed the possibility of a CT scan, but the patient has a CT of the chest, abdomen, and pelvis coming up at [facility].  He will bring a disc in at the next office visit.    Finally, I made a referral to pain management.    I would like to see him back in a couple of weeks, to review the new MRI scan, and make a decision regarding treatment.      Time spent on counseling/coordination of care:  30 Minutes    Total Time spent with patient:  15 Minutes        11/18/2024  2:04 PM       [1] No Known Allergies

## 2024-11-18 NOTE — PROGRESS NOTES
New patient with low back pain, bilateral sciatica.    --NCS/EMG (3/19/2024)     IMAGIN2024--MRI CERVICAL+THOR+LUMB SPINE     TREATMENTS:  - Tramadol and PT w/ some improvement    - hx of liver transplant 2023  - DEXA scheduled 12/10/24    No hx of previous surgery or RONY's for back pain

## 2024-11-19 ENCOUNTER — HOSPITAL ENCOUNTER (OUTPATIENT)
Facility: HOSPITAL | Age: 59
Setting detail: HOSPITAL OUTPATIENT SURGERY
Discharge: HOME | End: 2024-11-19
Attending: INTERNAL MEDICINE | Admitting: INTERNAL MEDICINE
Payer: COMMERCIAL

## 2024-11-19 ENCOUNTER — ANESTHESIA (OUTPATIENT)
Dept: ENDOSCOPY | Facility: HOSPITAL | Age: 59
End: 2024-11-19
Payer: COMMERCIAL

## 2024-11-19 ENCOUNTER — ANESTHESIA EVENT (OUTPATIENT)
Dept: ENDOSCOPY | Facility: HOSPITAL | Age: 59
End: 2024-11-19
Payer: COMMERCIAL

## 2024-11-19 VITALS
OXYGEN SATURATION: 97 % | WEIGHT: 175 LBS | TEMPERATURE: 97 F | BODY MASS INDEX: 26.52 KG/M2 | HEART RATE: 73 BPM | SYSTOLIC BLOOD PRESSURE: 134 MMHG | HEIGHT: 68 IN | RESPIRATION RATE: 16 BRPM | DIASTOLIC BLOOD PRESSURE: 65 MMHG

## 2024-11-19 DIAGNOSIS — K22.710 BARRETT'S ESOPHAGUS WITH LOW GRADE DYSPLASIA: ICD-10-CM

## 2024-11-19 PROCEDURE — 0DB48ZX EXCISION OF ESOPHAGOGASTRIC JUNCTION, VIA NATURAL OR ARTIFICIAL OPENING ENDOSCOPIC, DIAGNOSTIC: ICD-10-PCS | Performed by: INTERNAL MEDICINE

## 2024-11-19 PROCEDURE — 0DB38ZX EXCISION OF LOWER ESOPHAGUS, VIA NATURAL OR ARTIFICIAL OPENING ENDOSCOPIC, DIAGNOSTIC: ICD-10-PCS | Performed by: INTERNAL MEDICINE

## 2024-11-19 PROCEDURE — 88312 SPECIAL STAINS GROUP 1: CPT | Performed by: INTERNAL MEDICINE

## 2024-11-19 PROCEDURE — 88305 TISSUE EXAM BY PATHOLOGIST: CPT | Performed by: INTERNAL MEDICINE

## 2024-11-19 RX ORDER — NALOXONE HYDROCHLORIDE 0.4 MG/ML
0.08 INJECTION, SOLUTION INTRAMUSCULAR; INTRAVENOUS; SUBCUTANEOUS ONCE AS NEEDED
OUTPATIENT
Start: 2024-11-19 | End: 2024-11-19

## 2024-11-19 RX ORDER — LIDOCAINE HYDROCHLORIDE 10 MG/ML
INJECTION, SOLUTION EPIDURAL; INFILTRATION; INTRACAUDAL; PERINEURAL AS NEEDED
Status: DISCONTINUED | OUTPATIENT
Start: 2024-11-19 | End: 2024-11-19 | Stop reason: SURG

## 2024-11-19 RX ORDER — SODIUM CHLORIDE, SODIUM LACTATE, POTASSIUM CHLORIDE, CALCIUM CHLORIDE 600; 310; 30; 20 MG/100ML; MG/100ML; MG/100ML; MG/100ML
INJECTION, SOLUTION INTRAVENOUS CONTINUOUS
Status: DISCONTINUED | OUTPATIENT
Start: 2024-11-19 | End: 2024-11-19

## 2024-11-19 RX ORDER — SODIUM CHLORIDE, SODIUM LACTATE, POTASSIUM CHLORIDE, CALCIUM CHLORIDE 600; 310; 30; 20 MG/100ML; MG/100ML; MG/100ML; MG/100ML
INJECTION, SOLUTION INTRAVENOUS CONTINUOUS
OUTPATIENT
Start: 2024-11-19

## 2024-11-19 RX ADMIN — SODIUM CHLORIDE, SODIUM LACTATE, POTASSIUM CHLORIDE, CALCIUM CHLORIDE: 600; 310; 30; 20 INJECTION, SOLUTION INTRAVENOUS at 08:28:00

## 2024-11-19 RX ADMIN — SODIUM CHLORIDE, SODIUM LACTATE, POTASSIUM CHLORIDE, CALCIUM CHLORIDE: 600; 310; 30; 20 INJECTION, SOLUTION INTRAVENOUS at 08:37:00

## 2024-11-19 RX ADMIN — LIDOCAINE HYDROCHLORIDE 50 MG: 10 INJECTION, SOLUTION EPIDURAL; INFILTRATION; INTRACAUDAL; PERINEURAL at 08:24:00

## 2024-11-19 NOTE — ANESTHESIA POSTPROCEDURE EVALUATION
[facility]    Richard Palacios Patient Status:  Hospital Outpatient Surgery   Age/Gender 59 year old male MRN [id number]   Location [facility] ENDOSCOPY PAIN CENTER Attending Claudio Adams MD   Hosp Day # 0 PCP Lg Sosa MD       Anesthesia Post-op Note    ESOPHAGOGASTRODUODENOSCOPY (EGD) with biopsies    Procedure Summary       Date: 11/19/24 Room / Location:  ENDOSCOPY 02 /  ENDOSCOPY    Anesthesia Start: 0821 Anesthesia Stop: 0837    Procedure: ESOPHAGOGASTRODUODENOSCOPY (EGD) with biopsies Diagnosis:       Crow's esophagus with low grade dysplasia      (Crow's esophagus, hiatal hernia)    Surgeons: Claudio Adams MD Anesthesiologist: Henrik Marcum MD    Anesthesia Type: MAC ASA Status: 3            Anesthesia Type: MAC    Vitals Value Taken Time   /65 11/19/24 0833   Temp  11/19/24 0837   Pulse 63 11/19/24 0837   Resp 18 11/19/24 0837   SpO2 89 % 11/19/24 0837   Vitals shown include unfiled device data.    Patient Location: Endoscopy    Anesthesia Type: MAC    Airway Patency: patent    Postop Pain Control: adequate    Mental Status: mildly sedated but able to meaningfully participate in the post-anesthesia evaluation    Nausea/Vomiting: none    Cardiopulmonary/Hydration status: stable euvolemic    Complications: no apparent anesthesia related complications    Postop vital signs: stable    Dental Exam: Unchanged from Preop    Patient to be discharged home when criteria met.

## 2024-11-19 NOTE — ANESTHESIA PREPROCEDURE EVALUATION
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  Temp: 97.4 °F (36.3 °C)  Pulse: 71  Resp: 16  BP: 151/72  SpO2: 96 %  Body mass index is 26.61 kg/m².    Current medications reviewed.  Hospital Medications:  • lactated ringers infusion   Intravenous Continuous       Outpatient Medications:   Prescriptions Prior to Admission[1]    Allergies: Patient has no known allergies.      Anesthesia Evaluation    Patient summary reviewed.    Anesthetic Complications  (-) history of anesthetic complications         GI/Hepatic/Renal      (+) GERD          (+) liver disease                 Cardiovascular      ECG reviewed.  Exercise tolerance: good     MET: >4      (+) hypertension                                     Endo/Other                                  Pulmonary               (+) shortness of breath  (-) recent URI          Neuro/Psych                                    Past Surgical History:   Procedure Laterality Date   • Colonoscopy N/A 11/20/2023    Procedure: .;  Surgeon: Gus Ocampo MD;  Location:  ENDOSCOPY   • Colonoscopy N/A 11/27/2023    Procedure: COLONOSCOPY with cold snare polypectomy and clip placement x1;  Surgeon: Robin Raya DO;  Location:  ENDOSCOPY   • Colonoscopy N/A 7/25/2024    Procedure: COLONOSCOPY;  Surgeon: Claudio Adams MD;  Location:  ENDOSCOPY   • Organ transplant  12/19/2023    Liver transplant   • Tonsillectomy       Social History     Socioeconomic History   • Marital status:    Tobacco Use   • Smoking status: Some Days     Current packs/day: 0.00     Types: Cigars, Cigarettes     Last attempt to quit: 2/4/1984     Years since  quittin.8     Passive exposure: Never   • Smokeless tobacco: Never   • Tobacco comments:     Still not smoking cigarettes since . Do smoke a couple of cigars a week.   Vaping Use   • Vaping status: Never Used   Substance and Sexual Activity   • Alcohol use: Not Currently   • Drug use: No   Other Topics Concern   • Caffeine Concern Yes     Comment: 1 cup a day   • Exercise No     History   Drug Use No     Available pre-op labs reviewed.  Lab Results   Component Value Date    WBC 6.3 2024    RBC 5.23 2024    HGB 15.9 2024    HCT 45.3 2024    MCV 86.6 2024    MCH 30.4 2024    MCHC 35.1 2024    RDW 13.4 2024    .0 2024     Lab Results   Component Value Date     2024    K 4.3 2024     2024    CO2 28.0 2024    BUN 12 2024    CREATSERUM 1.21 2024     (H) 2024    CA 9.9 2024            Airway      Mallampati: II  Mouth opening: >3 FB  TM distance: > 6 cm  Neck ROM: full Cardiovascular      Rhythm: regular  Rate: normal     Dental             Pulmonary      Breath sounds clear to auscultation bilaterally.               Other findings        ASA: 3   Plan: MAC  NPO status verified and patient meets guidelines.    Post-procedure pain management plan discussed with surgeon and patient.      Plan/risks discussed with: patient            Present on Admission:  **None**             [1]   Medications Prior to Admission   Medication Sig Dispense Refill Last Dose/Taking   • gabapentin 300 MG Oral Cap Take 1 capsule (300 mg total) by mouth nightly.   2024   • mycophenolate sodium 360 MG Oral Tab EC Take 2 tablets (720 mg total) by mouth 2 (two) times daily.   2024   • ENVARSUS XR 0.75 MG Oral Tablet 24 Hr    2024   • URSODIOL 300 MG Oral Cap TAKE 1 CAPSULE(300 MG) BY MOUTH THREE TIMES DAILY 270 capsule 1 2024   • ALLOPURINOL 100 MG Oral Tab TAKE 1 TABLET(100 MG) BY MOUTH DAILY  90 tablet 3 2024   • rosuvastatin 10 MG Oral Tab Take 1 tablet (10 mg total) by mouth daily.   2024   • traMADol 50 MG Oral Tab Take 1 tablet (50 mg total) by mouth every 6 (six) hours as needed for Pain.   2024   • acetaminophen 500 MG Oral Tab Take 1 tablet (500 mg total) by mouth every 6 (six) hours as needed for Pain.   Past Week   • TRAZODONE 100 MG Oral Tab TAKE 1 TABLET(100 MG) BY MOUTH EVERY NIGHT 90 tablet 1 2024   • [] Lidocaine Viscous HCl 2 % Mouth/Throat Solution Take 15 mL by mouth every 3 (three) hours as needed for Pain (chest pain after banding). Swish and swallow 100 mL 0 Taking As Needed   • LOKELMA 10 g Oral Powd Pack Take 1 packet (10 g total) by mouth daily. 90 packet 1 2024   • Tacrolimus ER 1 MG Oral Tablet 24 Hr Take 2 tablets (2 mg total) by mouth daily.   2024   • Magnesium Oxide -Mg Supplement 400 (240 Mg) MG Oral Tab Take 1 tablet (400 mg total) by mouth daily. 30 tablet 1 2024   • ergocalciferol 1.25 MG (54477 UT) Oral Cap Take 1 capsule (50,000 Units total) by mouth once a week. Q 2024   • valGANciclovir 450 MG Oral Tab Take 2 tablets (900 mg total) by mouth daily.   2024   • aspirin 81 MG Oral Tab EC Take 1 tablet (81 mg total) by mouth daily.   2024   • pantoprazole 40 MG Oral Tab EC Take 1 tablet (40 mg total) by mouth 2 (two) times daily before meals. 60 tablet 11 2024   • Sildenafil Citrate 100 MG Oral Tab Take 0.5-1 tablets ( mg total) by mouth daily as needed for Erectile Dysfunction. 45 tablet 3

## 2024-11-19 NOTE — OPERATIVE REPORT
Richard Palacios Patient Status:  Hospital Outpatient Surgery    1965 MRN [id number]   [facility] ENDOSCOPY PAIN CENTER Attending Claudio Adams MD   Date 2024 PCP Lg Sosa MD     PREOPERATIVE DIAGNOSIS/INDICATION: H/o cirrhosis post liver transplant, no varices , LSBE with low grade dysplasia post ablation, esophagitis  POSTOPERTATIVE DIAGNOSIS: Same, healed esophagitis, two residual diminutive islands, post ablation scar, irregular z-line, hiatal hernia  PROCEDURE PERFORMED: EGD biopsy  TIME OUT WAS PERFORMED     SEDATION: MAC sedation provided by General Anesthesia     INFORMED CONSENT: Risks, benefits and alternatives to the procedure were explained to the patient including but not limited to bleeding, infection, perforation, adverse drug reactions, pancreatitis and the need for hospitalization and surgery if this occurs, the patient understands and agrees to procedure.  PROCEDURE DESCRIPTION: A regular upper endoscope was introduced into the patient’s mouth, hypo pharynx, esophagus, stomach and the first and second portion of the duodenum, retroflexion of the endoscope was performed in the stomach. Careful examination of the above described areas was performed on withdrawal of the endoscope. The patient tolerated the procedure well, there were no immediate complication immediately following the procedure, and the patient was transferred to recovery in stable condition.  FINDINGS:  ESOPHAGUS: Esophageal scar distal esophagus 2 cm in diameter post ablation, 1 mm and 2 mm pink mucosal islands distal esophagus s/p excisional biopsy, irregular z line  GEJ: 3 cm hiatal hernia, Hill grade 3  STOMACH: Normal  DUODENUM: Normal  THERAPEUTICS: Cold forceps biopsies were performed from GEJ, distal esophagus and two residual mucosal islands and esophageal scar  RECOMMENDATIONS:   Post EGD precautions, watch for bleeding, infection, perforation, adverse drug reactions   Follow  biopsies  Pantoprazole to 40 mg PO BID  Repeat EDG in 6 months    Claudio Adams MD  11/19/2024  8:30 AM

## 2024-11-19 NOTE — DISCHARGE INSTRUCTIONS

## 2024-11-19 NOTE — H&P
[facility]  Pre-op H and P    Richard Palacios Patient Status:  Hospital Outpatient Surgery    1965 MRN [id number]   Location [facility] ENDOSCOPY PAIN CENTER Attending Claudio Adams MD   Date 2024 PCP Lg Sosa MD     CC: H/o cirrhosis post liver transplant, no varices oin last EGD, LSBE with low grade dysplasia, esophagitis, post EGD ablation 24    History of Present Illness:  Richard Palacios is a a(n) 59 year old male. H/o cirrhosis post liver transplant, no varices oin last EGD, LSBE with low grade dysplasia, esophagitis, post EGD ablation 24    History:  Past Medical History:    Abdominal pain    Ascites    Back problem    COVID    Disorder of liver    Cirrhosis - no longer existent since liver transplant surgery 23    Esophageal reflux    Essential hypertension    ETOH abuse    Gout    Hyperlipidemia    Hypoalbuminemia    Nausea    Personal history of alcoholism (HCC)    Thrombocytopenia (HCC)    Weight loss     Past Surgical History:   Procedure Laterality Date    Colonoscopy N/A 2023    Procedure: .;  Surgeon: Gus Ocampo MD;  Location:  ENDOSCOPY    Colonoscopy N/A 2023    Procedure: COLONOSCOPY with cold snare polypectomy and clip placement x1;  Surgeon: Robin Raya DO;  Location:  ENDOSCOPY    Colonoscopy N/A 2024    Procedure: COLONOSCOPY;  Surgeon: Claudio Adams MD;  Location:  ENDOSCOPY    Organ transplant  2023    Liver transplant    Tonsillectomy       Family History   Problem Relation Age of Onset    Other (alcoholism) Father     Diabetes Mother         Type I    Other (CAD) Mother       reports that he has been smoking cigars and cigarettes. He has never been exposed to tobacco smoke. He has never used smokeless tobacco. He reports that he does not currently use alcohol. He reports that he does not use drugs.    Allergies:  Allergies[1]    Medications:    Current Facility-Administered Medications:      lactated ringers infusion, , Intravenous, Continuous    Physical Exam:    Blood pressure 151/72, pulse 71, temperature 97.4 °F (36.3 °C), temperature source Temporal, resp. rate 16, height 5' 8\" (1.727 m), weight 175 lb (79.4 kg), SpO2 96%.    General: Appears alert, oriented x3 and in no acute distress.  CV: Normal rate   Lungs: Normal effort   Skin: Warm and dry.  Laboratory Data:       Imaging:      Assessment/Plan/Procedure:  Patient Active Problem List   Diagnosis    Gouty arthropathy, unspecified    Family history of colon cancer    Routine general medical examination at a health care facility    Sebaceous cyst    Steatohepatitis    Metabolic syndrome    Tobacco abuse    Gout    Tobacco abuse counseling    Gallstones    Cirrhosis of liver (HCC)    Coagulopathy (HCC)    Hypoalbuminemia    S/P liver transplant (HCC)    Fever, unspecified fever cause    Immunocompromised (HCC)    History of liver transplant (HCC)    Gastroesophageal reflux disease with esophagitis without hemorrhage       H/o cirrhosis post liver transplant, no varices oin last EGD, LSBE with low grade dysplasia, esophagitis, post EGD ablation 8/20/24    Plan:  EGD    Claudio Adams MD  11/19/2024  7:44 AM       [1] No Known Allergies

## 2024-11-22 ENCOUNTER — TELEPHONE (OUTPATIENT)
Dept: SURGERY | Facility: CLINIC | Age: 59
End: 2024-11-22

## 2024-11-22 NOTE — TELEPHONE ENCOUNTER
Patient brought in 1 imaging disc.     [facility]:  Disc 1 CT abdomen pelvis DOS 11/22/2024                 Uploaded successfully into PACS, PSR confirmed in [employer] imaging is viewable.     Placed in neurology drawer for patient to  at next appointment. Patient does not have a follow up appt made at this time

## 2024-12-05 ENCOUNTER — OFFICE VISIT (OUTPATIENT)
Dept: PAIN CLINIC | Facility: CLINIC | Age: 59
End: 2024-12-05
Payer: COMMERCIAL

## 2024-12-05 VITALS
SYSTOLIC BLOOD PRESSURE: 124 MMHG | BODY MASS INDEX: 27 KG/M2 | OXYGEN SATURATION: 98 % | HEART RATE: 68 BPM | DIASTOLIC BLOOD PRESSURE: 74 MMHG | WEIGHT: 175 LBS

## 2024-12-05 DIAGNOSIS — M47.816 LUMBAR SPONDYLOSIS: Primary | ICD-10-CM

## 2024-12-05 PROCEDURE — 99204 OFFICE O/P NEW MOD 45 MIN: CPT | Performed by: PHYSICIAN ASSISTANT

## 2024-12-05 RX ORDER — CYCLOBENZAPRINE HCL 5 MG
TABLET ORAL
COMMUNITY
Start: 2024-05-14 | End: 2024-12-05 | Stop reason: ALTCHOICE

## 2024-12-05 NOTE — PROGRESS NOTES
Subjective:   Patient ID: Richard Palacios is a 59 year old male.    HPI    History/Other:   Review of Systems  Current Outpatient Medications   Medication Sig Dispense Refill   • gabapentin 300 MG Oral Cap Take 1 capsule (300 mg total) by mouth nightly.     • mycophenolate sodium 360 MG Oral Tab EC Take 2 tablets (720 mg total) by mouth 2 (two) times daily.     • ENVARSUS XR 0.75 MG Oral Tablet 24 Hr      • URSODIOL 300 MG Oral Cap TAKE 1 CAPSULE(300 MG) BY MOUTH THREE TIMES DAILY 270 capsule 1   • ALLOPURINOL 100 MG Oral Tab TAKE 1 TABLET(100 MG) BY MOUTH DAILY 90 tablet 3   • Sildenafil Citrate 100 MG Oral Tab Take 0.5-1 tablets ( mg total) by mouth daily as needed for Erectile Dysfunction. 45 tablet 3   • rosuvastatin 10 MG Oral Tab Take 1 tablet (10 mg total) by mouth daily.     • traMADol 50 MG Oral Tab Take 1 tablet (50 mg total) by mouth every 6 (six) hours as needed for Pain.     • acetaminophen 500 MG Oral Tab Take 1 tablet (500 mg total) by mouth every 6 (six) hours as needed for Pain.     • TRAZODONE 100 MG Oral Tab TAKE 1 TABLET(100 MG) BY MOUTH EVERY NIGHT 90 tablet 1   • LOKELMA 10 g Oral Powd Pack Take 1 packet (10 g total) by mouth daily. 90 packet 1   • Tacrolimus ER 1 MG Oral Tablet 24 Hr Take 2 tablets (2 mg total) by mouth daily.     • Magnesium Oxide -Mg Supplement 400 (240 Mg) MG Oral Tab Take 1 tablet (400 mg total) by mouth daily. 30 tablet 1   • ergocalciferol 1.25 MG (46224 UT) Oral Cap Take 1 capsule (50,000 Units total) by mouth once a week. Q Sunday     • valGANciclovir 450 MG Oral Tab Take 2 tablets (900 mg total) by mouth daily.     • aspirin 81 MG Oral Tab EC Take 1 tablet (81 mg total) by mouth daily.     • pantoprazole 40 MG Oral Tab EC Take 1 tablet (40 mg total) by mouth 2 (two) times daily before meals. 60 tablet 11     Allergies:Allergies[1]    Objective:   Physical Exam  Constitutional:          Assessment & Plan:   No diagnosis found.    No orders of the defined types  were placed in this encounter.      Meds This Visit:  Requested Prescriptions      No prescriptions requested or ordered in this encounter       Imaging & Referrals:  None      Location of Pain: lumbar spine, w/ left side radiculopathy    Date Pain Began: 12/2023          Work Related:   No        Receiving Work Comp/Disability:   Yes    Numeric Rating Scale:  Pain at Present:  5                                                                                                            (No Pain) 0  to  10 (Worst Pain)                 Minimum Pain:   3  Maximum Pain  8    Distribution of Pain:    left    Quality of Pain:   aching and sharp/stabbing    Origin of Pain:    Surgical complications    Aggravating Factors:    Sitting, Standing, and Walking    Past Treatments for Current Pain Condition:   Other tylenol, tramadol    Prior diagnostic testing for your pain:  CT, MRI          [1] No Known Allergies

## 2024-12-05 NOTE — PROGRESS NOTES
Patient: Richard Palacios  Medical Record Number: [id number]  Site: [facility]  Referring Physician:  Roland Colón  PCP: Dr. Sosa    Dear Dr. Colón:    Thank you very much for requesting this consultation. I had the opportunity to evaluate and initiate care for your patient today, as per your request.    HISTORY OF CHIEF COMPLAINT:      Richard Palacios is a 59 year old male, who complains of L low back pain.    Pt is here today at request of spine surgery with pain in above described distribution that began atraumatically ~1 year ago.  Explains that he had liver transplant and sometime thereafter, developed LBP.  He has been under the care of neurology due to memory loss, and discussed his LBP with neurologist.  Sent for MRI C, T, L spine, which showed an atypical hemangioma.  Discussed with neurosurgery, and is awaiting follow up MRI.  He has been to PT through this summer, to some improvement in ADL tolerance, though did not help pain.  He is on opiates for his pain, and has escalated tramadol from 2/day to 4/day, to limited relief.  Sent for further options.      VAS Pain Score:  2-8/10    Aggravating Factors: Relieving Factors:   Increased activity  Heating pad  [place]      Past Treatment Attempted/Patient’s Response:  As above     Past Medical History:    Abdominal pain    Ascites    Back problem    COVID    Disorder of liver    Cirrhosis - no longer existent since liver transplant surgery 12/19/23    Esophageal reflux    Essential hypertension    ETOH abuse    Gout    Hyperlipidemia    Hypoalbuminemia    Nausea    Personal history of alcoholism (HCC)    Thrombocytopenia (HCC)    Weight loss      Past Surgical History:   Procedure Laterality Date    Colonoscopy N/A 11/20/2023    Procedure: .;  Surgeon: Gus Ocampo MD;  Location:  ENDOSCOPY    Colonoscopy N/A 11/27/2023    Procedure: COLONOSCOPY with cold snare polypectomy and clip placement x1;  Surgeon: Robin Raya  DO;  Location:  ENDOSCOPY    Colonoscopy N/A 2024    Procedure: COLONOSCOPY;  Surgeon: Claudio Adams MD;  Location:  ENDOSCOPY    Organ transplant  2023    Liver transplant    Tonsillectomy        Family History   Problem Relation Age of Onset    Other (alcoholism) Father     Diabetes Mother         Type I    Other (CAD) Mother       Social History     Socioeconomic History    Marital status:    Tobacco Use    Smoking status: Some Days     Current packs/day: 0.00     Types: Cigars, Cigarettes     Last attempt to quit: 1984     Years since quittin.8     Passive exposure: Never    Smokeless tobacco: Never    Tobacco comments:     Still not smoking cigarettes since . Do smoke a couple of cigars a week.   Vaping Use    Vaping status: Never Used   Substance and Sexual Activity    Alcohol use: Not Currently    Drug use: No   Other Topics Concern    Caffeine Concern Yes     Comment: 1 cup a day    Exercise No      Current Medications:  Current Outpatient Medications   Medication Sig Dispense Refill    gabapentin 300 MG Oral Cap Take 1 capsule (300 mg total) by mouth nightly.      mycophenolate sodium 360 MG Oral Tab EC Take 2 tablets (720 mg total) by mouth 2 (two) times daily.      ENVARSUS XR 0.75 MG Oral Tablet 24 Hr       URSODIOL 300 MG Oral Cap TAKE 1 CAPSULE(300 MG) BY MOUTH THREE TIMES DAILY 270 capsule 1    ALLOPURINOL 100 MG Oral Tab TAKE 1 TABLET(100 MG) BY MOUTH DAILY 90 tablet 3    Sildenafil Citrate 100 MG Oral Tab Take 0.5-1 tablets ( mg total) by mouth daily as needed for Erectile Dysfunction. 45 tablet 3    rosuvastatin 10 MG Oral Tab Take 1 tablet (10 mg total) by mouth daily.      traMADol 50 MG Oral Tab Take 1 tablet (50 mg total) by mouth every 6 (six) hours as needed for Pain.      acetaminophen 500 MG Oral Tab Take 1 tablet (500 mg total) by mouth every 6 (six) hours as needed for Pain.      TRAZODONE 100 MG Oral Tab TAKE 1 TABLET(100 MG) BY MOUTH EVERY  NIGHT 90 tablet 1    LOKELMA 10 g Oral Powd Pack Take 1 packet (10 g total) by mouth daily. 90 packet 1    Tacrolimus ER 1 MG Oral Tablet 24 Hr Take 2 tablets (2 mg total) by mouth daily.      Magnesium Oxide -Mg Supplement 400 (240 Mg) MG Oral Tab Take 1 tablet (400 mg total) by mouth daily. 30 tablet 1    ergocalciferol 1.25 MG (66532 UT) Oral Cap Take 1 capsule (50,000 Units total) by mouth once a week. Q Sunday      valGANciclovir 450 MG Oral Tab Take 2 tablets (900 mg total) by mouth daily.      aspirin 81 MG Oral Tab EC Take 1 tablet (81 mg total) by mouth daily.      pantoprazole 40 MG Oral Tab EC Take 1 tablet (40 mg total) by mouth 2 (two) times daily before meals. 60 tablet 11        Functional Assessment: Patient reports that they are able to complete all of their ADL's such as eating, bathing, using the toilet, dressing and getting up from a bed or a chair independently.    Work History:  The patient currently is on disability.    REVIEW OF SYSTEMS:   10 point review of systems is otherwise negative,unless otherwise in HPI.      Radiology/Lab Test Reviewed:  MRI L spine 6/11/24:    LUMBAR FINDINGS:   Patient motion noted.      Stable holovertebral hemangioma at the L1 level resulting in minimal cortical expansion along the dorsal cortex of the vertebral body.  No pathologic fracture identified.  Stable small hemangioma also noted in the L2 vertebral body.  The suspected subcentimeter hemangioma in the dorsal aspect of the L5 vertebral body is also again noted, however limited in evaluation due to patient motion, but appearing grossly unchanged.      There is normal lumbar lordosis with anatomic alignment.  Vertebral body heights are well-maintained.  Mild degenerative disc space loss, disc desiccation, endplate spurring, and degenerative endplate marrow signal changes scattered in the lumbar spine.  No suspicious enhancement.  Scattered degenerative enhancement noted, particularly in the mid to lower  lumbar spine along the facet joints and interspinous regions.      The distal spinal cord and conus medullaris have a normal signal and morphology.  The conus medullaris terminates at the mid L1 level.  The roots of the cauda equina are unremarkable.  No focal mass or fluid collection is seen in the lumbar spinal canal.   The paraspinal soft tissues are unremarkable.      T12-L1: There is no significant abnormality.      L1-2:  Minimal diffuse disc bulge with mild facet arthropathy. There is no significant spinal canal or neural foraminal stenosis. No significant interval change.      L2-3:  Mild diffuse disc bulge with mild facet arthropathy. There is no significant spinal canal stenosis.  There is mild left neural foraminal stenosis. No significant interval change.      L3-4:  Diffuse disc bulge with mild facet arthropathy.  There is no significant spinal canal stenosis.  There is mild right neural foraminal stenosis. No significant interval change.      L4-5:  Diffuse disc bulge with a small superimposed broad-based central disc protrusion.  Mild to moderate facet arthropathy and thickening of ligamentum flavum.  No significant spinal canal stenosis.  Mild bilateral neural foraminal stenosis. No significant interval change.      L5-S1:  Minimal diffuse disc bulge with mild facet arthropathy. There is no significant spinal canal or neural foraminal stenosis. No significant interval change.         CBC:    Lab Results   Component Value Date    WBC 6.3 11/05/2024    WBC 7.1 10/21/2024    WBC 7.7 09/01/2024   No results found for: \"HEMOGLOBIN\"  Lab Results   Component Value Date    .0 11/05/2024    .0 10/21/2024    .0 (L) 09/01/2024       PHYSICAL EXAMIMATION   PHYSICAL EXAMINATION: Richard Palacios is a 59 year old male who is observed sitting comfortably on a chair in the exam room alert and oriented times three. He looks consistent with his stated age.    Ht Readings from Last 1 Encounters:    11/19/24 68\"     Wt Readings from Last 1 Encounters:   12/05/24 175 lb (79.4 kg)     The patient is well developed, well nourished, normal body habitus, well muscled. He moves independently from sitting to standing with ease.       Inspection:  No acute distress    Patient displays Non-antalgic gait, and is able to Normal heel walk, Normal toe walk.    Coordination:  Well-coordinated, fluent gait, able to engage in rapid alternating movements of upper and lower extremities.    ROM Lumbar Spine:  See chart below:  Motion Right (+ or -) Left (+ or -)   Lumbar Flexion - -   Lumbar Extension - +   Lumbar Bending - +   Lumbar Extension/ twisting motion - +     Lumbar/Sacral Integument:  Skin over lumbar sacral spine is intact without rashes, excoriations, lesions or erythema noted    Palpation:  See chart below:  Palpation of lumbar area Right (+ or -) Left (+ or -)   Lumbar facets - +   Lumbar paraspinals - -   Piriformis - -   SIJ - -   Trochanteric Bursa - -     Strength:  Strength of bilateral lower extremities grossly intact; 5/5 throughout    Sensation:  No sensory deficits noted on bilateral lower extremities to light touch    Tests:  Test Right (+ or -) Left (+ or -)   SLR - -   Carlos’s     Babinski     Clonus       HEAD/NECK: Head is normocephalic, neck supple  EYES: EOMI, KARY  SKIN EXAM: Skin is intact, head, neck, trunk and arms/legs. No rashes, mottling or ulcerations.  LYMPH EXAM: There is no lymph edema in either lower extremity.  VASCULAR EXAM: Pedal pulses are normal bilaterally, with good distal perfusion. No clubbing or cyanosis.  ABDOMINAL EXAM: Abdomen is soft without masses palpated.  HEART: normal, regular, S1 and S2  LUNGS:CTA  MUSCULOSKELETAL: Smooth, pain-free ROM to bilat hips, ankles, and knees.     Do you have any known blood/bleeding disorders?  No  Does patient currently take blood thinners?   None  Does patient currently take any antibiotics?   No      Patient is currently on pain  medications:  No  Reason pain medications are prescribed: N/A  Pain medications are prescribed by: N/A  [place] Prescription Monitoring review: N/A  DIRE: N/A  Treatment decision: N/A    MEDICAL DECISION MAKING:     Impression: Lumbar spondylosis    Axial left-sided low back pain reproduced with range of motion of the lumbar spine, specifically extension and rotational extension, as well as palpation of the left inferior facets.  Symptoms progressively present over the past year which began sometime after his liver transplant on 12/19/2023 (history of alcoholic cirrhosis).  Has had some improvement with physical therapy and that his tolerance of his day-to-day activities has improved, the low back pain persists.  He was placed on opiates, specifically tramadol, which he has escalated from 2/day to 4/day to limited relief.  From a structural standpoint MRI looks fairly benign, though there are some degenerative changes, particularly of the facets.  That said he does have an atypical hemangioma at the L2 vertebrae for which she had been evaluated by neurosurgery.  Awaiting updated MRI next week.    We did discuss options, and we will have him proceed with MRI, as scheduled.  Provided there are no new findings, would give consideration to left lumbar medial branch block for both diagnostic and potential therapeutic purposes.  If he has good but only brief improvement, would repeat procedure and staging for radiofrequency ablation.  Obviously, if updated MRI does show new or evolving changes, would alter plan accordingly.  Again, he is a liver transplant patient the most recent labs from 11/5/2024 look fine from a hepatic standpoint, and most recent CBC shows platelet count of 185K.    Plan: Update MRI as scheduled, discuss options with neurosurgery.  Barring any new or evolving issues, we will proceed with a left L4-5 and L5-S1 medial branch block for diagnostic/therapeutic purposes.  Asked that he call after follow  up with neurosurgery, and I will be happy to review imaging/notes, and place order for MBNB if appropriate.      The patient indicates understanding of these issues and agrees to the plan.      Thank you very much.     Respectfully yours,    PATRICK Mcbride

## 2024-12-05 NOTE — PATIENT INSTRUCTIONS
Refill policies:    Allow 2-3 business days for refills; controlled substances may take longer.  Contact your pharmacy at least 5 days prior to running out of medication and have them send an electronic request or submit request through the “request refill” option in your [employer] account.  Refills are not addressed on weekends; covering physicians do not authorize routine medications on weekends.  No narcotics or controlled substances are refilled after noon on Fridays or by on call physicians.  By law, narcotics must be electronically prescribed.  A 30 day supply with no refills is the maximum allowed.  If your prescription is due for a refill, you may be due for a follow up appointment.  To best provide you care, patients receiving routine medications need to be seen at least once a year.  Patients receiving narcotic/controlled substance medications need to be seen at least once every 3 months.  In the event that your preferred pharmacy does not have the requested medication in stock (e.g. Backordered), it is your responsibility to find another pharmacy that has the requested medication available.  We will gladly send a new prescription to that pharmacy at your request.    Scheduling Tests:    If your physician has ordered radiology tests such as MRI or CT scans, please contact Central Scheduling at [phone] right away to schedule the test.  Once scheduled, the [facility] Centralized Referral Team will work with your insurance carrier to obtain pre-certification or prior authorization.  Depending on your insurance carrier, approval may take 3-10 days.  It is highly recommended patients assure they have received an authorization before having a test performed.  If test is done without insurance authorization, patient may be responsible for the entire amount billed.      Precertification and Prior Authorizations:  If your physician has recommended that you have a procedure or additional testing performed the [facility]  Centralized Referral Team will contact your insurance carrier to obtain pre-certification or prior authorization.    You are strongly encouraged to contact your insurance carrier to verify that your procedure/test has been approved and is a COVERED benefit.  Although the [facility] Centralized Referral Team does its due diligence, the insurance carrier gives the disclaimer that \"Although the procedure is authorized, this does not guarantee payment.\"    Ultimately the patient is responsible for payment.   Thank you for your understanding in this matter.  Paperwork Completion:  If you require FMLA or disability paperwork for your recovery, please make sure to either drop it off or have it faxed to our office at [phone]. Be sure the form has your name and date of birth on it.  The form will be faxed to our Forms Department and they will complete it for you.  There is a 25$ fee for all forms that need to be filled out.  Please be aware there is a 10-14 day turnaround time.  You will need to sign a release of information (VITA) form if your paperwork does not come with one.  You may call the Forms Department with any questions at [phone].  Their fax number is [phone].

## 2024-12-10 ENCOUNTER — HOSPITAL ENCOUNTER (OUTPATIENT)
Dept: BONE DENSITY | Age: 59
Discharge: HOME | End: 2024-12-10
Attending: INTERNAL MEDICINE
Payer: COMMERCIAL

## 2024-12-10 ENCOUNTER — LAB ENCOUNTER (OUTPATIENT)
Dept: LAB | Age: 59
End: 2024-12-10
Attending: INTERNAL MEDICINE
Payer: COMMERCIAL

## 2024-12-10 DIAGNOSIS — Z94.4 LIVER REPLACED BY TRANSPLANT (HCC): Primary | ICD-10-CM

## 2024-12-10 DIAGNOSIS — Z51.81 ENCOUNTER FOR THERAPEUTIC DRUG MONITORING: ICD-10-CM

## 2024-12-10 DIAGNOSIS — Z94.4 STATUS POST LIVER TRANSPLANT (HCC): ICD-10-CM

## 2024-12-10 DIAGNOSIS — Z51.81 THERAPEUTIC DRUG MONITORING: ICD-10-CM

## 2024-12-10 DIAGNOSIS — D84.9 IMMUNOSUPPRESSION (HCC): ICD-10-CM

## 2024-12-10 LAB
ALBUMIN SERPL-MCNC: 4.4 G/DL (ref 3.2–4.8)
ALBUMIN/GLOB SERPL: 1.5 {RATIO} (ref 1–2)
ALP LIVER SERPL-CCNC: 102 U/L
ALT SERPL-CCNC: 18 U/L
ANION GAP SERPL CALC-SCNC: 7 MMOL/L (ref 0–18)
AST SERPL-CCNC: 18 U/L (ref ?–34)
BASOPHILS # BLD AUTO: 0.04 X10(3) UL (ref 0–0.2)
BASOPHILS NFR BLD AUTO: 0.7 %
BILIRUB SERPL-MCNC: 0.4 MG/DL (ref 0.3–1.2)
BUN BLD-MCNC: 14 MG/DL (ref 9–23)
CALCIUM BLD-MCNC: 10.6 MG/DL (ref 8.7–10.4)
CHLORIDE SERPL-SCNC: 103 MMOL/L (ref 98–112)
CO2 SERPL-SCNC: 28 MMOL/L (ref 21–32)
CREAT BLD-MCNC: 1.24 MG/DL
EGFRCR SERPLBLD CKD-EPI 2021: 67 ML/MIN/1.73M2 (ref 60–?)
EOSINOPHIL # BLD AUTO: 0.09 X10(3) UL (ref 0–0.7)
EOSINOPHIL NFR BLD AUTO: 1.5 %
ERYTHROCYTE [DISTWIDTH] IN BLOOD BY AUTOMATED COUNT: 12.7 %
FASTING STATUS PATIENT QL REPORTED: YES
GLOBULIN PLAS-MCNC: 2.9 G/DL (ref 2–3.5)
GLUCOSE BLD-MCNC: 108 MG/DL (ref 70–99)
HCT VFR BLD AUTO: 47.9 %
HGB BLD-MCNC: 16.4 G/DL
IMM GRANULOCYTES # BLD AUTO: 0.02 X10(3) UL (ref 0–1)
IMM GRANULOCYTES NFR BLD: 0.3 %
LYMPHOCYTES # BLD AUTO: 1.23 X10(3) UL (ref 1–4)
LYMPHOCYTES NFR BLD AUTO: 20.3 %
MAGNESIUM SERPL-MCNC: 1.9 MG/DL (ref 1.6–2.6)
MCH RBC QN AUTO: 30.2 PG (ref 26–34)
MCHC RBC AUTO-ENTMCNC: 34.2 G/DL (ref 31–37)
MCV RBC AUTO: 88.2 FL
MONOCYTES # BLD AUTO: 0.35 X10(3) UL (ref 0.1–1)
MONOCYTES NFR BLD AUTO: 5.8 %
NEUTROPHILS # BLD AUTO: 4.32 X10 (3) UL (ref 1.5–7.7)
NEUTROPHILS # BLD AUTO: 4.32 X10(3) UL (ref 1.5–7.7)
NEUTROPHILS NFR BLD AUTO: 71.4 %
OSMOLALITY SERPL CALC.SUM OF ELEC: 287 MOSM/KG (ref 275–295)
PLATELET # BLD AUTO: 187 10(3)UL (ref 150–450)
POTASSIUM SERPL-SCNC: 4.7 MMOL/L (ref 3.5–5.1)
PROT SERPL-MCNC: 7.3 G/DL (ref 5.7–8.2)
RBC # BLD AUTO: 5.43 X10(6)UL
SODIUM SERPL-SCNC: 138 MMOL/L (ref 136–145)
WBC # BLD AUTO: 6.1 X10(3) UL (ref 4–11)

## 2024-12-10 PROCEDURE — 80053 COMPREHEN METABOLIC PANEL: CPT

## 2024-12-10 PROCEDURE — 83735 ASSAY OF MAGNESIUM: CPT

## 2024-12-10 PROCEDURE — 85025 COMPLETE CBC W/AUTO DIFF WBC: CPT

## 2024-12-10 PROCEDURE — 80197 ASSAY OF TACROLIMUS: CPT

## 2024-12-10 PROCEDURE — 77080 DXA BONE DENSITY AXIAL: CPT | Performed by: INTERNAL MEDICINE

## 2024-12-10 PROCEDURE — 36415 COLL VENOUS BLD VENIPUNCTURE: CPT

## 2024-12-10 PROCEDURE — 87517 HEPATITIS B DNA QUANT: CPT

## 2024-12-12 ENCOUNTER — TELEPHONE (OUTPATIENT)
Dept: SURGERY | Facility: CLINIC | Age: 59
End: 2024-12-12

## 2024-12-12 ENCOUNTER — HOSPITAL ENCOUNTER (OUTPATIENT)
Dept: MRI IMAGING | Age: 59
Discharge: HOME | End: 2024-12-12
Attending: NEUROLOGICAL SURGERY
Payer: COMMERCIAL

## 2024-12-12 DIAGNOSIS — M54.50 LOW BACK PAIN, UNSPECIFIED BACK PAIN LATERALITY, UNSPECIFIED CHRONICITY, UNSPECIFIED WHETHER SCIATICA PRESENT: ICD-10-CM

## 2024-12-12 DIAGNOSIS — D18.09 VERTEBRAL BODY HEMANGIOMA: ICD-10-CM

## 2024-12-12 PROCEDURE — A9575 INJ GADOTERATE MEGLUMI 0.1ML: HCPCS

## 2024-12-12 PROCEDURE — 72158 MRI LUMBAR SPINE W/O & W/DYE: CPT | Performed by: NEUROLOGICAL SURGERY

## 2024-12-12 RX ORDER — GADOTERATE MEGLUMINE 376.9 MG/ML
20 INJECTION INTRAVENOUS
Status: COMPLETED | OUTPATIENT
Start: 2024-12-12 | End: 2024-12-12

## 2024-12-12 RX ADMIN — GADOTERATE MEGLUMINE 16 ML: 376.9 INJECTION INTRAVENOUS at 15:45:00

## 2024-12-14 LAB — TACROLIMUS LVL: 5.1 NG/ML

## 2024-12-16 ENCOUNTER — TELEPHONE (OUTPATIENT)
Dept: PAIN CLINIC | Facility: CLINIC | Age: 59
End: 2024-12-16

## 2024-12-16 ENCOUNTER — OFFICE VISIT (OUTPATIENT)
Dept: SURGERY | Facility: CLINIC | Age: 59
End: 2024-12-16
Payer: COMMERCIAL

## 2024-12-16 VITALS
BODY MASS INDEX: 28.04 KG/M2 | HEIGHT: 68 IN | SYSTOLIC BLOOD PRESSURE: 128 MMHG | WEIGHT: 185 LBS | HEART RATE: 70 BPM | DIASTOLIC BLOOD PRESSURE: 72 MMHG

## 2024-12-16 DIAGNOSIS — D18.09 VERTEBRAL BODY HEMANGIOMA: ICD-10-CM

## 2024-12-16 DIAGNOSIS — M47.816 LUMBAR SPONDYLOSIS: Primary | ICD-10-CM

## 2024-12-16 DIAGNOSIS — M54.50 LOW BACK PAIN, UNSPECIFIED BACK PAIN LATERALITY, UNSPECIFIED CHRONICITY, UNSPECIFIED WHETHER SCIATICA PRESENT: Primary | ICD-10-CM

## 2024-12-16 LAB
HBV DNA DETECT, QN LOG: NOT DETECTED {LOG_IU}/ML
HBV DNA DETECT, QN: NOT DETECTED [IU]/ML

## 2024-12-16 PROCEDURE — 99213 OFFICE O/P EST LOW 20 MIN: CPT | Performed by: NEUROLOGICAL SURGERY

## 2024-12-16 NOTE — PROGRESS NOTES
Established patient:  Reason for follow up: lower back pain/ review imaging     Numeric Rating Scale:      Pain at Present:  3/10       Distribution of Pain:    bilateral sciatica. States he has pain on the left side         New imaging or testing since your last office visit:   MRI lumbar spine  12.12.2024

## 2024-12-16 NOTE — TELEPHONE ENCOUNTER
TE routed to PATRICK Andersen in regards to order for MBB. Prior Authorization for Injection will be required through patient's insurance, since he is scheduled for hernia surgery on 12/20 will need to check with .

## 2024-12-16 NOTE — TELEPHONE ENCOUNTER
Prior authorization request completed for: left L4/5,L5/S1 MBB  Authorization # [id number]  Authorization dates: 12/16/24-6/14/25  CPT codes approved: 36085,17382  Number of visits/dates of service approved: 1  Physician: wolf  Location: [facility]     Patient can be scheduled. Routed to Navigator.

## 2024-12-16 NOTE — TELEPHONE ENCOUNTER
Patient stopped by the office stating that he would like to schedule a procedure for his back. Patient was seen by provider on 12/5. Patient stated that he is having another surgery done on 12/20 and would like to try and coordinate it. Please call patient back.

## 2024-12-16 NOTE — TELEPHONE ENCOUNTER
Contacted patient informed Lg message below and to call the office back once he has been cleared by his surgeon after hernia surgery. Patient VU and appreciative for call back.

## 2024-12-16 NOTE — TELEPHONE ENCOUNTER
Order in for MBNB, and after we get it approved, will ask that he is cleared by general surgery prior to proceeding with injection.

## 2024-12-16 NOTE — TELEPHONE ENCOUNTER
Plan: Update MRI as scheduled, discuss options with neurosurgery.  Barring any new or evolving issues, we will proceed with a left L4-5 and L5-S1 medial branch block for diagnostic/therapeutic purposes.  Asked that he call after follow up with neurosurgery, and I will be happy to review imaging/notes, and place order for MBNB if appropriate.       The patient indicates understanding of these issues and agrees to the plan.        Thank you very much.      Respectfully yours,     PATRICK Mcbride

## 2024-12-16 NOTE — PROGRESS NOTES
Neurosurgery Clinic Visit  2024    Richard Palacios PCP:  Lg Sosa MD    1965 MRN [id number]     HISTORY OF PRESENT ILLNESS:  Richard Palacios is a(n) 59 year old male who presents to the office today in follow-up.  He continues to have pain primarily in his back, which is more left than right-sided.  He gets some intermittent radiation to the left greater than right leg.  Symptoms are overall unchanged.      PHYSICAL EXAMINATION:  Vital Signs:  /72 (BP Location: Right arm, Patient Position: Sitting, Cuff Size: adult)   Pulse 70   Ht 68\"   Wt 185 lb (83.9 kg)   BMI 28.13 kg/m²   On examination, strength remains 5/5.      REVIEW OF STUDIES:    Several new imaging studies were reviewed.    MRI scan was performed .  Images personally reviewed.  L1 vertebral body hemangioma.  There is a small bulge at L4-5 with mild facet joint effusions    DEXA scan demonstrates osteopenia at the femoral necks    CT scan reviewed.  This demonstrates imaging characteristics consistent with L1 vertebral body hemangioma.      ASSESSMENT and PLAN:  1.  L1 hemangioma    2.  L4-5 disc bulge with facet joint effusions.    The L4-5 findings could indicate a positional spondylolisthesis.  This may bear further investigation.    Otherwise, the patient is scheduled for ventral hernia repair on Friday at [facility].  The possibility of medial branch blocks at left L4-5 and L5-S1 have been discussed.  I think this is very reasonable.    I would like to see the patient back in about 2 months, to see how he is doing after the hernia repair, and the medial branch blocks.    Imaging reviewed.  All questions answered.  He voices his understanding and agreement.        Time spent on counseling/coordination of care:  15 Minutes    Total time spent with patient:  15 Minutes        2024  9:20 AM     This report was dictated using voice recognition technology.  Errors in syntax or recognition may occur, and should not be  construed to change the meaning of a sentence.

## 2024-12-16 NOTE — PATIENT INSTRUCTIONS
Refill policies:    Allow 2-3 business days for refills; controlled substances may take longer.  Contact your pharmacy at least 5 days prior to running out of medication and have them send an electronic request or submit request through the “request refill” option in your [employer] account.  Refills are not addressed on weekends; covering physicians do not authorize routine medications on weekends.  No narcotics or controlled substances are refilled after noon on Fridays or by on call physicians.  By law, narcotics must be electronically prescribed.  A 30 day supply with no refills is the maximum allowed.  If your prescription is due for a refill, you may be due for a follow up appointment.  To best provide you care, patients receiving routine medications need to be seen at least once a year.  Patients receiving narcotic/controlled substance medications need to be seen at least once every 3 months.  In the event that your preferred pharmacy does not have the requested medication in stock (e.g. Backordered), it is your responsibility to find another pharmacy that has the requested medication available.  We will gladly send a new prescription to that pharmacy at your request.    Scheduling Tests:    If your physician has ordered radiology tests such as MRI or CT scans, please contact Central Scheduling at [phone] right away to schedule the test.  Once scheduled, the [facility] Centralized Referral Team will work with your insurance carrier to obtain pre-certification or prior authorization.  Depending on your insurance carrier, approval may take 3-10 days.  It is highly recommended patients assure they have received an authorization before having a test performed.  If test is done without insurance authorization, patient may be responsible for the entire amount billed.      Precertification and Prior Authorizations:  If your physician has recommended that you have a procedure or additional testing performed the [facility]  Centralized Referral Team will contact your insurance carrier to obtain pre-certification or prior authorization.    You are strongly encouraged to contact your insurance carrier to verify that your procedure/test has been approved and is a COVERED benefit.  Although the [facility] Centralized Referral Team does its due diligence, the insurance carrier gives the disclaimer that \"Although the procedure is authorized, this does not guarantee payment.\"    Ultimately the patient is responsible for payment.   Thank you for your understanding in this matter.  Paperwork Completion:  If you require FMLA or disability paperwork for your recovery, please make sure to either drop it off or have it faxed to our office at [phone]. Be sure the form has your name and date of birth on it.  The form will be faxed to our Forms Department and they will complete it for you.  There is a 25$ fee for all forms that need to be filled out.  Please be aware there is a 10-14 day turnaround time.  You will need to sign a release of information (VITA) form if your paperwork does not come with one.  You may call the Forms Department with any questions at [phone].  Their fax number is [phone].

## 2024-12-18 NOTE — TELEPHONE ENCOUNTER
See TE dated 12/16/24 under \"Scheduling Procedure\".     Patient is scheduled for hernia surgery on 12/20/24 - patient was advised to contact office once he is cleared by surgeon prior to scheduling injection.

## 2025-01-07 ENCOUNTER — TELEPHONE (OUTPATIENT)
Dept: PAIN CLINIC | Facility: CLINIC | Age: 60
End: 2025-01-07

## 2025-01-07 NOTE — TELEPHONE ENCOUNTER
FMLA FORMS for spouse's employer received and logged for processing, sent mychart for dion and fcr

## 2025-01-10 NOTE — TELEPHONE ENCOUNTER
Michelle,    **The ACKNOWLEDGE button has been moved to the top right ribbon**    Please sign off on form if you agree to: Family Medical Leave Act (SPOUSE)  (place your signature on the first page only)  -From your Inbasket, Highlight the patient and click Chart  -Double click the 1/7/25 Forms Completion telephone encounter  -Scroll down to the Media section  -Click the blue Hyperlink: Family Medical Leave Act (SPOUSE) A.Arroyo 1/10/25    -Click Acknowledge located in the top right ribbon/menu  -Drag the mouse into the blank space of the document and a + sign will appear. Left click to  electronically sign the document.    Thank you,    AZIDA.

## 2025-01-10 NOTE — TELEPHONE ENCOUNTER
Called patient to obtain details.   Type of Leave: intermittent for wife  Reason for Leave: upcoming surgery, past liver transplant, severe ongoing back pain, difficulty ambulating  Start date of leave: 1/1/25  End date of leave: 1/1/26  How many flare ups per month/length?: 1-4 times per month, lasting 1-2 days  How many appts per month/length?: 1-2 appts, lasting 1-4h

## 2025-01-13 ENCOUNTER — TELEPHONE (OUTPATIENT)
Dept: SURGERY | Facility: CLINIC | Age: 60
End: 2025-01-13

## 2025-01-13 NOTE — TELEPHONE ENCOUNTER
Dr. Hendrickson,     **The ACKNOWLEDGE button has been moved to the top right ribbon**     Please sign off on form if you agree to: Family Medical Leave Act (SPOUSE)  (place your signature on the first page only)  -From your Inbasket, Highlight the patient and click Chart  -Double click the 1/7/25 Forms Completion telephone encounter  -Scroll down to the Media section  -Click the blue Hyperlink: Family Medical Leave Act (SPOUSE) Dr. Hendrickson 1/13/25     -Click Acknowledge located in the top right ribbon/menu  -Drag the mouse into the blank space of the document and a + sign will appear. Left click to  electronically sign the document.     Thank you,     ZAIDA.

## 2025-01-13 NOTE — TELEPHONE ENCOUNTER
Patient is scheduled for intervention with pain management. No neurosurgical procedure is scheduled. Thus it is not appropriate for neurosurgery to fill out [place] paperwork. Recommend pain management to review. Please advise.

## 2025-01-13 NOTE — TELEPHONE ENCOUNTER
LVMTCB for patient to reschedule apt on 02/17/2025 due to provider being out of office.  Sent a [employer] message to patient also.  Please assist patient when calling back to reschedule.

## 2025-01-23 ENCOUNTER — APPOINTMENT (OUTPATIENT)
Dept: GENERAL RADIOLOGY | Facility: HOSPITAL | Age: 60
End: 2025-01-23
Attending: ANESTHESIOLOGY
Payer: COMMERCIAL

## 2025-01-23 ENCOUNTER — HOSPITAL ENCOUNTER (OUTPATIENT)
Facility: HOSPITAL | Age: 60
Setting detail: HOSPITAL OUTPATIENT SURGERY
Discharge: HOME | End: 2025-01-23
Attending: ANESTHESIOLOGY | Admitting: ANESTHESIOLOGY
Payer: COMMERCIAL

## 2025-01-23 VITALS
HEIGHT: 68 IN | RESPIRATION RATE: 18 BRPM | WEIGHT: 185 LBS | SYSTOLIC BLOOD PRESSURE: 147 MMHG | OXYGEN SATURATION: 97 % | TEMPERATURE: 97 F | HEART RATE: 62 BPM | BODY MASS INDEX: 28.04 KG/M2 | DIASTOLIC BLOOD PRESSURE: 82 MMHG

## 2025-01-23 PROBLEM — M47.816 LUMBAR SPONDYLOSIS: Status: ACTIVE | Noted: 2025-01-23

## 2025-01-23 PROCEDURE — 64493 INJ PARAVERT F JNT L/S 1 LEV: CPT | Performed by: ANESTHESIOLOGY

## 2025-01-23 PROCEDURE — 64494 INJ PARAVERT F JNT L/S 2 LEV: CPT | Performed by: ANESTHESIOLOGY

## 2025-01-23 PROCEDURE — BR161ZZ FLUOROSCOPY OF LUMBAR FACET JOINT(S) USING LOW OSMOLAR CONTRAST: ICD-10-PCS | Performed by: ANESTHESIOLOGY

## 2025-01-23 PROCEDURE — 3E0T3BZ INTRODUCTION OF ANESTHETIC AGENT INTO PERIPHERAL NERVES AND PLEXI, PERCUTANEOUS APPROACH: ICD-10-PCS | Performed by: ANESTHESIOLOGY

## 2025-01-23 RX ORDER — BUPIVACAINE HYDROCHLORIDE 5 MG/ML
INJECTION, SOLUTION EPIDURAL; INTRACAUDAL
Status: DISCONTINUED | OUTPATIENT
Start: 2025-01-23 | End: 2025-01-23

## 2025-01-23 RX ORDER — NALOXONE HYDROCHLORIDE 0.4 MG/ML
0.08 INJECTION, SOLUTION INTRAMUSCULAR; INTRAVENOUS; SUBCUTANEOUS AS NEEDED
Status: DISCONTINUED | OUTPATIENT
Start: 2025-01-23 | End: 2025-01-23

## 2025-01-23 RX ORDER — METHYLPREDNISOLONE ACETATE 40 MG/ML
INJECTION, SUSPENSION INTRA-ARTICULAR; INTRALESIONAL; INTRAMUSCULAR; SOFT TISSUE
Status: DISCONTINUED | OUTPATIENT
Start: 2025-01-23 | End: 2025-01-23

## 2025-01-23 RX ORDER — LIDOCAINE HYDROCHLORIDE 10 MG/ML
INJECTION, SOLUTION INFILTRATION; PERINEURAL
Status: DISCONTINUED | OUTPATIENT
Start: 2025-01-23 | End: 2025-01-23

## 2025-01-23 NOTE — H&P
History & Physical Examination    Patient Name: Richard Palacios  MRN: [id number]  CSN: [id number]  YOB: 1965    Pre-Operative Diagnosis:  Lumbar spondylosis [M47.816]    Present Illness: Lumbar spondylosis    ASA: 2  MP class: 1  Sedation: Local    Prescriptions Prior to Admission[1]  No current facility-administered medications for this encounter.       Allergies: Allergies[2]    Past Medical History:    Abdominal pain    Ascites    Back problem    COVID    Disorder of liver    Cirrhosis - no longer existent since liver transplant surgery 23    Esophageal reflux    Essential hypertension    ETOH abuse    Gout    Hyperlipidemia    Hypoalbuminemia    Nausea    Personal history of alcoholism (HCC)    Thrombocytopenia (HCC)    Weight loss     Past Surgical History:   Procedure Laterality Date    Colonoscopy N/A 2023    Procedure: .;  Surgeon: Gus Ocampo MD;  Location:  ENDOSCOPY    Colonoscopy N/A 2023    Procedure: COLONOSCOPY with cold snare polypectomy and clip placement x1;  Surgeon: Robin Raya DO;  Location:  ENDOSCOPY    Colonoscopy N/A 2024    Procedure: COLONOSCOPY;  Surgeon: Claudio Adams MD;  Location:  ENDOSCOPY    Organ transplant  2023    Liver transplant    Tonsillectomy       Family History   Problem Relation Age of Onset    Other (alcoholism) Father     Diabetes Mother         Type I    Other (CAD) Mother      Social History     Tobacco Use    Smoking status: Some Days     Current packs/day: 0.00     Types: Cigars, Cigarettes     Last attempt to quit: 1984     Years since quittin.9     Passive exposure: Never    Smokeless tobacco: Never    Tobacco comments:     Still not smoking cigarettes since . Do smoke a couple of cigars a week.   Substance Use Topics    Alcohol use: Not Currently       SYSTEM Check if Review is Normal Check if Physical Exam is Normal If not normal, please explain:   HEENT [x ] [x ]    NECK & BACK [x ] [x  ]    HEART [x ] [x ]    LUNGS [x ] [x ]    ABDOMEN [x ] [x ]    UROGENITAL [x ] [x ]    EXTREMITIES [x ] [x ]    OTHER        [ x ] I have discussed the risks and benefits and alternatives with the patient/family.  They understand and agree to proceed with plan of care.  [ x ] I have reviewed the History and Physical done within the last 30 days.  Any changes noted above.    Henrik Hendrickson MD              [1]   Medications Prior to Admission   Medication Sig Dispense Refill Last Dose/Taking    PANTOPRAZOLE 40 MG Oral Tab EC TAKE 1 TABLET(40 MG) BY MOUTH TWICE DAILY BEFORE MEALS 60 tablet 10     gabapentin 300 MG Oral Cap Take 1 capsule (300 mg total) by mouth nightly.       mycophenolate sodium 360 MG Oral Tab EC Take 2 tablets (720 mg total) by mouth 2 (two) times daily.       ENVARSUS XR 0.75 MG Oral Tablet 24 Hr        URSODIOL 300 MG Oral Cap TAKE 1 CAPSULE(300 MG) BY MOUTH THREE TIMES DAILY 270 capsule 1     ALLOPURINOL 100 MG Oral Tab TAKE 1 TABLET(100 MG) BY MOUTH DAILY (Patient not taking: Reported on 12/16/2024) 90 tablet 3     Sildenafil Citrate 100 MG Oral Tab Take 0.5-1 tablets ( mg total) by mouth daily as needed for Erectile Dysfunction. 45 tablet 3     rosuvastatin 10 MG Oral Tab Take 1 tablet (10 mg total) by mouth daily.       traMADol 50 MG Oral Tab Take 1 tablet (50 mg total) by mouth every 6 (six) hours as needed for Pain.       acetaminophen 500 MG Oral Tab Take 1 tablet (500 mg total) by mouth every 6 (six) hours as needed for Pain.       TRAZODONE 100 MG Oral Tab TAKE 1 TABLET(100 MG) BY MOUTH EVERY NIGHT 90 tablet 1     LOKELMA 10 g Oral Powd Pack Take 1 packet (10 g total) by mouth daily. 90 packet 1     Tacrolimus ER 1 MG Oral Tablet 24 Hr Take 2 tablets (2 mg total) by mouth daily.       Magnesium Oxide -Mg Supplement 400 (240 Mg) MG Oral Tab Take 1 tablet (400 mg total) by mouth daily. (Patient not taking: Reported on 12/16/2024) 30 tablet 1     ergocalciferol 1.25 MG (82243 UT)  Oral Cap Take 1 capsule (50,000 Units total) by mouth once a week. Q Sunday (Patient not taking: Reported on 12/16/2024)       valGANciclovir 450 MG Oral Tab Take 2 tablets (900 mg total) by mouth daily.       aspirin 81 MG Oral Tab EC Take 1 tablet (81 mg total) by mouth daily. (Patient not taking: Reported on 12/16/2024)   1/21/2025   [2] No Known Allergies

## 2025-01-23 NOTE — OPERATIVE REPORT
[facility]  Operative Report  2025     Richard Palacios Patient Status:  [facility] Outpatient Surgery    1965 MRN [id number]   Location [facility] Attending Henrik Hendrickson MD   Hosp Day # 0 PCP Lg Sosa MD     Indication: Richard is a 59 year old male lumbar spondylosis    Preoperative Diagnosis:  Lumbar spondylosis [M47.816]    Postoperative Diagnosis: Same as above.    Procedure performed: left L4/5 and L5/S1 MEDIAL BRANCH BLOCK with local    Anesthesia: Local      EBL: Less than 1 ml.    Procedure Description:  After reviewing the patient's history and performing a focused physical examination, the diagnosis was confirmed and contraindications such as infection and coagulopathy were ruled out.  Following review of potential side effects and complications, including but not necessarily limited to infection, allergic reaction, local tissue breakdown, nerve injury, and paresis, the patient indicated they understood and agreed to proceed.  After obtaining the informed consent, the patient was brought to the procedure room and monitored.      The patient was placed prone on the table and the back was prepped and draped in a sterile fashion.  The skin and subcutaneous tissue was anesthetized via 25-gauge 1.5\" needle with approximately 2 mL of 1% lidocaine.  Under fluoroscopy guidance, a 22-gauge 3.5\" Quincke spinal needle was introduced and advanced along the superior margin of the L3 transverse process and lateral to the L3 superior articular process, and it was directed inferiorly and medially so that the tip struck the superior aspect of the junction of the transverse process and the superior articular process.  Following negative aspiration for CSF and blood, approximately 1 mL of 1% lidocaine was injected via each needle without complication.  Similar procedures were performed at L4 and L5.  The needles were removed with tips intact.  The patient tolerated procedure  very well.  No complications were observed during or immediately after the procedure.  The patient was observed until discharge criteria met.  Discharge instructions were given and patient was released to a responsible adult.    Complications: None.    Follow up:  Clinic    Henrik Hendrickson MD

## 2025-01-23 NOTE — DISCHARGE INSTRUCTIONS
Home Care Instructions Following Your Pain Procedure     Richard,  It has been a pleasure to have you as our patient. To help you at home, you must follow these general discharge instructions. We will review these with you before you are discharged. It is our hope that you have a complete and uneventful recovery from our procedure.     General Instructions:  What to Expect:  Bandages from your procedure today can be removed when you get home.  Please avoid soaking and/or swimming for 24 hours.  Showering is okay  It is normal to have increased pain symptoms and/or pain at injection site for up to 3-5 days after procedure, you can use heat or ice (20 minutes on 20 minutes off) for comfort.  You may experience some temporary side effects which may include restlessness or insomnia, flushing of the face, or heart palpitations.  Please contact the provider if these symptoms do not resolve within 3-4 days.  Lightheadedness or nausea may occur and should resolve within 24 to 48 hours.  If you develop a headache after treatment, rest, drink fluids (with caffeine, if possible) and take mild over-the-counter pain medication.  If the headache does not improve with the above treatment, contact the physician.  Home Medications:  Resume all previously prescribed medication.  Please avoid taking NSAIDs (Non-Steriodal Anti-Inflammatory Drugs) such as:  Ibuprofen ( Advil, Motrin) Aleve (Naproxen), Diclofenac, Meloxicam for 6 hours after procedure.   If you are on Coumadin (Warfarin) or any other anti-coagulant (or \"blood thinning\") medication such as Plavix (Clopidogrel), Xarelto (Rivaroxaban), Eliquis (Apixaban), Effient (Prasugrel) etc., restart on the following day from the procedure unless otherwise directed by your provider.  If you are a diabetic, please increase the frequency of your glucose monitoring after the procedure as steroids may cause a temporary (2-3 day) increase in your blood sugar.  Contact your primary care  physician if your blood sugar remains elevated as you may require some medication adjustment.  Diet:  Resume your regular diet as tolerated.  Activity:  We recommend that you relax and rest during the rest of your procedure day.  If you feel weakness in your arms or legs do not drive.  Follow-up Appointment  Please schedule a follow-up visit within 3 to 4 weeks after your last procedure date.  Question or Concerns:  Feel free to call our office with any questions or concerns at [phone] (option #2)    Richard  Thank you for coming to [facility] for your procedure.  The nurses try very hard to make sure you receive the best care possible.  Your trust in them as well as us is greatly appreciated.    Thanks so much,   Dr. Henrik Hendrickson

## 2025-01-24 ENCOUNTER — TELEPHONE (OUTPATIENT)
Dept: PAIN CLINIC | Facility: CLINIC | Age: 60
End: 2025-01-24

## 2025-01-24 ENCOUNTER — LAB ENCOUNTER (OUTPATIENT)
Dept: LAB | Age: 60
End: 2025-01-24
Attending: INTERNAL MEDICINE
Payer: COMMERCIAL

## 2025-01-24 DIAGNOSIS — Z94.4 LIVER REPLACED BY TRANSPLANT (HCC): ICD-10-CM

## 2025-01-24 DIAGNOSIS — D84.9 IMMUNOSUPPRESSION (HCC): ICD-10-CM

## 2025-01-24 DIAGNOSIS — Z51.81 ENCOUNTER FOR THERAPEUTIC DRUG MONITORING: ICD-10-CM

## 2025-01-24 LAB
ALBUMIN SERPL-MCNC: 5.2 G/DL (ref 3.2–4.8)
ALBUMIN/GLOB SERPL: 1.7 {RATIO} (ref 1–2)
ALP LIVER SERPL-CCNC: 123 U/L
ALT SERPL-CCNC: 13 U/L
ANION GAP SERPL CALC-SCNC: 9 MMOL/L (ref 0–18)
AST SERPL-CCNC: 24 U/L (ref ?–34)
BASOPHILS # BLD AUTO: 0.04 X10(3) UL (ref 0–0.2)
BASOPHILS NFR BLD AUTO: 0.4 %
BILIRUB SERPL-MCNC: 0.6 MG/DL (ref 0.3–1.2)
BUN BLD-MCNC: 14 MG/DL (ref 9–23)
CALCIUM BLD-MCNC: 10.4 MG/DL (ref 8.7–10.6)
CHLORIDE SERPL-SCNC: 104 MMOL/L (ref 98–112)
CO2 SERPL-SCNC: 28 MMOL/L (ref 21–32)
CREAT BLD-MCNC: 1.13 MG/DL
EGFRCR SERPLBLD CKD-EPI 2021: 75 ML/MIN/1.73M2 (ref 60–?)
EOSINOPHIL # BLD AUTO: 0.09 X10(3) UL (ref 0–0.7)
EOSINOPHIL NFR BLD AUTO: 1 %
ERYTHROCYTE [DISTWIDTH] IN BLOOD BY AUTOMATED COUNT: 12.3 %
FASTING STATUS PATIENT QL REPORTED: YES
GLOBULIN PLAS-MCNC: 3 G/DL (ref 2–3.5)
GLUCOSE BLD-MCNC: 105 MG/DL (ref 70–99)
HCT VFR BLD AUTO: 48.2 %
HGB BLD-MCNC: 16.3 G/DL
IMM GRANULOCYTES # BLD AUTO: 0.23 X10(3) UL (ref 0–1)
IMM GRANULOCYTES NFR BLD: 2.6 %
LYMPHOCYTES # BLD AUTO: 1.29 X10(3) UL (ref 1–4)
LYMPHOCYTES NFR BLD AUTO: 14.5 %
MAGNESIUM SERPL-MCNC: 2.1 MG/DL (ref 1.6–2.6)
MCH RBC QN AUTO: 29.9 PG (ref 26–34)
MCHC RBC AUTO-ENTMCNC: 33.8 G/DL (ref 31–37)
MCV RBC AUTO: 88.4 FL
MONOCYTES # BLD AUTO: 0.83 X10(3) UL (ref 0.1–1)
MONOCYTES NFR BLD AUTO: 9.3 %
NEUTROPHILS # BLD AUTO: 6.44 X10 (3) UL (ref 1.5–7.7)
NEUTROPHILS # BLD AUTO: 6.44 X10(3) UL (ref 1.5–7.7)
NEUTROPHILS NFR BLD AUTO: 72.2 %
OSMOLALITY SERPL CALC.SUM OF ELEC: 293 MOSM/KG (ref 275–295)
PLATELET # BLD AUTO: 202 10(3)UL (ref 150–450)
POTASSIUM SERPL-SCNC: 4.5 MMOL/L (ref 3.5–5.1)
PROT SERPL-MCNC: 8.2 G/DL (ref 5.7–8.2)
RBC # BLD AUTO: 5.45 X10(6)UL
SODIUM SERPL-SCNC: 141 MMOL/L (ref 136–145)
WBC # BLD AUTO: 8.9 X10(3) UL (ref 4–11)

## 2025-01-24 PROCEDURE — 83735 ASSAY OF MAGNESIUM: CPT

## 2025-01-24 PROCEDURE — 80197 ASSAY OF TACROLIMUS: CPT

## 2025-01-24 PROCEDURE — 80053 COMPREHEN METABOLIC PANEL: CPT

## 2025-01-24 PROCEDURE — 36415 COLL VENOUS BLD VENIPUNCTURE: CPT

## 2025-01-24 PROCEDURE — 85025 COMPLETE CBC W/AUTO DIFF WBC: CPT

## 2025-01-24 NOTE — TELEPHONE ENCOUNTER
Akil called placed to patient for post procedure follow up. Patient stated no questions nor concerns , feeling great. I Pt verbalized understanding to call with any questions or concerns.      Procedure: left L4/5 and L5/S1 MEDIAL BRANCH BLOCK   Date: 1/23/2025  Follow up Visit Scheduled: 2/6/2025 with Lg Wood

## 2025-01-28 LAB — TACROLIMUS LVL: 6.1 NG/ML

## 2025-02-03 ENCOUNTER — APPOINTMENT (OUTPATIENT)
Dept: SURGERY | Facility: CLINIC | Age: 60
End: 2025-02-03

## 2025-02-06 ENCOUNTER — TELEPHONE (OUTPATIENT)
Dept: PAIN CLINIC | Facility: CLINIC | Age: 60
End: 2025-02-06

## 2025-02-06 ENCOUNTER — OFFICE VISIT (OUTPATIENT)
Dept: PAIN CLINIC | Facility: CLINIC | Age: 60
End: 2025-02-06
Payer: COMMERCIAL

## 2025-02-06 VITALS
WEIGHT: 185 LBS | HEART RATE: 96 BPM | SYSTOLIC BLOOD PRESSURE: 128 MMHG | OXYGEN SATURATION: 98 % | BODY MASS INDEX: 28 KG/M2 | DIASTOLIC BLOOD PRESSURE: 84 MMHG

## 2025-02-06 DIAGNOSIS — M47.816 LUMBAR SPONDYLOSIS: Primary | ICD-10-CM

## 2025-02-06 PROCEDURE — 99214 OFFICE O/P EST MOD 30 MIN: CPT | Performed by: PHYSICIAN ASSISTANT

## 2025-02-06 RX ORDER — SODIUM CHLORIDE, SODIUM LACTATE, POTASSIUM CHLORIDE, CALCIUM CHLORIDE 600; 310; 30; 20 MG/100ML; MG/100ML; MG/100ML; MG/100ML
INJECTION, SOLUTION INTRAVENOUS CONTINUOUS
Status: CANCELLED | OUTPATIENT
Start: 2025-02-06

## 2025-02-06 NOTE — TELEPHONE ENCOUNTER
Problem: Adult Inpatient Plan of Care  Goal: Plan of Care Review  Outcome: Ongoing, Progressing  Goal: Patient-Specific Goal (Individualized)  Outcome: Ongoing, Progressing  Goal: Absence of Hospital-Acquired Illness or Injury  Outcome: Ongoing, Progressing  Goal: Optimal Comfort and Wellbeing  Outcome: Ongoing, Progressing  Goal: Readiness for Transition of Care  Outcome: Ongoing, Progressing     Problem: Adjustment to Illness (Sepsis/Septic Shock)  Goal: Optimal Coping  Outcome: Ongoing, Progressing     Problem: Bleeding (Sepsis/Septic Shock)  Goal: Absence of Bleeding  Outcome: Ongoing, Progressing     Problem: Glycemic Control Impaired (Sepsis/Septic Shock)  Goal: Blood Glucose Level Within Desired Range  Outcome: Ongoing, Progressing     Problem: Infection Progression (Sepsis/Septic Shock)  Goal: Absence of Infection Signs and Symptoms  Outcome: Ongoing, Progressing     Problem: Nutrition Impaired (Sepsis/Septic Shock)  Goal: Optimal Nutrition Intake  Outcome: Ongoing, Progressing     Problem: Fluid and Electrolyte Imbalance (Acute Kidney Injury/Impairment)  Goal: Fluid and Electrolyte Balance  Outcome: Ongoing, Progressing     Problem: Oral Intake Inadequate (Acute Kidney Injury/Impairment)  Goal: Optimal Nutrition Intake  Outcome: Ongoing, Progressing     Problem: Renal Function Impairment (Acute Kidney Injury/Impairment)  Goal: Effective Renal Function  Outcome: Ongoing, Progressing     Problem: Infection  Goal: Absence of Infection Signs and Symptoms  Outcome: Ongoing, Progressing     Problem: Fall Injury Risk  Goal: Absence of Fall and Fall-Related Injury  Outcome: Ongoing, Progressing     Problem: Communication Impairment (Mechanical Ventilation, Invasive)  Goal: Effective Communication  Outcome: Ongoing, Progressing     Problem: Device-Related Complication Risk (Mechanical Ventilation, Invasive)  Goal: Optimal Device Function  Outcome: Ongoing, Progressing     Problem: Inability to Wean (Mechanical  Prior authorization request completed for: left L4/5,L5/S1 MBB  Authorization # [id number]  Authorization dates: 2/6/25-8/5/25  CPT codes approved: 87735,90333  Number of visits/dates of service approved: 1  Physician: wolf  Location: [facility]     Patient can be scheduled. Routed to Navigator.    Ventilation, Invasive)  Goal: Mechanical Ventilation Liberation  Outcome: Ongoing, Progressing     Problem: Nutrition Impairment (Mechanical Ventilation, Invasive)  Goal: Optimal Nutrition Delivery  Outcome: Ongoing, Progressing     Problem: Skin and Tissue Injury (Mechanical Ventilation, Invasive)  Goal: Absence of Device-Related Skin and Tissue Injury  Outcome: Ongoing, Progressing     Problem: Ventilator-Induced Lung Injury (Mechanical Ventilation, Invasive)  Goal: Absence of Ventilator-Induced Lung Injury  Outcome: Ongoing, Progressing     Problem: Communication Impairment (Artificial Airway)  Goal: Effective Communication  Outcome: Ongoing, Progressing     Problem: Device-Related Complication Risk (Artificial Airway)  Goal: Optimal Device Function  Outcome: Ongoing, Progressing     Problem: Skin and Tissue Injury (Artificial Airway)  Goal: Absence of Device-Related Skin or Tissue Injury  Outcome: Ongoing, Progressing     Problem: Skin Injury Risk Increased  Goal: Skin Health and Integrity  Outcome: Ongoing, Progressing     Problem: Device-Related Complication Risk (Hemodialysis)  Goal: Safe, Effective Therapy Delivery  Outcome: Ongoing, Progressing     Problem: Hemodynamic Instability (Hemodialysis)  Goal: Effective Tissue Perfusion  Outcome: Ongoing, Progressing     Problem: Infection (Hemodialysis)  Goal: Absence of Infection Signs and Symptoms  Outcome: Ongoing, Progressing     Problem: Diabetes Comorbidity  Goal: Blood Glucose Level Within Targeted Range  Outcome: Ongoing, Progressing     Problem: Impaired Wound Healing  Goal: Optimal Wound Healing  Outcome: Ongoing, Progressing     Problem: Gas Exchange Impaired  Goal: Optimal Gas Exchange  Outcome: Ongoing, Progressing

## 2025-02-06 NOTE — PROGRESS NOTES
Last procedure: left L4/5 and L5/S1 MEDIAL BRANCH BLOCK with local   Date: 01/23/25  Percentage of relief obtained: 90%  Duration of relief: a week    Current Pain Score: 6

## 2025-02-06 NOTE — PROGRESS NOTES
HPI:   Richard Palacios presents with complaints of left low back pain.    The pain is described as severe aching, stabbing that is intermittent.  The patient’s activity level has remained the same since last visit.  The pain is worst unrelated to time of day.    Changes in condition/history since last visit: Patient is here today for follow-up having been seen once previously on 12/5/2024.  At that time had discussed left L4-5 and L5-S1 medial branch block, which he did undergo on 1/23/2025.  Procedure was well-tolerated and had no adverse effects.  Overall, reports 90% resolution of symptoms and was very pleased with his response.  States that he resumed typical ADLs, and felt great.  After a week, pain began to return, and wishes to repeat procedure in staging for RFA.      Last procedure: Left L4-5 and L5-S1 MBNB    date: 1/23/2025    Percentage of relief experienced from the procedure: 90%    Duration of the relief: 6 days    The following activities will increase the patient’s pain: walking, standing    The following activities decrease the patient’s pain: limiting activity level    Functional Assessment: Patient reports that they are able to complete all of their ADL's such as eating, bathing, using the toilet, dressing and getting up from a bed or a chair independently.    Current Medications:  Current Outpatient Medications   Medication Sig Dispense Refill    PANTOPRAZOLE 40 MG Oral Tab EC TAKE 1 TABLET(40 MG) BY MOUTH TWICE DAILY BEFORE MEALS 60 tablet 10    gabapentin 300 MG Oral Cap Take 1 capsule (300 mg total) by mouth nightly.      mycophenolate sodium 360 MG Oral Tab EC Take 2 tablets (720 mg total) by mouth 2 (two) times daily.      ENVARSUS XR 0.75 MG Oral Tablet 24 Hr  (Patient not taking: Reported on 2/6/2025)      URSODIOL 300 MG Oral Cap TAKE 1 CAPSULE(300 MG) BY MOUTH THREE TIMES DAILY 270 capsule 1    ALLOPURINOL 100 MG Oral Tab TAKE 1 TABLET(100 MG) BY MOUTH DAILY 90 tablet 3    Sildenafil  Citrate 100 MG Oral Tab Take 0.5-1 tablets ( mg total) by mouth daily as needed for Erectile Dysfunction. 45 tablet 3    rosuvastatin 10 MG Oral Tab Take 1 tablet (10 mg total) by mouth daily.      traMADol 50 MG Oral Tab Take 1 tablet (50 mg total) by mouth every 6 (six) hours as needed for Pain.      acetaminophen 500 MG Oral Tab Take 1 tablet (500 mg total) by mouth every 6 (six) hours as needed for Pain.      TRAZODONE 100 MG Oral Tab TAKE 1 TABLET(100 MG) BY MOUTH EVERY NIGHT 90 tablet 1    LOKELMA 10 g Oral Powd Pack Take 1 packet (10 g total) by mouth daily. 90 packet 1    Tacrolimus ER 1 MG Oral Tablet 24 Hr Take 2 tablets (2 mg total) by mouth daily.      Magnesium Oxide -Mg Supplement 400 (240 Mg) MG Oral Tab Take 1 tablet (400 mg total) by mouth daily. (Patient not taking: Reported on 2/6/2025) 30 tablet 1    ergocalciferol 1.25 MG (89047 UT) Oral Cap Take 1 capsule (50,000 Units total) by mouth once a week. Q Sunday (Patient not taking: Reported on 2/6/2025)      valGANciclovir 450 MG Oral Tab Take 2 tablets (900 mg total) by mouth daily.      aspirin 81 MG Oral Tab EC Take 1 tablet (81 mg total) by mouth daily.        Patient requires assistance with: No assistance required    Reviewed Patient History Dated: 1/23/25 no changes noted    Physical Exam:   /84   Pulse 96   Wt 185 lb (83.9 kg)   SpO2 98%   BMI 28.13 kg/m²   VAS Pain Score:  6/10  General Appearance: Well developed, well nourished, normal build, independent body habitus, no apparent physical disabilities, well groomed    Neurological Exam: WNL-Orientation to time, place and person, normal mood & effect, normal concentration & attention span  Inspection: non-antalgic, no acute distress   Radiology/Lab Test Reviewed: MRI L spine 6/11/24:     LUMBAR FINDINGS:   Patient motion noted.      Stable holovertebral hemangioma at the L1 level resulting in minimal cortical expansion along the dorsal cortex of the vertebral body.  No  pathologic fracture identified.  Stable small hemangioma also noted in the L2 vertebral body.  The suspected subcentimeter hemangioma in the dorsal aspect of the L5 vertebral body is also again noted, however limited in evaluation due to patient motion, but appearing grossly unchanged.      There is normal lumbar lordosis with anatomic alignment.  Vertebral body heights are well-maintained.  Mild degenerative disc space loss, disc desiccation, endplate spurring, and degenerative endplate marrow signal changes scattered in the lumbar spine.  No suspicious enhancement.  Scattered degenerative enhancement noted, particularly in the mid to lower lumbar spine along the facet joints and interspinous regions.      The distal spinal cord and conus medullaris have a normal signal and morphology.  The conus medullaris terminates at the mid L1 level.  The roots of the cauda equina are unremarkable.  No focal mass or fluid collection is seen in the lumbar spinal canal.   The paraspinal soft tissues are unremarkable.      T12-L1: There is no significant abnormality.      L1-2:  Minimal diffuse disc bulge with mild facet arthropathy. There is no significant spinal canal or neural foraminal stenosis. No significant interval change.      L2-3:  Mild diffuse disc bulge with mild facet arthropathy. There is no significant spinal canal stenosis.  There is mild left neural foraminal stenosis. No significant interval change.      L3-4:  Diffuse disc bulge with mild facet arthropathy.  There is no significant spinal canal stenosis.  There is mild right neural foraminal stenosis. No significant interval change.      L4-5:  Diffuse disc bulge with a small superimposed broad-based central disc protrusion.  Mild to moderate facet arthropathy and thickening of ligamentum flavum.  No significant spinal canal stenosis.  Mild bilateral neural foraminal stenosis. No significant interval change.      L5-S1:  Minimal diffuse disc bulge with mild  facet arthropathy. There is no significant spinal canal or neural foraminal stenosis. No significant interval change.          Lab Results   Component Value Date    WBC 8.9 01/24/2025    WBC 6.1 12/10/2024    WBC 6.3 11/05/2024   No results found for: \"HEMOGLOBIN\"  Lab Results   Component Value Date    .0 01/24/2025    .0 12/10/2024    .0 11/05/2024     Do you have any known blood/bleeding disorders?  No  Does patient currently take blood thinners?   None  Does patient currently take any antibiotics?   No  Patient educated and verbalized understanding.  Medical Decision Making:   Diagnosis:    Encounter Diagnosis   Name Primary?    Lumbar spondylosis Yes     Impression: 90% relief with left L4/5 and L5/S1 MB NB on 1/23/2025.  Better able to tolerate his day-to-day activities and was very pleased with his response.  After 6 days, pain began to return, and has since returned to baseline.  Wishes a repeat procedure and staging for RFA, and order is placed.    Plan: Patient to schedule the following injection: Left lumbar MB NB #2 Levels: L4-5 and L5-S1, Procedure and risks were discussed with pt. including headache, bleeding, infection and potential nerve damage.  Follow-up 2 to 3 weeks.  If he again has good but only brief improvement, we will proceed with RFA.    No orders of the defined types were placed in this encounter.      Meds & Refills for this Visit:  Requested Prescriptions      No prescriptions requested or ordered in this encounter       Imaging & Consults:  None    The patient indicates understanding of these issues and agrees to the plan.    PATRICK Mcbride

## 2025-02-06 NOTE — PATIENT INSTRUCTIONS
Refill policies:    Allow 2-3 business days for refills; controlled substances may take longer.  Contact your pharmacy at least 5 days prior to running out of medication and have them send an electronic request or submit request through the “request refill” option in your [employer] account.  Refills are not addressed on weekends; covering physicians do not authorize routine medications on weekends.  No narcotics or controlled substances are refilled after noon on Fridays or by on call physicians.  By law, narcotics must be electronically prescribed.  A 30 day supply with no refills is the maximum allowed.  If your prescription is due for a refill, you may be due for a follow up appointment.  To best provide you care, patients receiving routine medications need to be seen at least once a year.  Patients receiving narcotic/controlled substance medications need to be seen at least once every 3 months.  In the event that your preferred pharmacy does not have the requested medication in stock (e.g. Backordered), it is your responsibility to find another pharmacy that has the requested medication available.  We will gladly send a new prescription to that pharmacy at your request.    Scheduling Tests:    If your physician has ordered radiology tests such as MRI or CT scans, please contact Central Scheduling at [phone] right away to schedule the test.  Once scheduled, the [facility] Centralized Referral Team will work with your insurance carrier to obtain pre-certification or prior authorization.  Depending on your insurance carrier, approval may take 3-10 days.  It is highly recommended patients assure they have received an authorization before having a test performed.  If test is done without insurance authorization, patient may be responsible for the entire amount billed.      Precertification and Prior Authorizations:  If your physician has recommended that you have a procedure or additional testing performed the [facility]  Centralized Referral Team will contact your insurance carrier to obtain pre-certification or prior authorization.    You are strongly encouraged to contact your insurance carrier to verify that your procedure/test has been approved and is a COVERED benefit.  Although the [facility] Centralized Referral Team does its due diligence, the insurance carrier gives the disclaimer that \"Although the procedure is authorized, this does not guarantee payment.\"    Ultimately the patient is responsible for payment.   Thank you for your understanding in this matter.  Paperwork Completion:  If you require FMLA or disability paperwork for your recovery, please make sure to either drop it off or have it faxed to our office at [phone]. Be sure the form has your name and date of birth on it.  The form will be faxed to our Forms Department and they will complete it for you.  There is a 25$ fee for all forms that need to be filled out.  Please be aware there is a 10-14 day turnaround time.  You will need to sign a release of information (VITA) form if your paperwork does not come with one.  You may call the Forms Department with any questions at [phone].  Their fax number is [phone].

## 2025-02-10 NOTE — TELEPHONE ENCOUNTER
Returned patient call to schedule procedure.     Patient advised of insurance approval to proceed with injections and is agreeable to scheduling. Patient scheduled for procedure, pre-procedure instructions reviewed. Patient prefers Local sedation. Reviewed sedation instructions including No Fasting & No  Required. Patient encouraged but not required to hold ASA 81 mg for 24 hours prior to procedure. Patient verbalized understanding of instructions, no further needs at this time.       [facility]  PRE-PROCEDURE INSTRUCTIONS WITHOUT SEDATION    Procedure: Left L4/5,L5/S1 MBB       Appointment Date: 02/18/2025  Check-In Time: 01:30 PM    Follow-Up Date/Time: Patient mentioned he will call back to schedule follow up, as he has appt w/ on 02/25/25.     Prior to the procedure:  Please update us prior to the procedure if you are experiencing any symptoms of infection such as cough, fever, chills, urinary symptoms, or have recently been prescribed antibiotics, have open wounds, have recently had surgery or dental procedures.    Day of Procedure:  **Drivers will be required for patients who receive prescriptions for Valium.    NO FASTING REQUIRED  Please bring your Insurance Card, Photo ID, List of Current Medications and Referral (if applicable) to your appointment.  Please park in the [employer] and follow the signs to the \[facility] [place] [facility]\"".  Check in at [facility] ([address] [place] [address]) outpatient registration in the [employer].  Please note-No prescriptions will be written by Pain Clinic in OR on the day of procedure. If you require a refill of medications, please contact the office 48 hours prior to your procedure.  If you have an implanted Spinal Cord or Peripheral Nerve Stimulator: Please remember to turn device off for procedure.        Medication Hold:    Number of days you need to be off for the following medications:    Aggrenox 10 days   Agrylin  (Anagrelide) 10 days  Brilinta (Ticagrelor) 7 days  Imbruvica (Ibrutinib) 3 days   Enbrel (Etanercept) 24 hours   Fragmin (Dalteparin) 24 hours   Pletal (Cilostazol) 7 days  Effient (Prasugrel) 7 days  Pradaxa 10 days  Trental 7 days  Eliquis (Apixaban) 3 days  Xarelto (Rivaroxaban) 3 days  Lovenox (Enoxaparin) 24 hours  Aspirin  Greater than 81mg but less than 325mg   5 days  325mg and greater                  7 days  Coumadin       5 days  Procedure may be cancelled if INR is elevated.   Excedrin (with aspirin) 7 days  Plavix (Clopidogrel)                            7 days    NSAIDs: 24 hours preferred      Ibuprofen (Motrin, Advil, Vicoprofen), Naproxen (Naprosyn, Aleve), Piroxcam (Feldene), Meloxicam (Mobic), Oxaprozin (Daypro), Diclofenac (Voltaren), Indomethacin (Indocin), Etodolac (Lodine), Nabumetone (Relafen), Celebrex (Celecoxib)           HERBAL SUPPLEMENTS  5 days preferred  Fish oil, krill oil, Omega-3, Vascepa, Vitamin E, Turmeric, Garlic                       Insurance Authorization:   Most insurances are now requiring a preauthorization for all procedures.  In the event that your insurance does not authorize your procedure within 48 hours of the scheduled date, your procedure will be cancelled and rescheduled to a later date.  Please contact your insurance carrier to determine what your financial responsibility will be for the procedure(s).      Cancellation/Rescheduling Appointment:   In the event you need to cancel or reschedule your appointment, you must notify the office 24 hours prior.    Post-procedure instructions:        Please schedule a follow up visit within 2 to 4 weeks after your last procedure date   Please call our office with any questions or concerns before or after your procedure at  [phone].  If you are a diabetic, please increase the frequency of your glucose monitoring after the procedure as this may cause a temporary increase in your blood sugar.  Contact your primary care  physician if your blood sugar rises as you may require some medication adjustment.  It is normal to have increased pain at injection site for up to 3-5 days after procedure, you can use heat or ice (20 minutes on 20 minutes off) for comfort.    **To hear a recorded version of these instructions, please call [phone] and follow the prompts.  **Para escuchar las instrucciones en Español, por favor de llamar el robbin [phone] opción 4.

## 2025-02-18 ENCOUNTER — APPOINTMENT (OUTPATIENT)
Dept: GENERAL RADIOLOGY | Facility: HOSPITAL | Age: 60
End: 2025-02-18
Attending: ANESTHESIOLOGY
Payer: COMMERCIAL

## 2025-02-18 ENCOUNTER — HOSPITAL ENCOUNTER (OUTPATIENT)
Facility: HOSPITAL | Age: 60
Setting detail: HOSPITAL OUTPATIENT SURGERY
Discharge: HOME | End: 2025-02-18
Attending: ANESTHESIOLOGY | Admitting: ANESTHESIOLOGY
Payer: COMMERCIAL

## 2025-02-18 VITALS
RESPIRATION RATE: 17 BRPM | SYSTOLIC BLOOD PRESSURE: 134 MMHG | OXYGEN SATURATION: 100 % | HEIGHT: 68 IN | TEMPERATURE: 97 F | HEART RATE: 82 BPM | WEIGHT: 185 LBS | BODY MASS INDEX: 28.04 KG/M2 | DIASTOLIC BLOOD PRESSURE: 85 MMHG

## 2025-02-18 PROCEDURE — BR161ZZ FLUOROSCOPY OF LUMBAR FACET JOINT(S) USING LOW OSMOLAR CONTRAST: ICD-10-PCS | Performed by: ANESTHESIOLOGY

## 2025-02-18 PROCEDURE — 64494 INJ PARAVERT F JNT L/S 2 LEV: CPT | Performed by: ANESTHESIOLOGY

## 2025-02-18 PROCEDURE — 3E0T3BZ INTRODUCTION OF ANESTHETIC AGENT INTO PERIPHERAL NERVES AND PLEXI, PERCUTANEOUS APPROACH: ICD-10-PCS | Performed by: ANESTHESIOLOGY

## 2025-02-18 PROCEDURE — 64493 INJ PARAVERT F JNT L/S 1 LEV: CPT | Performed by: ANESTHESIOLOGY

## 2025-02-18 RX ORDER — LIDOCAINE HYDROCHLORIDE 10 MG/ML
INJECTION, SOLUTION INFILTRATION; PERINEURAL
Status: DISCONTINUED | OUTPATIENT
Start: 2025-02-18 | End: 2025-02-18

## 2025-02-18 RX ORDER — BUPIVACAINE HYDROCHLORIDE 5 MG/ML
INJECTION, SOLUTION EPIDURAL; INTRACAUDAL
Status: DISCONTINUED | OUTPATIENT
Start: 2025-02-18 | End: 2025-02-18

## 2025-02-18 RX ORDER — SODIUM CHLORIDE, SODIUM LACTATE, POTASSIUM CHLORIDE, CALCIUM CHLORIDE 600; 310; 30; 20 MG/100ML; MG/100ML; MG/100ML; MG/100ML
INJECTION, SOLUTION INTRAVENOUS CONTINUOUS
Status: DISCONTINUED | OUTPATIENT
Start: 2025-02-18 | End: 2025-02-18

## 2025-02-18 RX ORDER — NALOXONE HYDROCHLORIDE 0.4 MG/ML
0.08 INJECTION, SOLUTION INTRAMUSCULAR; INTRAVENOUS; SUBCUTANEOUS AS NEEDED
Status: DISCONTINUED | OUTPATIENT
Start: 2025-02-18 | End: 2025-02-18

## 2025-02-18 RX ORDER — METHYLPREDNISOLONE ACETATE 40 MG/ML
INJECTION, SUSPENSION INTRA-ARTICULAR; INTRALESIONAL; INTRAMUSCULAR; SOFT TISSUE
Status: DISCONTINUED | OUTPATIENT
Start: 2025-02-18 | End: 2025-02-18

## 2025-02-18 NOTE — OPERATIVE REPORT
[facility]  Operative Report  2025     Richard Palacios Patient Status:  [facility] Outpatient Surgery    1965 MRN [id number]   Location [facility] Attending Henrik Hendrickson MD   Hosp Day # 0 PCP Lg Sosa MD     Indication: Richard is a 59 year old male with lumbar spondylosis    Preoperative Diagnosis:  Lumbar spondylosis [M47.816]    Postoperative Diagnosis: Same as above.    Procedure performed: left L4/5 and L5/S1 MEDIAL BRANCH BLOCK with local    Anesthesia: Local  .    EBL: Less than 1 ml.    Procedure Description:  After reviewing the patient's history and performing a focused physical examination, the diagnosis was confirmed and contraindications such as infection and coagulopathy were ruled out.  Following review of potential side effects and complications, including but not necessarily limited to infection, allergic reaction, local tissue breakdown, nerve injury, and paresis, the patient indicated they understood and agreed to proceed.  After obtaining the informed consent, the patient was brought to the procedure room and monitored.       The patient was placed prone on the table and the back was prepped and draped in a sterile fashion.  The skin and subcutaneous tissue was anesthetized via 25-gauge 1.5\" needle with approximately 2 mL of 1% lidocaine.  Under fluoroscopy guidance, a 22-gauge 3.5\" Quincke spinal needle was introduced and advanced along the superior margin of the L3 transverse process and lateral to the L3 superior articular process, and it was directed inferiorly and medially so that the tip struck the superior aspect of the junction of the transverse process and the superior articular process.  Following negative aspiration for CSF and blood, approximately 1 mL of 1% lidocaine was injected via each needle without complication.  Similar procedures were performed at L4 and L5.  The needles were removed with tips intact.  The patient tolerated  procedure very well.  No complications were observed during or immediately after the procedure.  The patient was observed until discharge criteria met.  Discharge instructions were given and patient was released to a responsible adult.    Complications: None.    Follow up:  Clinic    Henrik Hendrickson MD

## 2025-02-18 NOTE — DISCHARGE INSTRUCTIONS
Home Care Instructions Following Your Pain Procedure     Richard,  It has been a pleasure to have you as our patient. To help you at home, you must follow these general discharge instructions. We will review these with you before you are discharged. It is our hope that you have a complete and uneventful recovery from our procedure.     General Instructions:  What to Expect:  Bandages from your procedure today can be removed when you get home.  Please avoid soaking and/or swimming for 24 hours.  Showering is okay  It is normal to have increased pain symptoms and/or pain at injection site for up to 3-5 days after procedure, you can use heat or ice (20 minutes on 20 minutes off) for comfort.  You may experience some temporary side effects which may include restlessness or insomnia, flushing of the face, or heart palpitations.  Please contact the provider if these symptoms do not resolve within 3-4 days.  Lightheadedness or nausea may occur and should resolve within 24 to 48 hours.  If you develop a headache after treatment, rest, drink fluids (with caffeine, if possible) and take mild over-the-counter pain medication.  If the headache does not improve with the above treatment, contact the physician.  Home Medications:  Resume all previously prescribed medication.  Please avoid taking NSAIDs (Non-Steriodal Anti-Inflammatory Drugs) such as:  Ibuprofen ( Advil, Motrin) Aleve (Naproxen), Diclofenac, Meloxicam for 6 hours after procedure.   If you are on Coumadin (Warfarin) or any other anti-coagulant (or \"blood thinning\") medication such as Plavix (Clopidogrel), Xarelto (Rivaroxaban), Eliquis (Apixaban), Effient (Prasugrel) etc., restart on the following day from the procedure unless otherwise directed by your provider.  If you are a diabetic, please increase the frequency of your glucose monitoring after the procedure as steroids may cause a temporary (2-3 day) increase in your blood sugar.  Contact your primary care  physician if your blood sugar remains elevated as you may require some medication adjustment.  Diet:  Resume your regular diet as tolerated.  Activity:  We recommend that you relax and rest during the rest of your procedure day.  If you feel weakness in your arms or legs do not drive.  Follow-up Appointment  Please schedule a follow-up visit within 3 to 4 weeks after your last procedure date.  Question or Concerns:  Feel free to call our office with any questions or concerns at [phone] (option #2)    Richard  Thank you for coming to [facility] for your procedure.  The nurses try very hard to make sure you receive the best care possible.  Your trust in them as well as us is greatly appreciated.    Thanks so much,   Dr. Henrik Hendrickson

## 2025-02-18 NOTE — H&P
History & Physical Examination    Patient Name: Richard Palacios  MRN: [id number]  CSN: [id number]  YOB: 1965    Pre-Operative Diagnosis:  Lumbar spondylosis [M47.816]    Present Illness: Lumbar spondylosis    ASA: 3  MP class: 1  Sedation: Local      Prescriptions Prior to Admission[1]  No current facility-administered medications for this encounter.       Allergies: Allergies[2]    Past Medical History:    Abdominal pain    Ascites    Back problem    COVID    Disorder of liver    Cirrhosis - no longer existent since liver transplant surgery 12/19/23    Esophageal reflux    Essential hypertension    ETOH abuse    Gout    Hyperlipidemia    Hypoalbuminemia    Incisional hernia    Nausea    Personal history of alcoholism (HCC)    Thrombocytopenia    Weight loss     Past Surgical History:   Procedure Laterality Date    Colonoscopy N/A 11/20/2023    Procedure: .;  Surgeon: Gus Ocampo MD;  Location:  ENDOSCOPY    Colonoscopy N/A 11/27/2023    Procedure: COLONOSCOPY with cold snare polypectomy and clip placement x1;  Surgeon: Robin Raya DO;  Location:  ENDOSCOPY    Colonoscopy N/A 7/25/2024    Procedure: COLONOSCOPY;  Surgeon: Claudio Adams MD;  Location:  ENDOSCOPY    Organ transplant  12/19/2023    Liver transplant    Tonsillectomy       Family History   Problem Relation Age of Onset    Other (alcoholism) Father     Diabetes Mother         Type I    Other (CAD) Mother      Social History     Tobacco Use    Smoking status: Some Days     Types: Cigars     Passive exposure: Never    Smokeless tobacco: Never    Tobacco comments:     Still not smoking cigarettes since 2014. Do smoke a couple of cigars a week.   Substance Use Topics    Alcohol use: Not Currently       SYSTEM Check if Review is Normal Check if Physical Exam is Normal If not normal, please explain:   HEENT [x ] [x ]    NECK & BACK [x ] [x ]    HEART [x ] [x ]    LUNGS [x ] [x ]    ABDOMEN [x ] [x ]    UROGENITAL [x ] [x ]     EXTREMITIES [x ] [x ]    OTHER        [ x ] I have discussed the risks and benefits and alternatives with the patient/family.  They understand and agree to proceed with plan of care.  [ x ] I have reviewed the History and Physical done within the last 30 days.  Any changes noted above.    Henrik Hendrickson MD              [1]   Medications Prior to Admission   Medication Sig Dispense Refill Last Dose/Taking    aspirin 81 MG Oral Tab EC Take 1 tablet (81 mg total) by mouth daily.   2/16/2025    PANTOPRAZOLE 40 MG Oral Tab EC TAKE 1 TABLET(40 MG) BY MOUTH TWICE DAILY BEFORE MEALS 60 tablet 10     gabapentin 300 MG Oral Cap Take 1 capsule (300 mg total) by mouth nightly.       mycophenolate sodium 360 MG Oral Tab EC Take 2 tablets (720 mg total) by mouth 2 (two) times daily.       ENVARSUS XR 0.75 MG Oral Tablet 24 Hr 2 tablets daily.       URSODIOL 300 MG Oral Cap TAKE 1 CAPSULE(300 MG) BY MOUTH THREE TIMES DAILY 270 capsule 1     ALLOPURINOL 100 MG Oral Tab TAKE 1 TABLET(100 MG) BY MOUTH DAILY 90 tablet 3     Sildenafil Citrate 100 MG Oral Tab Take 0.5-1 tablets ( mg total) by mouth daily as needed for Erectile Dysfunction. 45 tablet 3     rosuvastatin 10 MG Oral Tab Take 1 tablet (10 mg total) by mouth daily.       traMADol 50 MG Oral Tab Take 1 tablet (50 mg total) by mouth every 6 (six) hours as needed for Pain.       acetaminophen 500 MG Oral Tab Take 1 tablet (500 mg total) by mouth every 6 (six) hours as needed for Pain.       TRAZODONE 100 MG Oral Tab TAKE 1 TABLET(100 MG) BY MOUTH EVERY NIGHT 90 tablet 1     LOKELMA 10 g Oral Powd Pack Take 1 packet (10 g total) by mouth daily. 90 packet 1     Magnesium Oxide -Mg Supplement 400 (240 Mg) MG Oral Tab Take 1 tablet (400 mg total) by mouth daily. 30 tablet 1     valGANciclovir 450 MG Oral Tab Take 2 tablets (900 mg total) by mouth daily.      [2] No Known Allergies

## 2025-02-19 ENCOUNTER — TELEPHONE (OUTPATIENT)
Dept: PAIN CLINIC | Facility: CLINIC | Age: 60
End: 2025-02-19

## 2025-02-19 NOTE — TELEPHONE ENCOUNTER
Courtesy called placed to patient for post procedure follow up. Patient stated feels awesome. Informed patient that soreness is to be expected after the procedure. Educated patient that it takes 3-5 days for the steroid to be effective and to allow adequate time for medication to work. Encouraged patient to alternate ice and heat and to take medications as prescribed. Pt verbalized understanding to call with any questions or concerns.      Procedure: left L4/5 and L5/S1 MEDIAL BRANCH BLOCK with local   Date: 02/18/25  Follow up Visit Scheduled:

## 2025-02-20 ENCOUNTER — HOSPITAL ENCOUNTER (OUTPATIENT)
Facility: HOSPITAL | Age: 60
Setting detail: HOSPITAL OUTPATIENT SURGERY
Discharge: HOME | End: 2025-02-20
Attending: INTERNAL MEDICINE | Admitting: INTERNAL MEDICINE
Payer: COMMERCIAL

## 2025-02-20 ENCOUNTER — ANESTHESIA (OUTPATIENT)
Dept: ENDOSCOPY | Facility: HOSPITAL | Age: 60
End: 2025-02-20
Payer: COMMERCIAL

## 2025-02-20 ENCOUNTER — ANESTHESIA EVENT (OUTPATIENT)
Dept: ENDOSCOPY | Facility: HOSPITAL | Age: 60
End: 2025-02-20
Payer: COMMERCIAL

## 2025-02-20 VITALS
HEIGHT: 68 IN | BODY MASS INDEX: 28.04 KG/M2 | HEART RATE: 69 BPM | WEIGHT: 185 LBS | OXYGEN SATURATION: 97 % | DIASTOLIC BLOOD PRESSURE: 74 MMHG | RESPIRATION RATE: 18 BRPM | SYSTOLIC BLOOD PRESSURE: 121 MMHG | TEMPERATURE: 98 F

## 2025-02-20 DIAGNOSIS — K22.710 BARRETT'S ESOPHAGUS WITH LOW GRADE DYSPLASIA: ICD-10-CM

## 2025-02-20 PROCEDURE — 0D538ZZ DESTRUCTION OF LOWER ESOPHAGUS, VIA NATURAL OR ARTIFICIAL OPENING ENDOSCOPIC: ICD-10-PCS | Performed by: INTERNAL MEDICINE

## 2025-02-20 PROCEDURE — 88305 TISSUE EXAM BY PATHOLOGIST: CPT | Performed by: INTERNAL MEDICINE

## 2025-02-20 PROCEDURE — 88341 IMHCHEM/IMCYTCHM EA ADD ANTB: CPT | Performed by: INTERNAL MEDICINE

## 2025-02-20 PROCEDURE — 0DB78ZX EXCISION OF STOMACH, PYLORUS, VIA NATURAL OR ARTIFICIAL OPENING ENDOSCOPIC, DIAGNOSTIC: ICD-10-PCS | Performed by: INTERNAL MEDICINE

## 2025-02-20 PROCEDURE — 0D548ZZ DESTRUCTION OF ESOPHAGOGASTRIC JUNCTION, VIA NATURAL OR ARTIFICIAL OPENING ENDOSCOPIC: ICD-10-PCS | Performed by: INTERNAL MEDICINE

## 2025-02-20 PROCEDURE — 88342 IMHCHEM/IMCYTCHM 1ST ANTB: CPT | Performed by: INTERNAL MEDICINE

## 2025-02-20 RX ORDER — SODIUM CHLORIDE, SODIUM LACTATE, POTASSIUM CHLORIDE, CALCIUM CHLORIDE 600; 310; 30; 20 MG/100ML; MG/100ML; MG/100ML; MG/100ML
INJECTION, SOLUTION INTRAVENOUS CONTINUOUS
Status: DISCONTINUED | OUTPATIENT
Start: 2025-02-20 | End: 2025-02-20

## 2025-02-20 RX ORDER — NALOXONE HYDROCHLORIDE 0.4 MG/ML
0.08 INJECTION, SOLUTION INTRAMUSCULAR; INTRAVENOUS; SUBCUTANEOUS ONCE AS NEEDED
Status: DISCONTINUED | OUTPATIENT
Start: 2025-02-20 | End: 2025-02-20

## 2025-02-20 RX ORDER — LIDOCAINE HYDROCHLORIDE 10 MG/ML
INJECTION, SOLUTION EPIDURAL; INFILTRATION; INTRACAUDAL; PERINEURAL AS NEEDED
Status: DISCONTINUED | OUTPATIENT
Start: 2025-02-20 | End: 2025-02-20 | Stop reason: SURG

## 2025-02-20 RX ADMIN — LIDOCAINE HYDROCHLORIDE 50 MG: 10 INJECTION, SOLUTION EPIDURAL; INFILTRATION; INTRACAUDAL; PERINEURAL at 12:05:00

## 2025-02-20 NOTE — DISCHARGE INSTRUCTIONS

## 2025-02-20 NOTE — OPERATIVE REPORT
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, esophagitis, repeat biopsy of residual islands indefinite for dysplasia   POSTOPERTATIVE DIAGNOSIS: Same, healed esophagitis, two residual diminutive islands, post ablation scar, irregular z-line, hiatal hernia  PROCEDURE PERFORMED: EGD biopsy/ablation  TIME OUT WAS PERFORMED     SEDATION: MAC sedation provided by General Anesthesia     INFORMED CONSENT: Risks, benefits and alternatives to the procedure were explained to the patient including but not limited to bleeding, infection, perforation, adverse drug reactions, pancreatitis and the need for hospitalization and surgery if this occurs, the patient understands and agrees to procedure.  PROCEDURE DESCRIPTION: A regular upper endoscope was introduced into the patient’s mouth, hypo pharynx, esophagus, stomach and the first and second portion of the duodenum, retroflexion of the endoscope was performed in the stomach. Careful examination of the above described areas was performed on withdrawal of the endoscope. The patient tolerated the procedure well, there were no immediate complication immediately following the procedure, and the patient was transferred to recovery in stable condition.  FINDINGS:  ESOPHAGUS: Esophageal scar distal esophagus 2 cm in diameter post ablation, irregular z line  GEJ: 3 cm hiatal hernia, Hill grade 3  STOMACH: Normal  DUODENUM: Normal  THERAPEUTICS: Cold forceps biopsies were performed from antrum, Barrx ablation of distal esophagus and GEJ with Barrx 60 RFA focal catheter 15 mm x 10 mm, ablated tissue was scrapped off and repeat ablation was performed  RECOMMENDATIONS:   Post EGD precautions,  watch for bleeding, infection, perforation, adverse drug reactions   Follow biopsies  Pantoprazole to 40 mg PO BID  Repeat EDG in 6 months    Claudio Adams MD  2/20/2025  12:27 PM

## 2025-02-20 NOTE — H&P
[facility]  Pre-op H and P    Richard Palacios Patient Status:  [facility]ospital [facility]patient [facility]    1965 MRN [id number]   Location [facility] ENDOSCOPY PAIN CENTER Attending Claudio Adams MD   Date 2025 PCP Lg Sosa MD     CC: H/o cirrhosis post liver transplant, no varices , LSBE with low grade dysplasia post ablation, esophagitis, repeat biopsy of residual islands indefinite for dysplasia    History of Present Illness:  Richard Palacios is a a(n) 59 year old male. H/o cirrhosis post liver transplant, no varices , LSBE with low grade dysplasia post ablation, esophagitis, repeat biopsy of residual islands indefinite for dysplasia    History:  Past Medical History:    Abdominal pain    Ascites    Back problem    COVID    Disorder of liver    Cirrhosis - no longer existent since liver transplant surgery 23    Esophageal reflux    Essential hypertension    ETOH abuse    Gout    Hyperlipidemia    Hypoalbuminemia    Incisional hernia    Nausea    Personal history of alcoholism (HCC)    Thrombocytopenia    Weight loss     Past Surgical History:   Procedure Laterality Date    Colonoscopy N/A 2023    Procedure: .;  Surgeon: Gus Ocampo MD;  Location:  ENDOSCOPY    Colonoscopy N/A 2023    Procedure: COLONOSCOPY with cold snare polypectomy and clip placement x1;  Surgeon: Robin Raya DO;  Location:  ENDOSCOPY    Colonoscopy N/A 2024    Procedure: COLONOSCOPY;  Surgeon: Claudio Adams MD;  Location:  ENDOSCOPY    Organ transplant  2023    Liver transplant    Tonsillectomy       Family History   Problem Relation Age of Onset    Other (alcoholism) Father     Diabetes Mother         Type I    Other (CAD) Mother       reports that he has been smoking cigars. He has never been exposed to tobacco smoke. He has never used smokeless tobacco. He reports that he does not currently use alcohol. He reports that he does not use  drugs.    Allergies:  Allergies[1]    Medications:    Current Facility-Administered Medications:     lactated ringers infusion, , Intravenous, Continuous    Physical Exam:    Blood pressure 135/77, pulse 82, temperature 97.8 °F (36.6 °C), temperature source Temporal, resp. rate 20, height 5' 8\" (1.727 m), weight 185 lb (83.9 kg), SpO2 96%.    General: Appears alert, oriented x3 and in no acute distress.  CV: Normal rate   Lungs: Normal effort   Skin: Warm and dry.  Laboratory Data:       Imaging:      Assessment/Plan/Procedure:  Patient Active Problem List   Diagnosis    Gouty arthropathy, unspecified    Family history of colon cancer    Routine general medical examination at a health care facility    Sebaceous cyst    Steatohepatitis    Metabolic syndrome    Tobacco abuse    Gout    Tobacco abuse counseling    Gallstones    Cirrhosis of liver (HCC)    Coagulopathy (HCC)    Hypoalbuminemia    S/P liver transplant (HCC)    Fever, unspecified fever cause    Immunocompromised (HCC)    History of liver transplant (HCC)    Gastroesophageal reflux disease with esophagitis without hemorrhage    Lumbar spondylosis       H/o cirrhosis post liver transplant, no varices , LSBE with low grade dysplasia post ablation, esophagitis, repeat biopsy of residual islands indefinite for dysplasia    Plan;  EGD    Claudio Adams MD  2/20/2025  11:59 AM       [1] No Known Allergies

## 2025-02-20 NOTE — ANESTHESIA POSTPROCEDURE EVALUATION
[facility]    Richard Palacios Patient Status:  Hospital Outpatient Surgery   Age/Gender 59 year old male MRN [id number]   Location [facility] ENDOSCOPY PAIN CENTER Attending Claudio Adams MD   Hosp Day # 0 PCP Lg Sosa MD       Anesthesia Post-op Note    ESOPHAGOGASTRODUODENOSCOPY (EGD) WITH BIOPSIES AND RADIOFREQUENCY ABLATION    Procedure Summary       Date: 02/20/25 Room / Location:  ENDOSCOPY 02 /  ENDOSCOPY    Anesthesia Start: 1202 Anesthesia Stop:     Procedure: ESOPHAGOGASTRODUODENOSCOPY (EGD) WITH BIOPSIES AND RADIOFREQUENCY ABLATION Diagnosis:       Crow's esophagus with low grade dysplasia      (BARRETTS ESOPHAGUS, HIATAL HERNIA)    Surgeons: Claudio Adams MD Anesthesiologist: Toan Andino MD    Anesthesia Type: MAC ASA Status: 3            Anesthesia Type: MAC    Vitals Value Taken Time   /74 02/20/25 1219   Temp  02/20/25 1220   Pulse 78 02/20/25 1219   Resp 18 02/20/25 1219   SpO2 96 % 02/20/25 1219   Vitals shown include unfiled device data.        Patient Location: Endoscopy    Anesthesia Type: MAC    Airway Patency: patent    Postop Pain Control: adequate    Mental Status: mildly sedated but able to meaningfully participate in the post-anesthesia evaluation    Nausea/Vomiting: none    Cardiopulmonary/Hydration status: stable euvolemic    Complications: no apparent anesthesia related complications    Postop vital signs: stable    Dental Exam: Unchanged from Preop    Patient to be discharged home when criteria met.

## 2025-02-20 NOTE — ANESTHESIA PREPROCEDURE EVALUATION
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        Body mass index is 28.13 kg/m².    Current medications reviewed.  Hospital Medications:   lactated ringers infusion   Intravenous Continuous       Outpatient Medications:   Prescriptions Prior to Admission[1]    Allergies: Patient has no known allergies.      Anesthesia Evaluation    Patient summary reviewed.    Anesthetic Complications           GI/Hepatic/Renal      (+) GERD          (+) liver disease                 Cardiovascular      ECG reviewed.            (+) hypertension   (+) hyperlipidemia                                  Endo/Other                                  Pulmonary    Negative pulmonary ROS.                       Neuro/Psych    Negative neuro/psych ROS.                                  Past Surgical History:   Procedure Laterality Date    Colonoscopy N/A 11/20/2023    Procedure: .;  Surgeon: Gus Ocampo MD;  Location:  ENDOSCOPY    Colonoscopy N/A 11/27/2023    Procedure: COLONOSCOPY with cold snare polypectomy and clip placement x1;  Surgeon: Robin Raya DO;  Location:  ENDOSCOPY    Colonoscopy N/A 7/25/2024    Procedure: COLONOSCOPY;  Surgeon: Claudio Adams MD;  Location:  ENDOSCOPY    Organ transplant  12/19/2023    Liver transplant    Tonsillectomy       Social History     Socioeconomic History    Marital status:    Tobacco Use    Smoking status: Some Days     Types: Cigars     Passive exposure: Never    Smokeless tobacco: Never    Tobacco comments:     Still not smoking cigarettes since 2014. Do smoke a couple of cigars a week.   Vaping Use    Vaping status: Never Used    Substance and Sexual Activity    Alcohol use: Not Currently    Drug use: No   Other Topics Concern    Caffeine Concern Yes     Comment: 1 cup a day    Exercise No     History   Drug Use No     Available pre-op labs reviewed.  Lab Results   Component Value Date    WBC 8.9 01/24/2025    RBC 5.45 01/24/2025    HGB 16.3 01/24/2025    HCT 48.2 01/24/2025    MCV 88.4 01/24/2025    MCH 29.9 01/24/2025    MCHC 33.8 01/24/2025    RDW 12.3 01/24/2025    .0 01/24/2025     Lab Results   Component Value Date     01/24/2025    K 4.5 01/24/2025     01/24/2025    CO2 28.0 01/24/2025    BUN 14 01/24/2025    CREATSERUM 1.13 01/24/2025     (H) 01/24/2025    CA 10.4 01/24/2025            Airway      Mallampati: II  Mouth opening: >3 FB  TM distance: > 6 cm  Neck ROM: full Cardiovascular    Cardiovascular exam normal.  Rhythm: regular  Rate: normal     Dental    Dentition appears grossly intact         Pulmonary    Pulmonary exam normal.  Breath sounds clear to auscultation bilaterally.               Other findings              ASA: 3   Plan: MAC  NPO status verified and patient meets guidelines.    Post-procedure pain management plan discussed with surgeon and patient.      Plan/risks discussed with: patient                Present on Admission:  **None**             [1]   Medications Prior to Admission   Medication Sig Dispense Refill Last Dose/Taking    PANTOPRAZOLE 40 MG Oral Tab EC TAKE 1 TABLET(40 MG) BY MOUTH TWICE DAILY BEFORE MEALS 60 tablet 10 Taking    gabapentin 300 MG Oral Cap Take 1 capsule (300 mg total) by mouth nightly.   Taking    mycophenolate sodium 360 MG Oral Tab EC Take 2 tablets (720 mg total) by mouth 2 (two) times daily.   Taking    ENVARSUS XR 0.75 MG Oral Tablet 24 Hr 2 tablets daily.   Taking    URSODIOL 300 MG Oral Cap TAKE 1 CAPSULE(300 MG) BY MOUTH THREE TIMES DAILY 270 capsule 1 Taking    ALLOPURINOL 100 MG Oral Tab TAKE 1 TABLET(100 MG) BY MOUTH DAILY 90 tablet 3 Taking     Sildenafil Citrate 100 MG Oral Tab Take 0.5-1 tablets ( mg total) by mouth daily as needed for Erectile Dysfunction. 45 tablet 3 Taking As Needed    rosuvastatin 10 MG Oral Tab Take 1 tablet (10 mg total) by mouth daily.   Taking    traMADol 50 MG Oral Tab Take 1 tablet (50 mg total) by mouth every 6 (six) hours as needed for Pain.   Taking As Needed    acetaminophen 500 MG Oral Tab Take 1 tablet (500 mg total) by mouth every 6 (six) hours as needed for Pain.   Taking As Needed    TRAZODONE 100 MG Oral Tab TAKE 1 TABLET(100 MG) BY MOUTH EVERY NIGHT 90 tablet 1 Taking    LOKELMA 10 g Oral Powd Pack Take 1 packet (10 g total) by mouth daily. 90 packet 1 Taking    Magnesium Oxide -Mg Supplement 400 (240 Mg) MG Oral Tab Take 1 tablet (400 mg total) by mouth daily. 30 tablet 1 Taking    valGANciclovir 450 MG Oral Tab Take 2 tablets (900 mg total) by mouth daily.   Taking    aspirin 81 MG Oral Tab EC Take 1 tablet (81 mg total) by mouth daily.   Taking

## 2025-02-24 ENCOUNTER — LAB ENCOUNTER (OUTPATIENT)
Dept: LAB | Age: 60
End: 2025-02-24
Attending: INTERNAL MEDICINE
Payer: COMMERCIAL

## 2025-02-24 DIAGNOSIS — Z51.81 ENCOUNTER FOR THERAPEUTIC DRUG MONITORING: ICD-10-CM

## 2025-02-24 DIAGNOSIS — D84.9 IMMUNOSUPPRESSION (HCC): ICD-10-CM

## 2025-02-24 DIAGNOSIS — Z94.4 LIVER REPLACED BY TRANSPLANT (HCC): ICD-10-CM

## 2025-02-24 LAB
ALBUMIN SERPL-MCNC: 4.3 G/DL (ref 3.2–4.8)
ALBUMIN/GLOB SERPL: 1.5 {RATIO} (ref 1–2)
ALP LIVER SERPL-CCNC: 138 U/L
ALT SERPL-CCNC: 111 U/L
ANION GAP SERPL CALC-SCNC: 7 MMOL/L (ref 0–18)
AST SERPL-CCNC: 51 U/L (ref ?–34)
BASOPHILS # BLD AUTO: 0.06 X10(3) UL (ref 0–0.2)
BASOPHILS NFR BLD AUTO: 0.8 %
BILIRUB SERPL-MCNC: 0.9 MG/DL (ref 0.3–1.2)
BUN BLD-MCNC: 13 MG/DL (ref 9–23)
CALCIUM BLD-MCNC: 9.7 MG/DL (ref 8.7–10.6)
CHLORIDE SERPL-SCNC: 103 MMOL/L (ref 98–112)
CO2 SERPL-SCNC: 28 MMOL/L (ref 21–32)
CREAT BLD-MCNC: 1.07 MG/DL
EGFRCR SERPLBLD CKD-EPI 2021: 80 ML/MIN/1.73M2 (ref 60–?)
EOSINOPHIL # BLD AUTO: 0.06 X10(3) UL (ref 0–0.7)
EOSINOPHIL NFR BLD AUTO: 0.8 %
ERYTHROCYTE [DISTWIDTH] IN BLOOD BY AUTOMATED COUNT: 13.9 %
FASTING STATUS PATIENT QL REPORTED: YES
GLOBULIN PLAS-MCNC: 2.8 G/DL (ref 2–3.5)
GLUCOSE BLD-MCNC: 108 MG/DL (ref 70–99)
HCT VFR BLD AUTO: 42.4 %
HGB BLD-MCNC: 14.2 G/DL
IMM GRANULOCYTES # BLD AUTO: 0.02 X10(3) UL (ref 0–1)
IMM GRANULOCYTES NFR BLD: 0.3 %
LYMPHOCYTES # BLD AUTO: 4.04 X10(3) UL (ref 1–4)
LYMPHOCYTES NFR BLD AUTO: 53.9 %
MAGNESIUM SERPL-MCNC: 2 MG/DL (ref 1.6–2.6)
MCH RBC QN AUTO: 28.8 PG (ref 26–34)
MCHC RBC AUTO-ENTMCNC: 33.5 G/DL (ref 31–37)
MCV RBC AUTO: 86 FL
MONOCYTES # BLD AUTO: 0.62 X10(3) UL (ref 0.1–1)
MONOCYTES NFR BLD AUTO: 8.3 %
MORPHOLOGY: NORMAL
NEUTROPHILS # BLD AUTO: 2.7 X10 (3) UL (ref 1.5–7.7)
NEUTROPHILS # BLD AUTO: 2.7 X10(3) UL (ref 1.5–7.7)
NEUTROPHILS NFR BLD AUTO: 35.9 %
OSMOLALITY SERPL CALC.SUM OF ELEC: 287 MOSM/KG (ref 275–295)
PLATELET # BLD AUTO: 167 10(3)UL (ref 150–450)
PLATELET MORPHOLOGY: NORMAL
POTASSIUM SERPL-SCNC: 4.6 MMOL/L (ref 3.5–5.1)
PROT SERPL-MCNC: 7.1 G/DL (ref 5.7–8.2)
RBC # BLD AUTO: 4.93 X10(6)UL
SODIUM SERPL-SCNC: 138 MMOL/L (ref 136–145)
WBC # BLD AUTO: 7.5 X10(3) UL (ref 4–11)

## 2025-02-24 PROCEDURE — 85025 COMPLETE CBC W/AUTO DIFF WBC: CPT

## 2025-02-24 PROCEDURE — 36415 COLL VENOUS BLD VENIPUNCTURE: CPT

## 2025-02-24 PROCEDURE — 80197 ASSAY OF TACROLIMUS: CPT

## 2025-02-24 PROCEDURE — 83735 ASSAY OF MAGNESIUM: CPT

## 2025-02-24 PROCEDURE — 80053 COMPREHEN METABOLIC PANEL: CPT

## 2025-02-25 DIAGNOSIS — R19.7 DIARRHEA OF PRESUMED INFECTIOUS ORIGIN: Primary | ICD-10-CM

## 2025-02-25 DIAGNOSIS — Z79.899 IMMUNOSUPPRESSION DUE TO DRUG THERAPY (HCC): ICD-10-CM

## 2025-02-25 DIAGNOSIS — D84.821 IMMUNOSUPPRESSION DUE TO DRUG THERAPY (HCC): ICD-10-CM

## 2025-02-25 DIAGNOSIS — R50.9 FEVER, UNSPECIFIED FEVER CAUSE: ICD-10-CM

## 2025-02-26 LAB — TACROLIMUS LVL: 6.1 NG/ML

## 2025-02-27 DIAGNOSIS — Z94.4 LIVER REPLACED BY TRANSPLANT (HCC): ICD-10-CM

## 2025-02-27 DIAGNOSIS — B25.9 CYTOMEGALOVIRUS (CMV) VIREMIA (HCC): Primary | ICD-10-CM

## 2025-03-03 ENCOUNTER — LAB ENCOUNTER (OUTPATIENT)
Dept: LAB | Age: 60
End: 2025-03-03
Attending: INTERNAL MEDICINE
Payer: COMMERCIAL

## 2025-03-03 DIAGNOSIS — B25.9 CYTOMEGALOVIRUS (CMV) VIREMIA (HCC): ICD-10-CM

## 2025-03-03 DIAGNOSIS — Z94.4 LIVER REPLACED BY TRANSPLANT (HCC): ICD-10-CM

## 2025-03-03 LAB
ALBUMIN SERPL-MCNC: 4.2 G/DL (ref 3.2–4.8)
ALBUMIN/GLOB SERPL: 1.4 {RATIO} (ref 1–2)
ALP LIVER SERPL-CCNC: 116 U/L
ALT SERPL-CCNC: 60 U/L
ANION GAP SERPL CALC-SCNC: 6 MMOL/L (ref 0–18)
AST SERPL-CCNC: 38 U/L (ref ?–34)
BASOPHILS # BLD AUTO: 0.05 X10(3) UL (ref 0–0.2)
BASOPHILS NFR BLD AUTO: 0.7 %
BILIRUB SERPL-MCNC: 0.8 MG/DL (ref 0.3–1.2)
BUN BLD-MCNC: 8 MG/DL (ref 9–23)
CALCIUM BLD-MCNC: 9.8 MG/DL (ref 8.7–10.6)
CHLORIDE SERPL-SCNC: 104 MMOL/L (ref 98–112)
CO2 SERPL-SCNC: 29 MMOL/L (ref 21–32)
CREAT BLD-MCNC: 1.12 MG/DL
EGFRCR SERPLBLD CKD-EPI 2021: 76 ML/MIN/1.73M2 (ref 60–?)
EOSINOPHIL # BLD AUTO: 0.07 X10(3) UL (ref 0–0.7)
EOSINOPHIL NFR BLD AUTO: 1 %
ERYTHROCYTE [DISTWIDTH] IN BLOOD BY AUTOMATED COUNT: 14.7 %
FASTING STATUS PATIENT QL REPORTED: YES
GLOBULIN PLAS-MCNC: 2.9 G/DL (ref 2–3.5)
GLUCOSE BLD-MCNC: 104 MG/DL (ref 70–99)
HCT VFR BLD AUTO: 42.6 %
HGB BLD-MCNC: 14.2 G/DL
IMM GRANULOCYTES # BLD AUTO: 0.02 X10(3) UL (ref 0–1)
IMM GRANULOCYTES NFR BLD: 0.3 %
LYMPHOCYTES # BLD AUTO: 4.36 X10(3) UL (ref 1–4)
LYMPHOCYTES NFR BLD AUTO: 59.5 %
MAGNESIUM SERPL-MCNC: 2 MG/DL (ref 1.6–2.6)
MCH RBC QN AUTO: 29.3 PG (ref 26–34)
MCHC RBC AUTO-ENTMCNC: 33.3 G/DL (ref 31–37)
MCV RBC AUTO: 87.8 FL
MONOCYTES # BLD AUTO: 0.42 X10(3) UL (ref 0.1–1)
MONOCYTES NFR BLD AUTO: 5.7 %
NEUTROPHILS # BLD AUTO: 2.41 X10 (3) UL (ref 1.5–7.7)
NEUTROPHILS # BLD AUTO: 2.41 X10(3) UL (ref 1.5–7.7)
NEUTROPHILS NFR BLD AUTO: 32.8 %
OSMOLALITY SERPL CALC.SUM OF ELEC: 287 MOSM/KG (ref 275–295)
PLATELET # BLD AUTO: 151 10(3)UL (ref 150–450)
POTASSIUM SERPL-SCNC: 5.5 MMOL/L (ref 3.5–5.1)
PROT SERPL-MCNC: 7.1 G/DL (ref 5.7–8.2)
RBC # BLD AUTO: 4.85 X10(6)UL
SODIUM SERPL-SCNC: 139 MMOL/L (ref 136–145)
WBC # BLD AUTO: 7.3 X10(3) UL (ref 4–11)

## 2025-03-03 PROCEDURE — 80197 ASSAY OF TACROLIMUS: CPT

## 2025-03-03 PROCEDURE — 80053 COMPREHEN METABOLIC PANEL: CPT

## 2025-03-03 PROCEDURE — 85025 COMPLETE CBC W/AUTO DIFF WBC: CPT

## 2025-03-03 PROCEDURE — 83735 ASSAY OF MAGNESIUM: CPT

## 2025-03-03 PROCEDURE — 36415 COLL VENOUS BLD VENIPUNCTURE: CPT

## 2025-03-05 LAB
CMV DNA DETECT, QN LOG: 3 {LOG_IU}/ML
CMV DNA DETECT, QN: 986 [IU]/ML

## 2025-03-07 LAB — TACROLIMUS LVL: 4.5 NG/ML

## 2025-03-10 ENCOUNTER — LAB ENCOUNTER (OUTPATIENT)
Dept: LAB | Age: 60
End: 2025-03-10
Attending: INTERNAL MEDICINE
Payer: COMMERCIAL

## 2025-03-10 DIAGNOSIS — Z94.4 LIVER REPLACED BY TRANSPLANT (HCC): ICD-10-CM

## 2025-03-10 DIAGNOSIS — B25.9 CYTOMEGALOVIRUS (CMV) VIREMIA (HCC): ICD-10-CM

## 2025-03-10 LAB
ALBUMIN SERPL-MCNC: 4.4 G/DL (ref 3.2–4.8)
ALBUMIN/GLOB SERPL: 1.4 {RATIO} (ref 1–2)
ALP LIVER SERPL-CCNC: 110 U/L
ALT SERPL-CCNC: 38 U/L
ANION GAP SERPL CALC-SCNC: 5 MMOL/L (ref 0–18)
AST SERPL-CCNC: 30 U/L (ref ?–34)
BASOPHILS # BLD AUTO: 0.04 X10(3) UL (ref 0–0.2)
BASOPHILS NFR BLD AUTO: 0.7 %
BILIRUB SERPL-MCNC: 0.6 MG/DL (ref 0.3–1.2)
BUN BLD-MCNC: 8 MG/DL (ref 9–23)
CALCIUM BLD-MCNC: 9.7 MG/DL (ref 8.7–10.6)
CHLORIDE SERPL-SCNC: 104 MMOL/L (ref 98–112)
CO2 SERPL-SCNC: 29 MMOL/L (ref 21–32)
CREAT BLD-MCNC: 1.26 MG/DL
EGFRCR SERPLBLD CKD-EPI 2021: 66 ML/MIN/1.73M2 (ref 60–?)
EOSINOPHIL # BLD AUTO: 0.1 X10(3) UL (ref 0–0.7)
EOSINOPHIL NFR BLD AUTO: 1.9 %
ERYTHROCYTE [DISTWIDTH] IN BLOOD BY AUTOMATED COUNT: 15.6 %
FASTING STATUS PATIENT QL REPORTED: YES
GLOBULIN PLAS-MCNC: 3.1 G/DL (ref 2–3.5)
GLUCOSE BLD-MCNC: 108 MG/DL (ref 70–99)
HCT VFR BLD AUTO: 42.7 %
HGB BLD-MCNC: 14.4 G/DL
IMM GRANULOCYTES # BLD AUTO: 0.02 X10(3) UL (ref 0–1)
IMM GRANULOCYTES NFR BLD: 0.4 %
LYMPHOCYTES # BLD AUTO: 2.63 X10(3) UL (ref 1–4)
LYMPHOCYTES NFR BLD AUTO: 49.3 %
MAGNESIUM SERPL-MCNC: 2.1 MG/DL (ref 1.6–2.6)
MCH RBC QN AUTO: 29.9 PG (ref 26–34)
MCHC RBC AUTO-ENTMCNC: 33.7 G/DL (ref 31–37)
MCV RBC AUTO: 88.8 FL
MONOCYTES # BLD AUTO: 0.1 X10(3) UL (ref 0.1–1)
MONOCYTES NFR BLD AUTO: 1.9 %
NEUTROPHILS # BLD AUTO: 2.45 X10 (3) UL (ref 1.5–7.7)
NEUTROPHILS # BLD AUTO: 2.45 X10(3) UL (ref 1.5–7.7)
NEUTROPHILS NFR BLD AUTO: 45.8 %
OSMOLALITY SERPL CALC.SUM OF ELEC: 285 MOSM/KG (ref 275–295)
PLATELET # BLD AUTO: 161 10(3)UL (ref 150–450)
POTASSIUM SERPL-SCNC: 5.7 MMOL/L (ref 3.5–5.1)
PROT SERPL-MCNC: 7.5 G/DL (ref 5.7–8.2)
RBC # BLD AUTO: 4.81 X10(6)UL
SODIUM SERPL-SCNC: 138 MMOL/L (ref 136–145)
WBC # BLD AUTO: 5.3 X10(3) UL (ref 4–11)

## 2025-03-10 PROCEDURE — 80053 COMPREHEN METABOLIC PANEL: CPT

## 2025-03-10 PROCEDURE — 85025 COMPLETE CBC W/AUTO DIFF WBC: CPT

## 2025-03-10 PROCEDURE — 83735 ASSAY OF MAGNESIUM: CPT

## 2025-03-10 PROCEDURE — 80197 ASSAY OF TACROLIMUS: CPT

## 2025-03-10 PROCEDURE — 36415 COLL VENOUS BLD VENIPUNCTURE: CPT

## 2025-03-11 ENCOUNTER — OFFICE VISIT (OUTPATIENT)
Dept: SURGERY | Facility: CLINIC | Age: 60
End: 2025-03-11
Payer: COMMERCIAL

## 2025-03-11 VITALS
HEIGHT: 68 IN | SYSTOLIC BLOOD PRESSURE: 122 MMHG | BODY MASS INDEX: 28.04 KG/M2 | HEART RATE: 96 BPM | DIASTOLIC BLOOD PRESSURE: 72 MMHG | WEIGHT: 185 LBS

## 2025-03-11 DIAGNOSIS — D18.09 VERTEBRAL BODY HEMANGIOMA: Primary | ICD-10-CM

## 2025-03-11 PROCEDURE — 99213 OFFICE O/P EST LOW 20 MIN: CPT | Performed by: NEUROLOGICAL SURGERY

## 2025-03-11 NOTE — PROGRESS NOTES
Established patient:  Reason for follow up: lower back pain     Numeric Rating Scale:      Pain at Present:  7/10       Distribution of Pain:     bilateral sciatica. States he has pain on the left side     Most recent treatments for Current Pain Condition:     States he had the medial branch blocks.  90%  1st lasted 6 days and the 2nd lasted 4 days     Response to treatment: some relief

## 2025-03-11 NOTE — PROGRESS NOTES
Neurosurgery Clinic Visit  3/11/2025    Richard Palacios PCP:  Lg Sosa MD    1965 MRN [id number]     HISTORY OF PRESENT ILLNESS:  Richard Palacios is a(n) 59 year old male who presents today in office follow-up.  He was last seen on .    He underwent ventral hernia repair.  He feels better following this.  He thinks he might be developing a new ventral hernia.    Medial branch block was done.  He reports very good relief, but this was transient.  His back pain has now returned.      PHYSICAL EXAMINATION:  Vital Signs:  /72 (BP Location: Right arm, Patient Position: Sitting, Cuff Size: adult)   Pulse 96   Ht 68\"   Wt 185 lb (83.9 kg)   BMI 28.13 kg/m²   On examination, strength is 5/5 in the lower extremities.  Reflexes are 1+ and symmetric.      REVIEW OF STUDIES:    No new imaging      ASSESSMENT and PLAN:  1.  L1 vertebral body hemangioma.  Follow-up in 3 or 4 months with a new MRI of the lumbar spine, with and without contrast.    2.  Back pain.  Agree with the RFA as the next step.  He will follow-up with Dr. Hendrickson for this.    If he does not get durable relief from this, there may be role for further investigation, for possible positional instability.        Time spent on counseling/coordination of care:  15 Minutes    Total time spent with patient:  15 Minutes        3/11/2025  9:16 AM     This report was dictated using voice recognition technology.  Errors in syntax or recognition may occur, and should not be construed to change the meaning of a sentence.

## 2025-03-11 NOTE — PATIENT INSTRUCTIONS
Refill policies:    Allow 2-3 business days for refills; controlled substances may take longer.  Contact your pharmacy at least 5 days prior to running out of medication and have them send an electronic request or submit request through the “request refill” option in your [facility] account.  Refills are not addressed on weekends; covering physicians do not authorize routine medications on weekends.  No narcotics or controlled substances are refilled after noon on Fridays or by on call physicians.  By law, narcotics must be electronically prescribed.  A 30 day supply with no refills is the maximum allowed.  If your prescription is due for a refill, you may be due for a follow up appointment.  To best provide you care, patients receiving routine medications need to be seen at least once a year.  Patients receiving narcotic/controlled substance medications need to be seen at least once every 3 months.  In the event that your preferred pharmacy does not have the requested medication in stock (e.g. Backordered), it is your responsibility to find another pharmacy that has the requested medication available.  We will gladly send a new prescription to that pharmacy at your request.    Scheduling Tests:    If your physician has ordered radiology tests such as MRI or CT scans, please contact Central Scheduling at [phone] right away to schedule the test.  Once scheduled, the [facility] Centralized Referral Team will work with your insurance carrier to obtain pre-certification or prior authorization.  Depending on your insurance carrier, approval may take 3-10 days.  It is highly recommended patients assure they have received an authorization before having a test performed.  If test is done without insurance authorization, patient may be responsible for the entire amount billed.      Precertification and Prior Authorizations:  If your physician has recommended that you have a procedure or additional testing performed the [facility]  Centralized Referral Team will contact your insurance carrier to obtain pre-certification or prior authorization.    You are strongly encouraged to contact your insurance carrier to verify that your procedure/test has been approved and is a COVERED benefit.  Although the [facility] Centralized Referral Team does its due diligence, the insurance carrier gives the disclaimer that \"Although the procedure is authorized, this does not guarantee payment.\"    Ultimately the patient is responsible for payment.   Thank you for your understanding in this matter.  Paperwork Completion:  If you require FMLA or disability paperwork for your recovery, please make sure to either drop it off or have it faxed to our office at [phone]. Be sure the form has your name and date of birth on it.  The form will be faxed to our Forms Department and they will complete it for you.  There is a 25$ fee for all forms that need to be filled out.  Please be aware there is a 10-14 day turnaround time.  You will need to sign a release of information (VITA) form if your paperwork does not come with one.  You may call the Forms Department with any questions at [phone].  Their fax number is [phone].

## 2025-03-12 ENCOUNTER — OFFICE VISIT (OUTPATIENT)
Dept: PAIN CLINIC | Facility: CLINIC | Age: 60
End: 2025-03-12
Payer: COMMERCIAL

## 2025-03-12 VITALS — OXYGEN SATURATION: 100 % | HEART RATE: 76 BPM | SYSTOLIC BLOOD PRESSURE: 122 MMHG | DIASTOLIC BLOOD PRESSURE: 84 MMHG

## 2025-03-12 DIAGNOSIS — M47.816 LUMBAR SPONDYLOSIS: Primary | ICD-10-CM

## 2025-03-12 LAB
CMV DNA DETECT, QN LOG: NOT DETECTED {LOG_IU}/ML
CMV DNA DETECT, QN: NOT DETECTED [IU]/ML
TACROLIMUS LVL: 6.1 NG/ML

## 2025-03-12 PROCEDURE — 99214 OFFICE O/P EST MOD 30 MIN: CPT | Performed by: PHYSICIAN ASSISTANT

## 2025-03-12 NOTE — PROGRESS NOTES
Last procedure: left L4/5 and L5/S1 MEDIAL BRANCH BLOCK with local   Date: 2/18/25    Percentage of relief obtained: 90%  Duration of relief: 6 days and 4 days    Current Pain Score: 7/10    Narcotic Contract Exp: N/A    Wants to discuss RFA

## 2025-03-12 NOTE — PATIENT INSTRUCTIONS
Refill policies:    Allow 2-3 business days for refills; controlled substances may take longer.  Contact your pharmacy at least 5 days prior to running out of medication and have them send an electronic request or submit request through the “request refill” option in your [employer] account.  Refills are not addressed on weekends; covering physicians do not authorize routine medications on weekends.  No narcotics or controlled substances are refilled after noon on Fridays or by on call physicians.  By law, narcotics must be electronically prescribed.  A 30 day supply with no refills is the maximum allowed.  If your prescription is due for a refill, you may be due for a follow up appointment.  To best provide you care, patients receiving routine medications need to be seen at least once a year.  Patients receiving narcotic/controlled substance medications need to be seen at least once every 3 months.  In the event that your preferred pharmacy does not have the requested medication in stock (e.g. Backordered), it is your responsibility to find another pharmacy that has the requested medication available.  We will gladly send a new prescription to that pharmacy at your request.    Scheduling Tests:    If your physician has ordered radiology tests such as MRI or CT scans, please contact Central Scheduling at [phone] right away to schedule the test.  Once scheduled, the [facility] Centralized Referral Team will work with your insurance carrier to obtain pre-certification or prior authorization.  Depending on your insurance carrier, approval may take 3-10 days.  It is highly recommended patients assure they have received an authorization before having a test performed.  If test is done without insurance authorization, patient may be responsible for the entire amount billed.      Precertification and Prior Authorizations:  If your physician has recommended that you have a procedure or additional testing performed the [facility]  Centralized Referral Team will contact your insurance carrier to obtain pre-certification or prior authorization.    You are strongly encouraged to contact your insurance carrier to verify that your procedure/test has been approved and is a COVERED benefit.  Although the [facility] Centralized Referral Team does its due diligence, the insurance carrier gives the disclaimer that \"Although the procedure is authorized, this does not guarantee payment.\"    Ultimately the patient is responsible for payment.   Thank you for your understanding in this matter.  Paperwork Completion:  If you require FMLA or disability paperwork for your recovery, please make sure to either drop it off or have it faxed to our office at [phone]. Be sure the form has your name and date of birth on it.  The form will be faxed to our Forms Department and they will complete it for you.  There is a 25$ fee for all forms that need to be filled out.  Please be aware there is a 10-14 day turnaround time.  You will need to sign a release of information (VITA) form if your paperwork does not come with one.  You may call the Forms Department with any questions at [phone].  Their fax number is [phone].

## 2025-03-12 NOTE — PROGRESS NOTES
HPI:   Richard Palacios presents with complaints of left low back pain.    The pain is described as severe aching, stabbing that is intermittent.  The patient’s activity level has remained the same since last visit.  The pain is worst unrelated to time of day.    Changes in condition/history since last visit: Patient is here today for follow-up having had left L4-5 and L5-S1 MB NB #2 on 2/18/2025.  This was well-tolerated and had no adverse effects.  Overall, reports 90% improvement in pain, and was much better able to tolerate his day-to-day activities.  This has since begun to wane, and has followed with his neurosurgery.  Encouraged to proceed with the previously discussed RFA..      Last procedure: Left L4-5 and L5-S1 MBNB #2    date: 2/18/2025 (1/23/2025)    Percentage of relief experienced from the procedure: 90%    Duration of the relief: 4 days    The following activities will increase the patient’s pain: walking, standing    The following activities decrease the patient’s pain: limiting activity level    Functional Assessment: Patient reports that they are able to complete all of their ADL's such as eating, bathing, using the toilet, dressing and getting up from a bed or a chair independently.    Current Medications:  Current Outpatient Medications   Medication Sig Dispense Refill    PANTOPRAZOLE 40 MG Oral Tab EC TAKE 1 TABLET(40 MG) BY MOUTH TWICE DAILY BEFORE MEALS 60 tablet 10    gabapentin 300 MG Oral Cap Take 1 capsule (300 mg total) by mouth nightly.      mycophenolate sodium 360 MG Oral Tab EC Take 2 tablets (720 mg total) by mouth 2 (two) times daily.      ENVARSUS XR 0.75 MG Oral Tablet 24 Hr 2 tablets daily.      URSODIOL 300 MG Oral Cap TAKE 1 CAPSULE(300 MG) BY MOUTH THREE TIMES DAILY 270 capsule 1    ALLOPURINOL 100 MG Oral Tab TAKE 1 TABLET(100 MG) BY MOUTH DAILY 90 tablet 3    Sildenafil Citrate 100 MG Oral Tab Take 0.5-1 tablets ( mg total) by mouth daily as needed for Erectile  Dysfunction. 45 tablet 3    rosuvastatin 10 MG Oral Tab Take 1 tablet (10 mg total) by mouth daily.      traMADol 50 MG Oral Tab Take 1 tablet (50 mg total) by mouth every 6 (six) hours as needed for Pain.      acetaminophen 500 MG Oral Tab Take 1 tablet (500 mg total) by mouth every 6 (six) hours as needed for Pain.      TRAZODONE 100 MG Oral Tab TAKE 1 TABLET(100 MG) BY MOUTH EVERY NIGHT 90 tablet 1    LOKELMA 10 g Oral Powd Pack Take 1 packet (10 g total) by mouth daily. 90 packet 1    Magnesium Oxide -Mg Supplement 400 (240 Mg) MG Oral Tab Take 1 tablet (400 mg total) by mouth daily. 30 tablet 1    valGANciclovir 450 MG Oral Tab Take 2 tablets (900 mg total) by mouth daily.      aspirin 81 MG Oral Tab EC Take 1 tablet (81 mg total) by mouth daily.        Patient requires assistance with: No assistance required    Reviewed Patient History Dated: 2/18/25 no changes noted    Physical Exam:   /84 (BP Location: Right arm, Patient Position: Sitting, Cuff Size: large)   Pulse 76   SpO2 100%   VAS Pain Score:  6/10  General Appearance: Well developed, well nourished, normal build, independent body habitus, no apparent physical disabilities, well groomed    Neurological Exam: WNL-Orientation to time, place and person, normal mood & effect, normal concentration & attention span  Inspection: non-antalgic, no acute distress   Radiology/Lab Test Reviewed: MRI L spine 6/11/24:     LUMBAR FINDINGS:   Patient motion noted.      Stable holovertebral hemangioma at the L1 level resulting in minimal cortical expansion along the dorsal cortex of the vertebral body.  No pathologic fracture identified.  Stable small hemangioma also noted in the L2 vertebral body.  The suspected subcentimeter hemangioma in the dorsal aspect of the L5 vertebral body is also again noted, however limited in evaluation due to patient motion, but appearing grossly unchanged.      There is normal lumbar lordosis with anatomic alignment.  Vertebral  body heights are well-maintained.  Mild degenerative disc space loss, disc desiccation, endplate spurring, and degenerative endplate marrow signal changes scattered in the lumbar spine.  No suspicious enhancement.  Scattered degenerative enhancement noted, particularly in the mid to lower lumbar spine along the facet joints and interspinous regions.      The distal spinal cord and conus medullaris have a normal signal and morphology.  The conus medullaris terminates at the mid L1 level.  The roots of the cauda equina are unremarkable.  No focal mass or fluid collection is seen in the lumbar spinal canal.   The paraspinal soft tissues are unremarkable.      T12-L1: There is no significant abnormality.      L1-2:  Minimal diffuse disc bulge with mild facet arthropathy. There is no significant spinal canal or neural foraminal stenosis. No significant interval change.      L2-3:  Mild diffuse disc bulge with mild facet arthropathy. There is no significant spinal canal stenosis.  There is mild left neural foraminal stenosis. No significant interval change.      L3-4:  Diffuse disc bulge with mild facet arthropathy.  There is no significant spinal canal stenosis.  There is mild right neural foraminal stenosis. No significant interval change.      L4-5:  Diffuse disc bulge with a small superimposed broad-based central disc protrusion.  Mild to moderate facet arthropathy and thickening of ligamentum flavum.  No significant spinal canal stenosis.  Mild bilateral neural foraminal stenosis. No significant interval change.      L5-S1:  Minimal diffuse disc bulge with mild facet arthropathy. There is no significant spinal canal or neural foraminal stenosis. No significant interval change.          Lab Results   Component Value Date    WBC 5.3 03/10/2025    WBC 7.3 03/03/2025    WBC 7.5 02/24/2025   No results found for: \"HEMOGLOBIN\"  Lab Results   Component Value Date    .0 03/10/2025    .0 03/03/2025    .0  02/24/2025     Do you have any known blood/bleeding disorders?  No  Does patient currently take blood thinners?   None  Does patient currently take any antibiotics?   No  Patient educated and verbalized understanding.  Medical Decision Making:   Diagnosis:    Encounter Diagnosis   Name Primary?    Lumbar spondylosis Yes       Impression: 90% relief with left L4/5 and L5/S1 MB NB on 1/23/2025, with similar response to repeat procedure on 2/18/2025.  After each procedure he was better able to tolerate his day-to-day activities and was very pleased with his response.  Unfortunately, procedure did proved to be only diagnostic, and pain has since returned to baseline.  He did discuss options with his neurosurgeon, and was encouraged to proceed with the previously offered radiofrequency ablation.  Will proceed at his earliest convenience pending insurance approval.    Plan: Patient to schedule the following injection: Left lumbar RFA Levels: L4-5 and L5-S1, Procedure and risks were discussed with pt. including headache, bleeding, infection and potential nerve damage.  Follow-up 4-6 weeks.      No orders of the defined types were placed in this encounter.      Meds & Refills for this Visit:  Requested Prescriptions      No prescriptions requested or ordered in this encounter       Imaging & Consults:  None    The patient indicates understanding of these issues and agrees to the plan.    PATRICK Mcbride

## 2025-03-17 ENCOUNTER — LAB ENCOUNTER (OUTPATIENT)
Dept: LAB | Age: 60
End: 2025-03-17
Attending: INTERNAL MEDICINE
Payer: COMMERCIAL

## 2025-03-17 ENCOUNTER — APPOINTMENT (OUTPATIENT)
Dept: SURGERY | Facility: CLINIC | Age: 60
End: 2025-03-17

## 2025-03-17 DIAGNOSIS — B25.9 CYTOMEGALOVIRUS (CMV) VIREMIA (HCC): ICD-10-CM

## 2025-03-17 DIAGNOSIS — Z94.4 LIVER REPLACED BY TRANSPLANT (HCC): ICD-10-CM

## 2025-03-17 LAB
ALBUMIN SERPL-MCNC: 4.6 G/DL (ref 3.2–4.8)
ALBUMIN/GLOB SERPL: 1.4 {RATIO} (ref 1–2)
ALP LIVER SERPL-CCNC: 104 U/L
ALT SERPL-CCNC: 25 U/L
ANION GAP SERPL CALC-SCNC: 8 MMOL/L (ref 0–18)
AST SERPL-CCNC: 23 U/L (ref ?–34)
BASOPHILS # BLD AUTO: 0.05 X10(3) UL (ref 0–0.2)
BASOPHILS NFR BLD AUTO: 0.8 %
BILIRUB SERPL-MCNC: 0.6 MG/DL (ref 0.3–1.2)
BUN BLD-MCNC: 16 MG/DL (ref 9–23)
CALCIUM BLD-MCNC: 9.9 MG/DL (ref 8.7–10.6)
CHLORIDE SERPL-SCNC: 105 MMOL/L (ref 98–112)
CO2 SERPL-SCNC: 29 MMOL/L (ref 21–32)
CREAT BLD-MCNC: 1.34 MG/DL
EGFRCR SERPLBLD CKD-EPI 2021: 61 ML/MIN/1.73M2 (ref 60–?)
EOSINOPHIL # BLD AUTO: 0.11 X10(3) UL (ref 0–0.7)
EOSINOPHIL NFR BLD AUTO: 1.7 %
ERYTHROCYTE [DISTWIDTH] IN BLOOD BY AUTOMATED COUNT: 16 %
FASTING STATUS PATIENT QL REPORTED: YES
GLOBULIN PLAS-MCNC: 3.2 G/DL (ref 2–3.5)
GLUCOSE BLD-MCNC: 116 MG/DL (ref 70–99)
HCT VFR BLD AUTO: 43.6 %
HGB BLD-MCNC: 14.7 G/DL
IMM GRANULOCYTES # BLD AUTO: 0.01 X10(3) UL (ref 0–1)
IMM GRANULOCYTES NFR BLD: 0.2 %
LYMPHOCYTES # BLD AUTO: 2.57 X10(3) UL (ref 1–4)
LYMPHOCYTES NFR BLD AUTO: 40.5 %
MAGNESIUM SERPL-MCNC: 2 MG/DL (ref 1.6–2.6)
MCH RBC QN AUTO: 30.1 PG (ref 26–34)
MCHC RBC AUTO-ENTMCNC: 33.7 G/DL (ref 31–37)
MCV RBC AUTO: 89.2 FL
MONOCYTES # BLD AUTO: 0.1 X10(3) UL (ref 0.1–1)
MONOCYTES NFR BLD AUTO: 1.6 %
NEUTROPHILS # BLD AUTO: 3.5 X10 (3) UL (ref 1.5–7.7)
NEUTROPHILS # BLD AUTO: 3.5 X10(3) UL (ref 1.5–7.7)
NEUTROPHILS NFR BLD AUTO: 55.2 %
OSMOLALITY SERPL CALC.SUM OF ELEC: 296 MOSM/KG (ref 275–295)
PLATELET # BLD AUTO: 167 10(3)UL (ref 150–450)
POTASSIUM SERPL-SCNC: 5.3 MMOL/L (ref 3.5–5.1)
PROT SERPL-MCNC: 7.8 G/DL (ref 5.7–8.2)
RBC # BLD AUTO: 4.89 X10(6)UL
SODIUM SERPL-SCNC: 142 MMOL/L (ref 136–145)
WBC # BLD AUTO: 6.3 X10(3) UL (ref 4–11)

## 2025-03-17 PROCEDURE — 80197 ASSAY OF TACROLIMUS: CPT

## 2025-03-17 PROCEDURE — 80053 COMPREHEN METABOLIC PANEL: CPT

## 2025-03-17 PROCEDURE — 36415 COLL VENOUS BLD VENIPUNCTURE: CPT

## 2025-03-17 PROCEDURE — 83735 ASSAY OF MAGNESIUM: CPT

## 2025-03-17 PROCEDURE — 85025 COMPLETE CBC W/AUTO DIFF WBC: CPT

## 2025-03-19 ENCOUNTER — TELEPHONE (OUTPATIENT)
Dept: PAIN CLINIC | Facility: CLINIC | Age: 60
End: 2025-03-19

## 2025-03-19 DIAGNOSIS — M47.816 LUMBAR SPONDYLOSIS: Primary | ICD-10-CM

## 2025-03-19 LAB
CMV DNA DETECT, QN LOG: NOT DETECTED {LOG_IU}/ML
CMV DNA DETECT, QN: NOT DETECTED [IU]/ML

## 2025-03-19 RX ORDER — TRAZODONE HYDROCHLORIDE 100 MG/1
100 TABLET ORAL NIGHTLY
Qty: 90 TABLET | Refills: 1 | Status: SHIPPED | OUTPATIENT
Start: 2025-03-19

## 2025-03-19 NOTE — TELEPHONE ENCOUNTER
Prior authorization request completed for: left L4/5,L5/S1 RFA  Authorization # [id number]  Authorization dates: 3/19/25-9/15/25  CPT codes approved: 01693,22022  Number of visits/dates of service approved: 1  Physician: owlf  Location: [facility]     Patient can be scheduled. Routed to Navigator.

## 2025-03-19 NOTE — TELEPHONE ENCOUNTER
Patient advised of insurance approval to proceed with injections and is agreeable to scheduling. pre-procedure instructions reviewed. Patient prefers conscious  sedation. Reviewed sedation instructions including Fast 8 hours prior &  Required. Patient encouraged but not required to hold ASA 81 mg for 24 hours prior to procedure. Patient verbalized understanding of instructions, no further needs at this time.       [facility] PAIN CLINIC  PRE-PROCEDURE INSTRUCTIONS WITH IV SEDATION     Procedure: Left L4/5,L5/S1 RFA    Appointment Date: 04/08/2025    Check-In Time: 01:15 PM     Follow-Up Date/Time: Pt will call back to schedule follow up.    Prior to the procedure:  Please update us prior to the procedure if you are experiencing any symptoms of infection such as cough, fever, chills, urinary symptoms, or have recently been prescribed antibiotics, have open wounds, have recently had surgery or dental procedures.    Day of Procedure:  Do not eat or drink anything (including water) 8 hours prior to your procedure.  If you take morning blood pressure medication or oral diabetic medication, please take with a small sip of water.  You are required to have a responsible adult drive you home after your procedure. You may not take a cab or ride share unless you have a responsible adult with you in the cab or ride share.  A family member or friend is required to stay in our waiting room or hospital garage because of the sedation you will receive, you may be sleepy and forgetful. You may not remember anything told to you after your procedure including discharge instructions. Please note: children are not allowed in the holding area so please make appropriate arrangements.  Any additional family members and friends will need to remain in the surgical waiting room or hospital garage for the duration of your procedure. *If your family member or friend elects to wait in the garage, they must leave a cell phone number  with the lab staff in case they need to be reached.  Please park in the [facility] parking garage and follow the signs to the hospitals.  Please bring your Insurance Card, Photo ID, List of Current Medications and Referral (if applicable) to your appointment. Check in at [facility] ([address] [place] [address]) outpatient registration in the hospitals.  Please note-No prescriptions will be written by Pain Clinic in OR on the day of procedure. If you require a refill of medications, please contact the office 48 hours prior to your procedure.  If you have an implanted Spinal Cord or Peripheral Nerve Stimulator: Please remember to turn device off for procedure    *If you are fasting, you may take blood pressure and thyroid medications with a small sip of water the day of your procedure.   *If you are diabetic, your glucose must be within a normal range for you. If you are fasting, you should check your glucose levels and adjust with medication if needed.    Medication Hold:  Number of days you need to be off for the following medications:    Aggrenox 10 days   Agrylin (Anagrelide) 10 days  Brilinta (Ticagrelor) 7 days  Imbruvica (Ibrutinib) 3 days   Enbrel (Etanercept) 24 hours   Fragmin (Dalteparin) 24 hours   Pletal (Cilostazol) 7 days  Effient (Prasugrel) 7 days  Pradaxa 10 days  Trental 7 days  Eliquis (Apixaban) 3 days  Xarelto (Rivaroxaban) 3 days  Lovenox (Enoxaparin) 24 hours  Aspirin  Greater than 81mg but less than 325mg   5 days  325mg and greater                  7 days  (*81 mg      24 hours preferred, but not required)  Coumadin       5 days  Procedure may be cancelled if INR is elevated.   Excedrin (with aspirin) 7 days  Plavix (Clopidogrel)                             7 days    NSAIDs: 24 hours preferred, but not required      Ibuprofen (Motrin, Advil, Vicoprofen), Naproxen (Naprosyn, Aleve), Piroxcam (Feldene), Meloxicam (Mobic), Oxaprozin (Daypro), Diclofenac (Voltaren), Indomethacin  (Indocin), Etodolac (Lodine), Nabumetone (Relafen), Celebrex (Celecoxib)           HERBAL SUPPLEMENTS  5 days preferred, but not required  Fish oil, krill oil, Omega-3, Vascepa, Vitamin E, Turmeric, Garlic                       Insurance Authorization:   Most insurances are now requiring a preauthorization for all procedures.     Please contact your insurance carrier to determine what your financial responsibility will be for the procedure(s).    Cancellation/Rescheduling Appointment:   In the event you need to cancel or reschedule your appointment, you must notify the office 24 hours prior.    Post-procedure instructions:        Please schedule a follow up visit within 2 to 4 weeks after your last procedure date   Please call our office with any questions or concerns before or after your procedure at [phone], #[phone].  If you are a diabetic, please increase the frequency of your glucose monitoring after the procedure as this may cause a temporary increase in your blood sugar. Contact your primary care physician if your blood sugar rises as you may require some medication adjustment.        It is normal to have increased pain at injection site for up to 3-5 days after procedure, you can        use heat or ice (20 minutes on 20 minutes off) for comfort.

## 2025-03-19 NOTE — TELEPHONE ENCOUNTER
A refill request was received for:  Requested Prescriptions     Pending Prescriptions Disp Refills    TRAZODONE 100 MG Oral Tab [Pharmacy Med Name: TRAZODONE 100MG TABLETS] 90 tablet 1     Sig: TAKE 1 TABLET(100 MG) BY MOUTH EVERY NIGHT       Last refill date: 9/2024      Last office visit: 8/5/2024    Follow up due:  Future Appointments   Date Time Provider Department Center   5/5/2025  9:45 AM Crystal Bryan MD [facility] Surg/Onc   5/28/2025  1:00 PM Gus Ocampo MD [facility] SUB GI   6/11/2025  7:45 AM YUDITH MR RM1 (1.5T) YUDITH MRI [address]

## 2025-03-20 LAB — TACROLIMUS LVL: 3.3 NG/ML

## 2025-03-24 ENCOUNTER — LAB ENCOUNTER (OUTPATIENT)
Dept: LAB | Age: 60
End: 2025-03-24
Attending: INTERNAL MEDICINE
Payer: COMMERCIAL

## 2025-03-24 DIAGNOSIS — Z94.4 LIVER REPLACED BY TRANSPLANT (HCC): ICD-10-CM

## 2025-03-24 DIAGNOSIS — B25.9 CYTOMEGALOVIRUS (CMV) VIREMIA (HCC): ICD-10-CM

## 2025-03-24 LAB
ALBUMIN SERPL-MCNC: 4.5 G/DL (ref 3.2–4.8)
ALBUMIN/GLOB SERPL: 1.4 {RATIO} (ref 1–2)
ALP LIVER SERPL-CCNC: 91 U/L
ALT SERPL-CCNC: 22 U/L
ANION GAP SERPL CALC-SCNC: 7 MMOL/L (ref 0–18)
AST SERPL-CCNC: 23 U/L (ref ?–34)
BASOPHILS # BLD AUTO: 0.04 X10(3) UL (ref 0–0.2)
BASOPHILS NFR BLD AUTO: 0.7 %
BILIRUB SERPL-MCNC: 0.6 MG/DL (ref 0.3–1.2)
BUN BLD-MCNC: 13 MG/DL (ref 9–23)
CALCIUM BLD-MCNC: 10 MG/DL (ref 8.7–10.6)
CHLORIDE SERPL-SCNC: 105 MMOL/L (ref 98–112)
CO2 SERPL-SCNC: 27 MMOL/L (ref 21–32)
CREAT BLD-MCNC: 1.15 MG/DL
EGFRCR SERPLBLD CKD-EPI 2021: 74 ML/MIN/1.73M2 (ref 60–?)
EOSINOPHIL # BLD AUTO: 0.11 X10(3) UL (ref 0–0.7)
EOSINOPHIL NFR BLD AUTO: 2 %
ERYTHROCYTE [DISTWIDTH] IN BLOOD BY AUTOMATED COUNT: 16.1 %
FASTING STATUS PATIENT QL REPORTED: YES
GLOBULIN PLAS-MCNC: 3.2 G/DL (ref 2–3.5)
GLUCOSE BLD-MCNC: 101 MG/DL (ref 70–99)
HCT VFR BLD AUTO: 45.7 %
HGB BLD-MCNC: 15.6 G/DL
IMM GRANULOCYTES # BLD AUTO: 0.01 X10(3) UL (ref 0–1)
IMM GRANULOCYTES NFR BLD: 0.2 %
LYMPHOCYTES # BLD AUTO: 2.12 X10(3) UL (ref 1–4)
LYMPHOCYTES NFR BLD AUTO: 38.8 %
MAGNESIUM SERPL-MCNC: 2 MG/DL (ref 1.6–2.6)
MCH RBC QN AUTO: 30.5 PG (ref 26–34)
MCHC RBC AUTO-ENTMCNC: 34.1 G/DL (ref 31–37)
MCV RBC AUTO: 89.3 FL
MONOCYTES # BLD AUTO: 0.14 X10(3) UL (ref 0.1–1)
MONOCYTES NFR BLD AUTO: 2.6 %
NEUTROPHILS # BLD AUTO: 3.04 X10 (3) UL (ref 1.5–7.7)
NEUTROPHILS # BLD AUTO: 3.04 X10(3) UL (ref 1.5–7.7)
NEUTROPHILS NFR BLD AUTO: 55.7 %
OSMOLALITY SERPL CALC.SUM OF ELEC: 288 MOSM/KG (ref 275–295)
PLATELET # BLD AUTO: 197 10(3)UL (ref 150–450)
POTASSIUM SERPL-SCNC: 4.9 MMOL/L (ref 3.5–5.1)
PROT SERPL-MCNC: 7.7 G/DL (ref 5.7–8.2)
RBC # BLD AUTO: 5.12 X10(6)UL
SODIUM SERPL-SCNC: 139 MMOL/L (ref 136–145)
WBC # BLD AUTO: 5.5 X10(3) UL (ref 4–11)

## 2025-03-24 PROCEDURE — 80053 COMPREHEN METABOLIC PANEL: CPT

## 2025-03-24 PROCEDURE — 85025 COMPLETE CBC W/AUTO DIFF WBC: CPT

## 2025-03-24 PROCEDURE — 80197 ASSAY OF TACROLIMUS: CPT

## 2025-03-24 PROCEDURE — 36415 COLL VENOUS BLD VENIPUNCTURE: CPT

## 2025-03-24 PROCEDURE — 83735 ASSAY OF MAGNESIUM: CPT

## 2025-03-26 LAB
CMV DNA DETECT, QN LOG: 2.8 {LOG_IU}/ML
CMV DNA DETECT, QN: 637 [IU]/ML

## 2025-03-27 LAB — TACROLIMUS LVL: 4.1 NG/ML

## 2025-03-31 ENCOUNTER — LAB ENCOUNTER (OUTPATIENT)
Dept: LAB | Age: 60
End: 2025-03-31
Attending: INTERNAL MEDICINE
Payer: COMMERCIAL

## 2025-03-31 DIAGNOSIS — B25.9 CYTOMEGALOVIRUS (CMV) VIREMIA (HCC): ICD-10-CM

## 2025-03-31 DIAGNOSIS — Z94.4 LIVER REPLACED BY TRANSPLANT (HCC): ICD-10-CM

## 2025-03-31 LAB
ALBUMIN SERPL-MCNC: 4.5 G/DL (ref 3.2–4.8)
ALBUMIN/GLOB SERPL: 1.4 {RATIO} (ref 1–2)
ALP LIVER SERPL-CCNC: 89 U/L
ALT SERPL-CCNC: 22 U/L
ANION GAP SERPL CALC-SCNC: 6 MMOL/L (ref 0–18)
AST SERPL-CCNC: 22 U/L (ref ?–34)
BASOPHILS # BLD AUTO: 0.06 X10(3) UL (ref 0–0.2)
BASOPHILS NFR BLD AUTO: 0.9 %
BILIRUB SERPL-MCNC: 0.4 MG/DL (ref 0.3–1.2)
BUN BLD-MCNC: 13 MG/DL (ref 9–23)
CALCIUM BLD-MCNC: 10.2 MG/DL (ref 8.7–10.6)
CHLORIDE SERPL-SCNC: 104 MMOL/L (ref 98–112)
CO2 SERPL-SCNC: 27 MMOL/L (ref 21–32)
CREAT BLD-MCNC: 1.21 MG/DL
EGFRCR SERPLBLD CKD-EPI 2021: 69 ML/MIN/1.73M2 (ref 60–?)
EOSINOPHIL # BLD AUTO: 0.08 X10(3) UL (ref 0–0.7)
EOSINOPHIL NFR BLD AUTO: 1.3 %
ERYTHROCYTE [DISTWIDTH] IN BLOOD BY AUTOMATED COUNT: 15.9 %
FASTING STATUS PATIENT QL REPORTED: YES
GLOBULIN PLAS-MCNC: 3.3 G/DL (ref 2–3.5)
GLUCOSE BLD-MCNC: 110 MG/DL (ref 70–99)
HCT VFR BLD AUTO: 46.6 %
HGB BLD-MCNC: 15.7 G/DL
IMM GRANULOCYTES # BLD AUTO: 0.02 X10(3) UL (ref 0–1)
IMM GRANULOCYTES NFR BLD: 0.3 %
LYMPHOCYTES # BLD AUTO: 2.5 X10(3) UL (ref 1–4)
LYMPHOCYTES NFR BLD AUTO: 39.2 %
MAGNESIUM SERPL-MCNC: 2 MG/DL (ref 1.6–2.6)
MCH RBC QN AUTO: 30.4 PG (ref 26–34)
MCHC RBC AUTO-ENTMCNC: 33.7 G/DL (ref 31–37)
MCV RBC AUTO: 90.1 FL
MONOCYTES # BLD AUTO: 0.18 X10(3) UL (ref 0.1–1)
MONOCYTES NFR BLD AUTO: 2.8 %
NEUTROPHILS # BLD AUTO: 3.53 X10 (3) UL (ref 1.5–7.7)
NEUTROPHILS # BLD AUTO: 3.53 X10(3) UL (ref 1.5–7.7)
NEUTROPHILS NFR BLD AUTO: 55.5 %
OSMOLALITY SERPL CALC.SUM OF ELEC: 285 MOSM/KG (ref 275–295)
PLATELET # BLD AUTO: 220 10(3)UL (ref 150–450)
POTASSIUM SERPL-SCNC: 5.9 MMOL/L (ref 3.5–5.1)
PROT SERPL-MCNC: 7.8 G/DL (ref 5.7–8.2)
RBC # BLD AUTO: 5.17 X10(6)UL
SODIUM SERPL-SCNC: 137 MMOL/L (ref 136–145)
WBC # BLD AUTO: 6.4 X10(3) UL (ref 4–11)

## 2025-03-31 PROCEDURE — 80053 COMPREHEN METABOLIC PANEL: CPT

## 2025-03-31 PROCEDURE — 80197 ASSAY OF TACROLIMUS: CPT

## 2025-03-31 PROCEDURE — 36415 COLL VENOUS BLD VENIPUNCTURE: CPT

## 2025-03-31 PROCEDURE — 83735 ASSAY OF MAGNESIUM: CPT

## 2025-03-31 PROCEDURE — 85025 COMPLETE CBC W/AUTO DIFF WBC: CPT

## 2025-04-02 LAB
CMV DNA DETECT, QN LOG: NOT DETECTED {LOG_IU}/ML
CMV DNA DETECT, QN: NOT DETECTED [IU]/ML
TACROLIMUS LVL: 2.6 NG/ML

## 2025-04-08 ENCOUNTER — APPOINTMENT (OUTPATIENT)
Dept: GENERAL RADIOLOGY | Facility: HOSPITAL | Age: 60
End: 2025-04-08
Attending: ANESTHESIOLOGY
Payer: COMMERCIAL

## 2025-04-08 ENCOUNTER — HOSPITAL ENCOUNTER (OUTPATIENT)
Facility: HOSPITAL | Age: 60
Setting detail: HOSPITAL OUTPATIENT SURGERY
Discharge: HOME | End: 2025-04-08
Attending: ANESTHESIOLOGY | Admitting: ANESTHESIOLOGY
Payer: COMMERCIAL

## 2025-04-08 VITALS
OXYGEN SATURATION: 96 % | RESPIRATION RATE: 18 BRPM | HEIGHT: 68 IN | TEMPERATURE: 98 F | WEIGHT: 185 LBS | HEART RATE: 73 BPM | DIASTOLIC BLOOD PRESSURE: 98 MMHG | BODY MASS INDEX: 28.04 KG/M2 | SYSTOLIC BLOOD PRESSURE: 141 MMHG

## 2025-04-08 PROCEDURE — 64635 DESTROY LUMB/SAC FACET JNT: CPT | Performed by: ANESTHESIOLOGY

## 2025-04-08 PROCEDURE — 64636 DESTROY L/S FACET JNT ADDL: CPT | Performed by: ANESTHESIOLOGY

## 2025-04-08 PROCEDURE — 99152 MOD SED SAME PHYS/QHP 5/>YRS: CPT | Performed by: ANESTHESIOLOGY

## 2025-04-08 PROCEDURE — 3E023TZ INTRODUCTION OF DESTRUCTIVE AGENT INTO MUSCLE, PERCUTANEOUS APPROACH: ICD-10-PCS | Performed by: ANESTHESIOLOGY

## 2025-04-08 RX ORDER — METHYLPREDNISOLONE ACETATE 40 MG/ML
INJECTION, SUSPENSION INTRA-ARTICULAR; INTRALESIONAL; INTRAMUSCULAR; SOFT TISSUE
Status: DISCONTINUED | OUTPATIENT
Start: 2025-04-08 | End: 2025-04-09

## 2025-04-08 RX ORDER — DIPHENHYDRAMINE HYDROCHLORIDE 50 MG/ML
50 INJECTION, SOLUTION INTRAMUSCULAR; INTRAVENOUS ONCE AS NEEDED
Status: DISCONTINUED | OUTPATIENT
Start: 2025-04-08 | End: 2025-04-08

## 2025-04-08 RX ORDER — NALOXONE HYDROCHLORIDE 0.4 MG/ML
0.08 INJECTION, SOLUTION INTRAMUSCULAR; INTRAVENOUS; SUBCUTANEOUS AS NEEDED
Status: DISCONTINUED | OUTPATIENT
Start: 2025-04-08 | End: 2025-04-09

## 2025-04-08 RX ORDER — LIDOCAINE HYDROCHLORIDE 10 MG/ML
INJECTION, SOLUTION EPIDURAL; INFILTRATION; INTRACAUDAL; PERINEURAL
Status: DISCONTINUED | OUTPATIENT
Start: 2025-04-08 | End: 2025-04-09

## 2025-04-08 RX ORDER — MIDAZOLAM HYDROCHLORIDE 1 MG/ML
INJECTION INTRAMUSCULAR; INTRAVENOUS
Status: DISCONTINUED | OUTPATIENT
Start: 2025-04-08 | End: 2025-04-09

## 2025-04-08 RX ORDER — SODIUM CHLORIDE, SODIUM LACTATE, POTASSIUM CHLORIDE, CALCIUM CHLORIDE 600; 310; 30; 20 MG/100ML; MG/100ML; MG/100ML; MG/100ML
100 INJECTION, SOLUTION INTRAVENOUS CONTINUOUS
Status: DISCONTINUED | OUTPATIENT
Start: 2025-04-08 | End: 2025-04-09

## 2025-04-08 RX ORDER — ONDANSETRON 2 MG/ML
4 INJECTION INTRAMUSCULAR; INTRAVENOUS ONCE AS NEEDED
Status: DISCONTINUED | OUTPATIENT
Start: 2025-04-08 | End: 2025-04-08

## 2025-04-08 NOTE — OPERATIVE REPORT
[facility]  Operative Report  2025     Richard Palacios Patient Status:  [facility] Outpatient Surgery    1965 MRN [id number]   Location [facility] PAIN CENTER Attending Henrik Hendrickson MD   Hosp Day # 0 PCP Lg Sosa MD     Indication: Richard is a 59 year old male with lumbar spondylosis    Preoperative Diagnosis:  Lumbar spondylosis [M47.816]    Postoperative Diagnosis: Same as above.    Procedure performed: left L4/5 and L5/S1 RADIOFREQUENCY ABLATION OF THORACIC OR LUMBAR MEDIAL BRANCH with sedation    Anesthesia: Local and IV Sedation.    EBL: Less than 1 ml.    Procedure Description:   After reviewing the patient's history and performing a focused physical examination, the diagnosis was confirmed and contraindications such as infection and coagulopathy were ruled out.  Following review of potential side effects and complications, including but not necessarily limited to infection, allergic reaction, local tissue breakdown, nerve injury, and paresis, the patient indicated they understood and agreed to proceed. After obtaining the informed consent, the patient was brought to the procedure room and monitored. Per my order and under my supervision, the patient was sedated with intermittent intravenous doses of versed and fentanyl. The vital signs were monitored and recorded by an experienced RN. The procedure started after the patient was adequately sedated. The moderate intravenous conscious sedation was provided for 15 minutes.    The patient was placed prone on the table.  The patient's back was prepped and draped in sterile fashion. The skin was anesthetized via 25-gauge 1.5\" needle with approximately 2 mL of 1% lidocaine for local anesthesia.  Under fluoroscopic guidance, a 22-gauge, 100-mm SMK needle was advanced to the junction of the superior aspect of the L3 transverse process and the lateral aspect of the same level superior articular process at left L3 level .  The  needle was then walked off the bony tissue and advanced 2 to 3 mm to lie along the path of the L$ medial branch nerve.  AP and lateral radiographs were obtained to document proper needle position.  Sensory and motor stimulation were then performed, which elicited deep local back discomfort but no evidence of motor stimulation in the gluteal muscles or extremities.  At this point, 0.5 mL of 1% lidocaine was injected to the tissues around the tip of the SMK needle.   Subsequently, a medial branch nerve denervation was performed for 90 seconds at 80 degrees centigrade without complication.  Similar procedures were performed at left L4-L5 and L5-S1 level. The radiofrequency probe was removed with the needle left in place and 1% lidocaine and 10 mg methylprednisolone was injected through each needle.  The needles were removed with tips intact.  The patient tolerated the procedure very well.  There was no subjective or objective loss of motor strength.  The patient was observed until discharge criteria met.  Discharge instructions were given and patient was released to a responsible adult.     Complications: None.    Follow up:  The patient will be followed in the pain clinic as needed basis.      Henrik Hendrickson MD

## 2025-04-08 NOTE — DISCHARGE INSTRUCTIONS
Home Care Instructions Following Your Pain Procedure     Richard,  It has been a pleasure to have you as our patient. To help you at home, you must follow these general discharge instructions. We will review these with you before you are discharged. It is our hope that you have a complete and uneventful recovery from our procedure.     General Instructions:  What to Expect:  Bandages from your procedure today can be removed when you get home.  Please avoid soaking and/or swimming for 24 hours.  Showering is okay  It is normal to have increased pain symptoms and/or pain at injection site for up to 3-5 days after procedure, you can use heat or ice (20 minutes on 20 minutes off) for comfort.  You may experience some temporary side effects which may include restlessness or insomnia, flushing of the face, or heart palpitations.  Please contact the provider if these symptoms do not resolve within 3-4 days.  Lightheadedness or nausea may occur and should resolve within 24 to 48 hours.  If you develop a headache after treatment, rest, drink fluids (with caffeine, if possible) and take mild over-the-counter pain medication.  If the headache does not improve with the above treatment, contact the physician.  Home Medications:  Resume all previously prescribed medication.  Please avoid taking NSAIDs (Non-Steriodal Anti-Inflammatory Drugs) such as:  Ibuprofen ( Advil, Motrin) Aleve (Naproxen), Diclofenac, Meloxicam for 6 hours after procedure.   If you are on Coumadin (Warfarin) or any other anti-coagulant (or \"blood thinning\") medication such as Plavix (Clopidogrel), Xarelto (Rivaroxaban), Eliquis (Apixaban), Effient (Prasugrel) etc., restart on the following day from the procedure unless otherwise directed by your provider.  If you are a diabetic, please increase the frequency of your glucose monitoring after the procedure as steroids may cause a temporary (2-3 day) increase in your blood sugar.  Contact your primary care  physician if your blood sugar remains elevated as you may require some medication adjustment.  Diet:  Resume your regular diet as tolerated.  Activity:  We recommend that you relax and rest during the rest of your procedure day.  If you feel weakness in your arms or legs do not drive.  Follow-up Appointment  Please schedule a follow-up visit within 3 to 4 weeks after your last procedure date.  Question or Concerns:  Feel free to call our office with any questions or concerns at [phone] (option #2)    Richard  Thank you for coming to [facility] for your procedure.  The nurses try very hard to make sure you receive the best care possible.  Your trust in them as well as us is greatly appreciated.    Thanks so much,   Dr. Henrik Hendrickson

## 2025-04-08 NOTE — H&P
History & Physical Examination    Patient Name: Richard Palacios  MRN: [id number]  CSN: [id number]  YOB: 1965    Pre-Operative Diagnosis:  Lumbar spondylosis [M47.816]    Present Illness: Lumbar spondylosis    ASA: 2  MP class: 1  Sedation:  IV sedation (anxiolysis)    Prescriptions Prior to Admission[1]  Current Facility-Administered Medications   Medication Dose Route Frequency    lactated ringers infusion  100 mL/hr Intravenous Continuous    ondansetron (Zofran) 4 MG/2ML injection 4 mg  4 mg Intravenous Once PRN       Allergies: Allergies[2]    Past Medical History:    Abdominal pain    Ascites    Back problem    COVID    Disorder of liver    Cirrhosis - no longer existent since liver transplant surgery 12/19/23    Esophageal reflux    Essential hypertension    ETOH abuse    Gout    Hyperlipidemia    Hypoalbuminemia    Incisional hernia    Nausea    Personal history of alcoholism (HCC)    Thrombocytopenia    Weight loss     Past Surgical History:   Procedure Laterality Date    Colonoscopy N/A 11/20/2023    Procedure: .;  Surgeon: Gus Ocampo MD;  Location:  ENDOSCOPY    Colonoscopy N/A 11/27/2023    Procedure: COLONOSCOPY with cold snare polypectomy and clip placement x1;  Surgeon: Robin Raya DO;  Location:  ENDOSCOPY    Colonoscopy N/A 7/25/2024    Procedure: COLONOSCOPY;  Surgeon: Claudio Adams MD;  Location:  ENDOSCOPY    Organ transplant  12/19/2023    Liver transplant    Tonsillectomy       Family History   Problem Relation Age of Onset    Other (alcoholism) Father     Diabetes Mother         Type I    Other (CAD) Mother      Social History     Tobacco Use    Smoking status: Some Days     Types: Cigars     Passive exposure: Never    Smokeless tobacco: Never    Tobacco comments:     Still not smoking cigarettes since 2014. Do smoke a couple of cigars a week.   Substance Use Topics    Alcohol use: Not Currently       SYSTEM Check if Review is Normal Check if Physical Exam is  Normal If not normal, please explain:   HEENT [x ] [x ]    NECK & BACK [x ] [x ]    HEART [x ] [x ]    LUNGS [x ] [x ]    ABDOMEN [x ] [x ]    UROGENITAL [x ] [x ]    EXTREMITIES [x ] [x ]    OTHER        [ x ] I have discussed the risks and benefits and alternatives with the patient/family.  They understand and agree to proceed with plan of care.  [ x ] I have reviewed the History and Physical done within the last 30 days.  Any changes noted above.    Henrik Hendrickson MD              [1]   Medications Prior to Admission   Medication Sig Dispense Refill Last Dose/Taking    TRAZODONE 100 MG Oral Tab TAKE 1 TABLET(100 MG) BY MOUTH EVERY NIGHT 90 tablet 1 4/7/2025 at  8:00 PM    PANTOPRAZOLE 40 MG Oral Tab EC TAKE 1 TABLET(40 MG) BY MOUTH TWICE DAILY BEFORE MEALS 60 tablet 10 4/8/2025 at  8:00 AM    gabapentin 300 MG Oral Cap Take 1 capsule (300 mg total) by mouth nightly.   4/7/2025 at  8:00 PM    mycophenolate sodium 360 MG Oral Tab EC Take 2 tablets (720 mg total) by mouth 2 (two) times daily.   Past Month    ENVARSUS XR 0.75 MG Oral Tablet 24 Hr 2 tablets daily.   4/8/2025 at  8:00 AM    URSODIOL 300 MG Oral Cap TAKE 1 CAPSULE(300 MG) BY MOUTH THREE TIMES DAILY 270 capsule 1 4/8/2025 at  8:00 AM    ALLOPURINOL 100 MG Oral Tab TAKE 1 TABLET(100 MG) BY MOUTH DAILY 90 tablet 3 4/7/2025 at  8:00 PM    Sildenafil Citrate 100 MG Oral Tab Take 0.5-1 tablets ( mg total) by mouth daily as needed for Erectile Dysfunction. 45 tablet 3     rosuvastatin 10 MG Oral Tab Take 1 tablet (10 mg total) by mouth daily.   4/7/2025 at 12:00 PM    traMADol 50 MG Oral Tab Take 1 tablet (50 mg total) by mouth every 6 (six) hours as needed for Pain.   4/6/2025    acetaminophen 500 MG Oral Tab Take 1 tablet (500 mg total) by mouth every 6 (six) hours as needed for Pain.   Past Week    LOKELMA 10 g Oral Powd Pack Take 1 packet (10 g total) by mouth daily. 90 packet 1 4/7/2025 at 12:00 PM    Magnesium Oxide -Mg Supplement 400 (240 Mg) MG  Oral Tab Take 1 tablet (400 mg total) by mouth daily. 30 tablet 1 4/8/2025 at  8:00 AM    valGANciclovir 450 MG Oral Tab Take 2 tablets (900 mg total) by mouth daily.   4/8/2025 at  8:00 AM    aspirin 81 MG Oral Tab EC Take 1 tablet (81 mg total) by mouth daily.   Past Week   [2] No Known Allergies

## 2025-04-09 ENCOUNTER — TELEPHONE (OUTPATIENT)
Dept: PAIN CLINIC | Facility: CLINIC | Age: 60
End: 2025-04-09

## 2025-04-09 NOTE — TELEPHONE ENCOUNTER
Akil called placed to patient for post procedure follow up. Patient stated he is doing good and has no questions.Pt verbalized understanding to call with any questions or concerns.    Procedure: RFA   Date: 4/8/25  Follow up Visit Scheduled: 4/29/2025 8:30 AM  with Lg

## 2025-04-14 ENCOUNTER — LAB ENCOUNTER (OUTPATIENT)
Dept: LAB | Age: 60
End: 2025-04-14
Attending: INTERNAL MEDICINE
Payer: COMMERCIAL

## 2025-04-14 DIAGNOSIS — E78.2 MIXED HYPERLIPIDEMIA: Primary | ICD-10-CM

## 2025-04-14 LAB
ALBUMIN SERPL-MCNC: 4.5 G/DL (ref 3.2–4.8)
ALBUMIN/GLOB SERPL: 1.4 {RATIO} (ref 1–2)
ALP LIVER SERPL-CCNC: 81 U/L (ref 45–117)
ALT SERPL-CCNC: 21 U/L (ref 10–49)
ANION GAP SERPL CALC-SCNC: 9 MMOL/L (ref 0–18)
AST SERPL-CCNC: 20 U/L (ref ?–34)
BASOPHILS # BLD AUTO: 0.04 X10(3) UL (ref 0–0.2)
BASOPHILS NFR BLD AUTO: 0.7 %
BILIRUB SERPL-MCNC: 0.4 MG/DL (ref 0.3–1.2)
BUN BLD-MCNC: 14 MG/DL (ref 9–23)
CALCIUM BLD-MCNC: 9.9 MG/DL (ref 8.7–10.6)
CHLORIDE SERPL-SCNC: 105 MMOL/L (ref 98–112)
CO2 SERPL-SCNC: 28 MMOL/L (ref 21–32)
CREAT BLD-MCNC: 1.15 MG/DL (ref 0.7–1.3)
EGFRCR SERPLBLD CKD-EPI 2021: 74 ML/MIN/1.73M2 (ref 60–?)
EOSINOPHIL # BLD AUTO: 0.08 X10(3) UL (ref 0–0.7)
EOSINOPHIL NFR BLD AUTO: 1.4 %
ERYTHROCYTE [DISTWIDTH] IN BLOOD BY AUTOMATED COUNT: 15.2 %
FASTING STATUS PATIENT QL REPORTED: YES
GLOBULIN PLAS-MCNC: 3.2 G/DL (ref 2–3.5)
GLUCOSE BLD-MCNC: 108 MG/DL (ref 70–99)
HCT VFR BLD AUTO: 45.7 % (ref 39–53)
HGB BLD-MCNC: 15.6 G/DL (ref 13–17.5)
IMM GRANULOCYTES # BLD AUTO: 0.01 X10(3) UL (ref 0–1)
IMM GRANULOCYTES NFR BLD: 0.2 %
LYMPHOCYTES # BLD AUTO: 1.96 X10(3) UL (ref 1–4)
LYMPHOCYTES NFR BLD AUTO: 34.9 %
MAGNESIUM SERPL-MCNC: 1.9 MG/DL (ref 1.6–2.6)
MCH RBC QN AUTO: 30.5 PG (ref 26–34)
MCHC RBC AUTO-ENTMCNC: 34.1 G/DL (ref 31–37)
MCV RBC AUTO: 89.4 FL (ref 80–100)
MONOCYTES # BLD AUTO: 0.23 X10(3) UL (ref 0.1–1)
MONOCYTES NFR BLD AUTO: 4.1 %
NEUTROPHILS # BLD AUTO: 3.3 X10 (3) UL (ref 1.5–7.7)
NEUTROPHILS # BLD AUTO: 3.3 X10(3) UL (ref 1.5–7.7)
NEUTROPHILS NFR BLD AUTO: 58.7 %
OSMOLALITY SERPL CALC.SUM OF ELEC: 295 MOSM/KG (ref 275–295)
PLATELET # BLD AUTO: 180 10(3)UL (ref 150–450)
POTASSIUM SERPL-SCNC: 4.9 MMOL/L (ref 3.5–5.1)
PROT SERPL-MCNC: 7.7 G/DL (ref 5.7–8.2)
RBC # BLD AUTO: 5.11 X10(6)UL (ref 4.3–5.7)
SODIUM SERPL-SCNC: 142 MMOL/L (ref 136–145)
WBC # BLD AUTO: 5.6 X10(3) UL (ref 4–11)

## 2025-04-14 PROCEDURE — 80053 COMPREHEN METABOLIC PANEL: CPT

## 2025-04-14 PROCEDURE — 83735 ASSAY OF MAGNESIUM: CPT

## 2025-04-14 PROCEDURE — 85025 COMPLETE CBC W/AUTO DIFF WBC: CPT

## 2025-04-14 PROCEDURE — 80197 ASSAY OF TACROLIMUS: CPT

## 2025-04-14 PROCEDURE — 36415 COLL VENOUS BLD VENIPUNCTURE: CPT

## 2025-04-15 ENCOUNTER — TELEPHONE (OUTPATIENT)
Dept: FAMILY MEDICINE CLINIC | Facility: CLINIC | Age: 60
End: 2025-04-15

## 2025-04-15 NOTE — TELEPHONE ENCOUNTER
Handicap placard has been completed per patient's request.   It is at the  waiting for the patient to pick it up. A message was left on the patient's voice mail    Patient has been reminded that he is due for a physical on or after 8/5/25    A copy of form is in purple folder by 's [employer] workstation and an additional copy has been sent to scan

## 2025-04-16 LAB
CMV DNA DETECT, QN LOG: NOT DETECTED {LOG_IU}/ML
CMV DNA DETECT, QN: NOT DETECTED [IU]/ML

## 2025-04-18 LAB — TACROLIMUS LVL: 3.2 NG/ML

## 2025-04-22 ENCOUNTER — LAB ENCOUNTER (OUTPATIENT)
Dept: LAB | Age: 60
End: 2025-04-22
Attending: INTERNAL MEDICINE
Payer: COMMERCIAL

## 2025-04-22 DIAGNOSIS — E78.2 MIXED HYPERLIPIDEMIA: ICD-10-CM

## 2025-04-22 LAB
CHOLEST SERPL-MCNC: 130 MG/DL (ref ?–200)
FASTING PATIENT LIPID ANSWER: YES
HDLC SERPL-MCNC: 51 MG/DL (ref 40–59)
LDLC SERPL CALC-MCNC: 55 MG/DL (ref ?–100)
NONHDLC SERPL-MCNC: 79 MG/DL (ref ?–130)
TRIGL SERPL-MCNC: 139 MG/DL (ref 30–149)
VLDLC SERPL CALC-MCNC: 20 MG/DL (ref 0–30)

## 2025-04-22 PROCEDURE — 80061 LIPID PANEL: CPT

## 2025-04-22 PROCEDURE — 36415 COLL VENOUS BLD VENIPUNCTURE: CPT

## 2025-04-28 ENCOUNTER — LAB ENCOUNTER (OUTPATIENT)
Dept: LAB | Age: 60
End: 2025-04-28
Attending: INTERNAL MEDICINE
Payer: COMMERCIAL

## 2025-04-28 LAB
ALBUMIN SERPL-MCNC: 4.7 G/DL (ref 3.2–4.8)
ALBUMIN/GLOB SERPL: 1.5 {RATIO} (ref 1–2)
ALP LIVER SERPL-CCNC: 79 U/L (ref 45–117)
ALT SERPL-CCNC: 24 U/L (ref 10–49)
ANION GAP SERPL CALC-SCNC: 7 MMOL/L (ref 0–18)
AST SERPL-CCNC: 24 U/L (ref ?–34)
BASOPHILS # BLD AUTO: 0.04 X10(3) UL (ref 0–0.2)
BASOPHILS NFR BLD AUTO: 0.7 %
BILIRUB SERPL-MCNC: 0.6 MG/DL (ref 0.3–1.2)
BUN BLD-MCNC: 11 MG/DL (ref 9–23)
CALCIUM BLD-MCNC: 10 MG/DL (ref 8.7–10.6)
CHLORIDE SERPL-SCNC: 107 MMOL/L (ref 98–112)
CO2 SERPL-SCNC: 26 MMOL/L (ref 21–32)
CREAT BLD-MCNC: 1.23 MG/DL (ref 0.7–1.3)
EGFRCR SERPLBLD CKD-EPI 2021: 68 ML/MIN/1.73M2 (ref 60–?)
EOSINOPHIL # BLD AUTO: 0.13 X10(3) UL (ref 0–0.7)
EOSINOPHIL NFR BLD AUTO: 2.4 %
ERYTHROCYTE [DISTWIDTH] IN BLOOD BY AUTOMATED COUNT: 14.8 %
FASTING STATUS PATIENT QL REPORTED: YES
GLOBULIN PLAS-MCNC: 3.1 G/DL (ref 2–3.5)
GLUCOSE BLD-MCNC: 106 MG/DL (ref 70–99)
HCT VFR BLD AUTO: 44.3 % (ref 39–53)
HGB BLD-MCNC: 15.5 G/DL (ref 13–17.5)
IMM GRANULOCYTES # BLD AUTO: 0.01 X10(3) UL (ref 0–1)
IMM GRANULOCYTES NFR BLD: 0.2 %
LYMPHOCYTES # BLD AUTO: 1.95 X10(3) UL (ref 1–4)
LYMPHOCYTES NFR BLD AUTO: 35.6 %
MAGNESIUM SERPL-MCNC: 1.8 MG/DL (ref 1.6–2.6)
MCH RBC QN AUTO: 30.6 PG (ref 26–34)
MCHC RBC AUTO-ENTMCNC: 35 G/DL (ref 31–37)
MCV RBC AUTO: 87.5 FL (ref 80–100)
MONOCYTES # BLD AUTO: 0.19 X10(3) UL (ref 0.1–1)
MONOCYTES NFR BLD AUTO: 3.5 %
NEUTROPHILS # BLD AUTO: 3.16 X10 (3) UL (ref 1.5–7.7)
NEUTROPHILS # BLD AUTO: 3.16 X10(3) UL (ref 1.5–7.7)
NEUTROPHILS NFR BLD AUTO: 57.6 %
OSMOLALITY SERPL CALC.SUM OF ELEC: 290 MOSM/KG (ref 275–295)
PLATELET # BLD AUTO: 169 10(3)UL (ref 150–450)
POTASSIUM SERPL-SCNC: 5.1 MMOL/L (ref 3.5–5.1)
PROT SERPL-MCNC: 7.8 G/DL (ref 5.7–8.2)
RBC # BLD AUTO: 5.06 X10(6)UL (ref 4.3–5.7)
SODIUM SERPL-SCNC: 140 MMOL/L (ref 136–145)
WBC # BLD AUTO: 5.5 X10(3) UL (ref 4–11)

## 2025-04-28 PROCEDURE — 36415 COLL VENOUS BLD VENIPUNCTURE: CPT

## 2025-04-28 PROCEDURE — 80053 COMPREHEN METABOLIC PANEL: CPT

## 2025-04-28 PROCEDURE — 80197 ASSAY OF TACROLIMUS: CPT

## 2025-04-28 PROCEDURE — 83735 ASSAY OF MAGNESIUM: CPT

## 2025-04-28 PROCEDURE — 85025 COMPLETE CBC W/AUTO DIFF WBC: CPT

## 2025-04-29 ENCOUNTER — OFFICE VISIT (OUTPATIENT)
Dept: PAIN CLINIC | Facility: CLINIC | Age: 60
End: 2025-04-29
Payer: COMMERCIAL

## 2025-04-29 VITALS — SYSTOLIC BLOOD PRESSURE: 122 MMHG | HEART RATE: 82 BPM | OXYGEN SATURATION: 95 % | DIASTOLIC BLOOD PRESSURE: 80 MMHG

## 2025-04-29 DIAGNOSIS — M47.816 LUMBAR SPONDYLOSIS: Primary | ICD-10-CM

## 2025-04-29 PROCEDURE — 99213 OFFICE O/P EST LOW 20 MIN: CPT | Performed by: PHYSICIAN ASSISTANT

## 2025-04-29 NOTE — PROGRESS NOTES
HPI:   Richard Palacios presents with complaints of left low back pain.    The pain is described as mild aching, stabbing that is intermittent.  The patient’s activity level has increased since last visit.  The pain is worst unrelated to time of day.    Changes in condition/history since last visit: Patient is here today for follow-up having had left L4-5 and L5-S1 RFA on 4/8/25.  Procedure was well tolerated, and had no adverse effects.  Overall, reports 100% relief of pain.      Last procedure: Left L4-5 and L5-S1    date: 4/8/25 (1/23/2025, 2/18/2025)    Percentage of relief experienced from the procedure: 100%    Duration of the relief: sustained    The following activities will increase the patient’s pain:  n/a    The following activities decrease the patient’s pain: limiting activity level    Functional Assessment: Patient reports that they are able to complete all of their ADL's such as eating, bathing, using the toilet, dressing and getting up from a bed or a chair independently.    Current Medications:  Current Outpatient Medications   Medication Sig Dispense Refill    TRAZODONE 100 MG Oral Tab TAKE 1 TABLET(100 MG) BY MOUTH EVERY NIGHT 90 tablet 1    PANTOPRAZOLE 40 MG Oral Tab EC TAKE 1 TABLET(40 MG) BY MOUTH TWICE DAILY BEFORE MEALS 60 tablet 10    gabapentin 300 MG Oral Cap Take 1 capsule (300 mg total) by mouth nightly.      mycophenolate sodium 360 MG Oral Tab EC Take 2 tablets (720 mg total) by mouth 2 (two) times daily.      ENVARSUS XR 0.75 MG Oral Tablet 24 Hr 2 tablets daily.      URSODIOL 300 MG Oral Cap TAKE 1 CAPSULE(300 MG) BY MOUTH THREE TIMES DAILY 270 capsule 1    ALLOPURINOL 100 MG Oral Tab TAKE 1 TABLET(100 MG) BY MOUTH DAILY 90 tablet 3    Sildenafil Citrate 100 MG Oral Tab Take 0.5-1 tablets ( mg total) by mouth daily as needed for Erectile Dysfunction. 45 tablet 3    rosuvastatin 10 MG Oral Tab Take 1 tablet (10 mg total) by mouth daily.      traMADol 50 MG Oral Tab Take 1 tablet  (50 mg total) by mouth every 6 (six) hours as needed for Pain.      acetaminophen 500 MG Oral Tab Take 1 tablet (500 mg total) by mouth every 6 (six) hours as needed for Pain.      LOKELMA 10 g Oral Powd Pack Take 1 packet (10 g total) by mouth daily. 90 packet 1    Magnesium Oxide -Mg Supplement 400 (240 Mg) MG Oral Tab Take 1 tablet (400 mg total) by mouth daily. 30 tablet 1    valGANciclovir 450 MG Oral Tab Take 2 tablets (900 mg total) by mouth daily.      aspirin 81 MG Oral Tab EC Take 1 tablet (81 mg total) by mouth daily.        Patient requires assistance with: No assistance required    Reviewed Patient History Dated: 3/12/25 no changes noted    Physical Exam:   /80 (BP Location: Left arm, Patient Position: Sitting, Cuff Size: adult)   Pulse 82   SpO2 95%   VAS Pain Score:  0/10  General Appearance: Well developed, well nourished, normal build, independent body habitus, no apparent physical disabilities, well groomed    Neurological Exam: WNL-Orientation to time, place and person, normal mood & effect, normal concentration & attention span  Inspection: non-antalgic, no acute distress   Radiology/Lab Test Reviewed: MRI L spine 6/11/24:     LUMBAR FINDINGS:   Patient motion noted.      Stable holovertebral hemangioma at the L1 level resulting in minimal cortical expansion along the dorsal cortex of the vertebral body.  No pathologic fracture identified.  Stable small hemangioma also noted in the L2 vertebral body.  The suspected subcentimeter hemangioma in the dorsal aspect of the L5 vertebral body is also again noted, however limited in evaluation due to patient motion, but appearing grossly unchanged.      There is normal lumbar lordosis with anatomic alignment.  Vertebral body heights are well-maintained.  Mild degenerative disc space loss, disc desiccation, endplate spurring, and degenerative endplate marrow signal changes scattered in the lumbar spine.  No suspicious enhancement.  Scattered  degenerative enhancement noted, particularly in the mid to lower lumbar spine along the facet joints and interspinous regions.      The distal spinal cord and conus medullaris have a normal signal and morphology.  The conus medullaris terminates at the mid L1 level.  The roots of the cauda equina are unremarkable.  No focal mass or fluid collection is seen in the lumbar spinal canal.   The paraspinal soft tissues are unremarkable.      T12-L1: There is no significant abnormality.      L1-2:  Minimal diffuse disc bulge with mild facet arthropathy. There is no significant spinal canal or neural foraminal stenosis. No significant interval change.      L2-3:  Mild diffuse disc bulge with mild facet arthropathy. There is no significant spinal canal stenosis.  There is mild left neural foraminal stenosis. No significant interval change.      L3-4:  Diffuse disc bulge with mild facet arthropathy.  There is no significant spinal canal stenosis.  There is mild right neural foraminal stenosis. No significant interval change.      L4-5:  Diffuse disc bulge with a small superimposed broad-based central disc protrusion.  Mild to moderate facet arthropathy and thickening of ligamentum flavum.  No significant spinal canal stenosis.  Mild bilateral neural foraminal stenosis. No significant interval change.      L5-S1:  Minimal diffuse disc bulge with mild facet arthropathy. There is no significant spinal canal or neural foraminal stenosis. No significant interval change.          Lab Results   Component Value Date    WBC 5.5 04/28/2025    WBC 5.6 04/14/2025    WBC 6.4 03/31/2025   No results found for: \"HEMOGLOBIN\"  Lab Results   Component Value Date    .0 04/28/2025    .0 04/14/2025    .0 03/31/2025     Do you have any known blood/bleeding disorders?  No  Does patient currently take blood thinners?   None  Does patient currently take any antibiotics?   No  Patient educated and verbalized  understanding.  Medical Decision Making:   Diagnosis:    Encounter Diagnosis   Name Primary?    Lumbar spondylosis Yes       Impression: 100% relief with left L4/5 and L5/S1 RFA on 4/8/2025 and is very pleased with his response.  No additional procedures required at this time, and asked that he simply follow-up on an as-needed basis.    Plan: Patient to follow up PRN.        No orders of the defined types were placed in this encounter.      Meds & Refills for this Visit:  Requested Prescriptions      No prescriptions requested or ordered in this encounter       Imaging & Consults:  None    The patient indicates understanding of these issues and agrees to the plan.    PATRICK Mcbride

## 2025-04-29 NOTE — PATIENT INSTRUCTIONS
Refill policies:    Allow 2-3 business days for refills; controlled substances may take longer.  Contact your pharmacy at least 5 days prior to running out of medication and have them send an electronic request or submit request through the “request refill” option in your [employer] account.  Refills are not addressed on weekends; covering physicians do not authorize routine medications on weekends.  No narcotics or controlled substances are refilled after noon on Fridays or by on call physicians.  By law, narcotics must be electronically prescribed.  A 30 day supply with no refills is the maximum allowed.  If your prescription is due for a refill, you may be due for a follow up appointment.  To best provide you care, patients receiving routine medications need to be seen at least once a year.  Patients receiving narcotic/controlled substance medications need to be seen at least once every 3 months.  In the event that your preferred pharmacy does not have the requested medication in stock (e.g. Backordered), it is your responsibility to find another pharmacy that has the requested medication available.  We will gladly send a new prescription to that pharmacy at your request.    Scheduling Tests:    If your physician has ordered radiology tests such as MRI or CT scans, please contact Central Scheduling at [phone] right away to schedule the test.  Once scheduled, the [facility] Centralized Referral Team will work with your insurance carrier to obtain pre-certification or prior authorization.  Depending on your insurance carrier, approval may take 3-10 days.  It is highly recommended patients assure they have received an authorization before having a test performed.  If test is done without insurance authorization, patient may be responsible for the entire amount billed.      Precertification and Prior Authorizations:  If your physician has recommended that you have a procedure or additional testing performed the [facility]  Centralized Referral Team will contact your insurance carrier to obtain pre-certification or prior authorization.    You are strongly encouraged to contact your insurance carrier to verify that your procedure/test has been approved and is a COVERED benefit.  Although the [facility] Centralized Referral Team does its due diligence, the insurance carrier gives the disclaimer that \"Although the procedure is authorized, this does not guarantee payment.\"    Ultimately the patient is responsible for payment.   Thank you for your understanding in this matter.  Paperwork Completion:  If you require FMLA or disability paperwork for your recovery, please make sure to either drop it off or have it faxed to our office at [phone]. Be sure the form has your name and date of birth on it.  The form will be faxed to our Forms Department and they will complete it for you.  There is a 25$ fee for all forms that need to be filled out.  Please be aware there is a 10-14 day turnaround time.  You will need to sign a release of information (VITA) form if your paperwork does not come with one.  You may call the Forms Department with any questions at [phone].  Their fax number is [phone].

## 2025-04-29 NOTE — PROGRESS NOTES
Last procedure: left L4/5 and L5/S1 RADIOFREQUENCY ABLATION OF THORACIC OR LUMBAR MEDIAL BRANCH with sedation   Date: 4/8/25  Percentage of relief obtained: 100%  Duration of relief: current    Current Pain Score: 0/10    Narcotic Contract Exp: n/a

## 2025-04-30 ENCOUNTER — TELEPHONE (OUTPATIENT)
Dept: FAMILY MEDICINE CLINIC | Facility: CLINIC | Age: 60
End: 2025-04-30

## 2025-04-30 ENCOUNTER — APPOINTMENT (OUTPATIENT)
Dept: MRI IMAGING | Facility: HOSPITAL | Age: 60
End: 2025-04-30
Attending: EMERGENCY MEDICINE
Payer: COMMERCIAL

## 2025-04-30 ENCOUNTER — HOSPITAL ENCOUNTER (EMERGENCY)
Facility: HOSPITAL | Age: 60
Discharge: HOME | End: 2025-04-30
Attending: EMERGENCY MEDICINE
Payer: COMMERCIAL

## 2025-04-30 VITALS
BODY MASS INDEX: 28 KG/M2 | SYSTOLIC BLOOD PRESSURE: 155 MMHG | TEMPERATURE: 98 F | RESPIRATION RATE: 18 BRPM | DIASTOLIC BLOOD PRESSURE: 80 MMHG | HEART RATE: 59 BPM | WEIGHT: 185 LBS | OXYGEN SATURATION: 99 %

## 2025-04-30 DIAGNOSIS — H53.2 DIPLOPIA: Primary | ICD-10-CM

## 2025-04-30 LAB
ALBUMIN SERPL-MCNC: 4.6 G/DL (ref 3.2–4.8)
ALBUMIN/GLOB SERPL: 1.6 {RATIO} (ref 1–2)
ALP LIVER SERPL-CCNC: 79 U/L (ref 45–117)
ALT SERPL-CCNC: 22 U/L (ref 10–49)
ANION GAP SERPL CALC-SCNC: 6 MMOL/L (ref 0–18)
AST SERPL-CCNC: 21 U/L (ref ?–34)
BASOPHILS # BLD AUTO: 0.04 X10(3) UL (ref 0–0.2)
BASOPHILS NFR BLD AUTO: 0.6 %
BILIRUB SERPL-MCNC: 0.6 MG/DL (ref 0.3–1.2)
BUN BLD-MCNC: 9 MG/DL (ref 9–23)
CALCIUM BLD-MCNC: 9.8 MG/DL (ref 8.7–10.6)
CHLORIDE SERPL-SCNC: 107 MMOL/L (ref 98–112)
CMV DNA DETECT, QN LOG: NOT DETECTED {LOG_IU}/ML
CMV DNA DETECT, QN: NOT DETECTED [IU]/ML
CO2 SERPL-SCNC: 27 MMOL/L (ref 21–32)
CREAT BLD-MCNC: 1.11 MG/DL (ref 0.7–1.3)
EGFRCR SERPLBLD CKD-EPI 2021: 77 ML/MIN/1.73M2 (ref 60–?)
EOSINOPHIL # BLD AUTO: 0.14 X10(3) UL (ref 0–0.7)
EOSINOPHIL NFR BLD AUTO: 2.3 %
ERYTHROCYTE [DISTWIDTH] IN BLOOD BY AUTOMATED COUNT: 14.6 %
GLOBULIN PLAS-MCNC: 2.9 G/DL (ref 2–3.5)
GLUCOSE BLD-MCNC: 129 MG/DL (ref 70–99)
HCT VFR BLD AUTO: 40.9 % (ref 39–53)
HGB BLD-MCNC: 15 G/DL (ref 13–17.5)
IMM GRANULOCYTES # BLD AUTO: 0.02 X10(3) UL (ref 0–1)
IMM GRANULOCYTES NFR BLD: 0.3 %
LYMPHOCYTES # BLD AUTO: 2.4 X10(3) UL (ref 1–4)
LYMPHOCYTES NFR BLD AUTO: 38.6 %
MCH RBC QN AUTO: 31.1 PG (ref 26–34)
MCHC RBC AUTO-ENTMCNC: 36.7 G/DL (ref 31–37)
MCV RBC AUTO: 84.9 FL (ref 80–100)
MONOCYTES # BLD AUTO: 0.22 X10(3) UL (ref 0.1–1)
MONOCYTES NFR BLD AUTO: 3.5 %
NEUTROPHILS # BLD AUTO: 3.39 X10 (3) UL (ref 1.5–7.7)
NEUTROPHILS # BLD AUTO: 3.39 X10(3) UL (ref 1.5–7.7)
NEUTROPHILS NFR BLD AUTO: 54.7 %
OSMOLALITY SERPL CALC.SUM OF ELEC: 290 MOSM/KG (ref 275–295)
PLATELET # BLD AUTO: 155 10(3)UL (ref 150–450)
POTASSIUM SERPL-SCNC: 4.4 MMOL/L (ref 3.5–5.1)
PROT SERPL-MCNC: 7.5 G/DL (ref 5.7–8.2)
RBC # BLD AUTO: 4.82 X10(6)UL (ref 4.3–5.7)
SODIUM SERPL-SCNC: 140 MMOL/L (ref 136–145)
TACROLIMUS LVL: 3.6 NG/ML
WBC # BLD AUTO: 6.2 X10(3) UL (ref 4–11)

## 2025-04-30 PROCEDURE — 70549 MR ANGIOGRAPH NECK W/O&W/DYE: CPT | Performed by: EMERGENCY MEDICINE

## 2025-04-30 PROCEDURE — A9575 INJ GADOTERATE MEGLUMI 0.1ML: HCPCS | Performed by: EMERGENCY MEDICINE

## 2025-04-30 PROCEDURE — 80053 COMPREHEN METABOLIC PANEL: CPT

## 2025-04-30 PROCEDURE — 70546 MR ANGIOGRAPH HEAD W/O&W/DYE: CPT | Performed by: EMERGENCY MEDICINE

## 2025-04-30 PROCEDURE — 85025 COMPLETE CBC W/AUTO DIFF WBC: CPT | Performed by: EMERGENCY MEDICINE

## 2025-04-30 PROCEDURE — 99284 EMERGENCY DEPT VISIT MOD MDM: CPT

## 2025-04-30 PROCEDURE — 80053 COMPREHEN METABOLIC PANEL: CPT | Performed by: EMERGENCY MEDICINE

## 2025-04-30 PROCEDURE — 99285 EMERGENCY DEPT VISIT HI MDM: CPT

## 2025-04-30 PROCEDURE — 85025 COMPLETE CBC W/AUTO DIFF WBC: CPT

## 2025-04-30 PROCEDURE — 36415 COLL VENOUS BLD VENIPUNCTURE: CPT

## 2025-04-30 PROCEDURE — 70553 MRI BRAIN STEM W/O & W/DYE: CPT | Performed by: EMERGENCY MEDICINE

## 2025-04-30 RX ORDER — GADOTERATE MEGLUMINE 376.9 MG/ML
20 INJECTION INTRAVENOUS
Status: COMPLETED | OUTPATIENT
Start: 2025-04-30 | End: 2025-04-30

## 2025-04-30 NOTE — ED INITIAL ASSESSMENT (HPI)
59YM c/c of vision problems Pt state that he started having double vision since yesterday morning. Pt state that he remains having double vision and headache at this time

## 2025-04-30 NOTE — ED PROVIDER NOTES
Patient Seen in: [facility] Emergency Department      History     Chief Complaint   Patient presents with    Diplopia    Headache     Stated Complaint: patient ambulatory c/o diplopia that began yesterday 0800 upon waking up c/o HA*    Subjective:   HPI    Patient is a 59-year-old male with a history of CAD, liver transplant in December 2023 presenting for evaluation of diplopia, mild disorientation, headache.  He reports he woke up yesterday morning just feeling a little bit off.  Did not notice definite double vision but just seeing was not quite normal he felt a little disoriented.  That persisted throughout the day and he is also noted intermittent frontal headache was not typical for him.  Today he definitely notes a horizontal binocular diplopia that is more pronounced.  It extinguishes with either eye closed.  He has not noticed any facial asymmetry, difficulty speaking or any other focal weakness or heaviness of the limb.  He has chronic diffuse peripheral paresthesias that may be seem a little more pronounced today.    History of Present Illness               Objective:     Past Medical History:    Abdominal pain    Ascites    Back problem    COVID    Disorder of liver    Cirrhosis - no longer existent since liver transplant surgery 12/19/23    Esophageal reflux    Essential hypertension    ETOH abuse    Gout    Hyperlipidemia    Hypoalbuminemia    Incisional hernia    Nausea    Personal history of alcoholism (HCC)    Thrombocytopenia    Weight loss              Past Surgical History:   Procedure Laterality Date    Colonoscopy N/A 11/20/2023    Procedure: .;  Surgeon: Gus Ocampo MD;  Location:  ENDOSCOPY    Colonoscopy N/A 11/27/2023    Procedure: COLONOSCOPY with cold snare polypectomy and clip placement x1;  Surgeon: Robin Raya DO;  Location:  ENDOSCOPY    Colonoscopy N/A 7/25/2024    Procedure: COLONOSCOPY;  Surgeon: Claudio Adams MD;  Location:  ENDOSCOPY    Organ  transplant  12/19/2023    Liver transplant    Tonsillectomy                  No pertinent social history.                              Physical Exam     ED Triage Vitals [04/30/25 1446]   /83   Pulse 67   Resp 20   Temp 97.7 °F (36.5 °C)   Temp src Oral   SpO2 98 %   O2 Device None (Room air)       Current Vitals:   Vital Signs  BP: 155/80  Pulse: 59  Resp: 18  Temp: 97.7 °F (36.5 °C)  Temp src: Oral  MAP (mmHg): (!) 104    Oxygen Therapy  SpO2: 99 %  O2 Device: None (Room air)        Physical Exam  Vitals and nursing note reviewed.   Constitutional:       Appearance: He is well-developed.   HENT:      Head: Normocephalic and atraumatic.   Eyes:      Conjunctiva/sclera: Conjunctivae normal.      Pupils: Pupils are equal, round, and reactive to light.   Cardiovascular:      Rate and Rhythm: Normal rate and regular rhythm.      Heart sounds: Normal heart sounds.   Pulmonary:      Effort: Pulmonary effort is normal.      Breath sounds: Normal breath sounds.   Abdominal:      General: Bowel sounds are normal.      Palpations: Abdomen is soft.   Musculoskeletal:         General: Normal range of motion.      Cervical back: Normal range of motion and neck supple.   Skin:     General: Skin is warm and dry.   Neurological:      Mental Status: He is alert and oriented to person, place, and time.      Comments: Cranial nerves II through XII are intact.  Extraocular movements are symmetric.  No facial asymmetry or droop.  Speech is fluent to clear with no aphasia or dysarthria.  Strength is 5/5 bilateral upper and lower extremities.  NIH is 0.           Physical Exam                ED Course     Labs Reviewed   COMP METABOLIC PANEL (14) - Abnormal; Notable for the following components:       Result Value    Glucose 129 (*)     All other components within normal limits   CBC WITH DIFFERENTIAL WITH PLATELET   RAINBOW DRAW BLUE          Results            MRI BRAIN MRA BRAIN+MRA NECK (ALL W+WO)  (CPT=70553/53184/69170)  Result Date: 4/30/2025  PROCEDURE:  MRI BRAIN MRA HEAD+MRA NECK (ALL W+WO) (CPT=70553/00538/84185)  COMPARISON:  [facility], MR, MRI BRAIN (W+WO) (CPT=70553), 4/05/2024, 11:06 AM.  INDICATIONS:  Diplopia, headache, lightheadedness, evaluate for stroke  TECHNIQUE:  MRI of the brain was performed with multi-planar T1, T2-weighted images with FLAIR sequences and diffusion weighted images without and with infusion.  MR angiography of the brain without and with infusion and MR angiography of the neck without and with infusion was performed using 3D time of flight, multi-planar and 3D reconstructed images. All measurements obtained in this exam were performed using NASCET criteria.  PATIENT STATED HISTORY:(As transcribed by Technologist)  Ongoing double vision issues   CONTRAST USED:  20 mL of Dotarem  FINDINGS:  Ventricles and sulci are normal caliber.  No abnormal extra-axial fluid collection.  No mass effect or midline shift.    There are minimal high FLAIR and T2 weighted deep white matter signal foci.  These appear unchanged and may be related to minimal chronic small vessel disease.  There is no restricted diffusion.  No abnormal parenchymal enhancement.    There is minimal dependent fluid in the right maxillary sinus.  Scattered mild fluid/mucosal thickening in a few ethmoid air cells.  The remainder have expected signal as do the mastoid air cells.    The sella and parasellar regions appear normal.  Normal central flow voids.  No orbit or globe abnormality.    The carotid and vertebral basilar arteries are patent.  Smooth tapering.  No focal stenosis or dissection.  No measurable stenosis at the carotid bifurcations.    Patent anterior, middle and posterior cerebral arteries.  Smooth tapering.  No intracranial aneurysm or arterial venous malformation.  There is a fetal type right posterior communicating artery, anatomic variation.  Left posterior communicating artery origin infundibulum.   Dural sinuses of flow related signal.              CONCLUSION:  1. No acute intracranial abnormality.  Negative for acute infarct. 2. Unremarkable [id number] MRA. 3. Unremarkable neck MRA. 4. Details as above.  Continued clinical correlation recommended.    LOCATION:  [facility]   Dictated by (CST): Sammy Dewitt MD on 4/30/2025 at 9:15 PM     Finalized by (CST): Sammy Dewitt MD on 4/30/2025 at 9:23 PM       XR PAIN CLINIC FLUOROSCOPY - N/C  Result Date: 4/8/2025  PROCEDURE:  XR PAIN CLINIC FLUOROSCOPY - N/C  TECHNIQUE:  Tech Time and fluoroscopy time were provided to the pain specialist during performance of a Pain Clinic Procedure. See Anesthesiologist note in Epic/Chart/Chart Review/Notes tab for details.  COMPARISON:  [facility] , XR, XR PAIN CLINIC FLUOROSCOPY - N/C, 2/18/2025, 3:29 PM.  [facility] , XR, XR PAIN CLINIC FLUOROSCOPY - N/C, 1/23/2025, 10:40 AM.  INDICATIONS:  Pain  HISTORY: (As transcribed by Technologist)  Left radiofrequency ablation.   FLUORSCOPY IMAGES OBTAINED:  1 FLUOROSCOPY TIME:  7 seconds TECHNOLOGIST TIME:  9 minutes RADIATION DOSE (AIR KERMA PRODUCT):  0.74 mGy  One intraoperative fluoroscopic image(s) submitted for interpretation postoperatively.  Image(s) for needle placement for pain procedure.  Correlation with real time fluoroscopy examination is recommended. See operative report for further details.            CONCLUSION: Intraoperative images for operative control.   LOCATION:  [facility]    Dictated by (CST): Monico Wiggins MD on 4/08/2025 at 3:28 PM     Finalized by (CST): Monico Wiggins MD on 4/08/2025 at 3:28 PM                            MDM      Pleasant 59-year-old male presenting with binocular horizontal diplopia.  Noted some visual changes and a headache yesterday but more vague and just sort of felt disoriented.  Definite diplopia today.  It does extinguish with 1 eye closed.  Differential is extraocular muscle dysfunction, specifically lateral rectus dysfunction versus central  cause like stroke, intracranial mass, intracranial hemorrhage.  Neurologic exam is normal otherwise I am suspicious this is more an extraocular muscle dysfunction.    Update at 9:30 PM.  MRI/MRA is totally normal.  No evidence of stroke, aneurysm, intracranial mass, hemorrhage.  Think this is more likely extraocular muscle palsy rather than a central cause and he can be discharged home.      Past Medical History-CAD, liver transplant    Differential diagnosis before testing included stroke, intracranial mass, extraocular muscle palsy    Co-morbidities that add to the complexity of management include: None    Testing ordered during this visit included labs, MRI/MRA    Radiographic images  I personally reviewed the radiographs and my individual interpretation shows no stroke or mass  I also reviewed the official reports that showed no ischemic stroke or mass        Disposition:        Discharge  I have discussed with the patient the results of test, differential diagnosis, treatment plan, warning signs and symptoms which should prompt immediate return.  They expressed understanding of these instructions and agrees to the following plan provided.  They were given written discharge instructions and agrees to return for any concerns and voiced understanding and all questions were answered.      Medical Decision Making      Disposition and Plan     Clinical Impression:  1. Diplopia         Disposition:  Discharge  4/30/2025  9:34 pm    Follow-up:  [place] [address] [place] [address] [address]  [place] [facility] [id number] [phone]  Follow up            Medications Prescribed:  Current Discharge Medication List          Supplementary Documentation:

## 2025-04-30 NOTE — ED QUICK NOTES
Patient updated on plan of care. Vitals taken. Patient taken off monitor to use restroom, ambulating with steady gait

## 2025-05-01 NOTE — DISCHARGE INSTRUCTIONS
MRI here is normal with no evidence of stroke or other abnormality.  You can call the ophthalmology office above to make follow-up appointment as needed.

## 2025-05-05 ENCOUNTER — APPOINTMENT (OUTPATIENT)
Dept: SURGERY | Facility: CLINIC | Age: 60
End: 2025-05-05

## 2025-05-06 ENCOUNTER — TELEPHONE (OUTPATIENT)
Dept: FAMILY MEDICINE CLINIC | Facility: CLINIC | Age: 60
End: 2025-05-06

## 2025-05-06 NOTE — TELEPHONE ENCOUNTER
RECEIVED 5/6/25    SENT TO [facility] 5/6/25    MEDICAL INFORMATION DEPT-[facility]  [address] [place] [address]      ENTIRE CHART

## 2025-05-13 ENCOUNTER — TELEPHONE (OUTPATIENT)
Dept: NEUROLOGY | Facility: CLINIC | Age: 60
End: 2025-05-13

## 2025-05-14 ENCOUNTER — OFFICE VISIT (OUTPATIENT)
Dept: NEUROLOGY | Facility: CLINIC | Age: 60
End: 2025-05-14
Payer: COMMERCIAL

## 2025-05-14 VITALS
HEART RATE: 74 BPM | SYSTOLIC BLOOD PRESSURE: 124 MMHG | BODY MASS INDEX: 28 KG/M2 | WEIGHT: 185 LBS | DIASTOLIC BLOOD PRESSURE: 68 MMHG | RESPIRATION RATE: 16 BRPM

## 2025-05-14 DIAGNOSIS — M53.3 SACROILIAC PAIN: ICD-10-CM

## 2025-05-14 DIAGNOSIS — M54.16 LUMBAR RADICULOPATHY: Primary | ICD-10-CM

## 2025-05-14 DIAGNOSIS — Z94.4 S/P LIVER TRANSPLANT (HCC): ICD-10-CM

## 2025-05-14 PROCEDURE — 99214 OFFICE O/P EST MOD 30 MIN: CPT | Performed by: OTHER

## 2025-05-14 RX ORDER — GABAPENTIN 300 MG/1
600 CAPSULE ORAL NIGHTLY
Qty: 60 CAPSULE | Refills: 11 | Status: SHIPPED | OUTPATIENT
Start: 2025-05-14 | End: 2026-05-14

## 2025-05-14 RX ORDER — CYCLOBENZAPRINE HCL 5 MG
5 TABLET ORAL AS NEEDED
COMMUNITY
Start: 2024-05-14

## 2025-05-14 NOTE — PROGRESS NOTES
[facility] Progress Note    HPI  Chief Complaint   Patient presents with    Neurologic Problem     Right leg pain that occurs at night. Most in the right thigh into the calf. Patient states sensation in muscle cramping. Patient primary care provider is asking to check muscle definition in the legs.        As per my initial H&P from 3/1/2024,   \" Richard Palacios is a 58 year old, who presents for evaluation of tremor, balance issues and memory changes.     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  He was reportedly doing well post-operatively and on Envarsus (tacrolimus ER) 7 mg daily and CellCept 720 mg bid for immunosuppression but re-hospitalized at [facility] after transfer from [facility] beds at the [facility] 12/28-1/5/2024 for NATHALY and hyperkalemia with supra-therapeutic tacrolimus level. He then had low back pain and found to have likely hemangioma in spine.  He was seen by hematology due to concern for possible myeloma but thought to have benign hemangioma; lower back improved with conservative management. He also had complications of procedure with biliary stent and stricture noted. He was on ursodiol.     Post operatively, he was noted to have issues with memory as well as balance and tremor and recommended to see neurology.     Patient states he started to have issues with tremor and memory in November which first started as his liver was failing.  He states even after liver transplant he has been having fatigue and balance issues and tremor.  Tremor is at rest and with movement.  He notes he may be worse on one side or the other depending on if he is holding in object in the his hands.     He admits to some tingling in hands but denies numbness/tingling in feet. He denies any episodes of speech arrest or loss of consciousness but admits he has issues with recalling conversations. He has some low back pain but not radiating to legs and denies  bowel / bladder incontinence. Overall, he feels his memory is worsening as the day goes on and admits he has been taking Flexeril, tramadol and trazodone near daily.      He denies any falls but is off balance when walking.       He states his liver rapidly failed in the past year but was diagnosed with cirrhosis 9/2022.      He states he first noted he was having issues with calculating a budget for his presentation at an end of the year conference and this was not a usual occurrence.  He also noted he would obtain the wrong object for what he needed (ie went to kitchen to get a spoon for his jello and then would come back with a cracker).          Otherwise, patient denies any recent weight change, fevers, chills, nausea, double vision/ blurry vision / loss of vision, chest pain, palpitations, shortness of breath, rashes, joint pains, bowel / bladder incontinence or mood issues. \"       Prior notes as per 5/3/2024.  Patient since last visit has been feeling his memory is worsening - he is driving and able to take his medications on own but notes he has a hard time remembering names.       He also notes pain in the mid to lower back and some radiation to R calf at times.  He denies prior history of spinal surgery. He notes pain when he is lying down as well.       Prior notes as per 7/15/2024.  Patient last seen 5/3/2024.  He overall is stable in terms of memory and denies getting lost when driving. He has continued low midline back pain at time radiating to L leg and notes balance changes and pain when walking but no falls.       Prior notes as per 10/15/2024.  Patient last seen 7/15/2024. He denies worsening memory and denies getting lost when driving. He has midline low back pain, at times moving to left leg and has been working with PT; no falls reported.  He denies worsening weakness but feels he has near constant midline low back pain. He mainly notes relief when lying on his back with slight incline; he is  on tramadol for this pain from another provider.      Patient last seen 10/14/2024.  He was improving in left leg pain and lower back pain when working with PT but was not able to continue to PT, however, due to developing incisional hernia in R lower quadrant and had to have surgery to repair this. He now has this in the left side - planning to have surgery in the future.     He was having tingling in feet but is on gabapentin 300 mg nightly and this improved. He has been seen by neurosurgery and pain management and recently had RF ablation and noted improvement in back pain.      He has noted recently pain in the right leg in the past month, occurs at night and tends to move to front of back of upper leg.     Past Medical History:    Abdominal pain    Ascites    Back problem    COVID    Disorder of liver    Cirrhosis - no longer existent since liver transplant surgery 12/19/23    Esophageal reflux    Essential hypertension    ETOH abuse    Gout    Hyperlipidemia    Hypoalbuminemia    Incisional hernia    Nausea    Personal history of alcoholism (HCC)    Thrombocytopenia    Weight loss     Past Surgical History:   Procedure Laterality Date    Colonoscopy N/A 11/20/2023    Procedure: .;  Surgeon: Gus Ocampo MD;  Location:  ENDOSCOPY    Colonoscopy N/A 11/27/2023    Procedure: COLONOSCOPY with cold snare polypectomy and clip placement x1;  Surgeon: Robin Raya DO;  Location:  ENDOSCOPY    Colonoscopy N/A 7/25/2024    Procedure: COLONOSCOPY;  Surgeon: Claudio Adams MD;  Location:  ENDOSCOPY    Organ transplant  12/19/2023    Liver transplant    Tonsillectomy       Family History   Problem Relation Age of Onset    Other (alcoholism) Father     Diabetes Mother         Type I    Other (CAD) Mother      Social History     Socioeconomic History    Marital status:    Tobacco Use    Smoking status: Some Days     Types: Cigars     Passive exposure: Never    Smokeless tobacco: Never    Tobacco  comments:     Still not smoking cigarettes since 2014. Do smoke a couple of cigars a week.   Vaping Use    Vaping status: Never Used   Substance and Sexual Activity    Alcohol use: Not Currently    Drug use: No   Other Topics Concern    Caffeine Concern Yes     Comment: 1 cup a day    Exercise No       No Known Allergies      Current Outpatient Medications:     cyclobenzaprine 5 MG Oral Tab, Take 1 tablet (5 mg total) by mouth as needed., Disp: , Rfl:     gabapentin 300 MG Oral Cap, Take 2 capsules (600 mg total) by mouth nightly., Disp: 60 capsule, Rfl: 11    TRAZODONE 100 MG Oral Tab, TAKE 1 TABLET(100 MG) BY MOUTH EVERY NIGHT, Disp: 90 tablet, Rfl: 1    PANTOPRAZOLE 40 MG Oral Tab EC, TAKE 1 TABLET(40 MG) BY MOUTH TWICE DAILY BEFORE MEALS, Disp: 60 tablet, Rfl: 10    mycophenolate sodium 360 MG Oral Tab EC, Take 2 tablets (720 mg total) by mouth 2 (two) times daily., Disp: , Rfl:     ENVARSUS XR 0.75 MG Oral Tablet 24 Hr, 2 tablets daily., Disp: , Rfl:     URSODIOL 300 MG Oral Cap, TAKE 1 CAPSULE(300 MG) BY MOUTH THREE TIMES DAILY, Disp: 270 capsule, Rfl: 1    ALLOPURINOL 100 MG Oral Tab, TAKE 1 TABLET(100 MG) BY MOUTH DAILY, Disp: 90 tablet, Rfl: 3    rosuvastatin 10 MG Oral Tab, Take 1 tablet (10 mg total) by mouth daily., Disp: , Rfl:     traMADol 50 MG Oral Tab, Take 1 tablet (50 mg total) by mouth every 6 (six) hours as needed for Pain., Disp: , Rfl:     acetaminophen 500 MG Oral Tab, Take 1 tablet (500 mg total) by mouth every 6 (six) hours as needed for Pain., Disp: , Rfl:     LOKELMA 10 g Oral Powd Pack, Take 1 packet (10 g total) by mouth daily., Disp: 90 packet, Rfl: 1    Magnesium Oxide -Mg Supplement 400 (240 Mg) MG Oral Tab, Take 1 tablet (400 mg total) by mouth daily., Disp: 30 tablet, Rfl: 1    valGANciclovir 450 MG Oral Tab, Take 2 tablets (900 mg total) by mouth daily., Disp: , Rfl:     aspirin 81 MG Oral Tab EC, Take 1 tablet (81 mg total) by mouth daily., Disp: , Rfl:     Sildenafil Citrate 100  MG Oral Tab, Take 0.5-1 tablets ( mg total) by mouth daily as needed for Erectile Dysfunction., Disp: 45 tablet, Rfl: 3    Review of Systems:  No chest pain or palpitations; otherwise as noted in HPI.    Exam:  /68   Pulse 74   Resp 16   Wt 185 lb (83.9 kg)   BMI 28.13 kg/m²   Estimated body mass index is 28.13 kg/m² as calculated from the following:    Height as of 4/7/25: 68\".    Weight as of this encounter: 185 lb (83.9 kg).    Gen: well developed, well nourished, no acute distress  HEENT: normocephalic  Heart; normal S1/S2, regular rate and rhythm  Lungs: clear to auscultation bilaterally  Extremities: no edema, peripheral pulses intact    Neck: supple, full range of motion; no carotid bruits    Fundoscopic Exam: optic discs sharp bilaterally    Neuro:  Mental status:  Orientation: Alert and oriented to person, place, time  Speech Fluent and conversational      CN: PERRL, EOMI with no nystagmus, VFF, smile symmetric, sensation intact, tongue and palate midline, SCM intact, otherwise, CN 2-12 intact  Motor: 5/5 strength throughout, tone normal    Mild tremor noted, high frequency low amplitude at rest, slightly more prominent R side; no decrement of amplitude but slower on left side;     DTR:  3+ brisk patellars, trace ankle jerks, 2+ but slightly brisker R UE otherwise, no clonus, toes downgoing bilaterally    Sensory: intact to light touch throughout; Pin is reduced up to ankles bilaterally     Vibration is reduced -  5 sec both great toes    Coord: FNF intact with no tremor or dysmetria; rapid alternating movements intact bilaterally  Romberg: present  Gait: Independent without cane or walker - overall improved, able to walk on heels, toes and briefly tandem    DAVION test: postive to R side     Labs:  None new    Prior as noted below    Component      Latest Ref Rng 5/6/2024   PROTEIN, TOTAL      5.7 - 8.2 g/dL 7.0    Albumin      3.75 - 5.21 g/dL 4.33    ALPHA-1-GLOBULINS      0.19 - 0.46 g/dL  0.32    ALPHA-2-GLOBULINS      0.48 - 1.05 g/dL 0.64    BETA GLOBULINS      0.68 - 1.23 g/dL 0.82    GAMMA GLOBULINS      0.62 - 1.70 g/dL 0.90    ALBUMIN/GLOBULIN RATIO      1.00 - 2.00  1.62    SPE INTERPRETATION No apparent monoclonal protein on serum electrophoresis.    IMMUNOFIXATION No monoclonal protein detected by immunofixation.    KAPPA FREE LIGHT CHAIN      0.330 - 1.940 mg/dL 3.382 (H)    LAMBDA FREE LIGHT CHAIN      0.571 - 2.630 mg/dL 2.288    KAPPA/LAMBDA FLC RATIO      0.26 - 1.65  1.48    Reviewed By: Reviewed by Andriy Arboleda M.D. Pathology 05/07/24 at 3:25 PM    MYELOPEROX ANTIBODIES, IGG      0.0 - 0.9 units <0.2    SERINE PROTEASE3, IGG      0.0 - 0.9 units <0.2    Cytoplasmic (C-ANCA)      Neg:<1:20 titer <1:20    Perinuclear (P-ANCA)      Neg:<1:20 titer <1:20    ATYPICAL PANCA      Neg:<1:20 titer <1:20    Expanded STAN Antibody Screen, IGG      <0.7 ug/l 0.60    Anti-dsDNA antibody      <10 IU/mL 2.3    Connective Tissue Disease Screen Interpretation      Negative  Negative    SED RATE      0 - 20 mm/Hr 10    C-REACTIVE PROTEIN      <0.30 mg/dL <0.29       Prior as noted below    Component      Latest Ref Rng 3/5/2024 3/26/2024 4/5/2024   WBC      4.0 - 11.0 x10(3) uL   4.9    RBC      4.30 - 5.70 x10(6)uL   4.66    Hemoglobin      13.0 - 17.5 g/dL   13.5    Hematocrit      39.0 - 53.0 %   40.8    Platelet Count      150.0 - 450.0 10(3)uL   200.0    MCV      80.0 - 100.0 fL   87.6    MCH      26.0 - 34.0 pg   29.0    MCHC      31.0 - 37.0 g/dL   33.1    RDW      %   12.9    Prelim Neutrophil Abs      1.50 - 7.70 x10 (3) uL   3.26    Neutrophils Absolute      1.50 - 7.70 x10(3) uL   3.26    Lymphocytes Absolute      1.00 - 4.00 x10(3) uL   1.21    Monocytes Absolute      0.10 - 1.00 x10(3) uL   0.31    Eosinophils Absolute      0.00 - 0.70 x10(3) uL   0.09    Basophils Absolute      0.00 - 0.20 x10(3) uL   0.03    Immature Granulocyte Absolute      0.00 - 1.00 x10(3) uL   0.02     Neutrophils %      %   66.3    Lymphocytes %      %   24.6    Monocytes %      %   6.3    Eosinophils %      %   1.8    Basophils %      %   0.6    Immature Granulocyte %      %   0.4    Glucose      70 - 99 mg/dL   96    Sodium      136 - 145 mmol/L   141    Potassium      3.5 - 5.1 mmol/L   4.3    Chloride      98 - 112 mmol/L   109    Carbon Dioxide, Total      21.0 - 32.0 mmol/L   27.0    ANION GAP      0 - 18 mmol/L   5    BUN      9 - 23 mg/dL   16    CREATININE      0.70 - 1.30 mg/dL   1.01    CALCIUM      8.5 - 10.1 mg/dL   9.8    CALCULATED OSMOLALITY      275 - 295 mOsm/kg   293    EGFR      >=60 mL/min/1.73m2   87    AST (SGOT)      15 - 37 U/L   16    ALT (SGPT)      16 - 61 U/L   23    ALKALINE PHOSPHATASE      45 - 117 U/L   91    Total Bilirubin      0.1 - 2.0 mg/dL   0.5    PROTEIN, TOTAL      6.4 - 8.2 g/dL   6.9    Albumin      3.4 - 5.0 g/dL   3.7    Globulin      2.8 - 4.4 g/dL   3.2    A/G Ratio      1.0 - 2.0    1.2    Patient Fasting for CMP?   No    Vitamin B12      193 - 986 pg/mL 420      AMMONIA      11 - 32 umol/L 15      Tacrolimus Lvl      2.0 - 20.0 ng/mL  5.0  6.5    Magnesium, Serum      1.6 - 2.6 mg/dL   1.7          Imaging:  None new    Prior as noted below    MRI CERVICAL+THOR+LUMB SPINE (ALL W+WO) (CPT=72156/43358/28791)    Result Date: 6/11/2024    CERVICAL FINDINGS:   There is mild straightening of the normal cervical lordosis with anatomic alignment.  Vertebral body heights are well-maintained.  Mild degenerative disc space loss, disc desiccation, endplate spurring, and degenerative endplate marrow signal changes   most pronounced at C5-6 and C6-7.  No focal worrisome marrow signal abnormality is seen.  No suspicious enhancement.  Degenerative enhancement noted along the spinous processes of C6 and C7 along the interspinous regions.      The cervical spinal cord has a normal course and caliber.  No focal cord signal abnormality is seen.  No focal mass or fluid collection is  seen in the cervical spinal canal.  The paraspinal soft tissues are unremarkable.  The craniocervical junction is   unremarkable.      C2-3: There is no significant abnormality.      C3-4: There is a minimal posterior disk osteophyte complex with minimal uncovertebral and facet joint degenerative changes.  There is no significant spinal canal or neural foraminal stenosis.      C4-5: There is a minimal posterior disk osteophyte complex with minimal uncovertebral and facet joint degenerative changes.  There is no significant spinal canal or neural foraminal stenosis.      C5-6: There is a posterior disk osteophyte complex with mild to moderate uncovertebral and facet joint degenerative changes.  There is mild spinal canal stenosis with minimal flattening the ventral spinal cord.  Mild-to-moderate right and moderate left   neural foraminal stenosis.      C6-7: There is a posterior disk osteophyte complex with mild-to-moderate uncovertebral and facet joint degenerative changes.  There is mild spinal canal stenosis with minimal flattening the ventral spinal cord.  Mild to moderate bilateral neural   foraminal stenosis.      C7-T1: There are minimal uncovertebral and facet joint degenerative changes.  There is no significant spinal canal or neural foraminal stenosis.      THORACIC FINDINGS    There is normal thoracic kyphosis with anatomic alignment.  Vertebral body heights are well-maintained.  Scattered mild degenerative disc space loss, disc desiccation, endplate spurring, and degenerative endplate marrow signal changes scattered in the   thoracic spine, most pronounced in the mid to lower thoracic spine.  A few scattered Schmorl's nodes are noted.  No focal worrisome marrow signal abnormality is identified.  No suspicious enhancement.      The thoracic spinal cord has a normal course and caliber.  No focal cord signal abnormality is seen.  No focal mass or fluid collection is seen in the thoracic spinal canal.  The  paraspinal soft tissues are grossly unremarkable.      There is no significant spinal canal or neural foraminal stenosis at any level in the thoracic spine.  Minimal left paracentral disc protrusion at T3-4.  Small right paracentral disc protrusion at T6-7.  Small left paracentral disc protrusion at T7-8.    Minimal left paracentral disc protrusion at T8-9.  Minimal left paracentral disc protrusion at T9-10.  Minimal right paracentral disc protrusion at T10-11.      LUMBAR FINDINGS:   Patient motion noted.      Stable holovertebral hemangioma at the L1 level resulting in minimal cortical expansion along the dorsal cortex of the vertebral body.  No pathologic fracture identified.  Stable small hemangioma also noted in the L2 vertebral body.  The suspected   subcentimeter hemangioma in the dorsal aspect of the L5 vertebral body is also again noted, however limited in evaluation due to patient motion, but appearing grossly unchanged.      There is normal lumbar lordosis with anatomic alignment.  Vertebral body heights are well-maintained.  Mild degenerative disc space loss, disc desiccation, endplate spurring, and degenerative endplate marrow signal changes scattered in the lumbar spine.    No suspicious enhancement.  Scattered degenerative enhancement noted, particularly in the mid to lower lumbar spine along the facet joints and interspinous regions.      The distal spinal cord and conus medullaris have a normal signal and morphology.  The conus medullaris terminates at the mid L1 level.  The roots of the cauda equina are unremarkable.  No focal mass or fluid collection is seen in the lumbar spinal canal.     The paraspinal soft tissues are unremarkable.      T12-L1: There is no significant abnormality.      L1-2:  Minimal diffuse disc bulge with mild facet arthropathy. There is no significant spinal canal or neural foraminal stenosis. No significant interval change.      L2-3:  Mild diffuse disc bulge with mild  facet arthropathy. There is no significant spinal canal stenosis.  There is mild left neural foraminal stenosis. No significant interval change.      L3-4:  Diffuse disc bulge with mild facet arthropathy.  There is no significant spinal canal stenosis.  There is mild right neural foraminal stenosis. No significant interval change.      L4-5:  Diffuse disc bulge with a small superimposed broad-based central disc protrusion.  Mild to moderate facet arthropathy and thickening of ligamentum flavum.  No significant spinal canal stenosis.  Mild bilateral neural foraminal stenosis. No   significant interval change.      L5-S1:  Minimal diffuse disc bulge with mild facet arthropathy. There is no significant spinal canal or neural foraminal stenosis. No significant interval change.     CONCLUSION:   1. Stable holovertebral hemangioma at the L1 level resulting in minimal cortical expansion along the dorsal cortex of the vertebral body.  No pathologic fracture identified.  Stable small hemangioma also noted in the L2 vertebral body.  2. Multilevel degenerative changes the cervical spine are most pronounced at the C5-6 and C6-7 levels.  3. Mild spinal canal stenosis at C5-6 and C6-7.  4. Mild-to-moderate right and moderate left neural foraminal stenosis at C5-6.  Mild to moderate bilateral neural foraminal stenosis at C6-7.  5. Multilevel degenerative changes in the thoracic spine as above without significant spinal canal or neural foraminal stenosis at any level.  6. Stable multilevel degenerative changes lumbar spine as above without significant spinal canal stenosis at any level.  7. Stable mild left neural foraminal stenosis at L2-3.  Stable mild right neural foraminal stenosis at L3-4.  Stable mild bilateral neural foraminal stenosis at L4-5.  8. Stable facet arthropathy in the lumbar spine as above.  9. No focal spinal cord signal abnormality identified in the cervical or thoracic spinal cord as above.  Please see above  for further details.     Prior as noted below    MRI brain with and without contrast (4/5/2024):   FINDINGS:  The ventricles and sulci are within normal limits.  There is no midline shift or mass effect.  The basal cisterns are patent.  The gray-white matter differentiation is intact.  The craniocervical junction is unremarkable.       Minimal chronic microvascular ischemic changes in the cerebral white matter.     There is no acute intracranial hemorrhage or extra-axial fluid collection identified.  No significant abnormal parenchymal gradient susceptibility. There is no abnormal parenchymal or leptomeningeal enhancement.  There is no restricted diffusion to  suggest acute ischemia/infarction.     Right maxillary retention cyst.  Minimal ethmoid, right frontal, and bilateral maxillary sinus mucosal thickening.  Trace fluid in the right mastoid air cells.  The expected major intracranial flow voids are present.                   Impression   CONCLUSION:       1. No acute intracranial abnormality identified.       2. Minimal chronic microvascular ischemic changes in the cerebral white matter.      Other procedures    None new    Prior as noted below    NCS/EMG (3/19/2024):     Sensory NCS      Nerve / Sites Rec. Site Onset Lat Peak Lat NP Amp PP Amp Segments Distance Velocity Comment       ms ms µV µV   cm m/s     R Median - Dig II (Antidromic)      Wrist Index 3.85 4.69 24.6 49.6 Wrist - Index 14 36        Ref.   <=3.30 <=4.00 >=7.0 >=8.0 Ref.         R Ulnar - Dig V (Antidromic)      Wrist Dig V 2.55 3.44 42.1 45.6 Wrist - Dig V 12 47        Ref.   <=3.10 <=4.00 >=5.0 >=4.0 Ref.         R Radial - Superficial (Antidromic)      Forearm Wrist 1.93 2.60 28.5 24.3 Forearm - Wrist 10 52        Ref.   <=2.20 <=2.80 >=7.0 >=11.0 Ref.         R Sural - (Antidromic)      Calf Ankle 3.49 4.38 7.6 9.9 Calf - Ankle 14 40        Ref.   <=3.60 <=4.50 >=4.0 >=4.0 Ref.            2 Ankle 3.59 4.43 5.7 10.7           L Sural -  (Antidromic)      Calf Ankle 3.65 4.64 6.6 8.4 Calf - Ankle 14 38        Ref.   <=3.60 <=4.50 >=4.0 >=4.0 Ref.             Motor NCS      Nerve / Sites Muscle Latency Amplitude Segments Dist. Lat Diff Velocity Comments       ms mV   cm ms m/s     R Median - APB      Wrist APB 4.54 6.0 Wrist - APB 7            Ref.   <=4.70 >=4.2 Ref.              Elbow APB 9.42 5.0 Elbow - Wrist 23 4.88 47.2        Ref.       Ref.     >=47.0     R Ulnar - ADM      Wrist ADM 2.73 11.0 Wrist - ADM 7            Ref.   <=3.70 >=7.9 Ref.              B.Elbow ADM 7.31 10.9 B.Elbow - Wrist 22.5 4.58 49.1        Ref.       Ref.     >=52.0     R Peroneal - EDB      Ankle EDB 5.92 4.4 Ankle - EDB 7            Ref.   <=6.50 >=1.1 Ref.              B. Fib Head EDB 14.92 3.8 B. Fib Head - Ankle 32.5 9.00 36.1        Ref.       Ref.     >=36.0     L Peroneal - EDB      Ankle EDB 9.65 1.0 Ankle - EDB 8            Ref.   <=6.50 >=1.1 Ref.              B. Fib Head EDB 18.33 1.2 B. Fib Head - Ankle 30.5 8.69 35.1        Ref.       Ref.     >=36.0     R Tibial - AH      Ankle AH 4.65 9.3 Ankle - AH 8            Ref.   <=6.10 >=5.3 Ref.              Knee AH 16.83 5.4 Knee - Ankle 40 12.19 32.8        Ref.       Ref.     >=34.0     L Tibial - AH      Ankle AH 6.08 9.9 Ankle - AH 8            Ref.   <=6.10 >=5.3 Ref.              Knee AH 17.85 6.1 Knee - Ankle 40 11.77 34.0        Ref.       Ref.     >=34.0         F  Wave      Nerve M Latency F Latency     ms ms   R Peroneal - EDB 6.6 57.5   Ref.   <=63.6   R Tibial - AH 5.4 56.8   Ref.   <=61.1   L Tibial - AH 7.9 58.9   Ref.   <=61.1   L Peroneal - EDB NR NR   Ref.   <=63.6   R Median - APB 4.9 31.2   Ref.   <=31.5   R Ulnar - ADM 3.3 30.1   Ref.   <=30.9                   EMG Summary Table       Spontaneous MUAP Recruitment   Muscle IA Fib PSW Fasc H.F. Amp Dur. PPP Pattern   R. Deltoid N None None None None N N N N   R. Biceps brachii N None None None None N N N N   R. Triceps brachii N None None  None None N N N N   R. Extensor digitorum communis N None None None None N N N N   R. First dorsal interosseous N None None None None N N N N   R. Vastus medialis N None None None None N N N N   L. Vastus medialis N None None None None N N N N   R. Peroneus longus N None None None None N N N N   L. Peroneus longus N None None None None N N N N   R. Tibialis anterior N None None None None N N N N   L. Tibialis anterior N None None None None N N N N   R. Gastrocnemius N None None None None N N N N   L. Gastrocnemius N None None None None N N N N   R. Extensor hallucis longus N None None None None N N N N   L. Extensor hallucis longus N None None None None N N N N       Summary     Nerve conduction studies;   Right sural sensory response was normal.  Left sural sensory response was borderline due to mildly prolonged peak latency, with normal amplitude, and borderline conduction velocity; rapid cooling should be considered.  Right median sensory response was abnormal due to prolonged peak latency, with normal amplitude, and slowed conduction velocity.  Right ulnar sensory response was borderline due to borderline conduction velocity, with normal amplitude, normal peak latency; rapid cooling should be considered.  Right radial sensory response was normal.  Right common peroneal motor response was normal; F-wave response was normal.  Left common peroneal motor response was abnormal due to markedly prolonged distal motor latency, with reduced amplitude, and mildly slowed conduction velocity; F-wave response was absent.  Right tibial motor response was borderline due to borderline conduction velocity, with normal amplitude and normal distal motor latency; F-wave response was normal.  Left tibial motor response was normal; F-wave response was normal.  Right median motor response was normal; F-wave response was normal.  Right ulnar motor response was normal; F-wave response was normal.     EMG (needle exam):  Concentric  needle EMG study was normal in all 15 tested muscles: R. Deltoid, R. Biceps brachii, R. Triceps brachii, R. Extensor digitorum communis, R. First dorsal interosseous, R. Vastus medialis, L. Vastus medialis, R. Peroneus longus, L. Peroneus longus, R. Tibialis anterior, L. Tibialis anterior, R. Gastrocnemius, L. Gastrocnemius, R. Extensor hallucis longus, L. Extensor hallucis longus.              Conclusion:   This is a mildly abnormal study.  There is electrophysiologic evidence to suggest asymmetry of responses in the left lower extremity relative to the right lower extremity with demyelinating changes greater than axonal changes in the left tibial motor response.  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      Clinical comment:  This does not appear typical for a length dependent polyneuropathy or a primary demyelinating neuropathy; there is no suggestion for a myopathy; while there are some demyelinating sensory and motor features in the right upper extremity and left lower extremity, this does not meet criteria for chronic inflammatory demyelinating polyneuropathy.  There are no acute denervation changes noted.  The significance of these findings is questionable and may be technically related or suggest an atypical neuropathy or possibly a chronic lumbosacral radiculopathy superimposed upon a right carpal tunnel syndrome.  Clinical correlation is advised             Impression/ Plan:  Richard Palacios is a 59 year old male who originally presented 3/1/2024 for evaluation of tremor, balance issues and memory changes.      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  He was reportedly doing well post-operatively and on Envarsus (tacrolimus ER) 7 mg daily and CellCept 720 mg bid for immunosuppression but re-hospitalized at [facility] after transfer from [facility]  12/28-1/5/2024 for NATHALY and hyperkalemia with supra-therapeutic tacrolimus level. He then had low back pain and found to have likely hemangioma in spine.  He was seen by hematology due to concern for possible myeloma but thought to have benign hemangioma; lower back improved with conservative management. He also had complications of procedure with biliary stent and stricture noted. He was on ursodiol.     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       Overall, walking appears improved.      . He has had workup with MRI C/T/L spine not showing any myelopathy to account for balance issues - suspect he may have lumbar stenosis and/or radiculopathy and has been following with PT with some improvement. He was improving in left leg pain and lower back pain when working with PT but was not able to continue to PT, however, due to developing incisional hernia in R lower quadrant and had to have surgery to repair this. He now has this in the left side - planning to have surgery in the future.     He was having tingling in feet but is on gabapentin 300 mg nightly and this improved. He has been seen by neurosurgery and pain  management and recently had RF ablation and noted improvement in back pain.      He has noted recently pain in the right leg in the past month, occurs at night and tends to move to front of back of upper leg.      DAVION test: postive to R side - suspect sacro-illiac pain - will try higher dose of gabapentin at night - increase to 300 mg AM / 600 mg nightly; in addition, recommend discuss with pain management for possible SI joint injection      1. Lumbar radiculopathy  As noted above   - gabapentin 300 MG Oral Cap; Take 2 capsules (600 mg total) by mouth nightly.  Dispense: 60 capsule; Refill: 11    2. S/P liver transplant (HCC)  As noted above    3. Sacroiliac pain  As noted above     Return in about 3 months (around 8/14/2025).    Nir Blakely MD, Neurology  [facility]  Pager [phone]  5/14/2025

## 2025-05-14 NOTE — PATIENT INSTRUCTIONS
Refill policies:    Allow 2-3 business days for refills; controlled substances may take longer.  Contact your pharmacy at least 5 days prior to running out of medication and have them send an electronic request or submit request through the “request refill” option in your [employer] account.  Refills are not addressed on weekends; covering physicians do not authorize routine medications on weekends.  No narcotics or controlled substances are refilled after noon on Fridays or by on call physicians.  By law, narcotics must be electronically prescribed.  A 30 day supply with no refills is the maximum allowed.  If your prescription is due for a refill, you may be due for a follow up appointment.  To best provide you care, patients receiving routine medications need to be seen at least once a year.  Patients receiving narcotic/controlled substance medications need to be seen at least once every 3 months.  In the event that your preferred pharmacy does not have the requested medication in stock (e.g. Backordered), it is your responsibility to find another pharmacy that has the requested medication available.  We will gladly send a new prescription to that pharmacy at your request.    Scheduling Tests:    If your physician has ordered radiology tests such as MRI or CT scans, please contact Central Scheduling at [phone] right away to schedule the test.  Once scheduled, the [facility] Centralized Referral Team will work with your insurance carrier to obtain pre-certification or prior authorization.  Depending on your insurance carrier, approval may take 3-10 days.  It is highly recommended patients assure they have received an authorization before having a test performed.  If test is done without insurance authorization, patient may be responsible for the entire amount billed.      Precertification and Prior Authorizations:  If your physician has recommended that you have a procedure or additional testing performed the [facility]  Centralized Referral Team will contact your insurance carrier to obtain pre-certification or prior authorization.    You are strongly encouraged to contact your insurance carrier to verify that your procedure/test has been approved and is a COVERED benefit.  Although the [facility] Centralized Referral Team does its due diligence, the insurance carrier gives the disclaimer that \"Although the procedure is authorized, this does not guarantee payment.\"    Ultimately the patient is responsible for payment.   Thank you for your understanding in this matter.  Paperwork Completion:  If you require FMLA or disability paperwork for your recovery, please make sure to either drop it off or have it faxed to our office at [phone]. Be sure the form has your name and date of birth on it.  The form will be faxed to our Forms Department and they will complete it for you.  There is a 25$ fee for all forms that need to be filled out.  Please be aware there is a 10-14 day turnaround time.  You will need to sign a release of information (VITA) form if your paperwork does not come with one.  You may call the Forms Department with any questions at [phone].  Their fax number is [phone].

## 2025-05-21 ENCOUNTER — LAB ENCOUNTER (OUTPATIENT)
Dept: LAB | Age: 60
End: 2025-05-21
Attending: INTERNAL MEDICINE
Payer: COMMERCIAL

## 2025-05-21 LAB
ALBUMIN SERPL-MCNC: 4.6 G/DL (ref 3.2–4.8)
ALBUMIN/GLOB SERPL: 1.5 {RATIO} (ref 1–2)
ALP LIVER SERPL-CCNC: 71 U/L (ref 45–117)
ALT SERPL-CCNC: 22 U/L (ref 10–49)
ANION GAP SERPL CALC-SCNC: 4 MMOL/L (ref 0–18)
AST SERPL-CCNC: 21 U/L (ref ?–34)
BASOPHILS # BLD AUTO: 0.04 X10(3) UL (ref 0–0.2)
BASOPHILS NFR BLD AUTO: 0.7 %
BILIRUB SERPL-MCNC: 0.6 MG/DL (ref 0.3–1.2)
BUN BLD-MCNC: 12 MG/DL (ref 9–23)
CALCIUM BLD-MCNC: 9.9 MG/DL (ref 8.7–10.6)
CHLORIDE SERPL-SCNC: 106 MMOL/L (ref 98–112)
CO2 SERPL-SCNC: 28 MMOL/L (ref 21–32)
CREAT BLD-MCNC: 1.18 MG/DL (ref 0.7–1.3)
EGFRCR SERPLBLD CKD-EPI 2021: 72 ML/MIN/1.73M2 (ref 60–?)
EOSINOPHIL # BLD AUTO: 0.08 X10(3) UL (ref 0–0.7)
EOSINOPHIL NFR BLD AUTO: 1.3 %
ERYTHROCYTE [DISTWIDTH] IN BLOOD BY AUTOMATED COUNT: 14.4 %
FASTING STATUS PATIENT QL REPORTED: YES
GLOBULIN PLAS-MCNC: 3 G/DL (ref 2–3.5)
GLUCOSE BLD-MCNC: 104 MG/DL (ref 70–99)
HCT VFR BLD AUTO: 44.7 % (ref 39–53)
HGB BLD-MCNC: 16.1 G/DL (ref 13–17.5)
IMM GRANULOCYTES # BLD AUTO: 0.02 X10(3) UL (ref 0–1)
IMM GRANULOCYTES NFR BLD: 0.3 %
LYMPHOCYTES # BLD AUTO: 1.97 X10(3) UL (ref 1–4)
LYMPHOCYTES NFR BLD AUTO: 32.5 %
MAGNESIUM SERPL-MCNC: 1.7 MG/DL (ref 1.6–2.6)
MCH RBC QN AUTO: 31.5 PG (ref 26–34)
MCHC RBC AUTO-ENTMCNC: 36 G/DL (ref 31–37)
MCV RBC AUTO: 87.5 FL (ref 80–100)
MONOCYTES # BLD AUTO: 0.25 X10(3) UL (ref 0.1–1)
MONOCYTES NFR BLD AUTO: 4.1 %
NEUTROPHILS # BLD AUTO: 3.7 X10 (3) UL (ref 1.5–7.7)
NEUTROPHILS # BLD AUTO: 3.7 X10(3) UL (ref 1.5–7.7)
NEUTROPHILS NFR BLD AUTO: 61.1 %
OSMOLALITY SERPL CALC.SUM OF ELEC: 286 MOSM/KG (ref 275–295)
PLATELET # BLD AUTO: 181 10(3)UL (ref 150–450)
POTASSIUM SERPL-SCNC: 5.1 MMOL/L (ref 3.5–5.1)
PROT SERPL-MCNC: 7.6 G/DL (ref 5.7–8.2)
RBC # BLD AUTO: 5.11 X10(6)UL (ref 4.3–5.7)
SODIUM SERPL-SCNC: 138 MMOL/L (ref 136–145)
WBC # BLD AUTO: 6.1 X10(3) UL (ref 4–11)

## 2025-05-21 PROCEDURE — 85025 COMPLETE CBC W/AUTO DIFF WBC: CPT

## 2025-05-21 PROCEDURE — 83735 ASSAY OF MAGNESIUM: CPT

## 2025-05-21 PROCEDURE — 36415 COLL VENOUS BLD VENIPUNCTURE: CPT

## 2025-05-21 PROCEDURE — 80197 ASSAY OF TACROLIMUS: CPT

## 2025-05-21 PROCEDURE — 80053 COMPREHEN METABOLIC PANEL: CPT

## 2025-05-24 LAB — TACROLIMUS LVL: 6.4 NG/ML

## 2025-05-27 RX ORDER — URSODIOL 300 MG/1
300 CAPSULE ORAL 3 TIMES DAILY
Qty: 270 CAPSULE | Refills: 1 | Status: SHIPPED | OUTPATIENT
Start: 2025-05-27

## 2025-05-27 NOTE — TELEPHONE ENCOUNTER
A refill request was received for:  Requested Prescriptions     Pending Prescriptions Disp Refills    URSODIOL 300 MG Oral Cap [Pharmacy Med Name: URSODIOL 300MG CAPSULES] 270 capsule 1     Sig: TAKE 1 CAPSULE(300 MG) BY MOUTH THREE TIMES DAILY       Last refill date: 11/16/2024      Last office visit: 8/2024    Follow up due:  Future Appointments   Date Time Provider Department Center   5/28/2025  8:30 AM Lg Wood PA ENIPain [facility] [place]   5/28/2025  1:00 PM Gus Ocampo MD SGINP E[facility] SUB GI   6/11/2025  7:45 AM BK MR RM1 (1.5T)  [facility] [address]   6/16/2025  8:45 AM Crystal Bryan MD EMGSURGONC EMG S[facility]c   6/17/2025  7:45 AM BK MR RM1 (1.5T)  [facility] [address]   6/23/2025  3:30 PM Lg Sosa MD EMG [address] [facility] [address]   7/1/2025  9:00 AM Roland Colón MD ENINAPER2 EMG [place]

## 2025-05-28 ENCOUNTER — TELEPHONE (OUTPATIENT)
Dept: PAIN CLINIC | Facility: CLINIC | Age: 60
End: 2025-05-28

## 2025-05-28 ENCOUNTER — OFFICE VISIT (OUTPATIENT)
Dept: PAIN CLINIC | Facility: CLINIC | Age: 60
End: 2025-05-28
Payer: COMMERCIAL

## 2025-05-28 VITALS
RESPIRATION RATE: 18 BRPM | BODY MASS INDEX: 28.04 KG/M2 | DIASTOLIC BLOOD PRESSURE: 70 MMHG | HEIGHT: 68 IN | SYSTOLIC BLOOD PRESSURE: 120 MMHG | OXYGEN SATURATION: 98 % | WEIGHT: 185 LBS | HEART RATE: 82 BPM

## 2025-05-28 DIAGNOSIS — M46.1 SACROILIITIS: Primary | ICD-10-CM

## 2025-05-28 PROCEDURE — 99214 OFFICE O/P EST MOD 30 MIN: CPT | Performed by: PHYSICIAN ASSISTANT

## 2025-05-28 NOTE — PATIENT INSTRUCTIONS
Refill policies:    Allow 2-3 business days for refills; controlled substances may take longer.  Contact your pharmacy at least 5 days prior to running out of medication and have them send an electronic request or submit request through the “request refill” option in your [employer] account.  Refills are not addressed on weekends; covering physicians do not authorize routine medications on weekends.  No narcotics or controlled substances are refilled after noon on Fridays or by on call physicians.  By law, narcotics must be electronically prescribed.  A 30 day supply with no refills is the maximum allowed.  If your prescription is due for a refill, you may be due for a follow up appointment.  To best provide you care, patients receiving routine medications need to be seen at least once a year.  Patients receiving narcotic/controlled substance medications need to be seen at least once every 3 months.  In the event that your preferred pharmacy does not have the requested medication in stock (e.g. Backordered), it is your responsibility to find another pharmacy that has the requested medication available.  We will gladly send a new prescription to that pharmacy at your request.    Scheduling Tests:    If your physician has ordered radiology tests such as MRI or CT scans, please contact Central Scheduling at [phone] right away to schedule the test.  Once scheduled, the [facility] Centralized Referral Team will work with your insurance carrier to obtain pre-certification or prior authorization.  Depending on your insurance carrier, approval may take 3-10 days.  It is highly recommended patients assure they have received an authorization before having a test performed.  If test is done without insurance authorization, patient may be responsible for the entire amount billed.      Precertification and Prior Authorizations:  If your physician has recommended that you have a procedure or additional testing performed the [facility]  Centralized Referral Team will contact your insurance carrier to obtain pre-certification or prior authorization.    You are strongly encouraged to contact your insurance carrier to verify that your procedure/test has been approved and is a COVERED benefit.  Although the [facility] Centralized Referral Team does its due diligence, the insurance carrier gives the disclaimer that \"Although the procedure is authorized, this does not guarantee payment.\"    Ultimately the patient is responsible for payment.   Thank you for your understanding in this matter.  Paperwork Completion:  If you require FMLA or disability paperwork for your recovery, please make sure to either drop it off or have it faxed to our office at [phone]. Be sure the form has your name and date of birth on it.  The form will be faxed to our Forms Department and they will complete it for you.  There is a 25$ fee for all forms that need to be filled out.  Please be aware there is a 10-14 day turnaround time.  You will need to sign a release of information (VITA) form if your paperwork does not come with one.  You may call the Forms Department with any questions at [phone].  Their fax number is [phone].

## 2025-05-28 NOTE — TELEPHONE ENCOUNTER
Prior Authorization for right SI Joint   initiated with  oumar  CPT codes: 01145  pending reference # [id number]  Clinical notes submitted via uploaded to chart

## 2025-05-28 NOTE — PROGRESS NOTES
Patient presents in office today with reported pain in right hip at night     Current pain level reported = 0/10    Last reported dose of extra strength tylenol       Narcotic Contract renewal N/A    Urine Drug screen N/a

## 2025-05-28 NOTE — PROGRESS NOTES
HPI:   Richard Palacios presents with complaints of R sacral pain with radiating gluteal pain to posterior thigh, previously had complaints of left low back pain.    The pain is described as moderate aching, stabbing that is intermittent.  The patient’s activity level has increased since last visit.  The pain is worst in the late evening.    Changes in condition/history since last visit: Patient is here today for follow-up having been seen last on 4/29/2025.  At that time had reported 100% relief following left L4-5 and L5-S1 RFA on 4/8/25 continues to be pleased with left-sided pain.  Over the past month, has had onset of right low back pain radiating to R gluteal region and posterior thigh.  He has been evaluated by neurology and was sent for consideration of SI joint injection.    Last procedure: Left L4-5 and L5-S1    date: 4/8/25 (1/23/2025, 2/18/2025)    Percentage of relief experienced from the procedure: 100%    Duration of the relief: sustained    The following activities will increase the patient’s pain: n/a    The following activities decrease the patient’s pain: limiting activity level    Functional Assessment: Patient reports that they are able to complete all of their ADL's such as eating, bathing, using the toilet, dressing and getting up from a bed or a chair independently.    Current Medications:  Current Outpatient Medications   Medication Sig Dispense Refill    URSODIOL 300 MG Oral Cap TAKE 1 CAPSULE(300 MG) BY MOUTH THREE TIMES DAILY 270 capsule 1    cyclobenzaprine 5 MG Oral Tab Take 1 tablet (5 mg total) by mouth as needed.      gabapentin 300 MG Oral Cap Take 2 capsules (600 mg total) by mouth nightly. 60 capsule 11    TRAZODONE 100 MG Oral Tab TAKE 1 TABLET(100 MG) BY MOUTH EVERY NIGHT 90 tablet 1    PANTOPRAZOLE 40 MG Oral Tab EC TAKE 1 TABLET(40 MG) BY MOUTH TWICE DAILY BEFORE MEALS 60 tablet 10    mycophenolate sodium 360 MG Oral Tab EC Take 2 tablets (720 mg total) by mouth 2 (two) times  daily.      ENVARSUS XR 0.75 MG Oral Tablet 24 Hr 2 tablets daily.      ALLOPURINOL 100 MG Oral Tab TAKE 1 TABLET(100 MG) BY MOUTH DAILY 90 tablet 3    Sildenafil Citrate 100 MG Oral Tab Take 0.5-1 tablets ( mg total) by mouth daily as needed for Erectile Dysfunction. 45 tablet 3    rosuvastatin 10 MG Oral Tab Take 1 tablet (10 mg total) by mouth daily.      traMADol 50 MG Oral Tab Take 1 tablet (50 mg total) by mouth every 6 (six) hours as needed for Pain.      acetaminophen 500 MG Oral Tab Take 1 tablet (500 mg total) by mouth every 6 (six) hours as needed for Pain.      LOKELMA 10 g Oral Powd Pack Take 1 packet (10 g total) by mouth daily. 90 packet 1    Magnesium Oxide -Mg Supplement 400 (240 Mg) MG Oral Tab Take 1 tablet (400 mg total) by mouth daily. 30 tablet 1    valGANciclovir 450 MG Oral Tab Take 2 tablets (900 mg total) by mouth daily.      aspirin 81 MG Oral Tab EC Take 1 tablet (81 mg total) by mouth daily.        Patient requires assistance with: No assistance required    Reviewed Patient History Dated: 4/29/25 no changes noted    Physical Exam:   /70   Pulse 82   Resp 18   Ht 68\"   Wt 185 lb (83.9 kg)   SpO2 98%   BMI 28.13 kg/m²   VAS Pain Score:  6/10  General Appearance: Well developed, well nourished, normal build, independent body habitus, no apparent physical disabilities, well groomed    Neurological Exam: WNL-Orientation to time, place and person, normal mood & effect, normal concentration & attention span  Inspection: non-antalgic, no acute distress   Pain to palpation right SI joint  Multiple positive SI joint tests: Positive right Carlos's, ASIS distraction, Gaenslen's  Radiology/Lab Test Reviewed: MRI L spine 6/11/24:     LUMBAR FINDINGS:   Patient motion noted.      Stable holovertebral hemangioma at the L1 level resulting in minimal cortical expansion along the dorsal cortex of the vertebral body.  No pathologic fracture identified.  Stable small hemangioma also noted  in the L2 vertebral body.  The suspected subcentimeter hemangioma in the dorsal aspect of the L5 vertebral body is also again noted, however limited in evaluation due to patient motion, but appearing grossly unchanged.      There is normal lumbar lordosis with anatomic alignment.  Vertebral body heights are well-maintained.  Mild degenerative disc space loss, disc desiccation, endplate spurring, and degenerative endplate marrow signal changes scattered in the lumbar spine.  No suspicious enhancement.  Scattered degenerative enhancement noted, particularly in the mid to lower lumbar spine along the facet joints and interspinous regions.      The distal spinal cord and conus medullaris have a normal signal and morphology.  The conus medullaris terminates at the mid L1 level.  The roots of the cauda equina are unremarkable.  No focal mass or fluid collection is seen in the lumbar spinal canal.   The paraspinal soft tissues are unremarkable.      T12-L1: There is no significant abnormality.      L1-2:  Minimal diffuse disc bulge with mild facet arthropathy. There is no significant spinal canal or neural foraminal stenosis. No significant interval change.      L2-3:  Mild diffuse disc bulge with mild facet arthropathy. There is no significant spinal canal stenosis.  There is mild left neural foraminal stenosis. No significant interval change.      L3-4:  Diffuse disc bulge with mild facet arthropathy.  There is no significant spinal canal stenosis.  There is mild right neural foraminal stenosis. No significant interval change.      L4-5:  Diffuse disc bulge with a small superimposed broad-based central disc protrusion.  Mild to moderate facet arthropathy and thickening of ligamentum flavum.  No significant spinal canal stenosis.  Mild bilateral neural foraminal stenosis. No significant interval change.      L5-S1:  Minimal diffuse disc bulge with mild facet arthropathy. There is no significant spinal canal or neural  foraminal stenosis. No significant interval change.          Lab Results   Component Value Date    WBC 6.1 05/21/2025    WBC 6.2 04/30/2025    WBC 5.5 04/28/2025   No results found for: \"HEMOGLOBIN\"  Lab Results   Component Value Date    .0 05/21/2025    .0 04/30/2025    .0 04/28/2025     Do you have any known blood/bleeding disorders?  No  Does patient currently take blood thinners?   None  Does patient currently take any antibiotics?   No  Patient educated and verbalized understanding.  Medical Decision Making:   Diagnosis:    Encounter Diagnosis   Name Primary?    Sacroiliitis Yes     Impression: With regards to left low back pain, continues with 100% relief following left L4/5 and L5/S1 RFA on 4/8/2025 and is very pleased with his response.  Over the past month, has had onset of right sided sacral pain radiating to gluteal region and posterior thigh.  Has been evaluated by neurology and for diagnostic as well as potential therapeutic purposes, was sent for SI joint injections.  On exam, does have reproduction of pain to palpation of the SI joint, as well as multiple positive SI joint tests (see above).  Will proceed with SI joint injection on the right, risks and benefits which were reviewed in detail.  Follow-up 2 to 3 weeks.  If he has limited relief, consider updating MRI.    Plan: Patient to schedule the following injection: Right SI joint injection Levels: As stated, Procedure and risks were discussed with pt. including headache, bleeding, infection and potential nerve damage.    Follow-up 2 to 3 weeks.    No orders of the defined types were placed in this encounter.      Meds & Refills for this Visit:  Requested Prescriptions      No prescriptions requested or ordered in this encounter       Imaging & Consults:  None    The patient indicates understanding of these issues and agrees to the plan.    PATRICK Mcbride

## 2025-05-30 NOTE — TELEPHONE ENCOUNTER
Procedure Name: right SI Joint  CPT Code(s): 34872  The procedure has been DENIED.  Plan/3rd party: oumar  Case/Reference #: [id number]  P2P Phone #: [phone]    Reason for denial:     We reviewed information from Dr. Henrik Hendrickson, your benefit plan, and any policies and guidelines  needed to reach this decision. We found the service requested is not medically necessary in your case:    Your doctor told us that you have pain coming from the joint in your pelvis. A shot to treat this was requested. We cannot approve this request. The notes sent to us do not show:    You must be in an active program to help manage your pain. This program includes any of the following:  -Physical therapy.  -Home exercise.  -Functional rehab program. This is a custom program to address lifestyle and pain management.    Routed to [facility]

## 2025-06-02 NOTE — TELEPHONE ENCOUNTER
Procedure Name: right SI Joint  CPT Code(s): 75636  Plan/3rd party: ajit  Case/Reference #: [id number]  P2P Phone #: [phone]      Contacted Ajit at this time and schedule a P2P on  6/2/2025 @ 2 pm .

## 2025-06-03 NOTE — TELEPHONE ENCOUNTER
Attempted to contact pt at this time to ask when his last PT session was. PT did not  the call. Left a detailed message to his identified VM. Advised to call before 2 pm as P2P is scheduled today @2 pm

## 2025-06-03 NOTE — TELEPHONE ENCOUNTER
The patient informed me that his last physical would be in late September or early October. According to the patient, it was discontinued because he developed a hernia and could no longer perform physical therapy. No further concerns at this time.

## 2025-06-03 NOTE — TELEPHONE ENCOUNTER
Per  :5/14/2025 visit He was improving in left leg pain and lower back pain when working with PT but was not able to continue to PT, however, due to developing incisional hernia in R lower quadrant and had to have surgery to repair this. He now has this in the left side - planning to have surgery in the future.     He was having tingling in feet but is on gabapentin 300 mg nightly and this improved. He has been seen by neurosurgery and pain management and recently had RF ablation and noted improvement in back pain.      He has noted recently pain in the right leg in the past month, occurs at night and tends to move to front of back of upper leg.

## 2025-06-03 NOTE — TELEPHONE ENCOUNTER
Authorization:  [id number]    Faxing the exact dates it is valid through, though I was informed that should be valid for 6 months (November).

## 2025-06-04 NOTE — TELEPHONE ENCOUNTER
Instruction Sheet- Please Read    Your Postoperative Eye Kit will be given to you at the hospital.    1. Medication in your kit: NONE                            DROPS - NONE    2.  Eye shield - place over operated eye, with nothing under shield and secure with tape supplied in kit at nap time and bedtime for one week.    3.  DO NOT RUB YOUR EYE FOR ONE MONTH    4.  Sun Glasses as needed.    Bring eye kit to your post operative appointment at the office.       Dr. Valverde- for an eye emergency call our office  24 hours a day and the correct emergency contact info will be given    ------------------------------------------------------------------------------------------------------------------    Care of Your Eye After Surgery    What to expect  When you go home from your cataract surgery, plan on only light activities when you get home.  You may want to take a nap.  You may use your eyes, walk around, ride in a car (NO driving). Some patients have scratching and mild pain - over the counter pain medications such as Tylenol are helpful.    Leave eye shield on as you ride home, once home you may take it off except for when you sleep. You may wear glasses although you may notice they may make the surgery eye more blurry, so you can also try not wearing then and see if that gives clearest vision..  The next day you will go to our office.  The eye will be red and the vision will usually be blurry.  It will take most patients several weeks to reach full best corrected vision.    Do Not's  1. DO NOT rub the eye for one month  2. DO NOT drive or operate hazardous equipment or machinery for 24 hours after your surgery.  Do not operate hazardous machinery (i.e.drills, saws) until you feel your vision has recovered adequately for safe operation  3. DO NOT make legally binding decisions for 24 hours.  4. DO NOT drink alcohol, including beer for 24 hours.  5. DO NOT smoke any substance for 24 hours.    Can Do  1. The  Prior authorization request completed for: right SI Joint  Authorization # [id number]  Authorization dates: 5/28/25-11/24/25  CPT codes approved: 02469  Number of visits/dates of service approved: 1  Physician: wolf  Location: [facility]     Patient can be scheduled. Routed to Navigator.      day after surgery, you can take a bath or shower, wash your hair, etc. But keep your eye closed.  If soap gets in the eye, it may sting a little but won't cause damage. Let the water run over your face to rinse out the soap. Just don't rub your eyes.   2. You may use your eyes for whatever visual activities you wish.  3. You may bend, lift, and stoop. Use common sense - if something hurts, don't do it.  4. You may drive when we tell you your vision is sufficient to drive - this varies for each patient.  5. You may resume most normal activities. If you have specific activities you enjoy but consider strenuous, ask us specifically about these.   6. Resume all medications taken prior to surgery unless specifically instructed to do otherwise by your doctor.    Notify your physician if you experience   · Significant pain or significant decrease in vision  · Significant bleeding    Common side effects  · Tearing, initial blurry vision, mild red staining on patch and possible pink fluid staining on patch.  · Mild or moderate pain, may occur.  · For patients using Post-operative drops - may burn upon instillation for the first couple days.

## 2025-06-05 NOTE — TELEPHONE ENCOUNTER
Call transferred from PSR  -patient returning call to schedule procedure.     Patient advised of insurance approval to proceed with injections and is agreeable to scheduling.  pre-procedure instructions reviewed. Patient prefers Local sedation. Reviewed sedation instructions including No Fasting & No  Required. Patient encouraged but not required to hold NSAIDs for 24 hours prior to procedure. Patient verbalized understanding of instructions, no further needs at this time.       [facility] PAIN CLINIC  PRE-PROCEDURE INSTRUCTIONS WITHOUT SEDATION    Procedure: Right SIJI       Appointment Date: 06/19/2025  Check-In Time: 07:45 AM    Follow-Up Date/Time: 07/03/2025 @ 09:00 AM    Prior to the procedure:  Please update us prior to the procedure if you are experiencing any symptoms of infection such as cough, fever, chills, urinary symptoms, or have recently been prescribed antibiotics, have open wounds, have recently had surgery or dental procedures.    Day of Procedure:  **Drivers will be required for patients who receive prescriptions for Valium.    NO FASTING REQUIRED  Please bring your Insurance Card, Photo ID, List of Current Medications and Referral (if applicable) to your appointment.  Please park in the [employer] and follow the signs to the [facility].  Check in at [facility] ([address] [place] [address]) outpatient registration in the [facility].  Please note-No prescriptions will be written by Pain Clinic in OR on the day of procedure. If you require a refill of medications, please contact the office 48 hours prior to your procedure.  If you have an implanted Spinal Cord or Peripheral Nerve Stimulator: Please remember to turn device off for procedure.        Medication Hold:    Number of days you need to be off for the following medications:    Aggrenox 10 days   Agrylin (Anagrelide) 10 days  Brilinta (Ticagrelor) 7 days  Imbruvica (Ibrutinib) 3 days   Enbrel  (Etanercept) 24 hours   Fragmin (Dalteparin) 24 hours   Pletal (Cilostazol) 7 days  Effient (Prasugrel) 7 days  Pradaxa 10 days  Trental 7 days  Eliquis (Apixaban) 3 days  Xarelto (Rivaroxaban) 3 days  Lovenox (Enoxaparin) 24 hours  Aspirin  Greater than 81mg but less than 325mg   5 days  325mg and greater                  7 days  Coumadin       5 days  Procedure may be cancelled if INR is elevated.   Excedrin (with aspirin) 7 days  Plavix (Clopidogrel)                            7 days    NSAIDs: 24 hours preferred      Ibuprofen (Motrin, Advil, Vicoprofen), Naproxen (Naprosyn, Aleve), Piroxcam (Feldene), Meloxicam (Mobic), Oxaprozin (Daypro), Diclofenac (Voltaren), Indomethacin (Indocin), Etodolac (Lodine), Nabumetone (Relafen), Celebrex (Celecoxib)           HERBAL SUPPLEMENTS  5 days preferred  Fish oil, krill oil, Omega-3, Vascepa, Vitamin E, Turmeric, Garlic                       Insurance Authorization:   Most insurances are now requiring a preauthorization for all procedures.  In the event that your insurance does not authorize your procedure within 48 hours of the scheduled date, your procedure will be cancelled and rescheduled to a later date.  Please contact your insurance carrier to determine what your financial responsibility will be for the procedure(s).      Cancellation/Rescheduling Appointment:   In the event you need to cancel or reschedule your appointment, you must notify the office 24 hours prior.    Post-procedure instructions:        Please schedule a follow up visit within 2 to 4 weeks after your last procedure date   Please call our office with any questions or concerns before or after your procedure at  [phone].  If you are a diabetic, please increase the frequency of your glucose monitoring after the procedure as this may cause a temporary increase in your blood sugar.  Contact your primary care physician if your blood sugar rises as you may require some medication adjustment.  It is  normal to have increased pain at injection site for up to 3-5 days after procedure, you can use heat or ice (20 minutes on 20 minutes off) for comfort.    **To hear a recorded version of these instructions, please call [phone] and follow the prompts.  **Para escuchar las instrucciones en Español, por favor de llamar el robbin [phone] opción 4.

## 2025-06-09 ENCOUNTER — LAB ENCOUNTER (OUTPATIENT)
Dept: LAB | Age: 60
End: 2025-06-09
Attending: INTERNAL MEDICINE
Payer: COMMERCIAL

## 2025-06-09 LAB
ALBUMIN SERPL-MCNC: 4.3 G/DL (ref 3.2–4.8)
ALBUMIN/GLOB SERPL: 1.5 {RATIO} (ref 1–2)
ALP LIVER SERPL-CCNC: 66 U/L (ref 45–117)
ALT SERPL-CCNC: 21 U/L (ref 10–49)
ANION GAP SERPL CALC-SCNC: 7 MMOL/L (ref 0–18)
AST SERPL-CCNC: 20 U/L (ref ?–34)
BASOPHILS # BLD AUTO: 0.05 X10(3) UL (ref 0–0.2)
BASOPHILS NFR BLD AUTO: 0.8 %
BILIRUB SERPL-MCNC: 0.6 MG/DL (ref 0.3–1.2)
BUN BLD-MCNC: 15 MG/DL (ref 9–23)
CALCIUM BLD-MCNC: 9.9 MG/DL (ref 8.7–10.6)
CHLORIDE SERPL-SCNC: 107 MMOL/L (ref 98–112)
CO2 SERPL-SCNC: 26 MMOL/L (ref 21–32)
CREAT BLD-MCNC: 1.24 MG/DL (ref 0.7–1.3)
EGFRCR SERPLBLD CKD-EPI 2021: 67 ML/MIN/1.73M2 (ref 60–?)
EOSINOPHIL # BLD AUTO: 0.11 X10(3) UL (ref 0–0.7)
EOSINOPHIL NFR BLD AUTO: 1.8 %
ERYTHROCYTE [DISTWIDTH] IN BLOOD BY AUTOMATED COUNT: 14.4 %
FASTING STATUS PATIENT QL REPORTED: YES
GLOBULIN PLAS-MCNC: 2.8 G/DL (ref 2–3.5)
GLUCOSE BLD-MCNC: 105 MG/DL (ref 70–99)
HCT VFR BLD AUTO: 44.6 % (ref 39–53)
HGB BLD-MCNC: 15.6 G/DL (ref 13–17.5)
IMM GRANULOCYTES # BLD AUTO: 0.03 X10(3) UL (ref 0–1)
IMM GRANULOCYTES NFR BLD: 0.5 %
LYMPHOCYTES # BLD AUTO: 1.9 X10(3) UL (ref 1–4)
LYMPHOCYTES NFR BLD AUTO: 31.9 %
MAGNESIUM SERPL-MCNC: 1.7 MG/DL (ref 1.6–2.6)
MCH RBC QN AUTO: 31.4 PG (ref 26–34)
MCHC RBC AUTO-ENTMCNC: 35 G/DL (ref 31–37)
MCV RBC AUTO: 89.7 FL (ref 80–100)
MONOCYTES # BLD AUTO: 0.21 X10(3) UL (ref 0.1–1)
MONOCYTES NFR BLD AUTO: 3.5 %
NEUTROPHILS # BLD AUTO: 3.66 X10 (3) UL (ref 1.5–7.7)
NEUTROPHILS # BLD AUTO: 3.66 X10(3) UL (ref 1.5–7.7)
NEUTROPHILS NFR BLD AUTO: 61.5 %
OSMOLALITY SERPL CALC.SUM OF ELEC: 291 MOSM/KG (ref 275–295)
PLATELET # BLD AUTO: 173 10(3)UL (ref 150–450)
POTASSIUM SERPL-SCNC: 4.6 MMOL/L (ref 3.5–5.1)
PROT SERPL-MCNC: 7.1 G/DL (ref 5.7–8.2)
RBC # BLD AUTO: 4.97 X10(6)UL (ref 4.3–5.7)
SODIUM SERPL-SCNC: 140 MMOL/L (ref 136–145)
WBC # BLD AUTO: 6 X10(3) UL (ref 4–11)

## 2025-06-09 PROCEDURE — 80197 ASSAY OF TACROLIMUS: CPT

## 2025-06-09 PROCEDURE — 80053 COMPREHEN METABOLIC PANEL: CPT

## 2025-06-09 PROCEDURE — 83735 ASSAY OF MAGNESIUM: CPT

## 2025-06-09 PROCEDURE — 36415 COLL VENOUS BLD VENIPUNCTURE: CPT

## 2025-06-09 PROCEDURE — 85025 COMPLETE CBC W/AUTO DIFF WBC: CPT

## 2025-06-11 ENCOUNTER — HOSPITAL ENCOUNTER (OUTPATIENT)
Dept: MRI IMAGING | Age: 60
Discharge: HOME | End: 2025-06-11
Attending: NEUROLOGICAL SURGERY
Payer: COMMERCIAL

## 2025-06-11 DIAGNOSIS — D18.09 VERTEBRAL BODY HEMANGIOMA: ICD-10-CM

## 2025-06-11 LAB
CMV DNA DETECT, QN LOG: NOT DETECTED {LOG_IU}/ML
CMV DNA DETECT, QN: NOT DETECTED [IU]/ML

## 2025-06-11 PROCEDURE — A9575 INJ GADOTERATE MEGLUMI 0.1ML: HCPCS | Performed by: NEUROLOGICAL SURGERY

## 2025-06-11 PROCEDURE — 72158 MRI LUMBAR SPINE W/O & W/DYE: CPT | Performed by: NEUROLOGICAL SURGERY

## 2025-06-11 RX ORDER — GADOTERATE MEGLUMINE 376.9 MG/ML
20 INJECTION INTRAVENOUS
Status: COMPLETED | OUTPATIENT
Start: 2025-06-11 | End: 2025-06-11

## 2025-06-11 RX ADMIN — GADOTERATE MEGLUMINE 18 ML: 376.9 INJECTION INTRAVENOUS at 08:55:00

## 2025-06-12 LAB — TACROLIMUS LVL: 4.5 NG/ML

## 2025-06-16 ENCOUNTER — APPOINTMENT (OUTPATIENT)
Dept: SURGERY | Facility: CLINIC | Age: 60
End: 2025-06-16

## 2025-06-17 ENCOUNTER — HOSPITAL ENCOUNTER (OUTPATIENT)
Dept: MRI IMAGING | Age: 60
Discharge: HOME | End: 2025-06-17
Attending: INTERNAL MEDICINE
Payer: COMMERCIAL

## 2025-06-17 DIAGNOSIS — D49.0 IPMN (INTRADUCTAL PAPILLARY MUCINOUS NEOPLASM): ICD-10-CM

## 2025-06-17 PROCEDURE — 74183 MRI ABD W/O CNTR FLWD CNTR: CPT | Performed by: INTERNAL MEDICINE

## 2025-06-17 PROCEDURE — 76376 3D RENDER W/INTRP POSTPROCES: CPT | Performed by: INTERNAL MEDICINE

## 2025-06-17 PROCEDURE — A9575 INJ GADOTERATE MEGLUMI 0.1ML: HCPCS | Performed by: INTERNAL MEDICINE

## 2025-06-17 RX ORDER — GADOTERATE MEGLUMINE 376.9 MG/ML
20 INJECTION INTRAVENOUS
Status: COMPLETED | OUTPATIENT
Start: 2025-06-17 | End: 2025-06-17

## 2025-06-17 RX ADMIN — GADOTERATE MEGLUMINE 20 ML: 376.9 INJECTION INTRAVENOUS at 09:11:00

## 2025-06-19 ENCOUNTER — APPOINTMENT (OUTPATIENT)
Dept: GENERAL RADIOLOGY | Facility: HOSPITAL | Age: 60
End: 2025-06-19
Attending: ANESTHESIOLOGY
Payer: COMMERCIAL

## 2025-06-19 ENCOUNTER — HOSPITAL ENCOUNTER (OUTPATIENT)
Facility: HOSPITAL | Age: 60
Setting detail: HOSPITAL OUTPATIENT SURGERY
Discharge: HOME | End: 2025-06-19
Attending: ANESTHESIOLOGY | Admitting: ANESTHESIOLOGY
Payer: COMMERCIAL

## 2025-06-19 VITALS
HEART RATE: 57 BPM | SYSTOLIC BLOOD PRESSURE: 152 MMHG | DIASTOLIC BLOOD PRESSURE: 82 MMHG | BODY MASS INDEX: 30.31 KG/M2 | TEMPERATURE: 97 F | OXYGEN SATURATION: 97 % | HEIGHT: 68 IN | RESPIRATION RATE: 18 BRPM | WEIGHT: 200 LBS

## 2025-06-19 PROBLEM — M53.3 SI (SACROILIAC) PAIN: Status: ACTIVE | Noted: 2025-06-19

## 2025-06-19 PROCEDURE — 27096 INJECT SACROILIAC JOINT: CPT | Performed by: ANESTHESIOLOGY

## 2025-06-19 RX ORDER — METHYLPREDNISOLONE ACETATE 40 MG/ML
INJECTION, SUSPENSION INTRA-ARTICULAR; INTRALESIONAL; INTRAMUSCULAR; SOFT TISSUE
Status: DISCONTINUED | OUTPATIENT
Start: 2025-06-19 | End: 2025-06-19

## 2025-06-19 RX ORDER — IOPAMIDOL 408 MG/ML
INJECTION, SOLUTION INTRATHECAL
Status: DISCONTINUED | OUTPATIENT
Start: 2025-06-19 | End: 2025-06-19

## 2025-06-19 RX ORDER — BUPIVACAINE HYDROCHLORIDE 5 MG/ML
INJECTION, SOLUTION EPIDURAL; INTRACAUDAL; PERINEURAL
Status: DISCONTINUED | OUTPATIENT
Start: 2025-06-19 | End: 2025-06-19

## 2025-06-19 RX ORDER — NALOXONE HYDROCHLORIDE 0.4 MG/ML
0.08 INJECTION, SOLUTION INTRAMUSCULAR; INTRAVENOUS; SUBCUTANEOUS AS NEEDED
Status: DISCONTINUED | OUTPATIENT
Start: 2025-06-19 | End: 2025-06-19

## 2025-06-19 RX ORDER — LIDOCAINE HYDROCHLORIDE 10 MG/ML
INJECTION, SOLUTION EPIDURAL; INFILTRATION; INTRACAUDAL; PERINEURAL
Status: DISCONTINUED | OUTPATIENT
Start: 2025-06-19 | End: 2025-06-19

## 2025-06-19 NOTE — OPERATIVE REPORT
[facility]  Operative Report  2025     Richard Palacios Patient Status:  [facility]    1965 MRN [id number]   Location [facility] PAIN [facility] Attending Henrik Hendrickson MD   Hosp Day # 0 PCP Lg Sosa MD     Indication: Richard is a 60 year old male with SI pain    Preoperative Diagnosis:  Sacroiliitis [M46.1]    Postoperative Diagnosis: Same as above.    Procedure performed: RIGHT SACROILIAC JOINT INJECTION with local    Anesthesia: Local     EBL: Less than 1 ml.    Procedure Description:  After reviewing the patient's history and performing a focused physical examination, the diagnosis was confirmed and contraindications such as infection and coagulopathy were ruled out.  Following review of potential side effects and complications, including but not necessarily limited to infection, allergic reaction, local tissue breakdown, nerve injury, and paresis, the patient indicated they understood and agreed to proceed.  After obtaining the informed consent, the patient was brought to the procedure room and monitored.      The patient was placed prone on the procedure table.  The lumbar and sacral areas were prepped and draped in sterile fashion.  Subcutaneous lidocaine was instilled into the superficial soft tissues for local anesthesia.  Under fluoroscopic guidance, the anterior and posterior aspects of the corresponding sacroiliac joint were overlapped.  A 22-gauge, Quincke spinal needle was advanced into the middle portion of the right sacroiliac joint. After the needle  positions were confirmed by AP and lateral views of fluoroscopy, 1 cc Omnipaque-240 was injected into the sacroiliac joint. There was a nice sacroiliac joint arthrogram revealed. After that methylprednisolone 80 mg with 2 mL of 1% lidocaine was injected.  The needle was flushed with 1 mL of 1% lidocaine.  The needle was removed with stylet in situ.  The patient tolerated the procedure very well.   There was no subjective or objective loss of motor strength.  The patient was observed until discharge criteria met.  Discharge instructions were given and patient was released to a responsible adult.    Complications: None.    Follow up: The patient was followed in the pain clinic as needed basis.      Henrik Hendrickson MD

## 2025-06-19 NOTE — H&P
History & Physical Examination    Patient Name: Richard Palacios  MRN: [id number]  CSN: [id number]  YOB: 1965    Pre-Operative Diagnosis:  Sacroiliitis [M46.1]    Present Illness: SI joint pain    ASA: 2  MP class: 1  Sedation: Local      Prescriptions Prior to Admission[1]  Current Hospital Medications[2]    Allergies: Allergies[3]    Past Medical History[4]  Past Surgical History[5]  Family History[6]  Social History     Tobacco Use    Smoking status: Former     Current packs/day: 0.00     Types: Cigars, Cigarettes     Quit date: 2014     Years since quittin.3     Passive exposure: Never    Smokeless tobacco: Never    Tobacco comments:     Still not smoking cigarettes since . Do smoke a couple of cigars a week.   Substance Use Topics    Alcohol use: Not Currently     Alcohol/week: 4.0 standard drinks of alcohol     Types: 4 Standard drinks or equivalent per week       SYSTEM Check if Review is Normal Check if Physical Exam is Normal If not normal, please explain:   HEENT [x ] [x ]    NECK & BACK [x ] [x ]    HEART [x ] [x ]    LUNGS [x ] [x ]    ABDOMEN [x ] [x ]    UROGENITAL [x ] [x ]    EXTREMITIES [x ] [x ]    OTHER        [ x ] I have discussed the risks and benefits and alternatives with the patient/family.  They understand and agree to proceed with plan of care.  [ x ] I have reviewed the History and Physical done within the last 30 days.  Any changes noted above.    Henrik Hendrickson MD              [1]   Medications Prior to Admission   Medication Sig Dispense Refill Last Dose/Taking    aspirin 81 MG Oral Tab EC Take 1 tablet (81 mg total) by mouth in the morning.   2025 Morning    URSODIOL 300 MG Oral Cap TAKE 1 CAPSULE(300 MG) BY MOUTH THREE TIMES DAILY 270 capsule 1     cyclobenzaprine 5 MG Oral Tab Take 1 tablet (5 mg total) by mouth as needed.       gabapentin 300 MG Oral Cap Take 2 capsules (600 mg total) by mouth nightly. 60 capsule 11     TRAZODONE 100 MG Oral Tab TAKE  1 TABLET(100 MG) BY MOUTH EVERY NIGHT 90 tablet 1     PANTOPRAZOLE 40 MG Oral Tab EC TAKE 1 TABLET(40 MG) BY MOUTH TWICE DAILY BEFORE MEALS 60 tablet 10     mycophenolate sodium 360 MG Oral Tab EC Take 2 tablets (720 mg total) by mouth 2 (two) times daily.       ENVARSUS XR 0.75 MG Oral Tablet 24 Hr 2 tablets daily.       ALLOPURINOL 100 MG Oral Tab TAKE 1 TABLET(100 MG) BY MOUTH DAILY 90 tablet 3     Sildenafil Citrate 100 MG Oral Tab Take 0.5-1 tablets ( mg total) by mouth daily as needed for Erectile Dysfunction. 45 tablet 3     rosuvastatin 10 MG Oral Tab Take 1 tablet (10 mg total) by mouth daily.       traMADol 50 MG Oral Tab Take 1 tablet (50 mg total) by mouth every 6 (six) hours as needed for Pain.       acetaminophen 500 MG Oral Tab Take 1 tablet (500 mg total) by mouth every 6 (six) hours as needed for Pain.       LOKELMA 10 g Oral Powd Pack Take 1 packet (10 g total) by mouth daily. 90 packet 1     Magnesium Oxide -Mg Supplement 400 (240 Mg) MG Oral Tab Take 1 tablet (400 mg total) by mouth daily. 30 tablet 1     valGANciclovir 450 MG Oral Tab Take 2 tablets (900 mg total) by mouth daily.      [2]   No current facility-administered medications for this encounter.   [3] No Known Allergies  [4]   Past Medical History:   Abdominal pain    Allergic rhinitis    Controlled with OTC meds.    Ascites    Back problem    COVID    Disorder of liver    Cirrhosis - no longer existent since liver transplant surgery 12/19/23    Esophageal reflux    Essential hypertension    ETOH abuse    Gout    Hyperlipidemia    Hypoalbuminemia    Incisional hernia    Nausea    Obesity    Diagnosed by Dr. Bay    Personal history of alcoholism (HCC)    Thrombocytopenia    Tremor    Weight loss   [5]   Past Surgical History:  Procedure Laterality Date    Colonoscopy N/A 11/20/2023    Procedure: .;  Surgeon: Gus Ocampo MD;  Location:  ENDOSCOPY    Colonoscopy N/A 11/27/2023    Procedure: COLONOSCOPY with cold snare  polypectomy and clip placement x1;  Surgeon: Robin Raya DO;  Location:  ENDOSCOPY    Colonoscopy N/A 07/25/2024    Procedure: COLONOSCOPY;  Surgeon: Claudio Adams MD;  Location:  ENDOSCOPY    Egd      Organ transplant  12/19/2023    Liver transplant    Tonsillectomy     [6]   Family History  Problem Relation Age of Onset    Other (alcoholism) Father     Diabetes Mother         Type I    Other (CAD) Mother     Cancer Mother         Mom had breast and colon cancer    Heart Disorder Mother         Multiple heart attacks.

## 2025-06-19 NOTE — DISCHARGE INSTRUCTIONS
Home Care Instructions Following Your Pain Procedure     Richard,  It has been a pleasure to have you as our patient. To help you at home, you must follow these general discharge instructions. We will review these with you before you are discharged. It is our hope that you have a complete and uneventful recovery from our procedure.     General Instructions:  What to Expect:  Bandages from your procedure today can be removed when you get home.  Please avoid soaking and/or swimming for 24 hours.  Showering is okay  It is normal to have increased pain symptoms and/or pain at injection site for up to 3-5 days after procedure, you can use heat or ice (20 minutes on 20 minutes off) for comfort.  You may experience some temporary side effects which may include restlessness or insomnia, flushing of the face, or heart palpitations.  Please contact the provider if these symptoms do not resolve within 3-4 days.  Lightheadedness or nausea may occur and should resolve within 24 to 48 hours.  If you develop a headache after treatment, rest, drink fluids (with caffeine, if possible) and take mild over-the-counter pain medication.  If the headache does not improve with the above treatment, contact the physician.  Home Medications:  Resume all previously prescribed medication.  Please avoid taking NSAIDs (Non-Steriodal Anti-Inflammatory Drugs) such as:  Ibuprofen ( Advil, Motrin) Aleve (Naproxen), Diclofenac, Meloxicam for 6 hours after procedure.   If you are on Coumadin (Warfarin) or any other anti-coagulant (or \"blood thinning\") medication such as Plavix (Clopidogrel), Xarelto (Rivaroxaban), Eliquis (Apixaban), Effient (Prasugrel) etc., restart on the following day from the procedure unless otherwise directed by your provider.  If you are a diabetic, please increase the frequency of your glucose monitoring after the procedure as steroids may cause a temporary (2-3 day) increase in your blood sugar.  Contact your primary care  physician if your blood sugar remains elevated as you may require some medication adjustment.  Diet:  Resume your regular diet as tolerated.  Activity:  We recommend that you relax and rest during the rest of your procedure day.  If you feel weakness in your arms or legs do not drive.  Follow-up Appointment  Please schedule a follow-up visit within 3 to 4 weeks after your last procedure date.  Question or Concerns:  Feel free to call our office with any questions or concerns at [phone] (option #2)    Richard  Thank you for coming to [facility] for your procedure.  The nurses try very hard to make sure you receive the best care possible.  Your trust in them as well as us is greatly appreciated.    Thanks so much,   Dr. Henrik Hendrickson

## 2025-06-20 ENCOUNTER — TELEPHONE (OUTPATIENT)
Dept: PAIN CLINIC | Facility: CLINIC | Age: 60
End: 2025-06-20

## 2025-06-20 NOTE — TELEPHONE ENCOUNTER
Follow-up call post pain procedure. Left message informing patient to contact the pain clinic at [phone] option #3 regarding any questions or concerns about recent pain procedure.     Procedure: RIGHT SIJI  Date: 6/19/2025  Follow up Visit Scheduled: 7/3/2025 with Lg Wood

## 2025-06-23 ENCOUNTER — OFFICE VISIT (OUTPATIENT)
Dept: FAMILY MEDICINE CLINIC | Facility: CLINIC | Age: 60
End: 2025-06-23
Payer: COMMERCIAL

## 2025-06-23 VITALS
HEART RATE: 72 BPM | DIASTOLIC BLOOD PRESSURE: 76 MMHG | BODY MASS INDEX: 30.31 KG/M2 | HEIGHT: 68 IN | SYSTOLIC BLOOD PRESSURE: 118 MMHG | WEIGHT: 200 LBS | OXYGEN SATURATION: 98 % | RESPIRATION RATE: 16 BRPM

## 2025-06-23 DIAGNOSIS — Z12.5 PROSTATE CANCER SCREENING: Primary | ICD-10-CM

## 2025-06-23 DIAGNOSIS — Z94.4 S/P LIVER TRANSPLANT (HCC): ICD-10-CM

## 2025-06-23 RX ORDER — TRAZODONE HYDROCHLORIDE 100 MG/1
100 TABLET ORAL NIGHTLY
Qty: 90 TABLET | Refills: 1 | Status: SHIPPED | OUTPATIENT
Start: 2025-06-23

## 2025-06-23 NOTE — PROGRESS NOTES
[facility] Progress Note    SUBJECTIVE: Richard Palacios 60 year old male is here today for   Chief Complaint   Patient presents with    Follow - Up       Moving to [place].    Has decided to move, daughter turning 13, and moving somewhre that fits th family better. Has family down there.    Still struggling with core strength, due to hernia, and will need PT to recover after hernia surgery, to recover core.    Closing on the July 11th.        PMH  Past Medical History[1]     PSH  Past Surgical History[2]     Social Hx:  No changes    ROS  See HPI    OBJECTIVE:  /76   Pulse 72   Resp 16   Ht 5' 8\" (1.727 m)   Wt 200 lb (90.7 kg)   SpO2 98%   BMI 30.41 kg/m²           Labs:          Meds:   Current Medications[3]      Assessment/Plan  Richard was seen today for follow - up.    Diagnoses and all orders for this visit:    Prostate cancer screening  -     PSA, Total (Screening) [E]; Future    S/P liver transplant (HCC)         Moving soon, here to say goodbye, and also to make sure we have medications and labs squared away to give time for finding a new physician in [facility]         Total Time spent with patient and coordinating care:  15 minutes.    Follow up: as needed      Lg Sosa MD         [1]   Past Medical History:   Abdominal pain    Allergic rhinitis    Controlled with OTC meds.    Ascites    Back problem    COVID    Disorder of liver    Cirrhosis - no longer existent since liver transplant surgery 12/19/23    Esophageal reflux    Essential hypertension    ETOH abuse    Gout    Hyperlipidemia    Hypoalbuminemia    Incisional hernia    Nausea    Obesity    Diagnosed by Dr. Bay    Personal history of alcoholism (HCC)    Thrombocytopenia    Tremor    Weight loss   [2]   Past Surgical History:  Procedure Laterality Date    Colonoscopy N/A 11/20/2023    Procedure: .;  Surgeon: Gus Ocampo MD;  Location:  ENDOSCOPY    Colonoscopy N/A 11/27/2023    Procedure: COLONOSCOPY with cold  snare polypectomy and clip placement x1;  Surgeon: Robin Raya DO;  Location:  ENDOSCOPY    Colonoscopy N/A 07/25/2024    Procedure: COLONOSCOPY;  Surgeon: Claudio Adams MD;  Location:  ENDOSCOPY    Egd      Organ transplant  12/19/2023    Liver transplant    Tonsillectomy     [3]   Current Outpatient Medications   Medication Sig Dispense Refill    TRAZODONE 100 MG Oral Tab TAKE 1 TABLET(100 MG) BY MOUTH EVERY NIGHT 90 tablet 1    URSODIOL 300 MG Oral Cap TAKE 1 CAPSULE(300 MG) BY MOUTH THREE TIMES DAILY 270 capsule 1    cyclobenzaprine 5 MG Oral Tab Take 1 tablet (5 mg total) by mouth as needed.      gabapentin 300 MG Oral Cap Take 2 capsules (600 mg total) by mouth nightly. 60 capsule 11    PANTOPRAZOLE 40 MG Oral Tab EC TAKE 1 TABLET(40 MG) BY MOUTH TWICE DAILY BEFORE MEALS 60 tablet 10    mycophenolate sodium 360 MG Oral Tab EC Take 2 tablets (720 mg total) by mouth 2 (two) times daily.      ENVARSUS XR 0.75 MG Oral Tablet 24 Hr 2 tablets daily.      ALLOPURINOL 100 MG Oral Tab TAKE 1 TABLET(100 MG) BY MOUTH DAILY 90 tablet 3    Sildenafil Citrate 100 MG Oral Tab Take 0.5-1 tablets ( mg total) by mouth daily as needed for Erectile Dysfunction. 45 tablet 3    rosuvastatin 10 MG Oral Tab Take 1 tablet (10 mg total) by mouth daily.      traMADol 50 MG Oral Tab Take 1 tablet (50 mg total) by mouth every 6 (six) hours as needed for Pain.      acetaminophen 500 MG Oral Tab Take 1 tablet (500 mg total) by mouth every 6 (six) hours as needed for Pain.      LOKELMA 10 g Oral Powd Pack Take 1 packet (10 g total) by mouth daily. 90 packet 1    Magnesium Oxide -Mg Supplement 400 (240 Mg) MG Oral Tab Take 1 tablet (400 mg total) by mouth daily. 30 tablet 1    valGANciclovir 450 MG Oral Tab Take 2 tablets (900 mg total) by mouth daily.      aspirin 81 MG Oral Tab EC Take 1 tablet (81 mg total) by mouth in the morning.

## 2025-07-01 ENCOUNTER — OFFICE VISIT (OUTPATIENT)
Dept: SURGERY | Facility: CLINIC | Age: 60
End: 2025-07-01
Payer: COMMERCIAL

## 2025-07-01 VITALS
HEIGHT: 68 IN | BODY MASS INDEX: 28.79 KG/M2 | HEART RATE: 70 BPM | DIASTOLIC BLOOD PRESSURE: 80 MMHG | WEIGHT: 190 LBS | SYSTOLIC BLOOD PRESSURE: 110 MMHG

## 2025-07-01 DIAGNOSIS — D18.09 VERTEBRAL BODY HEMANGIOMA: Primary | ICD-10-CM

## 2025-07-01 PROCEDURE — 99213 OFFICE O/P EST LOW 20 MIN: CPT | Performed by: NEUROLOGICAL SURGERY

## 2025-07-01 RX ORDER — PATIROMER 8.4 G/1
8.4 POWDER, FOR SUSPENSION ORAL DAILY
COMMUNITY

## 2025-07-01 NOTE — PATIENT INSTRUCTIONS
Refill policies:    Allow 2-3 business days for refills; controlled substances may take longer.  Contact your pharmacy at least 5 days prior to running out of medication and have them send an electronic request or submit request through the “request refill” option in your [employer] account.  Refills are not addressed on weekends; covering physicians do not authorize routine medications on weekends.  No narcotics or controlled substances are refilled after noon on Fridays or by on call physicians.  By law, narcotics must be electronically prescribed.  A 30 day supply with no refills is the maximum allowed.  If your prescription is due for a refill, you may be due for a follow up appointment.  To best provide you care, patients receiving routine medications need to be seen at least once a year.  Patients receiving narcotic/controlled substance medications need to be seen at least once every 3 months.  In the event that your preferred pharmacy does not have the requested medication in stock (e.g. Backordered), it is your responsibility to find another pharmacy that has the requested medication available.  We will gladly send a new prescription to that pharmacy at your request.    Scheduling Tests:    If your physician has ordered radiology tests such as MRI or CT scans, please contact Central Scheduling at [phone] right away to schedule the test.  Once scheduled, the [facility] Centralized Referral Team will work with your insurance carrier to obtain pre-certification or prior authorization.  Depending on your insurance carrier, approval may take 3-10 days.  It is highly recommended patients assure they have received an authorization before having a test performed.  If test is done without insurance authorization, patient may be responsible for the entire amount billed.      Precertification and Prior Authorizations:  If your physician has recommended that you have a procedure or additional testing performed the [facility]  Centralized Referral Team will contact your insurance carrier to obtain pre-certification or prior authorization.    You are strongly encouraged to contact your insurance carrier to verify that your procedure/test has been approved and is a COVERED benefit.  Although the [facility] Centralized Referral Team does its due diligence, the insurance carrier gives the disclaimer that \"Although the procedure is authorized, this does not guarantee payment.\"    Ultimately the patient is responsible for payment.   Thank you for your understanding in this matter.  Paperwork Completion:  If you require FMLA or disability paperwork for your recovery, please make sure to either drop it off or have it faxed to our office at [phone]. Be sure the form has your name and date of birth on it.  The form will be faxed to our Forms Department and they will complete it for you.  There is a 25$ fee for all forms that need to be filled out.  Please be aware there is a 10-14 day turnaround time.  You will need to sign a release of information (VITA) form if your paperwork does not come with one.  You may call the Forms Department with any questions at [phone].  Their fax number is [phone].

## 2025-07-01 NOTE — PROGRESS NOTES
Neurosurgery Clinic Visit  2025    Richard Palacios PCP:  Lg Sosa MD    1965 MRN [id number]     HISTORY OF PRESENT ILLNESS:  Richard Palacios is a(n) 60 year old male who presents today in office follow-up.  He underwent radiofrequency ablation, as well as SI joint injection.  He reports excellent pain relief from both of these procedures.  At this point, his back pain is better than it has been in a few years.      PHYSICAL EXAMINATION:  Vital Signs:  /80 (BP Location: Right arm, Patient Position: Sitting, Cuff Size: adult)   Pulse 70   Ht 68\"   Wt 190 lb (86.2 kg)   BMI 28.89 kg/m²   Examination is stable.  He remains neurologically intact.      REVIEW OF STUDIES:    MRI of the lumbar spine was performed with and without contrast on .  This demonstrates stable appearance of the hemangioma.      ASSESSMENT and PLAN:  1.  Vertebral body hemangioma.  Given the appearance, I would favor repeat MRI scan in about a year or so.  He is about to move to [place].  With his history of liver transplant, he is going to set up further care at the [facility].    He can follow-up with neurosurgery there for the hemangioma.    2.  Back pain.  Excellent result from recent interventions.  He will follow-up with pain management after the move, in a couple of weeks.        Time spent on counseling/coordination of care:  15 Minutes    Total time spent with patient:  15 Minutes        2025  9:32 AM     This report was dictated using voice recognition technology.  Errors in syntax or recognition may occur, and should not be construed to change the meaning of a sentence.

## 2025-07-01 NOTE — PROGRESS NOTES
Established patient:  Reason for follow up: lower back pain     Numeric Rating Scale:    Pain at Present:  0/10       Distribution of Pain:    right states he is having pain in the right hip. States he has no N/T in the legs     Most recent treatments for Current Pain Condition:     States he had the RFA back in 4/2025- 90% states it still working   States he had a SI joint injection on 6.19.2025- 90%  States he up his Gabapentin- states its helping him      Response to treatment: some relief    New imaging or testing since your last office visit:      MRI SPINE LUMBAR            The following individual(s) verbally consented to be recorded using ambient AI listening technology and understand that they can each withdraw their consent to this listening technology at any point by asking the clinician to turn off or pause the recording:    Patient name: Richard Palacios

## 2025-07-03 ENCOUNTER — OFFICE VISIT (OUTPATIENT)
Dept: PAIN CLINIC | Facility: CLINIC | Age: 60
End: 2025-07-03
Payer: COMMERCIAL

## 2025-07-03 VITALS
SYSTOLIC BLOOD PRESSURE: 112 MMHG | DIASTOLIC BLOOD PRESSURE: 78 MMHG | WEIGHT: 190 LBS | BODY MASS INDEX: 29 KG/M2 | HEART RATE: 68 BPM | OXYGEN SATURATION: 99 %

## 2025-07-03 DIAGNOSIS — M47.816 LUMBAR SPONDYLOSIS: Primary | ICD-10-CM

## 2025-07-03 DIAGNOSIS — M53.3 SI (SACROILIAC) PAIN: ICD-10-CM

## 2025-07-03 PROCEDURE — 99214 OFFICE O/P EST MOD 30 MIN: CPT | Performed by: PHYSICIAN ASSISTANT

## 2025-07-03 NOTE — PROGRESS NOTES
Last procedure: RIGHT SACROILIAC JOINT INJECTION with local   Date: 06/19//25  Percentage of relief obtained: 90%  Duration of relief: current    Current Pain Score: 0

## 2025-07-03 NOTE — PROGRESS NOTES
HPI:   Richard Palacios presents with complaints of R sacral pain with radiating gluteal pain to posterior thigh, previously had complaints of left low back pain.    The pain is described as mild aching that is intermittent.  The patient’s activity level has increased since last visit.  The pain is worst in the late evening.    Changes in condition/history since last visit: Patient is here today for follow-up having undergone right SI joint injection on 6/19/2025.  Procedure was well-tolerated and had no adverse effects.  Overall, reports 90% sustained improvement, if not more.  Pain is 0/10 on VAS, and is very pleased with his response.      Last procedure: Right SI joint injection date: 6/19/2025    Percent relief: 90%    Duration: Sustained      Last procedure: Left L4-5 and L5-S1    date: 4/8/25 (1/23/2025, 2/18/2025)    Percentage of relief experienced from the procedure: 100%    Duration of the relief: sustained      The following activities will increase the patient’s pain: n/a    The following activities decrease the patient’s pain: limiting activity level    Functional Assessment: Patient reports that they are able to complete all of their ADL's such as eating, bathing, using the toilet, dressing and getting up from a bed or a chair independently.    Current Medications:  Current Outpatient Medications   Medication Sig Dispense Refill    VELTASSA 8.4 g Oral Powd Pack Take 1 packet (8.4 g total) by mouth daily.      TRAZODONE 100 MG Oral Tab TAKE 1 TABLET(100 MG) BY MOUTH EVERY NIGHT 90 tablet 1    URSODIOL 300 MG Oral Cap TAKE 1 CAPSULE(300 MG) BY MOUTH THREE TIMES DAILY 270 capsule 1    cyclobenzaprine 5 MG Oral Tab Take 1 tablet (5 mg total) by mouth as needed.      gabapentin 300 MG Oral Cap Take 2 capsules (600 mg total) by mouth nightly. 60 capsule 11    PANTOPRAZOLE 40 MG Oral Tab EC TAKE 1 TABLET(40 MG) BY MOUTH TWICE DAILY BEFORE MEALS 60 tablet 10    mycophenolate sodium 360 MG Oral Tab EC Take 2  tablets (720 mg total) by mouth 2 (two) times daily.      ENVARSUS XR 0.75 MG Oral Tablet 24 Hr 2 tablets daily.      ALLOPURINOL 100 MG Oral Tab TAKE 1 TABLET(100 MG) BY MOUTH DAILY 90 tablet 3    Sildenafil Citrate 100 MG Oral Tab Take 0.5-1 tablets ( mg total) by mouth daily as needed for Erectile Dysfunction. 45 tablet 3    rosuvastatin 10 MG Oral Tab Take 1 tablet (10 mg total) by mouth daily.      traMADol 50 MG Oral Tab Take 1 tablet (50 mg total) by mouth every 6 (six) hours as needed for Pain.      acetaminophen 500 MG Oral Tab Take 1 tablet (500 mg total) by mouth every 6 (six) hours as needed for Pain.      Magnesium Oxide -Mg Supplement 400 (240 Mg) MG Oral Tab Take 1 tablet (400 mg total) by mouth daily. 30 tablet 1    valGANciclovir 450 MG Oral Tab Take 2 tablets (900 mg total) by mouth daily.      aspirin 81 MG Oral Tab EC Take 1 tablet (81 mg total) by mouth in the morning.        Patient requires assistance with: No assistance required    Reviewed Patient History Dated: 6/19/25 no changes noted    Physical Exam:   /78   Pulse 68   Wt 190 lb (86.2 kg)   SpO2 99%   BMI 28.89 kg/m²   VAS Pain Score:  0/10  General Appearance: Well developed, well nourished, normal build, independent body habitus, no apparent physical disabilities, well groomed    Neurological Exam: WNL-Orientation to time, place and person, normal mood & effect, normal concentration & attention span  Inspection: non-antalgic, no acute distress   Radiology/Lab Test Reviewed: MRI L spine 6/11/24:     LUMBAR FINDINGS:   Patient motion noted.      Stable holovertebral hemangioma at the L1 level resulting in minimal cortical expansion along the dorsal cortex of the vertebral body.  No pathologic fracture identified.  Stable small hemangioma also noted in the L2 vertebral body.  The suspected subcentimeter hemangioma in the dorsal aspect of the L5 vertebral body is also again noted, however limited in evaluation due to  patient motion, but appearing grossly unchanged.      There is normal lumbar lordosis with anatomic alignment.  Vertebral body heights are well-maintained.  Mild degenerative disc space loss, disc desiccation, endplate spurring, and degenerative endplate marrow signal changes scattered in the lumbar spine.  No suspicious enhancement.  Scattered degenerative enhancement noted, particularly in the mid to lower lumbar spine along the facet joints and interspinous regions.      The distal spinal cord and conus medullaris have a normal signal and morphology.  The conus medullaris terminates at the mid L1 level.  The roots of the cauda equina are unremarkable.  No focal mass or fluid collection is seen in the lumbar spinal canal.   The paraspinal soft tissues are unremarkable.      T12-L1: There is no significant abnormality.      L1-2:  Minimal diffuse disc bulge with mild facet arthropathy. There is no significant spinal canal or neural foraminal stenosis. No significant interval change.      L2-3:  Mild diffuse disc bulge with mild facet arthropathy. There is no significant spinal canal stenosis.  There is mild left neural foraminal stenosis. No significant interval change.      L3-4:  Diffuse disc bulge with mild facet arthropathy.  There is no significant spinal canal stenosis.  There is mild right neural foraminal stenosis. No significant interval change.      L4-5:  Diffuse disc bulge with a small superimposed broad-based central disc protrusion.  Mild to moderate facet arthropathy and thickening of ligamentum flavum.  No significant spinal canal stenosis.  Mild bilateral neural foraminal stenosis. No significant interval change.      L5-S1:  Minimal diffuse disc bulge with mild facet arthropathy. There is no significant spinal canal or neural foraminal stenosis. No significant interval change.          Lab Results   Component Value Date    WBC 6.0 06/09/2025    WBC 6.1 05/21/2025    WBC 6.2 04/30/2025   No results  found for: \"HEMOGLOBIN\"  Lab Results   Component Value Date    .0 06/09/2025    .0 05/21/2025    .0 04/30/2025     Do you have any known blood/bleeding disorders?  No  Does patient currently take blood thinners?   None  Does patient currently take any antibiotics?   No  Patient educated and verbalized understanding.  Medical Decision Making:   Diagnosis:    Encounter Diagnoses   Name Primary?    Lumbar spondylosis Yes    SI (sacroiliac) pain      Impression: With regards to left low back pain, continues with 100% relief following left L4/5 and L5/S1 RFA on 4/8/2025 and is very pleased with his response.  With regards to right sided sacral pain radiating to gluteal region and posterior thigh, he has had greater than 90% relief following right SI joint injection, and pain is 0/10 on VAS.  Overall, is very pleased with his response, and we will simply see him back on an as-needed basis.    Plan: Patient to follow up PRN.        No orders of the defined types were placed in this encounter.      Meds & Refills for this Visit:  Requested Prescriptions      No prescriptions requested or ordered in this encounter       Imaging & Consults:  None    The patient indicates understanding of these issues and agrees to the plan.    PATRICK Mcbride

## 2025-07-03 NOTE — PATIENT INSTRUCTIONS
Refill policies:    Allow 2-3 business days for refills; controlled substances may take longer.  Contact your pharmacy at least 5 days prior to running out of medication and have them send an electronic request or submit request through the “request refill” option in your [employer] account.  Refills are not addressed on weekends; covering physicians do not authorize routine medications on weekends.  No narcotics or controlled substances are refilled after noon on Fridays or by on call physicians.  By law, narcotics must be electronically prescribed.  A 30 day supply with no refills is the maximum allowed.  If your prescription is due for a refill, you may be due for a follow up appointment.  To best provide you care, patients receiving routine medications need to be seen at least once a year.  Patients receiving narcotic/controlled substance medications need to be seen at least once every 3 months.  In the event that your preferred pharmacy does not have the requested medication in stock (e.g. Backordered), it is your responsibility to find another pharmacy that has the requested medication available.  We will gladly send a new prescription to that pharmacy at your request.    Scheduling Tests:    If your physician has ordered radiology tests such as MRI or CT scans, please contact Central Scheduling at [phone] right away to schedule the test.  Once scheduled, the [facility] Centralized Referral Team will work with your insurance carrier to obtain pre-certification or prior authorization.  Depending on your insurance carrier, approval may take 3-10 days.  It is highly recommended patients assure they have received an authorization before having a test performed.  If test is done without insurance authorization, patient may be responsible for the entire amount billed.      Precertification and Prior Authorizations:  If your physician has recommended that you have a procedure or additional testing performed the [facility]  Centralized Referral Team will contact your insurance carrier to obtain pre-certification or prior authorization.    You are strongly encouraged to contact your insurance carrier to verify that your procedure/test has been approved and is a COVERED benefit.  Although the [facility] Centralized Referral Team does its due diligence, the insurance carrier gives the disclaimer that \"Although the procedure is authorized, this does not guarantee payment.\"    Ultimately the patient is responsible for payment.   Thank you for your understanding in this matter.  Paperwork Completion:  If you require FMLA or disability paperwork for your recovery, please make sure to either drop it off or have it faxed to our office at [phone]. Be sure the form has your name and date of birth on it.  The form will be faxed to our Forms Department and they will complete it for you.  There is a 25$ fee for all forms that need to be filled out.  Please be aware there is a 10-14 day turnaround time.  You will need to sign a release of information (VITA) form if your paperwork does not come with one.  You may call the Forms Department with any questions at [phone].  Their fax number is [phone].

## 2025-07-07 ENCOUNTER — LAB ENCOUNTER (OUTPATIENT)
Dept: LAB | Age: 60
End: 2025-07-07
Attending: INTERNAL MEDICINE
Payer: COMMERCIAL

## 2025-07-07 DIAGNOSIS — Z12.5 PROSTATE CANCER SCREENING: ICD-10-CM

## 2025-07-07 LAB
ALBUMIN SERPL-MCNC: 4.4 G/DL (ref 3.2–4.8)
ALBUMIN/GLOB SERPL: 1.5 {RATIO} (ref 1–2)
ALP LIVER SERPL-CCNC: 61 U/L (ref 45–117)
ALT SERPL-CCNC: 23 U/L (ref 10–49)
ANION GAP SERPL CALC-SCNC: 6 MMOL/L (ref 0–18)
AST SERPL-CCNC: 18 U/L (ref ?–34)
BASOPHILS # BLD AUTO: 0.03 X10(3) UL (ref 0–0.2)
BASOPHILS NFR BLD AUTO: 0.5 %
BILIRUB SERPL-MCNC: 0.7 MG/DL (ref 0.3–1.2)
BUN BLD-MCNC: 15 MG/DL (ref 9–23)
CALCIUM BLD-MCNC: 9.6 MG/DL (ref 8.7–10.6)
CHLORIDE SERPL-SCNC: 107 MMOL/L (ref 98–112)
CO2 SERPL-SCNC: 29 MMOL/L (ref 21–32)
COMPLEXED PSA SERPL-MCNC: 0.79 NG/ML (ref ?–4)
CREAT BLD-MCNC: 1.27 MG/DL (ref 0.7–1.3)
EGFRCR SERPLBLD CKD-EPI 2021: 65 ML/MIN/1.73M2 (ref 60–?)
EOSINOPHIL # BLD AUTO: 0.08 X10(3) UL (ref 0–0.7)
EOSINOPHIL NFR BLD AUTO: 1.3 %
ERYTHROCYTE [DISTWIDTH] IN BLOOD BY AUTOMATED COUNT: 13.9 %
FASTING STATUS PATIENT QL REPORTED: YES
GLOBULIN PLAS-MCNC: 3 G/DL (ref 2–3.5)
GLUCOSE BLD-MCNC: 107 MG/DL (ref 70–99)
HCT VFR BLD AUTO: 44.1 % (ref 39–53)
HGB BLD-MCNC: 15.3 G/DL (ref 13–17.5)
IMM GRANULOCYTES # BLD AUTO: 0.01 X10(3) UL (ref 0–1)
IMM GRANULOCYTES NFR BLD: 0.2 %
LYMPHOCYTES # BLD AUTO: 1.85 X10(3) UL (ref 1–4)
LYMPHOCYTES NFR BLD AUTO: 31 %
MAGNESIUM SERPL-MCNC: 1.7 MG/DL (ref 1.6–2.6)
MCH RBC QN AUTO: 32.3 PG (ref 26–34)
MCHC RBC AUTO-ENTMCNC: 34.7 G/DL (ref 31–37)
MCV RBC AUTO: 93 FL (ref 80–100)
MONOCYTES # BLD AUTO: 0.22 X10(3) UL (ref 0.1–1)
MONOCYTES NFR BLD AUTO: 3.7 %
NEUTROPHILS # BLD AUTO: 3.78 X10 (3) UL (ref 1.5–7.7)
NEUTROPHILS # BLD AUTO: 3.78 X10(3) UL (ref 1.5–7.7)
NEUTROPHILS NFR BLD AUTO: 63.3 %
OSMOLALITY SERPL CALC.SUM OF ELEC: 295 MOSM/KG (ref 275–295)
PLATELET # BLD AUTO: 152 10(3)UL (ref 150–450)
POTASSIUM SERPL-SCNC: 4.6 MMOL/L (ref 3.5–5.1)
PROT SERPL-MCNC: 7.4 G/DL (ref 5.7–8.2)
RBC # BLD AUTO: 4.74 X10(6)UL (ref 4.3–5.7)
SODIUM SERPL-SCNC: 142 MMOL/L (ref 136–145)
WBC # BLD AUTO: 6 X10(3) UL (ref 4–11)

## 2025-07-07 PROCEDURE — 80053 COMPREHEN METABOLIC PANEL: CPT

## 2025-07-07 PROCEDURE — 36415 COLL VENOUS BLD VENIPUNCTURE: CPT

## 2025-07-07 PROCEDURE — 80197 ASSAY OF TACROLIMUS: CPT

## 2025-07-07 PROCEDURE — 83735 ASSAY OF MAGNESIUM: CPT

## 2025-07-07 PROCEDURE — 85025 COMPLETE CBC W/AUTO DIFF WBC: CPT

## 2025-07-09 LAB
CMV DNA DETECT, QN LOG: NOT DETECTED {LOG_IU}/ML
CMV DNA DETECT, QN: NOT DETECTED [IU]/ML

## 2025-07-11 LAB — TACROLIMUS LVL: 4.6 NG/ML

## 2025-08-15 ENCOUNTER — TELEPHONE (OUTPATIENT)
Dept: SURGERY | Facility: CLINIC | Age: 60
End: 2025-08-15

## 2025-08-19 ENCOUNTER — TELEPHONE (OUTPATIENT)
Dept: NEUROLOGY | Facility: CLINIC | Age: 60
End: 2025-08-19

## (undated) DEVICE — KIT CUSTOM ENDOPROCEDURE STERIS

## (undated) DEVICE — REMOVER LOT 4OZ N IRRIG UNSCNT SFT MOIST LIQ

## (undated) DEVICE — NEEDLE SPNL 22GA L3.5IN BLK QNCKE STYL DISP

## (undated) DEVICE — 1200CC GUARDIAN II: Brand: GUARDIAN

## (undated) DEVICE — 10FT COMBINED O2 DELIVERY/CO2 MONITORING. FILTER WITH MICROSTREAM TYPE LUER: Brand: DUAL ADULT NASAL CANNULA

## (undated) DEVICE — BITEBLOCK ENDOSCP 60FR MAXI STRP

## (undated) DEVICE — PAIN TRAY: Brand: MEDLINE INDUSTRIES, INC.

## (undated) DEVICE — BIOGUARD CLEANING ADAPTER

## (undated) DEVICE — SKIN REG/FINE DUAL MARKER, RULER, LABELS: Brand: MEDLINE

## (undated) DEVICE — GLOVE,SURG,SENSICARE,ALOE,LF,PF,7: Brand: MEDLINE

## (undated) DEVICE — STERIS KITS

## (undated) DEVICE — BALLOON HEMOSTATIC EUS LINEAR

## (undated) DEVICE — 3M™ RED DOT™ MONITORING ELECTRODE WITH FOAM TAPE AND STICKY GEL 2570-3, 3/BAG, 200/CASE, 54/PLT: Brand: RED DOT™

## (undated) DEVICE — GLOVE SUR 7.5 SENSICARE PIP WHT PWD F

## (undated) DEVICE — RF CANNULA, CVD: Brand: VENOM

## (undated) DEVICE — GIJAW SINGLE-USE BIOPSY FORCEPS WITH NEEDLE: Brand: GIJAW

## (undated) DEVICE — SYRINGE MED 10ML LL CTRL W/ FNGR GRP CLR BRL

## (undated) DEVICE — SHEET, T, LAPAROTOMY, STERILE: Brand: MEDLINE

## (undated) DEVICE — V2 SPECIMEN COLLECTION MANIFOLD KIT: Brand: NEPTUNE

## (undated) DEVICE — TRAP POLYP W/ 2 SPEC TY CLR MAGNIFYING WIND

## (undated) DEVICE — BANDAGE ADH 1INX3IN NAT FAB N ADH PD CURAD

## (undated) DEVICE — Device

## (undated) DEVICE — RFA CLEANING CAP: Brand: BARRX

## (undated) DEVICE — 3M™ RED DOT™ MONITORING ELECTRODE WITH FOAM TAPE AND STICKY GEL, 50/BAG, 20/CASE, 72/PLT 2570: Brand: RED DOT™

## (undated) DEVICE — KIT VLV 5 PC AIR H2O SUCT BX ENDOGATOR CONN

## (undated) DEVICE — VALVE KIT SGL USE DEFENDO

## (undated) DEVICE — PATIENT RETURN ELECTRODE, SINGLE-USE, CONTACT QUALITY MONITORING, ADULT, WITH 9FT CORD, FOR PATIENTS WEIGING OVER 33LBS. (15KG): Brand: MEGADYNE

## (undated) DEVICE — LASSO POLYPECTOMY SNARE: Brand: LASSO

## (undated) DEVICE — KIT MFLD FOR SPEC COLL

## (undated) DEVICE — 60 RFA FOCAL CATHETER,80 APPLICATIONS: Brand: BARRX

## (undated) DEVICE — BLOCK BITE MAXI 60FR

## (undated) NOTE — Clinical Note
TCM call completed. Pt is doing better. He declined an appt at this time. He does have a call into Transplant team. Thank you!

## (undated) NOTE — LETTER
08/03/20        [address] [place] [address]      Dear Paul Proctor,    [facility] records indicate that you have outstanding lab work and or testing that was ordered for you and has not yet been completed:  Orders Placed This Encounter

## (undated) NOTE — Clinical Note
TCM call completed. Pt declined TCM appt at this time. He plans to schedule with GI and has an appt Monday with [facility] transplant team. Thank you.

## (undated) NOTE — LETTER
07/01/20        [address] [place] [address]      Dear [place],    [facility] records indicate that you have outstanding lab work and or testing that was ordered for you and has not yet been completed:  Orders Placed This Encounter

## (undated) NOTE — MR AVS SNAPSHOT
[address]  [address] [address] [phone]               Thank you for choosing us for your health care visit with Aurelio Avalos DO.   We are glad to serve you and happy to provide you with this Assoc Dx:  Laboratory exam ordered as part of routine general medical examination [Z00.00], Encounter for vitamin deficiency screening [Z13.21]                 Reason for Today's Visit     Well Adult           Medical Issues Discussed Today     Family his You can access your MyChart to more actively manage your health care and view more details from this visit by going to [email or web address].   If you've recently had a stay at the Hospital you can access your discharge instructions in [address] by glenna You don’t need to join a gym. Home exercises work great.  Put more priority on exercise in your life                    Visit [facility] online at  [email or web address]